# Patient Record
Sex: FEMALE | Race: BLACK OR AFRICAN AMERICAN | NOT HISPANIC OR LATINO | Employment: OTHER | URBAN - METROPOLITAN AREA
[De-identification: names, ages, dates, MRNs, and addresses within clinical notes are randomized per-mention and may not be internally consistent; named-entity substitution may affect disease eponyms.]

---

## 2017-01-11 DIAGNOSIS — I10 ESSENTIAL HYPERTENSION: ICD-10-CM

## 2017-01-11 RX ORDER — AMLODIPINE BESYLATE 5 MG/1
TABLET ORAL
Qty: 90 TABLET | Refills: 2 | Status: SHIPPED | OUTPATIENT
Start: 2017-01-11 | End: 2017-10-15 | Stop reason: SDUPTHER

## 2017-01-27 RX ORDER — CANAGLIFLOZIN 300 MG/1
TABLET, FILM COATED ORAL
Qty: 30 TABLET | Refills: 5 | Status: SHIPPED | OUTPATIENT
Start: 2017-01-27 | End: 2017-05-23 | Stop reason: SDUPTHER

## 2017-02-17 RX ORDER — METFORMIN HYDROCHLORIDE 500 MG/1
TABLET, EXTENDED RELEASE ORAL
Qty: 360 TABLET | Refills: 0 | Status: SHIPPED | OUTPATIENT
Start: 2017-02-17 | End: 2017-05-18 | Stop reason: SDUPTHER

## 2017-03-30 RX ORDER — ATENOLOL 100 MG/1
TABLET ORAL
Qty: 60 CAPSULE | Refills: 4 | Status: SHIPPED | OUTPATIENT
Start: 2017-03-30 | End: 2017-09-03 | Stop reason: SDUPTHER

## 2017-04-01 DIAGNOSIS — E11.8 TYPE 2 DIABETES MELLITUS WITH COMPLICATION, WITHOUT LONG-TERM CURRENT USE OF INSULIN (HCC): Primary | ICD-10-CM

## 2017-04-01 DIAGNOSIS — E53.8 COBALAMIN DEFICIENCY: ICD-10-CM

## 2017-04-08 LAB
ALBUMIN SERPL-MCNC: 3.8 G/DL (ref 3.5–5.2)
ALBUMIN/GLOB SERPL: 1.5 G/DL
ALP SERPL-CCNC: 63 U/L (ref 39–117)
ALT SERPL-CCNC: 25 U/L (ref 1–33)
AST SERPL-CCNC: 25 U/L (ref 1–32)
BASOPHILS # BLD AUTO: 0.03 10*3/MM3 (ref 0–0.2)
BASOPHILS NFR BLD AUTO: 0.3 % (ref 0–1.5)
BILIRUB SERPL-MCNC: 0.4 MG/DL (ref 0.1–1.2)
BUN SERPL-MCNC: 9 MG/DL (ref 8–23)
BUN/CREAT SERPL: 11.5 (ref 7–25)
CALCIUM SERPL-MCNC: 9.9 MG/DL (ref 8.6–10.5)
CHLORIDE SERPL-SCNC: 102 MMOL/L (ref 98–107)
CHOLEST SERPL-MCNC: 160 MG/DL (ref 0–200)
CO2 SERPL-SCNC: 27.3 MMOL/L (ref 22–29)
CREAT SERPL-MCNC: 0.78 MG/DL (ref 0.57–1)
EOSINOPHIL # BLD AUTO: 0.19 10*3/MM3 (ref 0–0.7)
EOSINOPHIL NFR BLD AUTO: 2.1 % (ref 0.3–6.2)
ERYTHROCYTE [DISTWIDTH] IN BLOOD BY AUTOMATED COUNT: 15.4 % (ref 11.7–13)
GLOBULIN SER CALC-MCNC: 2.6 GM/DL
GLUCOSE SERPL-MCNC: 142 MG/DL (ref 65–99)
HBA1C MFR BLD: 7.47 % (ref 4.8–5.6)
HCT VFR BLD AUTO: 37.6 % (ref 35.6–45.5)
HDLC SERPL-MCNC: 68 MG/DL (ref 40–60)
HGB BLD-MCNC: 11.9 G/DL (ref 11.9–15.5)
IMM GRANULOCYTES # BLD: 0.02 10*3/MM3 (ref 0–0.03)
IMM GRANULOCYTES NFR BLD: 0.2 % (ref 0–0.5)
LDLC SERPL CALC-MCNC: 69 MG/DL (ref 0–100)
LYMPHOCYTES # BLD AUTO: 2.66 10*3/MM3 (ref 0.9–4.8)
LYMPHOCYTES NFR BLD AUTO: 29.4 % (ref 19.6–45.3)
MCH RBC QN AUTO: 29.2 PG (ref 26.9–32)
MCHC RBC AUTO-ENTMCNC: 31.6 G/DL (ref 32.4–36.3)
MCV RBC AUTO: 92.2 FL (ref 80.5–98.2)
MONOCYTES # BLD AUTO: 0.67 10*3/MM3 (ref 0.2–1.2)
MONOCYTES NFR BLD AUTO: 7.4 % (ref 5–12)
NEUTROPHILS # BLD AUTO: 5.49 10*3/MM3 (ref 1.9–8.1)
NEUTROPHILS NFR BLD AUTO: 60.6 % (ref 42.7–76)
PLATELET # BLD AUTO: 319 10*3/MM3 (ref 140–500)
POTASSIUM SERPL-SCNC: 4.5 MMOL/L (ref 3.5–5.2)
PROT SERPL-MCNC: 6.4 G/DL (ref 6–8.5)
RBC # BLD AUTO: 4.08 10*6/MM3 (ref 3.9–5.2)
SODIUM SERPL-SCNC: 144 MMOL/L (ref 136–145)
TRIGL SERPL-MCNC: 113 MG/DL (ref 0–150)
TSH SERPL DL<=0.005 MIU/L-ACNC: 2.18 MIU/ML (ref 0.27–4.2)
VIT B12 SERPL-MCNC: 1203 PG/ML (ref 211–946)
VLDLC SERPL CALC-MCNC: 22.6 MG/DL (ref 5–40)
WBC # BLD AUTO: 9.06 10*3/MM3 (ref 4.5–10.7)

## 2017-04-12 ENCOUNTER — OFFICE VISIT (OUTPATIENT)
Dept: INTERNAL MEDICINE | Facility: CLINIC | Age: 72
End: 2017-04-12

## 2017-04-12 VITALS
OXYGEN SATURATION: 97 % | HEIGHT: 62 IN | BODY MASS INDEX: 37.36 KG/M2 | DIASTOLIC BLOOD PRESSURE: 68 MMHG | WEIGHT: 203 LBS | HEART RATE: 60 BPM | SYSTOLIC BLOOD PRESSURE: 130 MMHG

## 2017-04-12 DIAGNOSIS — E11.8 TYPE 2 DIABETES MELLITUS WITH COMPLICATION, WITHOUT LONG-TERM CURRENT USE OF INSULIN (HCC): Primary | ICD-10-CM

## 2017-04-12 DIAGNOSIS — E78.5 HYPERLIPIDEMIA, UNSPECIFIED HYPERLIPIDEMIA TYPE: ICD-10-CM

## 2017-04-12 DIAGNOSIS — I10 ESSENTIAL HYPERTENSION: ICD-10-CM

## 2017-04-12 PROCEDURE — 99214 OFFICE O/P EST MOD 30 MIN: CPT | Performed by: INTERNAL MEDICINE

## 2017-04-12 RX ORDER — ROSUVASTATIN CALCIUM 20 MG/1
TABLET, COATED ORAL
Qty: 90 TABLET | Refills: 1 | Status: SHIPPED | OUTPATIENT
Start: 2017-04-12 | End: 2017-10-15 | Stop reason: SDUPTHER

## 2017-04-12 RX ORDER — HYDRALAZINE HYDROCHLORIDE 100 MG/1
TABLET, FILM COATED ORAL
Qty: 180 TABLET | Refills: 1 | Status: SHIPPED | OUTPATIENT
Start: 2017-04-12 | End: 2017-10-15 | Stop reason: SDUPTHER

## 2017-04-13 NOTE — PROGRESS NOTES
Subjective     Kassi Mathis is a 72 y.o. female who presents with   Chief Complaint   Patient presents with   • Diabetes   • Hypertension   • Hyperlipidemia       History of Present Illness     DM-2. She is maintained on metformin, Januvia and Invokana.  HTN. She is under good control at home.  HLD. She looks good with Crestor.      Review of Systems   Respiratory: Negative.    Cardiovascular: Negative.        The following portions of the patient's history were reviewed and updated as appropriate: allergies, current medications and problem list.    Patient Active Problem List    Diagnosis Date Noted   • Type 2 diabetes mellitus 01/17/2016     Priority: High   • Depression 01/17/2016   • Allergic rhinitis 01/17/2016   • Hypertension 01/17/2016   • Dementia without behavioral disturbance 01/17/2016     Note Last Updated: 7/20/2016     By neuropysch evaluation 7/20/2016     • Paroxysmal atrial fibrillation 01/17/2016   • Cobalamin deficiency 01/17/2016     Note Last Updated: 6/8/2016     Boarderline only with normal MMA/Homocysteine/IF antibody.  Encouraged MVI daily.       • Hyperlipidemia 01/17/2016       Current Outpatient Prescriptions on File Prior to Visit   Medication Sig Dispense Refill   • acetaminophen (TYLENOL) 325 MG tablet Take 650 mg by mouth Every 6 (Six) Hours.     • amLODIPine (NORVASC) 5 MG tablet TAKE ONE TABLET BY MOUTH DAILY 90 tablet 2   • cetirizine (ZyrTEC) 10 MG tablet Take 1 tablet by mouth daily.     • donepezil (ARICEPT) 10 MG tablet      • ferrous sulfate 325 (65 FE) MG tablet Take 1 tablet by mouth 2 (two) times a day. EVERY OTHER DAY     • fluticasone (FLONASE) 50 MCG/ACT nasal spray PLACE 2 SPRAYS IN EACH NOSTRIL DAILY 16 each 10   • glucose blood test strip Use daily for type 2 DM. 100 each 3   • INVOKANA 300 MG tablet TAKE ONE TABLET BY MOUTH DAILY 30 tablet 5   • JANUVIA 100 MG tablet TAKE ONE TABLET BY MOUTH DAILY 30 tablet 10   • Melatonin 10 MG capsule Take  by mouth.     •  "metFORMIN ER (GLUCOPHAGE-XR) 500 MG 24 hr tablet TAKE TWO TABLETS BY MOUTH EVERY 12 HOURS 360 tablet 0   • metoprolol succinate XL (TOPROL-XL) 50 MG 24 hr tablet TAKE TWO TABLETS BY MOUTH EVERY MORNING 180 tablet 1   • Multiple Vitamin (MULTIVITAMINS PO) Take 1 tablet by mouth daily.     • PRADAXA 150 MG capsu TAKE ONE CAPSULE BY MOUTH TWICE A DAY 60 capsule 4   • sertraline (ZOLOFT) 25 MG tablet Take 25 mg by mouth Daily.     • buPROPion XL (WELLBUTRIN XL) 300 MG 24 hr tablet daily.         No current facility-administered medications on file prior to visit.        Objective     /68  Pulse 60  Ht 62\" (157.5 cm)  Wt 203 lb (92.1 kg)  LMP  (LMP Unknown)  SpO2 97%  BMI 37.13 kg/m2    Physical Exam   Constitutional: She is oriented to person, place, and time. She appears well-developed and well-nourished.   HENT:   Head: Normocephalic and atraumatic.   Cardiovascular: Normal rate, regular rhythm and normal heart sounds.    Pulmonary/Chest: Effort normal and breath sounds normal.   Neurological: She is alert and oriented to person, place, and time.   Skin: Skin is warm and dry.   Psychiatric: She has a normal mood and affect. Her behavior is normal.       Assessment/Plan   Kassi was seen today for diabetes, hypertension and hyperlipidemia.    Diagnoses and all orders for this visit:    Type 2 diabetes mellitus with complication, without long-term current use of insulin    Essential hypertension    Hyperlipidemia, unspecified hyperlipidemia type        Discussion  DM-2. Continue current regimen.   HTN. Continue current regimen.   HLD. Continue Crestor.        Future Appointments  Date Time Provider Department Center   6/8/2017 2:00 PM Mode Gongora MD [facility] None   10/10/2017 1:30 PM [facility] None   10/17/2017 1:30 PM Parul Jaime MD [facility] None         "

## 2017-05-01 RX ORDER — SITAGLIPTIN 100 MG/1
TABLET, FILM COATED ORAL
Qty: 30 TABLET | Refills: 9 | Status: SHIPPED | OUTPATIENT
Start: 2017-05-01 | End: 2017-05-23 | Stop reason: SDUPTHER

## 2017-05-18 RX ORDER — METFORMIN HYDROCHLORIDE 500 MG/1
TABLET, EXTENDED RELEASE ORAL
Qty: 360 TABLET | Refills: 0 | Status: SHIPPED | OUTPATIENT
Start: 2017-05-18 | End: 2017-08-20 | Stop reason: SDUPTHER

## 2017-05-30 RX ORDER — METOPROLOL SUCCINATE 50 MG/1
TABLET, EXTENDED RELEASE ORAL
Qty: 180 TABLET | Refills: 0 | Status: SHIPPED | OUTPATIENT
Start: 2017-05-30 | End: 2017-08-26 | Stop reason: SDUPTHER

## 2017-06-01 RX ORDER — DONEPEZIL HYDROCHLORIDE 5 MG/1
TABLET, FILM COATED ORAL
Qty: 30 TABLET | Refills: 3 | Status: SHIPPED | OUTPATIENT
Start: 2017-06-01 | End: 2017-07-12

## 2017-06-07 ENCOUNTER — TELEPHONE (OUTPATIENT)
Dept: CARDIOLOGY | Facility: CLINIC | Age: 72
End: 2017-06-07

## 2017-06-07 NOTE — TELEPHONE ENCOUNTER
Pt's daughter is not able to attend appt with pt.  She has mild demntia has some questions for appt in her absence.      Pt has an appt tomorrow on 06/08.  Pt's bp is usually running fine, 120's/50's, but does c/o headaches.  They are concerned that it may be a side affect from the Hydralazin 100 mg bid.    Do you think pt would be okay to take Calcium and Magnesium to assist with afib.  She does c/o of dizziness at times.  Went over pt's medication over the phone.       Note: will print and add to chart and discuss with Dr. Gongora about any changes after appt.

## 2017-06-08 ENCOUNTER — OFFICE VISIT (OUTPATIENT)
Dept: CARDIOLOGY | Facility: CLINIC | Age: 72
End: 2017-06-08

## 2017-06-08 VITALS
DIASTOLIC BLOOD PRESSURE: 70 MMHG | SYSTOLIC BLOOD PRESSURE: 122 MMHG | BODY MASS INDEX: 37.17 KG/M2 | WEIGHT: 202 LBS | HEART RATE: 66 BPM | HEIGHT: 62 IN

## 2017-06-08 DIAGNOSIS — I48.0 PAROXYSMAL ATRIAL FIBRILLATION (HCC): Primary | ICD-10-CM

## 2017-06-08 DIAGNOSIS — I10 ESSENTIAL HYPERTENSION: ICD-10-CM

## 2017-06-08 PROCEDURE — 99213 OFFICE O/P EST LOW 20 MIN: CPT | Performed by: INTERNAL MEDICINE

## 2017-06-08 PROCEDURE — 93000 ELECTROCARDIOGRAM COMPLETE: CPT | Performed by: INTERNAL MEDICINE

## 2017-06-08 NOTE — PROGRESS NOTES
Date of Office Visit: 2017  Encounter Provider: Mode Gongora MD  Place of Service: [facility] CARDIOLOGY  Patient Name: Kassi Mathis  :1945    Chief Complaint   Patient presents with   • Atrial Fibrillation     6 MONTH    :     HPI: Kassi Mathis is a 72 y.o. female who presents today to followup. She has a long-standing history of high blood pressure. She has paroxysmal atrial fibrillation. She had an echocardiogram in 2015 which revealed normal left ventricular systolic function without pulmonary hypertension or valvular heart disease. She only had grade I diastolic dysfunction. She had a Cardiolite stress test which was normal. She was markedly hypertensive at that time and she was placed on an increased dose of hydralazine/isosorbide as well as furosemide.  The diuretic then had to be stopped because she was getting orthostatic and dehydrated. She was unable to get the combination medication so she was changed to hydralazine alone.     She had occasional palpitations, which are isolated. She has not had any sustained atrial fibrillation. She denies edema, dyspnea, lightheadedness, syncope, or chest pain. She has not had bleeding on dabigatran.  Her memory continues to decline.     Her daughter checks her BP regularly at home, and her systolics are in the 120's.  She is having headaches and some positional lightheadedness.     Past Medical History:   Diagnosis Date   • Allergic rhinitis    • Anemia    • Chronic venous insufficiency    • Dementia    • Depression    • Diabetes mellitus    • Hidradenitis suppurativa    • Hyperlipidemia    • Hypertension    • Osteoarthritis    • Paroxysmal atrial fibrillation    • Peripheral neuropathy    • Stroke    • Vitamin B 12 deficiency     Boarderline only with normal MMA/Homocysteine/IF antibody       Past Surgical History:   Procedure Laterality Date   • BACK SURGERY     • HYSTERECTOMY      PARTIAL   • KNEE SURGERY Left         Social History     Social History   • Marital status:      Spouse name: N/A   • Number of children: N/A   • Years of education: N/A     Occupational History   • Not on file.     Social History Main Topics   • Smoking status: Former Smoker   • Smokeless tobacco: Not on file      Comment: CAFFEINE USE/ SOFT DRINKS.    • Alcohol use No   • Drug use: No   • Sexual activity: Not on file     Other Topics Concern   • Not on file     Social History Narrative       Family History   Problem Relation Age of Onset   • Breast cancer Mother    • Stroke Father    • Stroke Maternal Grandmother    • Heart attack Maternal Grandmother    • Heart attack Maternal Grandfather        Review of Systems   Cardiovascular: Positive for palpitations.   Neurological: Positive for light-headedness.   Psychiatric/Behavioral: Positive for memory loss.   All other systems reviewed and are negative.      Allergies   Allergen Reactions   • Enalapril      Aka vasotec tabs   • Namenda [Memantine Hcl]      imbalance   • Penicillins          Current Outpatient Prescriptions:   •  acetaminophen (TYLENOL) 325 MG tablet, Take 650 mg by mouth Every 6 (Six) Hours., Disp: , Rfl:   •  amLODIPine (NORVASC) 5 MG tablet, TAKE ONE TABLET BY MOUTH DAILY, Disp: 90 tablet, Rfl: 2  •  buPROPion XL (WELLBUTRIN XL) 300 MG 24 hr tablet, daily.  , Disp: , Rfl:   •  Canagliflozin (INVOKANA) 300 MG tablet, Take 300 mg by mouth Daily., Disp: 90 tablet, Rfl: 1  •  cetirizine (ZyrTEC) 10 MG tablet, Take 1 tablet by mouth daily., Disp: , Rfl:   •  donepezil (ARICEPT) 10 MG tablet, , Disp: , Rfl:   •  donepezil (ARICEPT) 5 MG tablet, TAKE ONE TABLET BY MOUTH EVERY NIGHT AT BEDTIME, Disp: 30 tablet, Rfl: 3  •  ferrous sulfate 325 (65 FE) MG tablet, Take 1 tablet by mouth 2 (two) times a day. EVERY OTHER DAY, Disp: , Rfl:   •  fluticasone (FLONASE) 50 MCG/ACT nasal spray, PLACE 2 SPRAYS IN EACH NOSTRIL DAILY, Disp: 16 each, Rfl: 10  •  glucose blood test strip, Use  "daily for type 2 DM., Disp: 100 each, Rfl: 3  •  hydrALAZINE (APRESOLINE) 100 MG tablet, TAKE ONE TABLET BY MOUTH TWICE A DAY, Disp: 180 tablet, Rfl: 1  •  Melatonin 10 MG capsule, Take  by mouth., Disp: , Rfl:   •  metFORMIN ER (GLUCOPHAGE-XR) 500 MG 24 hr tablet, TAKE TWO TABLETS BY MOUTH EVERY 12 HOURS, Disp: 360 tablet, Rfl: 0  •  metoprolol succinate XL (TOPROL-XL) 50 MG 24 hr tablet, TAKE TWO TABLETS BY MOUTH EVERY MORNING, Disp: 180 tablet, Rfl: 0  •  Multiple Vitamin (MULTIVITAMINS PO), Take 1 tablet by mouth daily., Disp: , Rfl:   •  PRADAXA 150 MG capsu, TAKE ONE CAPSULE BY MOUTH TWICE A DAY, Disp: 60 capsule, Rfl: 4  •  rosuvastatin (CRESTOR) 20 MG tablet, TAKE ONE TABLET BY MOUTH EVERY NIGHT AT BEDTIME, Disp: 90 tablet, Rfl: 1  •  sertraline (ZOLOFT) 100 MG tablet, Take 100 mg by mouth Daily., Disp: , Rfl:   •  SITagliptin (JANUVIA) 100 MG tablet, Take 1 tablet by mouth Daily., Disp: 90 tablet, Rfl: 1     Objective:     Vitals:    06/08/17 1357 06/08/17 1656   BP: 158/72 122/70   Pulse: 66    Weight: 202 lb (91.6 kg)    Height: 62\" (157.5 cm)      Body mass index is 36.95 kg/(m^2).    Physical Exam   Constitutional: She is oriented to person, place, and time. She appears well-developed and well-nourished.   HENT:   Head: Normocephalic.   Nose: Nose normal.   Mouth/Throat: Oropharynx is clear and moist.   Eyes: Conjunctivae and EOM are normal. Pupils are equal, round, and reactive to light.   Neck: Normal range of motion. No JVD present.   Cardiovascular: Normal rate, regular rhythm, normal heart sounds and intact distal pulses.    No murmur heard.  Pulmonary/Chest: Effort normal and breath sounds normal.   Abdominal: Soft. She exhibits no mass. There is no tenderness.   Musculoskeletal: Normal range of motion. She exhibits no edema.   Lymphadenopathy:     She has no cervical adenopathy.   Neurological: She is alert and oriented to person, place, and time. No cranial nerve deficit.   Skin: Skin is warm " and dry. No rash noted.   Psychiatric: She has a normal mood and affect. Her behavior is normal. Judgment and thought content normal.   Vitals reviewed.        ECG 12 Lead  Date/Time: 6/8/2017 4:54 PM  Performed by: MODE GONGORA  Authorized by: MODE GONGORA   Comparison: compared with previous ECG   Similar to previous ECG  Rhythm: sinus rhythm  Conduction: conduction normal  ST Depression: II, III, aVF, V5 and V6  QRS axis: normal  Other: no other findings  Clinical impression: abnormal ECG              Assessment:       Diagnosis Plan   1. Paroxysmal atrial fibrillation     2. Essential hypertension            Plan:       1.  Atrial Fibrillation and Atrial Flutter  Assessment  • The patient has paroxysmal atrial fibrillation  • This is non-valvular in etiology  • The patient's CHADS2-VASc score is 6  • A [id number] score of 2 or more is considered a high risk for a thromboembolic event  • Dabigatran prescribed    Plan  • Attempt to maintain sinus rhythm  • Continue dabigatran for antithrombotic therapy, bleeding issues discussed  • Continue beta blocker for rhythm control    2.  Her SBP is probably a little too low for her.  She has positional lightheadedness.  I have decreased her hydralazine to 50mg BID.        Sincerely,       Mode Gongora MD

## 2017-06-08 NOTE — TELEPHONE ENCOUNTER
Dr. Gongora recommends that her goal sys bp at 140, so pt will need to cut her Hydralizine in half.   Calcium and Mag are fine for pt to take.      I tried calling, but had to leave a v/m for pt's daughter with this info.  I instructed her to call back if she has any questions.

## 2017-07-12 ENCOUNTER — OFFICE VISIT (OUTPATIENT)
Dept: INTERNAL MEDICINE | Facility: CLINIC | Age: 72
End: 2017-07-12

## 2017-07-12 VITALS
SYSTOLIC BLOOD PRESSURE: 132 MMHG | BODY MASS INDEX: 36.99 KG/M2 | WEIGHT: 201 LBS | DIASTOLIC BLOOD PRESSURE: 84 MMHG | HEIGHT: 62 IN | OXYGEN SATURATION: 100 % | HEART RATE: 66 BPM

## 2017-07-12 DIAGNOSIS — M54.50 ACUTE BILATERAL LOW BACK PAIN WITHOUT SCIATICA: ICD-10-CM

## 2017-07-12 DIAGNOSIS — M25.511 ACUTE PAIN OF RIGHT SHOULDER: ICD-10-CM

## 2017-07-12 DIAGNOSIS — M25.532 WRIST PAIN, ACUTE, LEFT: Primary | ICD-10-CM

## 2017-07-12 DIAGNOSIS — Z13.9 SCREENING: ICD-10-CM

## 2017-07-12 DIAGNOSIS — E11.8 TYPE 2 DIABETES MELLITUS WITH COMPLICATION, WITHOUT LONG-TERM CURRENT USE OF INSULIN (HCC): ICD-10-CM

## 2017-07-12 PROCEDURE — 73030 X-RAY EXAM OF SHOULDER: CPT | Performed by: INTERNAL MEDICINE

## 2017-07-12 PROCEDURE — 99214 OFFICE O/P EST MOD 30 MIN: CPT | Performed by: INTERNAL MEDICINE

## 2017-07-12 PROCEDURE — 72110 X-RAY EXAM L-2 SPINE 4/>VWS: CPT | Performed by: INTERNAL MEDICINE

## 2017-07-12 NOTE — PROGRESS NOTES
Subjective     Kassi Mathis is a 72 y.o. female who presents with   Chief Complaint   Patient presents with   • Back Pain   • Left hand pain       History of Present Illness     Left wrist pain.  Going on for a week.  No injury is associated.  Swelling associated.  Getting better.     LBP.  Going for a couple months. No injury is associated.  Localized to the back.      Shoulder pain for eight months.  Aches with certain montions. Decreased ROM    Review of Systems   Respiratory: Negative.    Cardiovascular: Negative.        The following portions of the patient's history were reviewed and updated as appropriate: allergies, current medications and problem list.    Patient Active Problem List    Diagnosis Date Noted   • Type 2 diabetes mellitus 01/17/2016     Priority: High   • Depression 01/17/2016   • Allergic rhinitis 01/17/2016   • Hypertension 01/17/2016   • Dementia without behavioral disturbance 01/17/2016     Note Last Updated: 7/20/2016     By neuropysch evaluation 7/20/2016     • Paroxysmal atrial fibrillation 01/17/2016   • Cobalamin deficiency 01/17/2016     Note Last Updated: 6/8/2016     Boarderline only with normal MMA/Homocysteine/IF antibody.  Encouraged MVI daily.       • Hyperlipidemia 01/17/2016       Current Outpatient Prescriptions on File Prior to Visit   Medication Sig Dispense Refill   • acetaminophen (TYLENOL) 325 MG tablet Take 650 mg by mouth Every 6 (Six) Hours.     • amLODIPine (NORVASC) 5 MG tablet TAKE ONE TABLET BY MOUTH DAILY 90 tablet 2   • buPROPion XL (WELLBUTRIN XL) 300 MG 24 hr tablet daily.       • cetirizine (ZyrTEC) 10 MG tablet Take 1 tablet by mouth daily.     • donepezil (ARICEPT) 10 MG tablet      • ferrous sulfate 325 (65 FE) MG tablet Take 1 tablet by mouth 2 (two) times a day. EVERY OTHER DAY     • fluticasone (FLONASE) 50 MCG/ACT nasal spray PLACE 2 SPRAYS IN EACH NOSTRIL DAILY 16 each 10   • glucose blood test strip Use daily for type 2 DM. 100 each 3   •  "hydrALAZINE (APRESOLINE) 100 MG tablet TAKE ONE TABLET BY MOUTH TWICE A DAY (Patient taking differently: TAKE ONE HALF TABLET TWICE DAILY) 180 tablet 1   • Melatonin 10 MG capsule Take  by mouth.     • metFORMIN ER (GLUCOPHAGE-XR) 500 MG 24 hr tablet TAKE TWO TABLETS BY MOUTH EVERY 12 HOURS 360 tablet 0   • metoprolol succinate XL (TOPROL-XL) 50 MG 24 hr tablet TAKE TWO TABLETS BY MOUTH EVERY MORNING 180 tablet 0   • Multiple Vitamin (MULTIVITAMINS PO) Take 1 tablet by mouth daily.     • PRADAXA 150 MG capsu TAKE ONE CAPSULE BY MOUTH TWICE A DAY 60 capsule 4   • rosuvastatin (CRESTOR) 20 MG tablet TAKE ONE TABLET BY MOUTH EVERY NIGHT AT BEDTIME 90 tablet 1   • sertraline (ZOLOFT) 100 MG tablet Take 100 mg by mouth Daily.     • SITagliptin (JANUVIA) 100 MG tablet Take 1 tablet by mouth Daily. 90 tablet 1   • [DISCONTINUED] Canagliflozin (INVOKANA) 300 MG tablet Take 300 mg by mouth Daily. 90 tablet 1   • [DISCONTINUED] donepezil (ARICEPT) 5 MG tablet TAKE ONE TABLET BY MOUTH EVERY NIGHT AT BEDTIME 30 tablet 3     No current facility-administered medications on file prior to visit.        Objective     /84  Pulse 66  Ht 62\" (157.5 cm)  Wt 201 lb (91.2 kg)  LMP  (LMP Unknown)  SpO2 100%  BMI 36.76 kg/m2    Physical Exam   Constitutional: She is oriented to person, place, and time. She appears well-developed and well-nourished.   HENT:   Head: Normocephalic and atraumatic.   Pulmonary/Chest: Effort normal.   Musculoskeletal:        Right shoulder: She exhibits tenderness. She exhibits normal range of motion, no bony tenderness, no swelling and no effusion.        Left wrist: She exhibits decreased range of motion and tenderness. She exhibits no effusion.        Lumbar back: She exhibits normal range of motion, no tenderness, no bony tenderness, no swelling, no deformity and no spasm.   Neurological: She is alert and oriented to person, place, and time.   Psychiatric: She has a normal mood and affect. Her " behavior is normal.     X-rays of the wrist, lumbar, right shoulder  performed today for following indication:   pain.  X-ray reveal Lumbar previous surgery.  Right shoulder DJD.  Wrist NAD.  There is no available x-ray for comparison.  X-ray sent to radiology for official interpretation and findings.        Assessment/Plan   Kassi was seen today for back pain and left hand pain.    Diagnoses and all orders for this visit:    Wrist pain, acute, left  -     XR Wrist 3+ View Left; Future  -     Sedimentation Rate  -     Rheumatoid Factor, Quant  -     Cyclic Citrul Peptide Antibody, IgG / IgA    Acute bilateral low back pain without sciatica  -     XR Spine Lumbar 4+ View  -     Sedimentation Rate  -     Rheumatoid Factor, Quant  -     Cyclic Citrul Peptide Antibody, IgG / IgA  -     Ambulatory Referral to Physical Therapy    Acute pain of right shoulder  -     XR Shoulder 2+ View Right  -     Sedimentation Rate  -     Rheumatoid Factor, Quant  -     Cyclic Citrul Peptide Antibody, IgG / IgA  -     Ambulatory Referral to Physical Therapy    Screening  -     Vascular Screening (Bundle) CAR; Future    Other orders  -     diclofenac (VOLTAREN) 1 % gel gel; Apply 4 g topically 4 (Four) Times a Day As Needed (left wrist pain).  -     Empagliflozin (JARDIANCE) 10 MG tablet; Take 10 mg by mouth Daily.        Discussion  Left wrist pain.  Likley tendonitis.  Trial of Voltaren gel which her daughter has at home.    LBP.  Degenerative in nature.  Trial of tylenol arthritis and PT.   Right shoulder pain.  Trial of PT and tylenol arthritis.    Let me know if not feeling better over the next 7 days or if there is any change in symptoms.    DM-2.  Because of black box warning for Invokana we are switching to Jardiance.    Given multitude of joint symptoms her daughter who has RA requests her mom tested which is reasonable.   15/25 minutes spent in counseling regarding:  Risks/benefits of medication, test results.           Future  Appointments  Date Time Provider Department Center   7/27/2017 2:45 PM Alba Crews, PT  [facility]   10/10/2017 1:30 PM [facility] None   10/17/2017 1:30 PM MD CARMENZA Patiño [facility] None   3/8/2018 2:30 PM MD [facility] None

## 2017-07-14 LAB
CCP IGA+IGG SERPL IA-ACNC: 8 UNITS (ref 0–19)
ERYTHROCYTE [SEDIMENTATION RATE] IN BLOOD BY WESTERGREN METHOD: 54 MM/HR (ref 0–30)
RHEUMATOID FACT SERPL-ACNC: 13 IU/ML (ref 0–13.9)

## 2017-07-27 ENCOUNTER — HOSPITAL ENCOUNTER (OUTPATIENT)
Dept: PHYSICAL THERAPY | Facility: HOSPITAL | Age: 72
Setting detail: THERAPIES SERIES
Discharge: HOME | End: 2017-07-27

## 2017-07-27 DIAGNOSIS — M25.511 RIGHT SHOULDER PAIN, UNSPECIFIED CHRONICITY: Primary | ICD-10-CM

## 2017-07-27 DIAGNOSIS — G89.29 CHRONIC MIDLINE LOW BACK PAIN WITHOUT SCIATICA: ICD-10-CM

## 2017-07-27 DIAGNOSIS — M54.50 CHRONIC MIDLINE LOW BACK PAIN WITHOUT SCIATICA: ICD-10-CM

## 2017-07-27 PROCEDURE — G8979 MOBILITY GOAL STATUS: HCPCS | Performed by: PHYSICAL THERAPIST

## 2017-07-27 PROCEDURE — 97162 PT EVAL MOD COMPLEX 30 MIN: CPT | Performed by: PHYSICAL THERAPIST

## 2017-07-27 PROCEDURE — G0283 ELEC STIM OTHER THAN WOUND: HCPCS | Performed by: PHYSICAL THERAPIST

## 2017-07-27 PROCEDURE — G8978 MOBILITY CURRENT STATUS: HCPCS | Performed by: PHYSICAL THERAPIST

## 2017-07-28 NOTE — THERAPY EVALUATION
Outpatient Physical Therapy Ortho Initial Evaluation  [facility]     Patient Name: Kassi Mathis  : 1945  MRN: [id number]  Today's Date: 2017      Visit Date: 2017    Patient Active Problem List   Diagnosis   • Depression   • Allergic rhinitis   • Hypertension   • Dementia without behavioral disturbance   • Paroxysmal atrial fibrillation   • Cobalamin deficiency   • Type 2 diabetes mellitus   • Hyperlipidemia        Past Medical History:   Diagnosis Date   • Allergic rhinitis    • Anemia    • Chronic venous insufficiency    • Dementia    • Depression    • Diabetes mellitus    • Hidradenitis suppurativa    • Hyperlipidemia    • Hypertension    • Osteoarthritis    • Paroxysmal atrial fibrillation    • Peripheral neuropathy    • Stroke    • Vitamin B 12 deficiency     Boarderline only with normal MMA/Homocysteine/IF antibody        Past Surgical History:   Procedure Laterality Date   • BACK SURGERY     • HYSTERECTOMY      PARTIAL   • KNEE SURGERY Left        Visit Dx:     ICD-10-CM ICD-9-CM   1. Right shoulder pain, unspecified chronicity M25.511 719.41   2. Chronic midline low back pain without sciatica M54.5 724.2    G89.29 338.29             Patient History       17 1500          History    Chief Complaint Pain;Difficulty with daily activities  -KJ      Type of Pain Back pain;Shoulder pain  -KJ      Date Current Problem(s) Began 17  -KJ      Brief Description of Current Complaint Back pain worsening over past two years,  history of back surgery in , daughter thinks a fusion. Increased pain with any walking, prolonged standing >5-6 minutes. Decreased pain with sitting usually, sleeping, elevating in lounger. 4-5/10 on VAS now, worst over past week 6-7/10, best 2/10. Taking Tyelnol OA, Salonpas and that does help. No formal exercise program. Lives alone, takes care of her house. Pain stays mostly in lower back. No increased pain with valsalva. No falls. R shoulder off and on  "for about a year. Increased pain with washing dishes, cooking, bringing elbow out to side. No issues with sleeping. Pain in shoulder and upper arm. R handed. Tyelnol and Salonpas help that. Haven't tried heating or ice pack.   -KJ      Fall Risk Assessment    Any falls in the past year: No  -KJ        User Key  (r) = Recorded By, (t) = Taken By, (c) = Cosigned By    Initials Name Provider Type    JAMIL Crews, PT Physical Therapist                PT Ortho       07/27/17 1400    Posture/Observations    Posture/Observations Comments [place] flexed posture with R scoliosis noted   -KJ    Sensory Screen for Light Touch- Lower Quarter Clearing    L1 (inguinal area) Bilateral:;Intact  -KJ    L2 (anterior mid thigh) Bilateral:;Intact  -KJ    L3 (distal anterior thigh) Right:;Diminished;Left:;Intact  -KJ    L4 (medial lower leg/foot) Bilateral:;Intact  -KJ    L5 (lateral lower leg/great toe) Bilateral:;Intact  -KJ    Myotomal Screen- Lower Quarter Clearing    Hip flexion (L2) Bilateral:;4+ (Good +)  -KJ    Knee extension (L3) Bilateral:;4+ (Good +)  -KJ    Ankle DF (L4) Bilateral:;4+ (Good +)  -KJ    Great toe extension (L5) Bilateral:;4+ (Good +)  -KJ    Ankle PF (S1) Bilateral:;4+ (Good +)   tested in sitting  -KJ    Knee flexion (S2) Bilateral:;4+ (Good +)  -KJ    Lumbar ROM Screen- Lower Quarter Clearing    Lumbar Flexion Impaired   60% of WNL \"uncomfortable\"  -KJ    Lumbar Extension Impaired   50% of WNL with pain  -KJ    Lumbar Lateral Flexion Impaired   50% of WNL with pain B  -KJ    Lumbar Rotation Impaired   20% of WNL with pain B  -KJ    Lumbosacral Palpation    Erector Spinae (Paraspinals) Bilateral:;Tender;Guarded/taut   R>L  -KJ    Lumbosacral Accessory Motions    Lumbosacral Accessory Motions Tested? --   hypomobile but testing limited due to severe pain  -KJ    Lumbar/SI Special Tests    SLR (Neural Tension) Bilateral:;Negative   pain with R LAD  -KJ    Shoulder Girdle Palpation    Supraspinatus " Insertion Right:;Tender;Trigger point  -KJ    Upper Trap Right:;Tender;Guarded/taut;Trigger point   distal  -KJ    Shoulder Girdle Accessory Motions    Shoulder Girdle Accessory Motions Tested? --   normal PA, inferior without pain  -KJ    Shoulder Impingement/Rotator Cuff Special Tests    Phillip-Phu Test (RC Lesion vs. Bursitis) Right:;Negative  -KJ    Neer Impingement Test (RC Lesion vs. Bursitis) Right:;Negative   for shoulder pain, painful in low back  -KJ    Empty Can Test (RC Lesion) Right:;Positive   for pain  -KJ    Speed's Test (LH of Biceps Lesion) Right:;Negative  -KJ    Right Shoulder    Flexion ROM Details AROM 0-121 deg with pain  -KJ    ABduction ROM Details AROM 0-83 deg with pain  -KJ    External Rotation ROM Details ROJELIO 80% of WFL for age, equal to L side   B ROJELIO painful to low back  -KJ    Internal Rotation ROM Details FIR L3 B with pain on R  -KJ    Right Shoulder    Flexion Gross Movement (4-/5) good minus   pain  -KJ    ABduction Gross Movement (4-/5) good minus   with pain  -KJ    Int Rotation Gross Movement (4+/5) good plus  -KJ    Ext Rotation Gross Movement (4/5) good  -KJ    Lower Extremity Flexibility    Hamstrings Bilateral:;Mildly limited  -KJ    Hip External Rotators Right:;Severely limited;Left:;Mildly limited   very painful in LB  -KJ    Hip Internal Rotators Bilateral:;Moderately limited   mild pain in low back on L  -KJ      User Key  (r) = Recorded By, (t) = Taken By, (c) = Cosigned By    Initials Name Provider Type    JAMIL Crews PT Physical Therapist                            Therapy Education       07/28/17 1113          Therapy Education    Education Details Evaluation and imaging findings, use of e-stim for pain control, goals of therapy, realistic expectations.   -KJ      Given HEP;Symptoms/condition management;Pain management;Posture/body mechanics  -KJ      Program New  -KJ      How Provided Verbal;Demonstration  -KJ      Provided to Patient;Caregiver  -KJ       Level of Understanding Teach back education performed;Verbalized;Demonstrated  -KJ        User Key  (r) = Recorded By, (t) = Taken By, (c) = Cosigned By    Initials Name Provider Type    JAMIL Crews, PT Physical Therapist                PT OP Goals       07/27/17 2014       PT Short Term Goals    STG Date to Achieve 08/11/17  -KJ     STG 1 Pt. will be independent and compliant with initial home exercise program.  -KJ     STG 1 Progress New  -KJ     STG 2 Pt. will report low back pain </= 6-7/10 to increase ease of getting in/out of bed.  -KJ     STG 2 Progress New  -KJ     STG 3 Pt. will report R shoulder pain </=4-5/10 to increase ease of grooming.  -KJ     STG 3 Progress New  -KJ     Long Term Goals    LTG Date to Achieve 08/27/17  -KJ     LTG 1 Pt.w ill be independent and compliant with advanced home exercise program.  -KJ     LTG 1 Progress New  -KJ     LTG 2 Pt. will report low back pain </= 4-5/10 to increase ease of preparing a meal.  -KJ     LTG 2 Progress New  -KJ     LTG 3 Pt. will report R shoulder pain </=2-3/10 to increase ease of bathing.  -KJ     LTG 3 Progress New  -KJ     LTG 4 Pt. will score </=27% on DASH, indicating decreased perceived functional disability  -KJ     LTG 4 Progress New  -KJ     LTG 5 Pt. will score </= 40% on Oswestry, indicating decreased perceived functional disability.  -KJ     LTG 5 Progress New  -KJ     Time Calculation    PT Goal Re-Cert Due Date 08/26/17  -KJ       User Key  (r) = Recorded By, (t) = Taken By, (c) = Cosigned By    Initials Name Provider Type    JAMIL Crews, PT Physical Therapist                    Modalities       07/28/17 1100          Moist Heat    Location prone with LE over bolster with e-stim  -KJ      Rx Minutes 15 mins  -KJ      ELECTRICAL STIMULATION    Attended/Unattended Unattended  -KJ      Stimulation Type IFC  -KJ      Location/Electrode Placement/Other Lumbar spine  -KJ      Rx Minutes 15 mins  -KJ        User Key  (r) =  Recorded By, (t) = Taken By, (c) = Cosigned By    Initials Name Provider Type    JAMIL Crews PT Physical Therapist                                Outcome Measures       07/27/17 1500          DASH    Open a tight or new jar. 3  -KJ      Write 1  -KJ      Turn a key 2  -KJ      Prepare a meal 1  -KJ      Push open a heavy door 1  -KJ      Place an object on a shelf above your head 2  -KJ      Do heavy household chores (e.g., wash walls, wash floors) 4  -KJ      Garden or do yard work 5  -KJ      Make a bed 2  -KJ      Carry a shopping bag or briefcase 2  -KJ      Carry a heavy object (over 10 lbs) 2  -KJ      Change a lightbulb overhead 3  -KJ      Wash or blow dry your hair 1  -KJ      Wash your back 1  -KJ      Put on a pullover sweater 2  -KJ      Use a knife to cut food 1  -KJ      Recreational activities in which require little effort (e.g., cardplaying, knitting, etc.) 1  -KJ      Recreational activities in which you take some force or impact through your arm, should or hand (e.g. golf, hammering, tennis, etc.) 5  -KJ      Recreational Activities in which you move your arm freely (e.g., frisbee, badminton, etc.) 5  -KJ      Manage transportation needs (getting from one place to another) 1  -KJ      During the past week, to what extent has your arm, shoulder, or hand problem interfered with your normal social activites with family, friends, neighbors or groups? 4  -KJ      During the past week, were you limited in your work or other regular daily activities as a result of your arm, shoulder or hand problem? 3  -KJ      Arm, Shoulder, or hand pain 3  -KJ      Arm, shoulder or hand pain when you performed any specific activity 4  -KJ      Tingling (pins and needles) in your arm, shoulder, or hand 2  -KJ      Weakness in your arm, shoulder or hand 3  -KJ      Stiffness in your arm, shoulder or hand 3  -KJ      During the past week, how much difficulty have you had sleeping because of the pain in your arm,  shoulder or hand? 2  -KJ      I feel less capable, less confident or less useful because of my arm, shoulder or hand problem 3  -KJ      DASH Sum  72  -KJ      Number of Questions Answered 29  -KJ      DASH Score 37.07  -KJ      Modified Oswestry    Modified Oswestry Score/Comments 66%  -KJ      Functional Assessment    Outcome Measure Options Disabilities of the Arm, Shoulder, and Hand (DASH);Modifed Owestry  -KJ        User Key  (r) = Recorded By, (t) = Taken By, (c) = Cosigned By    Initials Name Provider Type    JAMIL Crews, PT Physical Therapist            Time Calculation:   Start Time: 1445  Stop Time: 1545  Time Calculation (min): 60 min     Therapy Charges for Today     Code Description Service Date Service Provider Modifiers Qty    44944529791 HC PT MOBILITY CURRENT 7/27/2017 Alba Crews, PT GP, CL 1    42031482960 HC PT MOBILITY PROJECTED 7/27/2017 Alba Crews, PT GP, CJ 1    42092559368 HC PT HOT OR COLD PACK TREAT MCARE 7/27/2017 Alba Crews, PT GP 1    77763141059 HC PT ELECTRICAL STIM UNATTENDED 7/27/2017 Alba Crews, PT  1    75878893742 HC PT EVAL MOD COMPLEXITY 3 7/27/2017 Alba Crews, PT GP 1          PT G-Codes  PT Professional Judgement Used?: Yes  Outcome Measure Options: Disabilities of the Arm, Shoulder, and Hand (DASH), Modifed Owestry  Score: 37% on DASH, 60% on Oswestry  Functional Limitation: Mobility: Walking and moving around  Mobility: Walking and Moving Around Current Status (): At least 60 percent but less than 80 percent impaired, limited or restricted  Mobility: Walking and Moving Around Goal Status (): At least 20 percent but less than 40 percent impaired, limited or restricted         Alba Crews, PT  7/28/2017

## 2017-08-03 ENCOUNTER — HOSPITAL ENCOUNTER (OUTPATIENT)
Dept: PHYSICAL THERAPY | Facility: HOSPITAL | Age: 72
Setting detail: THERAPIES SERIES
Discharge: HOME | End: 2017-08-03

## 2017-08-03 DIAGNOSIS — G89.29 CHRONIC MIDLINE LOW BACK PAIN WITHOUT SCIATICA: ICD-10-CM

## 2017-08-03 DIAGNOSIS — M25.511 RIGHT SHOULDER PAIN, UNSPECIFIED CHRONICITY: Primary | ICD-10-CM

## 2017-08-03 DIAGNOSIS — M54.50 CHRONIC MIDLINE LOW BACK PAIN WITHOUT SCIATICA: ICD-10-CM

## 2017-08-03 PROCEDURE — 97110 THERAPEUTIC EXERCISES: CPT | Performed by: PHYSICAL THERAPIST

## 2017-08-03 NOTE — THERAPY TREATMENT NOTE
Outpatient Physical Therapy Ortho Treatment Note  [facility]     Patient Name: Kassi Mathis  : 1945  MRN: [id number]  Today's Date: 8/3/2017      Visit Date: 2017    Visit Dx:    ICD-10-CM ICD-9-CM   1. Right shoulder pain, unspecified chronicity M25.511 719.41   2. Chronic midline low back pain without sciatica M54.5 724.2    G89.29 338.29       Patient Active Problem List   Diagnosis   • Depression   • Allergic rhinitis   • Hypertension   • Dementia without behavioral disturbance   • Paroxysmal atrial fibrillation   • Cobalamin deficiency   • Type 2 diabetes mellitus   • Hyperlipidemia        Past Medical History:   Diagnosis Date   • Allergic rhinitis    • Anemia    • Chronic venous insufficiency    • Dementia    • Depression    • Diabetes mellitus    • Hidradenitis suppurativa    • Hyperlipidemia    • Hypertension    • Osteoarthritis    • Paroxysmal atrial fibrillation    • Peripheral neuropathy    • Stroke    • Vitamin B 12 deficiency     Boarderline only with normal MMA/Homocysteine/IF antibody        Past Surgical History:   Procedure Laterality Date   • BACK SURGERY     • HYSTERECTOMY      PARTIAL   • KNEE SURGERY Left                              PT Assessment/Plan       17 1713       PT Assessment    Assessment Comments Pt. with much less pain today, improved mobilty and was able to tolerate 45 minutes of exercises.   -KJ     PT Plan    PT Plan Comments Assess response to ther ex today, add to HEP   -KJ       User Key  (r) = Recorded By, (t) = Taken By, (c) = Cosigned By    Initials Name Provider Type    JAMIL Crews, PT Physical Therapist                    Exercises       17 1500          Subjective Comments    Subjective Comments Doing good. Back pain is 2/10 since I had to do some walking to another MD today. R shoulder is good, no pain. Haven't noticed it, don't feel anything.   -KJ      Subjective Pain    Pre-Treatment Pain Level 2  -KJ      Exercise 1     Exercise Name 1 Nu-Step U/LE  -KJ      Time (Minutes) 1 10  -KJ      Additional Comments L3  -KJ      Exercise 2    Exercise Name 2 Hip abduction in standing  -KJ      Cueing 2 Verbal  -KJ      Reps 2 10  -KJ      Exercise 3    Exercise Name 3 Shoulder extension  -KJ      Cueing 3 Verbal  -KJ      Reps 3 10  -KJ      Additional Comments YTB  -KJ      Exercise 4    Exercise Name 4 Wall slides flexion/scaption  -KJ      Cueing 4 Verbal  -KJ      Reps 4 10  -KJ      Exercise 5    Exercise Name 5 Scapular retraction  -KJ      Cueing 5 Verbal  -KJ      Reps 5 10  -KJ      Additional Comments YTB  -KJ      Exercise 6    Exercise Name 6 SKTC  -KJ      Cueing 6 Verbal  -KJ      Reps 6 5  -KJ      Time (Seconds) 6 5  -KJ      Exercise 7    Exercise Name 7 LTR  -KJ      Reps 7 5  -KJ      Time (Seconds) 7 5  -KJ      Exercise 8    Exercise Name 8 PPT  -KJ      Reps 8 15  -KJ      Exercise 9    Exercise Name 9 Hip adduction with TA  -KJ      Reps 9 15  -KJ      Exercise 10    Exercise Name 10 Hip Abduction with TA  -KJ        User Key  (r) = Recorded By, (t) = Taken By, (c) = Cosigned By    Initials Name Provider Type    JAMIL Crews, PT Physical Therapist                               PT OP Goals       08/03/17 1700       PT Short Term Goals    STG Date to Achieve 08/11/17  -KJ     STG 1 Pt. will be independent and compliant with initial home exercise program.  -KJ     STG 1 Progress Ongoing  -KJ     STG 1 Progress Comments Will issue next session.   -KJ     STG 2 Pt. will report low back pain </= 6-7/10 to increase ease of getting in/out of bed.  -KJ     STG 2 Progress Progressing  -KJ     STG 2 Progress Comments 2/10 pain today, will continue to monitor.   -KJ     STG 3 Pt. will report R shoulder pain </=4-5/10 to increase ease of grooming.  -KJ     STG 3 Progress Progressing  -KJ     STG 3 Progress Comments 0/10 pain today, will continue to monitor.   -KJ     Long Term Goals    LTG Date to Achieve 08/27/17  -KJ      LTG 1 Pt.w ill be independent and compliant with advanced home exercise program.  -KJ     LTG 1 Progress Ongoing  -KJ     LTG 2 Pt. will report low back pain </= 4-5/10 to increase ease of preparing a meal.  -KJ     LTG 2 Progress Progressing  -KJ     LTG 2 Progress Comments 2/10 pain today, will continue to monitor.   -KJ     LTG 3 Pt. will report R shoulder pain </=2-3/10 to increase ease of bathing.  -KJ     LTG 3 Progress Progressing  -KJ     LTG 3 Progress Comments 0/10 pain today, will continue to monitor.   -KJ     LTG 4 Pt. will score </=27% on DASH, indicating decreased perceived functional disability  -KJ     LTG 4 Progress Ongoing  -KJ     LTG 5 Pt. will score </= 40% on Oswestry, indicating decreased perceived functional disability.  -KJ     LTG 5 Progress Ongoing  -KJ       User Key  (r) = Recorded By, (t) = Taken By, (c) = Cosigned By    Initials Name Provider Type    JAMIL Crews, PT Physical Therapist                Therapy Education       08/03/17 4342          Therapy Education    Education Details IMportance of core strength.   -KJ      Given HEP;Symptoms/condition management;Pain management;Posture/body mechanics  -KJ      Program Reinforced  -KJ      Provided to Patient;Caregiver  -KJ      Level of Understanding Teach back education performed;Verbalized;Demonstrated  -KJ        User Key  (r) = Recorded By, (t) = Taken By, (c) = Cosigned By    Initials Name Provider Type    JAMIL Crews, PT Physical Therapist                Time Calculation:   Start Time: 1536  Stop Time: 1615  Time Calculation (min): 39 min    Therapy Charges for Today     Code Description Service Date Service Provider Modifiers Qty    21168607481  PT THER PROC EA 15 MIN 8/3/2017 Alba Crews, PT GP 3                    Alba Crews, PT  8/3/2017

## 2017-08-08 ENCOUNTER — HOSPITAL ENCOUNTER (OUTPATIENT)
Dept: PHYSICAL THERAPY | Facility: HOSPITAL | Age: 72
Setting detail: THERAPIES SERIES
Discharge: HOME | End: 2017-08-08

## 2017-08-08 DIAGNOSIS — M25.511 RIGHT SHOULDER PAIN, UNSPECIFIED CHRONICITY: Primary | ICD-10-CM

## 2017-08-08 DIAGNOSIS — M54.50 CHRONIC MIDLINE LOW BACK PAIN WITHOUT SCIATICA: ICD-10-CM

## 2017-08-08 DIAGNOSIS — G89.29 CHRONIC MIDLINE LOW BACK PAIN WITHOUT SCIATICA: ICD-10-CM

## 2017-08-08 PROCEDURE — 97110 THERAPEUTIC EXERCISES: CPT

## 2017-08-08 NOTE — THERAPY TREATMENT NOTE
Outpatient Physical Therapy Ortho Treatment Note  [facility]     Patient Name: Kassi Mathis  : 1945  MRN: [id number]  Today's Date: 2017      Visit Date: 2017    Visit Dx:    ICD-10-CM ICD-9-CM   1. Right shoulder pain, unspecified chronicity M25.511 719.41   2. Chronic midline low back pain without sciatica M54.5 724.2    G89.29 338.29       Patient Active Problem List   Diagnosis   • Depression   • Allergic rhinitis   • Hypertension   • Dementia without behavioral disturbance   • Paroxysmal atrial fibrillation   • Cobalamin deficiency   • Type 2 diabetes mellitus   • Hyperlipidemia        Past Medical History:   Diagnosis Date   • Allergic rhinitis    • Anemia    • Chronic venous insufficiency    • Dementia    • Depression    • Diabetes mellitus    • Hidradenitis suppurativa    • Hyperlipidemia    • Hypertension    • Osteoarthritis    • Paroxysmal atrial fibrillation    • Peripheral neuropathy    • Stroke    • Vitamin B 12 deficiency     Boarderline only with normal MMA/Homocysteine/IF antibody        Past Surgical History:   Procedure Laterality Date   • BACK SURGERY     • HYSTERECTOMY      PARTIAL   • KNEE SURGERY Left                              PT Assessment/Plan       17 1455       PT Assessment    Assessment Comments Pt demonstrates facial grimace with transitions such as sit to supine and rolling B. Discussed TA activation and advised pt to perform during transitional movements. Pt has difficulty and increased pain with supine exercises and discussed possibility of transitioning to aquatic therapy for treatment of low back. Pt would like to continue with land PT for shoulder pain and will work on initiating aquatic therapy for low back relief.   -CN     PT Plan    PT Plan Comments Follow up regarding scheduling aquatic therapy 1x/week. Continue to progress low back and shoulder HEP as tolerated.   -CN       User Key  (r) = Recorded By, (t) = Taken By, (c) = Cosigned By     Initials Name Provider Type    ARIEL Flaherty, JOSE Physical Therapist                    Exercises       08/08/17 1400          Subjective Comments    Subjective Comments The back is hurting pretty bad. My shoulder has been doing better. My back is worse than my shoulder.   -CN      Subjective Pain    Able to rate subjective pain? yes  -CN      Pre-Treatment Pain Level 6  -CN      Post-Treatment Pain Level 3  -CN      Exercise 1    Exercise Name 1 Nu-Step U/LE  -CN      Time (Minutes) 1 6  -CN      Additional Comments L5  -CN      Exercise 3    Exercise Name 3 Shoulder extension  -CN      Cueing 3 Verbal  -CN      Sets 3 2  -CN      Reps 3 10  -CN      Additional Comments peach latex free  -CN      Exercise 5    Exercise Name 5 Scapular retraction  -CN      Cueing 5 Verbal  -CN      Sets 5 2  -CN      Reps 5 10  -CN      Additional Comments Peach latex free  -CN      Exercise 6    Exercise Name 6 SKTC  -CN      Cueing 6 Verbal  -CN      Reps 6 3  -CN      Time (Seconds) 6 20  -CN      Exercise 7    Exercise Name 7 LTR  -CN      Reps 7 10  -CN      Time (Seconds) 7 5  -CN      Exercise 8    Exercise Name 8 PPT  -CN      Reps 8 15  -CN      Exercise 9    Exercise Name 9 Hip adduction with TA  -CN      Reps 9 15  -CN      Exercise 10    Exercise Name 10 Hip Abduction with TA (B and uni)  -CN      Cueing 10 Verbal  -CN      Reps 10 15  -CN      Additional Comments Peach latex free band  -CN        User Key  (r) = Recorded By, (t) = Taken By, (c) = Cosigned By    Initials Name Provider Type    ARIEL Flaherty, PT Physical Therapist                               PT OP Goals       08/08/17 1400       PT Short Term Goals    STG Date to Achieve 08/11/17  -CN     STG 1 Pt. will be independent and compliant with initial home exercise program.  -CN     STG 1 Progress Ongoing  -CN     STG 1 Progress Comments Pt advised to perform HEP daily for maximal therapeutic benefit.   -CN     STG 2 Pt. will report low  back pain </= 6-7/10 to increase ease of getting in/out of bed.  -CN     STG 2 Progress Progressing  -CN     STG 2 Progress Comments Pt reports pain rated 6/10 today.   -CN     STG 3 Pt. will report R shoulder pain </=4-5/10 to increase ease of grooming.  -CN     STG 3 Progress Progressing  -CN     Long Term Goals    LTG Date to Achieve 08/27/17  -CN     LTG 1 Pt.w ill be independent and compliant with advanced home exercise program.  -CN     LTG 1 Progress Ongoing  -CN     LTG 2 Pt. will report low back pain </= 4-5/10 to increase ease of preparing a meal.  -CN     LTG 2 Progress Progressing  -CN     LTG 3 Pt. will report R shoulder pain </=2-3/10 to increase ease of bathing.  -CN     LTG 3 Progress Progressing  -CN     LTG 4 Pt. will score </=27% on DASH, indicating decreased perceived functional disability  -CN     LTG 4 Progress Ongoing  -CN     LTG 5 Pt. will score </= 40% on Oswestry, indicating decreased perceived functional disability.  -CN     LTG 5 Progress Ongoing  -CN       User Key  (r) = Recorded By, (t) = Taken By, (c) = Cosigned By    Initials Name Provider Type    ARIEL Flaherty PT Physical Therapist                Therapy Education       08/08/17 1407          Therapy Education    Given HEP;Symptoms/condition management;Pain management;Posture/body mechanics  -CN      Program Progressed  -CN      How Provided Verbal;Demonstration  -CN      Provided to Patient  -CN      Level of Understanding Teach back education performed;Verbalized;Demonstrated  -CN        User Key  (r) = Recorded By, (t) = Taken By, (c) = Cosigned By    Initials Name Provider Type    ARIEL Flaherty PT Physical Therapist                Time Calculation:   Start Time: 1400  Stop Time: 1445  Time Calculation (min): 45 min    Therapy Charges for Today     Code Description Service Date Service Provider Modifiers Qty    75246941598  PT THER PROC EA 15 MIN 8/8/2017 Laurence Flaherty PT GP 3                     Laurence Flaherty, PT  8/8/2017

## 2017-08-10 ENCOUNTER — HOSPITAL ENCOUNTER (OUTPATIENT)
Dept: PHYSICAL THERAPY | Facility: HOSPITAL | Age: 72
Setting detail: THERAPIES SERIES
Discharge: HOME | End: 2017-08-10

## 2017-08-10 DIAGNOSIS — M54.50 CHRONIC MIDLINE LOW BACK PAIN WITHOUT SCIATICA: ICD-10-CM

## 2017-08-10 DIAGNOSIS — G89.29 CHRONIC MIDLINE LOW BACK PAIN WITHOUT SCIATICA: ICD-10-CM

## 2017-08-10 DIAGNOSIS — M25.511 RIGHT SHOULDER PAIN, UNSPECIFIED CHRONICITY: Primary | ICD-10-CM

## 2017-08-10 PROCEDURE — 97110 THERAPEUTIC EXERCISES: CPT

## 2017-08-10 NOTE — THERAPY TREATMENT NOTE
Outpatient Physical Therapy Ortho Treatment Note  [facility]     Patient Name: Kassi Mathis  : 1945  MRN: [id number]  Today's Date: 8/10/2017      Visit Date: 08/10/2017    Visit Dx:    ICD-10-CM ICD-9-CM   1. Right shoulder pain, unspecified chronicity M25.511 719.41   2. Chronic midline low back pain without sciatica M54.5 724.2    G89.29 338.29       Patient Active Problem List   Diagnosis   • Depression   • Allergic rhinitis   • Hypertension   • Dementia without behavioral disturbance   • Paroxysmal atrial fibrillation   • Cobalamin deficiency   • Type 2 diabetes mellitus   • Hyperlipidemia        Past Medical History:   Diagnosis Date   • Allergic rhinitis    • Anemia    • Chronic venous insufficiency    • Dementia    • Depression    • Diabetes mellitus    • Hidradenitis suppurativa    • Hyperlipidemia    • Hypertension    • Osteoarthritis    • Paroxysmal atrial fibrillation    • Peripheral neuropathy    • Stroke    • Vitamin B 12 deficiency     Boarderline only with normal MMA/Homocysteine/IF antibody        Past Surgical History:   Procedure Laterality Date   • BACK SURGERY     • HYSTERECTOMY      PARTIAL   • KNEE SURGERY Left                              PT Assessment/Plan       08/10/17 1431       PT Assessment    Assessment Comments Pt was able to demonstrate correct PPT with copious cuing verbal and tactile and noted it relieved tension in her back. She was educated in HEP and will need reinforcement--she reports her memory isn't too good.  Pt responded well to ther ex and further reported decreased pain after application of MH at end of session.  -SOFIA     PT Plan    PT Plan Comments Check response to last visit and to HEP,  Next visit consider adding LAQ and possibly hamstring stretch to HEP.  -SOFIA       User Key  (r) = Recorded By, (t) = Taken By, (c) = Cosigned By    Initials Name Provider Type    SOFIA Singletary, PT Physical Therapist                Modalities       08/10/17 1300           Subjective Pain    Post-Treatment Pain Level 1  -JA      Moist Heat    MH Applied Yes  -JA      Location Lumbar, supine with legs over traction stool  -JA      Rx Minutes 15 mins  -JA      MH S/P Rx No  -JA        User Key  (r) = Recorded By, (t) = Taken By, (c) = Cosigned By    Initials Name Provider Type    SOFIA Singletary, PT Physical Therapist                Exercises       08/10/17 1300          Subjective Comments    Subjective Comments I have a lot of arthritis.  I am feeling better this week compared with last week.  -JA      Subjective Pain    Able to rate subjective pain? yes  -JA      Pre-Treatment Pain Level 7  -JA      Post-Treatment Pain Level 1  -JA      Subjective Pain Comment 7 LBP, 6 R shld  -JA      Exercise 1    Exercise Name 1 Nu-Step U/LE  -JA      Time (Minutes) 1 6  -JA      Additional Comments L4  -JA      Exercise 2    Exercise Name 2 Heel raises  -JA      Cueing 2 Demo  -JA      Reps 2 10  -JA      Exercise 3    Exercise Name 3 Shoulder extension  -JA      Cueing 3 Demo  -JA      Sets 3 2  -JA      Reps 3 10  -JA      Additional Comments no band today due to arthritis in hand today  -JA      Exercise 4    Exercise Name 4 Partial Squats  -JA      Cueing 4 Demo;Tactile   keep weight shifted over heels  -JA      Reps 4 8  -JA      Exercise 5    Exercise Name 5 Scapular retraction  -JA      Cueing 5 --  -JA      Sets 5 --  -JA      Reps 5 --  -JA      Additional Comments --  -JA      Exercise 6    Exercise Name 6 SKTC  -JA      Cueing 6 Verbal  -JA      Reps 6 2   increased pain, stopped after 2 tries  -JA      Exercise 7    Exercise Name 7 LTR  -JA      Reps 7 5  -JA      Time (Seconds) 7 5  -JA      Exercise 8    Exercise Name 8 PPT  -JA      Reps 8 15  -JA      Exercise 9    Exercise Name 9 Hip adduction with TA  -JA      Reps 9 15  -JA      Exercise 11    Exercise Name 11 LAQ with t.a.  -JA      Cueing 11 Demo  -JA      Sets 11 2  -JA      Reps 11 5  -JA        User Key  (r)  = Recorded By, (t) = Taken By, (c) = Cosigned By    Initials Name Provider Type    SOFIA Singletary PT Physical Therapist                                   Therapy Education       08/10/17 1425          Therapy Education    Education Details Worked on PPT and pt required a fair amount of cuing to achieve but once she did she was able to do consistently.  Initiated gentle ther ex for home: LTR in small range, PPT, standing heel raises, scap squeezes (cued to be careful not to shrug shoulders).  Reviewed the HEP with pt reading printed page.  Recommended pt use heat at home for better pain modulation.  -JA      Given HEP   Printed HEP with heel raises, scap ret, LTR, PPT  -JA      Program New  -SOFIA      How Provided Verbal;Demonstration;Written  -SOFIA      Provided to Patient  -SOFIA      Level of Understanding Teach back education performed  -SOFIA        User Key  (r) = Recorded By, (t) = Taken By, (c) = Cosigned By    Initials Name Provider Type    SOFIA Singletary PT Physical Therapist                Time Calculation:   Start Time: 1345  Stop Time: 1445  Time Calculation (min): 60 min    Therapy Charges for Today     Code Description Service Date Service Provider Modifiers Qty    49438858239  PT THER PROC EA 15 MIN 8/10/2017 Francheska Singletary, PT GP 3    96739655426 HC PT HOT OR COLD PACK TREAT MCARE 8/10/2017 Francheska Singletary, PT GP 1                    Francheska Singletary PT  8/10/2017

## 2017-08-15 ENCOUNTER — APPOINTMENT (OUTPATIENT)
Dept: PHYSICAL THERAPY | Facility: HOSPITAL | Age: 72
End: 2017-08-15

## 2017-08-17 ENCOUNTER — HOSPITAL ENCOUNTER (OUTPATIENT)
Dept: PHYSICAL THERAPY | Facility: HOSPITAL | Age: 72
Setting detail: THERAPIES SERIES
Discharge: HOME | End: 2017-08-17

## 2017-08-17 DIAGNOSIS — M54.50 CHRONIC MIDLINE LOW BACK PAIN WITHOUT SCIATICA: ICD-10-CM

## 2017-08-17 DIAGNOSIS — G89.29 CHRONIC MIDLINE LOW BACK PAIN WITHOUT SCIATICA: ICD-10-CM

## 2017-08-17 DIAGNOSIS — M25.511 RIGHT SHOULDER PAIN, UNSPECIFIED CHRONICITY: Primary | ICD-10-CM

## 2017-08-17 PROCEDURE — 97110 THERAPEUTIC EXERCISES: CPT | Performed by: PHYSICAL THERAPIST

## 2017-08-18 NOTE — THERAPY TREATMENT NOTE
Outpatient Physical Therapy Ortho Treatment Note  [facility]     Patient Name: Kassi Mathis  : 1945  MRN: [id number]  Today's Date: 2017      Visit Date: 2017    Visit Dx:    ICD-10-CM ICD-9-CM   1. Right shoulder pain, unspecified chronicity M25.511 719.41   2. Chronic midline low back pain without sciatica M54.5 724.2    G89.29 338.29       Patient Active Problem List   Diagnosis   • Depression   • Allergic rhinitis   • Hypertension   • Dementia without behavioral disturbance   • Paroxysmal atrial fibrillation   • Cobalamin deficiency   • Type 2 diabetes mellitus   • Hyperlipidemia        Past Medical History:   Diagnosis Date   • Allergic rhinitis    • Anemia    • Chronic venous insufficiency    • Dementia    • Depression    • Diabetes mellitus    • Hidradenitis suppurativa    • Hyperlipidemia    • Hypertension    • Osteoarthritis    • Paroxysmal atrial fibrillation    • Peripheral neuropathy    • Stroke    • Vitamin B 12 deficiency     Boarderline only with normal MMA/Homocysteine/IF antibody        Past Surgical History:   Procedure Laterality Date   • BACK SURGERY     • HYSTERECTOMY      PARTIAL   • KNEE SURGERY Left              17 1400   Subjective Comments   Subjective Comments I'm hurting today. All over, knees, low back, upper back. Tyelnol helps but it makes me more sleepy I have to wait until I get home. i haven't been doing the exercises at home.    Subjective Pain   Pre-Treatment Pain Level 8   Exercise 1   Exercise Name 1 Nu-Step U/LE   Time (Minutes) 1 6   Additional Comments L3   Exercise 2   Exercise Name 2 Hip abduction in standing   Cueing 2 Verbal   Reps 2 10   Exercise 3   Exercise Name 3 Shoulder extension   Cueing 3 Demo   Reps 3 15   Additional Comments Peach latex free   Exercise 4   Exercise Name 4 Wall slides flexion/scaption   Cueing 4 Verbal   Reps 4 10   Exercise 5   Exercise Name 5 Scapular retraction   Reps 5 15   Additional Comments Peach latex free    Exercise 6   Exercise Name 6 SKTC   Cueing 6 Verbal   Reps 6 5  (on L, R too painful)   Exercise 7   Exercise Name 7 LTR   Cueing 7 Verbal  (to stay in small range, pain-free)   Reps 7 5   Time (Seconds) 7 5   Exercise 8   Exercise Name 8 PPT   Reps 8 15   Exercise 9   Exercise Name 9 Hip adduction with TA   Reps 9 15                       PT Assessment/Plan       08/17/17 1512       PT Assessment    Assessment Comments Pt. with increased multi-joint pain today, increased grimacing and yelps of pain with certain movements, limited ther ex due to pain today. Pt. woud likely benefit from more aggressive OA medication therapy as well as aquatic therapy but she is afraid of water and does not want to take more medication. Increased independence with PPT noted today.   -KJ     PT Plan    PT Plan Comments  Contiue to progress R shoulder AROM and strength, core strength and mobility.   -KJ       User Key  (r) = Recorded By, (t) = Taken By, (c) = Cosigned By    Initials Name Provider Type    JAMIL Crews, PT Physical Therapist                                       PT OP Goals       08/17/17 1653       PT Short Term Goals    STG Date to Achieve 08/11/17  -KJ     STG 1 Pt. will be independent and compliant with initial home exercise program.  -KJ     STG 1 Progress Ongoing  -KJ     STG 1 Progress Comments Pt. reporting limited compliance when she is in pain.   -KJ     STG 2 Pt. will report low back pain </= 6-7/10 to increase ease of getting in/out of bed.  -KJ     STG 2 Progress Progressing  -KJ     STG 2 Progress Comments Intermittently met, some days worse than others.   -KJ     STG 3 Pt. will report R shoulder pain </=4-5/10 to increase ease of grooming.  -KJ     STG 3 Progress Progressing  -KJ     STG 3 Progress Comments Intermittently met, some days worse than others.   -KJ     Long Term Goals    LTG Date to Achieve 08/27/17  -KJ     LTG 1 Pt.w ill be independent and compliant with advanced home exercise  program.  -KJ     LTG 1 Progress Ongoing  -KJ     LTG 1 Progress Comments Pt. reporting limited compliance when she is in pain.   -KJ     LTG 2 Pt. will report low back pain </= 4-5/10 to increase ease of preparing a meal.  -KJ     LTG 2 Progress Progressing  -KJ     LTG 3 Pt. will report R shoulder pain </=2-3/10 to increase ease of bathing.  -KJ     LTG 3 Progress Progressing  -KJ     LTG 4 Pt. will score </=27% on DASH, indicating decreased perceived functional disability  -KJ     LTG 4 Progress Ongoing  -KJ     LTG 5 Pt. will score </= 40% on Oswestry, indicating decreased perceived functional disability.  -KJ     LTG 5 Progress Ongoing  -KJ       User Key  (r) = Recorded By, (t) = Taken By, (c) = Cosigned By    Initials Name Provider Type    JAMIL Crews, PT Physical Therapist                Therapy Education       08/17/17 3502          Therapy Education    Education Details At length discussion regarding benefits of HEP, OA, need to keep strong and moving. Encouraged pt. to follow up with [facility] re: meds she could take for OA. How OA can be affected by activity, barometric pressure, etc.   -KJ      Given HEP;Symptoms/condition management;Pain management  -KJ      Program Reinforced  -KJ      How Provided Verbal;Demonstration  -KJ      Provided to Patient  -KJ      Level of Understanding Teach back education performed;Verbalized;Demonstrated  -KJ        User Key  (r) = Recorded By, (t) = Taken By, (c) = Cosigned By    Initials Name Provider Type    JAMIL Crews PT Physical Therapist                Time Calculation:   Start Time: 1445  Stop Time: 1525  Time Calculation (min): 40 min    Therapy Charges for Today     Code Description Service Date Service Provider Modifiers Qty    29282230814  PT THER PROC EA 15 MIN 8/17/2017 Alba Crews, PT GP 3                    Alba Crews PT  8/18/2017

## 2017-08-21 RX ORDER — METFORMIN HYDROCHLORIDE 500 MG/1
TABLET, EXTENDED RELEASE ORAL
Qty: 360 TABLET | Refills: 0 | Status: SHIPPED | OUTPATIENT
Start: 2017-08-21 | End: 2017-11-17 | Stop reason: SDUPTHER

## 2017-08-22 ENCOUNTER — HOSPITAL ENCOUNTER (OUTPATIENT)
Dept: PHYSICAL THERAPY | Facility: HOSPITAL | Age: 72
Setting detail: THERAPIES SERIES
Discharge: HOME | End: 2017-08-22

## 2017-08-22 DIAGNOSIS — G89.29 CHRONIC MIDLINE LOW BACK PAIN WITHOUT SCIATICA: ICD-10-CM

## 2017-08-22 DIAGNOSIS — M54.50 CHRONIC MIDLINE LOW BACK PAIN WITHOUT SCIATICA: ICD-10-CM

## 2017-08-22 DIAGNOSIS — M25.511 RIGHT SHOULDER PAIN, UNSPECIFIED CHRONICITY: Primary | ICD-10-CM

## 2017-08-22 PROCEDURE — 97110 THERAPEUTIC EXERCISES: CPT | Performed by: PHYSICAL THERAPIST

## 2017-08-22 NOTE — THERAPY TREATMENT NOTE
Outpatient Physical Therapy Ortho Treatment Note  [facility]     Patient Name: Kassi Mathis  : 1945  MRN: [id number]  Today's Date: 2017      Visit Date: 2017    Visit Dx:    ICD-10-CM ICD-9-CM   1. Right shoulder pain, unspecified chronicity M25.511 719.41   2. Chronic midline low back pain without sciatica M54.5 724.2    G89.29 338.29       Patient Active Problem List   Diagnosis   • Depression   • Allergic rhinitis   • Hypertension   • Dementia without behavioral disturbance   • Paroxysmal atrial fibrillation   • Cobalamin deficiency   • Type 2 diabetes mellitus   • Hyperlipidemia        Past Medical History:   Diagnosis Date   • Allergic rhinitis    • Anemia    • Chronic venous insufficiency    • Dementia    • Depression    • Diabetes mellitus    • Hidradenitis suppurativa    • Hyperlipidemia    • Hypertension    • Osteoarthritis    • Paroxysmal atrial fibrillation    • Peripheral neuropathy    • Stroke    • Vitamin B 12 deficiency     Boarderline only with normal MMA/Homocysteine/IF antibody        Past Surgical History:   Procedure Laterality Date   • BACK SURGERY     • HYSTERECTOMY      PARTIAL   • KNEE SURGERY Left                              PT Assessment/Plan       17 1422       PT Assessment    Assessment Comments Pt. reporting lack of compliance with HEP, limited buy-in to therapy and concept of AROM and strength to decrease OA-related pain. Will reassess goals and likely d/c next session with recert.   -KJ     PT Plan    PT Plan Comments Reassess next session, likely d/c to HEP due to lack of compliance, lack of buy in.   -JAMIL       User Key  (r) = Recorded By, (t) = Taken By, (c) = Cosigned By    Initials Name Provider Type    JAMIL Crews, PT Physical Therapist                    Exercises       17 1400          Subjective Comments    Subjective Comments Arthritis is bad today, 9/10. I haven't done the exercises to be honest. Just lazy.  -JAMIL       Subjective Pain    Pre-Treatment Pain Level 9  -KJ      Exercise 1    Exercise Name 1 Nu-Step U/LE  -KJ      Time (Minutes) 1 10  -KJ      Additional Comments L3  -KJ      Exercise 2    Exercise Name 2 Hip abduction in standing  -KJ      Cueing 2 Verbal  -KJ      Reps 2 15   ea  -KJ      Exercise 3    Exercise Name 3 Shoulder extension  -KJ      Cueing 3 Verbal  -KJ      Reps 3 15  -KJ      Additional Comments Peach latex free  -KJ      Exercise 4    Exercise Name 4 Wall slides flexion/scaption  -KJ      Cueing 4 Verbal  -KJ      Reps 4 10  -KJ      Exercise 5    Exercise Name 5 Scapular retraction  -KJ      Reps 5 15  -KJ      Additional Comments Peach latex free  -KJ      Exercise 6    Exercise Name 6 SKTC  -KJ      Cueing 6 Verbal  -KJ      Reps 6 5  -KJ      Exercise 7    Exercise Name 7 LTR  -KJ      Cueing 7 Verbal   to stay in small range, pain-free  -KJ      Reps 7 5  -KJ      Time (Seconds) 7 5  -KJ      Exercise 8    Exercise Name 8 PPT  -KJ      Reps 8 15  -KJ      Exercise 9    Exercise Name 9 --  -KJ      Reps 9 --  -KJ        User Key  (r) = Recorded By, (t) = Taken By, (c) = Cosigned By    Initials Name Provider Type    JAMIL Crews, PT Physical Therapist                                   Therapy Education       08/22/17 1421          Therapy Education    Education Details At length disucssion regarding compliance with HEP, benefits of therapy but only if pt. is going to continue at home. That OA is degenerative and progressive but performing exercises can decrease pain. Will likely not eliminate pain.   -KJ      Given HEP;Symptoms/condition management;Pain management  -KJ      Program Reinforced  -KJ      How Provided Verbal;Demonstration  -KJ      Provided to Patient  -KJ      Level of Understanding Teach back education performed;Verbalized;Demonstrated  -KJ        User Key  (r) = Recorded By, (t) = Taken By, (c) = Cosigned By    Initials Name Provider Type    JAMIL Crews, PT  Physical Therapist                Time Calculation:   Start Time: 1400  Stop Time: 1440  Time Calculation (min): 40 min    Therapy Charges for Today     Code Description Service Date Service Provider Modifiers Qty    43859377546  PT THER PROC EA 15 MIN 8/22/2017 Alba Crews, PT GP 3                    Alba Crews, PT  8/22/2017

## 2017-08-24 ENCOUNTER — APPOINTMENT (OUTPATIENT)
Dept: PHYSICAL THERAPY | Facility: HOSPITAL | Age: 72
End: 2017-08-24

## 2017-08-28 RX ORDER — METOPROLOL SUCCINATE 50 MG/1
TABLET, EXTENDED RELEASE ORAL
Qty: 180 TABLET | Refills: 1 | Status: SHIPPED | OUTPATIENT
Start: 2017-08-28 | End: 2018-03-03 | Stop reason: SDUPTHER

## 2017-09-05 ENCOUNTER — APPOINTMENT (OUTPATIENT)
Dept: PHYSICAL THERAPY | Facility: HOSPITAL | Age: 72
End: 2017-09-05

## 2017-09-05 RX ORDER — ATENOLOL 100 MG/1
TABLET ORAL
Qty: 60 CAPSULE | Refills: 6 | Status: SHIPPED | OUTPATIENT
Start: 2017-09-05 | End: 2018-03-08 | Stop reason: SDUPTHER

## 2017-10-10 ENCOUNTER — LAB (OUTPATIENT)
Dept: INTERNAL MEDICINE | Facility: CLINIC | Age: 72
End: 2017-10-10

## 2017-10-10 DIAGNOSIS — I10 ESSENTIAL HYPERTENSION: Primary | ICD-10-CM

## 2017-10-10 DIAGNOSIS — I48.0 PAROXYSMAL ATRIAL FIBRILLATION (HCC): ICD-10-CM

## 2017-10-10 DIAGNOSIS — Z00.00 HEALTHCARE MAINTENANCE: ICD-10-CM

## 2017-10-10 DIAGNOSIS — E78.5 HYPERLIPIDEMIA, UNSPECIFIED HYPERLIPIDEMIA TYPE: ICD-10-CM

## 2017-10-10 DIAGNOSIS — E53.8 COBALAMIN DEFICIENCY: ICD-10-CM

## 2017-10-10 DIAGNOSIS — E11.8 TYPE 2 DIABETES MELLITUS WITH COMPLICATION, WITHOUT LONG-TERM CURRENT USE OF INSULIN (HCC): ICD-10-CM

## 2017-10-12 LAB
ALBUMIN SERPL-MCNC: 4 G/DL (ref 3.5–5.2)
ALBUMIN/CREAT UR: 240.5 MG/G CREAT (ref 0–30)
ALBUMIN/GLOB SERPL: 1.5 G/DL
ALP SERPL-CCNC: 64 U/L (ref 39–117)
ALT SERPL-CCNC: 26 U/L (ref 1–33)
APPEARANCE UR: ABNORMAL
AST SERPL-CCNC: 24 U/L (ref 1–32)
BACTERIA #/AREA URNS HPF: ABNORMAL /HPF
BASOPHILS # BLD AUTO: 0.02 10*3/MM3 (ref 0–0.2)
BASOPHILS NFR BLD AUTO: 0.2 % (ref 0–1.5)
BILIRUB SERPL-MCNC: 0.6 MG/DL (ref 0.1–1.2)
BILIRUB UR QL STRIP: NEGATIVE
BUN SERPL-MCNC: 7 MG/DL (ref 8–23)
BUN/CREAT SERPL: 8.5 (ref 7–25)
CALCIUM SERPL-MCNC: 9.6 MG/DL (ref 8.6–10.5)
CASTS URNS MICRO: ABNORMAL
CASTS URNS QL MICRO: PRESENT /LPF
CHLORIDE SERPL-SCNC: 103 MMOL/L (ref 98–107)
CHOLEST SERPL-MCNC: 150 MG/DL (ref 0–200)
CO2 SERPL-SCNC: 29.5 MMOL/L (ref 22–29)
COLOR UR: YELLOW
CREAT SERPL-MCNC: 0.82 MG/DL (ref 0.57–1)
CREAT UR-MCNC: 201.7 MG/DL
CRYSTALS URNS MICRO: ABNORMAL
EOSINOPHIL # BLD AUTO: 0.09 10*3/MM3 (ref 0–0.7)
EOSINOPHIL NFR BLD AUTO: 1 % (ref 0.3–6.2)
EPI CELLS #/AREA URNS HPF: ABNORMAL /HPF
ERYTHROCYTE [DISTWIDTH] IN BLOOD BY AUTOMATED COUNT: 15.7 % (ref 11.7–13)
GLOBULIN SER CALC-MCNC: 2.6 GM/DL
GLUCOSE SERPL-MCNC: 161 MG/DL (ref 65–99)
GLUCOSE UR QL: NEGATIVE
HBA1C MFR BLD: 7.45 % (ref 4.8–5.6)
HCT VFR BLD AUTO: 38.8 % (ref 35.6–45.5)
HDLC SERPL-MCNC: 73 MG/DL (ref 40–60)
HGB BLD-MCNC: 12.3 G/DL (ref 11.9–15.5)
HGB UR QL STRIP: NEGATIVE
IMM GRANULOCYTES # BLD: 0.02 10*3/MM3 (ref 0–0.03)
IMM GRANULOCYTES NFR BLD: 0.2 % (ref 0–0.5)
KETONES UR QL STRIP: NEGATIVE
LDLC SERPL CALC-MCNC: 60 MG/DL (ref 0–100)
LEUKOCYTE ESTERASE UR QL STRIP: NEGATIVE
LYMPHOCYTES # BLD AUTO: 3.13 10*3/MM3 (ref 0.9–4.8)
LYMPHOCYTES NFR BLD AUTO: 33.3 % (ref 19.6–45.3)
MCH RBC QN AUTO: 29.1 PG (ref 26.9–32)
MCHC RBC AUTO-ENTMCNC: 31.7 G/DL (ref 32.4–36.3)
MCV RBC AUTO: 91.9 FL (ref 80.5–98.2)
MICRO URNS: ABNORMAL
MICROALBUMIN UR-MCNC: 485 UG/ML
MONOCYTES # BLD AUTO: 0.9 10*3/MM3 (ref 0.2–1.2)
MONOCYTES NFR BLD AUTO: 9.6 % (ref 5–12)
MUCOUS THREADS URNS QL MICRO: PRESENT /HPF
NEUTROPHILS # BLD AUTO: 5.25 10*3/MM3 (ref 1.9–8.1)
NEUTROPHILS NFR BLD AUTO: 55.7 % (ref 42.7–76)
NITRITE UR QL STRIP: NEGATIVE
PH UR STRIP: 6 [PH] (ref 5–7.5)
PLATELET # BLD AUTO: 281 10*3/MM3 (ref 140–500)
POTASSIUM SERPL-SCNC: 4.8 MMOL/L (ref 3.5–5.2)
PROT SERPL-MCNC: 6.6 G/DL (ref 6–8.5)
PROT UR QL STRIP: ABNORMAL
RBC # BLD AUTO: 4.22 10*6/MM3 (ref 3.9–5.2)
RBC #/AREA URNS HPF: ABNORMAL /HPF
SODIUM SERPL-SCNC: 146 MMOL/L (ref 136–145)
SP GR UR: 1.02 (ref 1–1.03)
TRIGL SERPL-MCNC: 86 MG/DL (ref 0–150)
TSH SERPL DL<=0.005 MIU/L-ACNC: 1.12 MIU/ML (ref 0.27–4.2)
UNIDENT CRYS URNS QL MICRO: PRESENT /LPF
URINALYSIS REFLEX: ABNORMAL
UROBILINOGEN UR STRIP-MCNC: 0.2 MG/DL (ref 0.2–1)
VIT B12 SERPL-MCNC: 1808 PG/ML (ref 211–946)
VLDLC SERPL CALC-MCNC: 17.2 MG/DL (ref 5–40)
WBC # BLD AUTO: 9.41 10*3/MM3 (ref 4.5–10.7)
WBC #/AREA URNS HPF: ABNORMAL /HPF

## 2017-10-15 DIAGNOSIS — I10 ESSENTIAL HYPERTENSION: ICD-10-CM

## 2017-10-16 RX ORDER — AMLODIPINE BESYLATE 5 MG/1
TABLET ORAL
Qty: 90 TABLET | Refills: 1 | Status: SHIPPED | OUTPATIENT
Start: 2017-10-16 | End: 2018-04-17 | Stop reason: SDUPTHER

## 2017-10-16 RX ORDER — ROSUVASTATIN CALCIUM 20 MG/1
TABLET, COATED ORAL
Qty: 90 TABLET | Refills: 0 | Status: SHIPPED | OUTPATIENT
Start: 2017-10-16 | End: 2018-01-17 | Stop reason: SDUPTHER

## 2017-10-17 ENCOUNTER — OFFICE VISIT (OUTPATIENT)
Dept: INTERNAL MEDICINE | Facility: CLINIC | Age: 72
End: 2017-10-17

## 2017-10-17 VITALS
RESPIRATION RATE: 18 BRPM | SYSTOLIC BLOOD PRESSURE: 144 MMHG | OXYGEN SATURATION: 98 % | HEIGHT: 62 IN | HEART RATE: 59 BPM | BODY MASS INDEX: 36.25 KG/M2 | WEIGHT: 197 LBS | DIASTOLIC BLOOD PRESSURE: 68 MMHG

## 2017-10-17 DIAGNOSIS — Z12.31 ENCOUNTER FOR SCREENING MAMMOGRAM FOR BREAST CANCER: ICD-10-CM

## 2017-10-17 DIAGNOSIS — F02.80 ALZHEIMER'S DEMENTIA WITHOUT BEHAVIORAL DISTURBANCE, UNSPECIFIED TIMING OF DEMENTIA ONSET: ICD-10-CM

## 2017-10-17 DIAGNOSIS — Z00.00 MEDICARE ANNUAL WELLNESS VISIT, SUBSEQUENT: Primary | ICD-10-CM

## 2017-10-17 DIAGNOSIS — I10 ESSENTIAL HYPERTENSION: ICD-10-CM

## 2017-10-17 DIAGNOSIS — G30.9 ALZHEIMER'S DEMENTIA WITHOUT BEHAVIORAL DISTURBANCE, UNSPECIFIED TIMING OF DEMENTIA ONSET: ICD-10-CM

## 2017-10-17 DIAGNOSIS — E78.5 HYPERLIPIDEMIA, UNSPECIFIED HYPERLIPIDEMIA TYPE: ICD-10-CM

## 2017-10-17 DIAGNOSIS — E11.8 TYPE 2 DIABETES MELLITUS WITH COMPLICATION, WITHOUT LONG-TERM CURRENT USE OF INSULIN (HCC): ICD-10-CM

## 2017-10-17 PROBLEM — M20.42 HAMMER TOES OF BOTH FEET: Status: ACTIVE | Noted: 2017-10-17

## 2017-10-17 PROBLEM — M20.41 HAMMER TOES OF BOTH FEET: Status: ACTIVE | Noted: 2017-10-17

## 2017-10-17 PROCEDURE — G0009 ADMIN PNEUMOCOCCAL VACCINE: HCPCS | Performed by: INTERNAL MEDICINE

## 2017-10-17 PROCEDURE — 90662 IIV NO PRSV INCREASED AG IM: CPT | Performed by: INTERNAL MEDICINE

## 2017-10-17 PROCEDURE — 99214 OFFICE O/P EST MOD 30 MIN: CPT | Performed by: INTERNAL MEDICINE

## 2017-10-17 PROCEDURE — 90732 PPSV23 VACC 2 YRS+ SUBQ/IM: CPT | Performed by: INTERNAL MEDICINE

## 2017-10-17 PROCEDURE — G0439 PPPS, SUBSEQ VISIT: HCPCS | Performed by: INTERNAL MEDICINE

## 2017-10-17 PROCEDURE — G0008 ADMIN INFLUENZA VIRUS VAC: HCPCS | Performed by: INTERNAL MEDICINE

## 2017-10-17 RX ORDER — MEMANTINE HYDROCHLORIDE 10 MG/1
10 TABLET ORAL 2 TIMES DAILY
COMMUNITY
End: 2019-03-06

## 2017-10-17 RX ORDER — HYDRALAZINE HYDROCHLORIDE 100 MG/1
TABLET, FILM COATED ORAL
Qty: 180 TABLET | Refills: 1 | Status: SHIPPED | OUTPATIENT
Start: 2017-10-17 | End: 2018-11-01 | Stop reason: SDUPTHER

## 2017-10-17 NOTE — PATIENT INSTRUCTIONS
Medicare Wellness  Personal Prevention Plan of Service     Date of Office Visit:  10/17/2017  Encounter Provider:  Parul Jaime MD  Place of Service:  [facility] INTERNAL MEDICINE  Patient Name: Kassi Mathis  :  1945    As part of the Medicare Wellness portion of your visit today, we are providing you with this personalized preventive plan of services (PPPS). This plan is based upon recommendations of the [place] Preventive Services Task Force (USPSTF) and the Advisory Committee on Immunization Practices (ACIP).    This lists the preventive care services that should be considered, and provides dates of when you are due. Items listed as completed are up-to-date and do not require any further intervention.    Health Maintenance   Topic Date Due   • TDAP/TD VACCINES (1 - Tdap) 1963   • MEDICARE ANNUAL WELLNESS  2016   • PNEUMOCOCCAL VACCINES (65+ LOW/MEDIUM RISK) (2 of 2 - PPSV23) 2016   • DIABETIC FOOT EXAM  10/12/2017   • HEMOGLOBIN A1C  2018   • DIABETIC EYE EXAM  2018   • MAMMOGRAM  2018   • LIPID PANEL  10/11/2018   • URINE MICROALBUMIN  10/11/2018   • COLONOSCOPY  2025   • INFLUENZA VACCINE  Completed   • ZOSTER VACCINE  Completed       Orders Placed This Encounter   Procedures   • Mammo Screening Bilateral With CAD     Standing Status:   Future     Standing Expiration Date:   10/18/2018     Scheduling Instructions:      [facility]     Order Specific Question:   Reason for Exam:     Answer:   screening   • MRI Brain With Contrast     Standing Status:   Future     Standing Expiration Date:   10/17/2018     Order Specific Question:   Reason for Exam:     Answer:   dementia   • Flu Vaccine High Dose PF 65YR+   • Pneumococcal Polysaccharide Vaccine 23-Valent Greater Than or Equal To 3yo Subcutaneous / IM   • Ambulatory Referral to Neurology     Referral Priority:   Routine     Referral Type:   Consultation     Referral Reason:   Specialty  Services Required     Referred to Provider:   Ray Romero MD     Requested Specialty:   Neurology     Number of Visits Requested:   1       Return in about 3 months (around 1/17/2018) for Recheck.

## 2017-10-17 NOTE — PROGRESS NOTES
Subjective     Kassi Mathis is a 72 y.o. female who presents for an annual wellness visit as well as check up of dm-2, htn, hld, .      History of Present Illness     DM-2. She is maintained on metformin, Januvia and Jardiance with fair control.   HTN. She is under good control at home.  HLD. She looks good with Crestor.    Dementia.  Her daughter is interested in  instead of continuing to take her to [facility]. I reviewed importance of not driving which she finds upsetting.  Family in agreement.  They request F/u MRI of the brain which Richton requested.     Review of Systems   Respiratory: Negative.    Cardiovascular: Negative.        The following portions of the patient's history were reviewed and updated as appropriate: allergies, current medications, past family history, past medical history, past social history, past surgical history and problem list.  Health maintenance tab was reviewed and updated with the patient.       Patient Active Problem List    Diagnosis Date Noted   • Type 2 diabetes mellitus 01/17/2016     Priority: High   • Hammer toes of both feet 10/17/2017   • Depression 01/17/2016   • Allergic rhinitis 01/17/2016   • Hypertension 01/17/2016   • Dementia without behavioral disturbance 01/17/2016     Note Last Updated: 7/20/2016     By neuropysch evaluation 7/20/2016     • Paroxysmal atrial fibrillation 01/17/2016   • Cobalamin deficiency 01/17/2016     Note Last Updated: 6/8/2016     Boarderline only with normal MMA/Homocysteine/IF antibody.  Encouraged MVI daily.       • Hyperlipidemia 01/17/2016       Past Medical History:   Diagnosis Date   • Allergic rhinitis    • Anemia    • Chronic venous insufficiency    • Dementia    • Depression    • Diabetes mellitus    • Hidradenitis suppurativa    • Hyperlipidemia    • Hypertension    • Osteoarthritis    • Paroxysmal atrial fibrillation    • Peripheral neuropathy    • Stroke    • Vitamin B 12 deficiency     Boarderline only with normal  MMA/Homocysteine/IF antibody       Past Surgical History:   Procedure Laterality Date   • BACK SURGERY     • HYSTERECTOMY      PARTIAL   • KNEE SURGERY Left        Family History   Problem Relation Age of Onset   • Breast cancer Mother    • Stroke Father    • Stroke Maternal Grandmother    • Heart attack Maternal Grandmother    • Heart attack Maternal Grandfather        Social History     Social History   • Marital status:      Spouse name: N/A   • Number of children: N/A   • Years of education: N/A     Occupational History   • Not on file.     Social History Main Topics   • Smoking status: Former Smoker   • Smokeless tobacco: Not on file      Comment: CAFFEINE USE/ SOFT DRINKS.    • Alcohol use No   • Drug use: No   • Sexual activity: Not on file     Other Topics Concern   • Not on file     Social History Narrative       Current Outpatient Prescriptions on File Prior to Visit   Medication Sig Dispense Refill   • acetaminophen (TYLENOL) 325 MG tablet Take 650 mg by mouth Every 6 (Six) Hours.     • amLODIPine (NORVASC) 5 MG tablet TAKE ONE TABLET BY MOUTH DAILY 90 tablet 1   • buPROPion XL (WELLBUTRIN XL) 300 MG 24 hr tablet daily.       • cetirizine (ZyrTEC) 10 MG tablet Take 1 tablet by mouth daily.     • diclofenac (VOLTAREN) 1 % gel gel Apply 4 g topically 4 (Four) Times a Day As Needed (left wrist pain). 100 g 0   • donepezil (ARICEPT) 10 MG tablet      • Empagliflozin (JARDIANCE) 10 MG tablet Take 10 mg by mouth Daily. 30 tablet 11   • fluticasone (FLONASE) 50 MCG/ACT nasal spray PLACE 2 SPRAYS IN EACH NOSTRIL DAILY 16 each 10   • glucose blood test strip Use daily for type 2 DM. 100 each 3   • hydrALAZINE (APRESOLINE) 100 MG tablet TAKE ONE TABLET BY MOUTH TWICE A  tablet 1   • Melatonin 10 MG capsule Take  by mouth.     • metFORMIN ER (GLUCOPHAGE-XR) 500 MG 24 hr tablet TAKE TWO TABLETS BY MOUTH EVERY 12 HOURS 360 tablet 0   • metoprolol succinate XL (TOPROL-XL) 50 MG 24 hr tablet TAKE TWO  "TABLETS BY MOUTH EVERY MORNING 180 tablet 1   • Multiple Vitamin (MULTIVITAMINS PO) Take 1 tablet by mouth daily.     • PRADAXA 150 MG capsu TAKE ONE CAPSULE BY MOUTH TWICE A DAY 60 capsule 6   • rosuvastatin (CRESTOR) 20 MG tablet TAKE ONE TABLET BY MOUTH EVERY NIGHT AT BEDTIME 90 tablet 0   • sertraline (ZOLOFT) 100 MG tablet Take 100 mg by mouth Daily.     • SITagliptin (JANUVIA) 100 MG tablet Take 1 tablet by mouth Daily. 90 tablet 1   • ferrous sulfate 325 (65 FE) MG tablet Take 1 tablet by mouth 2 (two) times a day. EVERY OTHER DAY       No current facility-administered medications on file prior to visit.        Allergies   Allergen Reactions   • Enalapril      Aka vasotec tabs   • Namenda [Memantine Hcl]      imbalance   • Penicillins        Immunization History   Administered Date(s) Administered   • Flu Vaccine High Dose PF 65YR+ 10/12/2016, 10/17/2017   • Influenza TIV (IM) 08/31/2015   • Pneumococcal Conjugate 08/31/2015   • Pneumococcal Polysaccharide 01/01/2008, 10/17/2017   • Zoster 07/14/2015       Objective     /68  Pulse 59  Resp 18  Ht 62\" (157.5 cm)  Wt 197 lb (89.4 kg)  LMP  (LMP Unknown)  SpO2 98%  BMI 36.03 kg/m2    Physical Exam   Constitutional: She is oriented to person, place, and time. She appears well-developed and well-nourished.   HENT:   Head: Normocephalic and atraumatic.   Right Ear: Hearing, tympanic membrane and external ear normal.   Left Ear: Hearing, tympanic membrane and external ear normal.   Nose: Nose normal.   Mouth/Throat: Oropharynx is clear and moist.   Neck: Neck supple. No thyromegaly present.   Cardiovascular: Normal rate, regular rhythm and normal heart sounds.    No murmur heard.  Pulmonary/Chest: Effort normal and breath sounds normal. Right breast exhibits no mass. Left breast exhibits no mass.   Abdominal: Soft. She exhibits no distension. There is no hepatosplenomegaly. There is no tenderness.   Genitourinary: No breast tenderness.    Kassi had a " diabetic foot exam performed today.   During the foot exam she had a monofilament test performed (normal).   Skin Integrity  -  Her right foot skin is intact.     Kassi 's left foot skin is intact. .   Foot Structure and Biomechanics -  Her right foot has no hallux valgus and no hammer toes present. Her left foot has no hallux valgus and no hammer toes.      Lymphadenopathy:     She has no cervical adenopathy.   Neurological: She is alert and oriented to person, place, and time.   Skin: Skin is warm and dry.   Psychiatric: She has a normal mood and affect. Her speech is normal and behavior is normal. Judgment and thought content normal. Cognition and memory are impaired. She exhibits abnormal recent memory. She exhibits normal remote memory.       Assessment/Plan   Kassi was seen today for annual exam.    Diagnoses and all orders for this visit:    Medicare annual wellness visit, subsequent    Type 2 diabetes mellitus with complication, without long-term current use of insulin    Essential hypertension    Hyperlipidemia, unspecified hyperlipidemia type    Alzheimer's dementia without behavioral disturbance, unspecified timing of dementia onset  -     Ambulatory Referral to Neurology  -     MRI Brain With Contrast; Future    Encounter for screening mammogram for breast cancer  -     Mammo Screening Bilateral With CAD; Future    Other orders  -     Flu Vaccine High Dose PF 65YR+  -     Pneumococcal Polysaccharide Vaccine 23-Valent Greater Than or Equal To 1yo Subcutaneous / IM        Discussion    AWV.  See scanned forms for karla history, PHQ-9, functional ability questionnaire, cognitive impairment screening.  Direct observation of cognitive abilities:  She is impaired cognitively.  These were all reviewed with the patient and the patient was provided with a personal prevention plan of service in patient instructions.  Patient was given advice or information on the following topics:  nutrition, fall prevention,  exercise   DM-2. Continue current regimen.   HTN. Continue current regimen.   HLD. Continue Crestor.   Dementia.  Refer to  clinic.      Health Maintenance   Topic Date Due   • TDAP/TD VACCINES (1 - Tdap) 03/29/1964   • MEDICARE ANNUAL WELLNESS  01/19/2016   • DIABETIC FOOT EXAM  10/12/2017   • HEMOGLOBIN A1C  04/11/2018   • DIABETIC EYE EXAM  05/26/2018   • MAMMOGRAM  06/22/2018   • LIPID PANEL  10/11/2018   • URINE MICROALBUMIN  10/11/2018   • COLONOSCOPY  11/11/2025   • INFLUENZA VACCINE  Completed   • PNEUMOCOCCAL VACCINES (65+ LOW/MEDIUM RISK)  Completed   • ZOSTER VACCINE  Completed            Future Appointments  Date Time Provider Department Center   3/8/2018 2:30 PM Mode Gongora MD [facility] None   3/20/2018 1:00 PM Parul Jaime MD [facility] None

## 2017-10-24 RX ORDER — CETIRIZINE HYDROCHLORIDE 10 MG/1
10 TABLET ORAL DAILY
Qty: 90 TABLET | Refills: 3 | Status: SHIPPED | OUTPATIENT
Start: 2017-10-24 | End: 2019-03-20

## 2017-10-27 ENCOUNTER — TELEPHONE (OUTPATIENT)
Dept: INTERNAL MEDICINE | Facility: CLINIC | Age: 72
End: 2017-10-27

## 2017-10-27 ENCOUNTER — OFFICE VISIT (OUTPATIENT)
Dept: INTERNAL MEDICINE | Facility: CLINIC | Age: 72
End: 2017-10-27

## 2017-10-27 VITALS
HEIGHT: 62 IN | OXYGEN SATURATION: 98 % | HEART RATE: 64 BPM | WEIGHT: 197 LBS | SYSTOLIC BLOOD PRESSURE: 144 MMHG | BODY MASS INDEX: 36.25 KG/M2 | DIASTOLIC BLOOD PRESSURE: 82 MMHG

## 2017-10-27 DIAGNOSIS — L03.011 CELLULITIS OF FINGER OF RIGHT HAND: Primary | ICD-10-CM

## 2017-10-27 PROCEDURE — 99213 OFFICE O/P EST LOW 20 MIN: CPT | Performed by: INTERNAL MEDICINE

## 2017-10-27 RX ORDER — DOXYCYCLINE 100 MG/1
100 CAPSULE ORAL 2 TIMES DAILY
Qty: 20 CAPSULE | Refills: 0 | Status: SHIPPED | OUTPATIENT
Start: 2017-10-27 | End: 2017-12-20

## 2017-10-27 NOTE — PROGRESS NOTES
Subjective     Kassi Mathis is a 72 y.o. female who presents with   Chief Complaint   Patient presents with   • Hand Pain     small finger rt hand swollen       History of Present Illness     5th digit swelling and pain developed in last couple days.  Tender to palpation.  She bites her nails.  No drainage.      Review of Systems   Constitutional: Negative for fever.       The following portions of the patient's history were reviewed and updated as appropriate: allergies, current medications and problem list.    Patient Active Problem List    Diagnosis Date Noted   • Type 2 diabetes mellitus 01/17/2016     Priority: High   • Hammer toes of both feet 10/17/2017   • Depression 01/17/2016   • Allergic rhinitis 01/17/2016   • Hypertension 01/17/2016   • Dementia without behavioral disturbance 01/17/2016     Note Last Updated: 7/20/2016     By neuropysch evaluation 7/20/2016     • Paroxysmal atrial fibrillation 01/17/2016   • Cobalamin deficiency 01/17/2016     Note Last Updated: 6/8/2016     Boarderline only with normal MMA/Homocysteine/IF antibody.  Encouraged MVI daily.       • Hyperlipidemia 01/17/2016       Current Outpatient Prescriptions on File Prior to Visit   Medication Sig Dispense Refill   • acetaminophen (TYLENOL) 325 MG tablet Take 650 mg by mouth Every 6 (Six) Hours.     • buPROPion XL (WELLBUTRIN XL) 300 MG 24 hr tablet daily.       • cetirizine (zyrTEC) 10 MG tablet Take 1 tablet by mouth Daily. 90 tablet 3   • diclofenac (VOLTAREN) 1 % gel gel Apply 4 g topically 4 (Four) Times a Day As Needed (left wrist pain). 100 g 0   • donepezil (ARICEPT) 10 MG tablet      • Empagliflozin (JARDIANCE) 10 MG tablet Take 10 mg by mouth Daily. 30 tablet 11   • ferrous sulfate 325 (65 FE) MG tablet Take 1 tablet by mouth 2 (two) times a day. EVERY OTHER DAY     • fluticasone (FLONASE) 50 MCG/ACT nasal spray PLACE 2 SPRAYS IN EACH NOSTRIL DAILY 16 each 10   • glucose blood test strip Use daily for type 2 DM. 100 each 3  "  • hydrALAZINE (APRESOLINE) 100 MG tablet TAKE ONE TABLET BY MOUTH TWICE A  tablet 1   • Melatonin 10 MG capsule Take  by mouth.     • memantine (NAMENDA) 10 MG tablet Take 10 mg by mouth 2 (Two) Times a Day.     • metFORMIN ER (GLUCOPHAGE-XR) 500 MG 24 hr tablet TAKE TWO TABLETS BY MOUTH EVERY 12 HOURS 360 tablet 0   • metoprolol succinate XL (TOPROL-XL) 50 MG 24 hr tablet TAKE TWO TABLETS BY MOUTH EVERY MORNING 180 tablet 1   • rosuvastatin (CRESTOR) 20 MG tablet TAKE ONE TABLET BY MOUTH EVERY NIGHT AT BEDTIME 90 tablet 0   • sertraline (ZOLOFT) 100 MG tablet Take 100 mg by mouth Daily.     • SITagliptin (JANUVIA) 100 MG tablet Take 1 tablet by mouth Daily. 90 tablet 1   • amLODIPine (NORVASC) 5 MG tablet TAKE ONE TABLET BY MOUTH DAILY 90 tablet 1   • Multiple Vitamin (MULTIVITAMINS PO) Take 1 tablet by mouth daily.     • PRADAXA 150 MG capsu TAKE ONE CAPSULE BY MOUTH TWICE A DAY 60 capsule 6     No current facility-administered medications on file prior to visit.        Objective     /82  Pulse 64  Ht 62\" (157.5 cm)  Wt 197 lb (89.4 kg)  LMP  (LMP Unknown)  SpO2 98%  BMI 36.03 kg/m2    Physical Exam   Constitutional: She is oriented to person, place, and time. She appears well-developed and well-nourished.   HENT:   Head: Normocephalic and atraumatic.   Pulmonary/Chest: Effort normal.   Neurological: She is alert and oriented to person, place, and time.   Skin:   Distal aspect of 5th right digit is swollen, red and tender.  Nail bed is chewed.  No active drainage.   Psychiatric: She has a normal mood and affect. Her behavior is normal.       Assessment/Plan   Kassi was seen today for hand pain.    Diagnoses and all orders for this visit:    Paronychia of right little finger    Other orders  -     doxycycline (MONODOX) 100 MG capsule; Take 1 capsule by mouth 2 (Two) Times a Day.      Discussion  Patient presents with cellulitis secondary to biting nails.  Cover for MRSA with doxycycline.  Warm " soak are advised.  Let me know if not feeling better over the next 3 days or if there is any change in symptoms.             Future Appointments  Date Time Provider Department Center   3/8/2018 2:30 PM MD CARMENZA Schneider [facility] None   3/20/2018 1:00 PM MD CARMENZA Patiño [facility] None

## 2017-10-27 NOTE — TELEPHONE ENCOUNTER
Patients daughter called states her mothers pinky finger is swollen and red. She states it has been like that for three days. He mother told her it is very painful to the touch. She is calling to ask what to do.     Per Dr. Jaime she can be seen at 3:15 today.

## 2017-10-28 ENCOUNTER — HOSPITAL ENCOUNTER (EMERGENCY)
Facility: HOSPITAL | Age: 72
Discharge: HOME | End: 2017-10-28
Attending: EMERGENCY MEDICINE | Admitting: EMERGENCY MEDICINE

## 2017-10-28 VITALS
HEIGHT: 62 IN | OXYGEN SATURATION: 98 % | BODY MASS INDEX: 37.91 KG/M2 | HEART RATE: 62 BPM | DIASTOLIC BLOOD PRESSURE: 74 MMHG | SYSTOLIC BLOOD PRESSURE: 166 MMHG | RESPIRATION RATE: 15 BRPM | WEIGHT: 206 LBS | TEMPERATURE: 97.8 F

## 2017-10-28 DIAGNOSIS — L03.011 FELON OF FINGER OF RIGHT HAND: Primary | ICD-10-CM

## 2017-10-28 PROCEDURE — 96372 THER/PROPH/DIAG INJ SC/IM: CPT

## 2017-10-28 PROCEDURE — 99284 EMERGENCY DEPT VISIT MOD MDM: CPT

## 2017-10-28 PROCEDURE — 25010000002 HYDROMORPHONE PER 4 MG: Performed by: EMERGENCY MEDICINE

## 2017-10-28 PROCEDURE — 25010000002 PROMETHAZINE PER 50 MG: Performed by: EMERGENCY MEDICINE

## 2017-10-28 RX ORDER — OXYCODONE HYDROCHLORIDE AND ACETAMINOPHEN 5; 325 MG/1; MG/1
1-2 TABLET ORAL EVERY 6 HOURS PRN
Qty: 15 TABLET | Refills: 0 | Status: SHIPPED | OUTPATIENT
Start: 2017-10-28 | End: 2018-03-08 | Stop reason: ALTCHOICE

## 2017-10-28 RX ORDER — LIDOCAINE HYDROCHLORIDE AND EPINEPHRINE 10; 10 MG/ML; UG/ML
20 INJECTION, SOLUTION INFILTRATION; PERINEURAL ONCE
Status: COMPLETED | OUTPATIENT
Start: 2017-10-28 | End: 2017-10-28

## 2017-10-28 RX ORDER — PROMETHAZINE HYDROCHLORIDE 25 MG/ML
12.5 INJECTION, SOLUTION INTRAMUSCULAR; INTRAVENOUS ONCE
Status: COMPLETED | OUTPATIENT
Start: 2017-10-28 | End: 2017-10-28

## 2017-10-28 RX ADMIN — PROMETHAZINE HYDROCHLORIDE 12.5 MG: 25 INJECTION INTRAMUSCULAR; INTRAVENOUS at 22:01

## 2017-10-28 RX ADMIN — LIDOCAINE HYDROCHLORIDE,EPINEPHRINE BITARTRATE 20 ML: 10; .01 INJECTION, SOLUTION INFILTRATION; PERINEURAL at 22:02

## 2017-10-28 RX ADMIN — HYDROMORPHONE HYDROCHLORIDE 1 MG: 1 INJECTION, SOLUTION INTRAMUSCULAR; INTRAVENOUS; SUBCUTANEOUS at 21:57

## 2017-10-29 NOTE — ED NOTES
Pt right 5th finger wound care complete. Pt tolerated well. Wrap with kurlex and then I applied a finger split.           Rosmery Oliveros  10/28/17 2799

## 2017-10-29 NOTE — ED PROVIDER NOTES
EMERGENCY DEPARTMENT ENCOUNTER    CHIEF COMPLAINT  Chief Complaint: pain and swelling of the R 5th finger  History given by: Pt  History limited by: N/A  Room Number: 15/15  PMD: Parul Jaime MD      HPI:  Pt is a 72 y.o. female who presents with pain and swelling of the R 5th finger for the last week. Pt has not drained any fluid, and went to see her PCP, who believed it was the infection of the finger - started Doxycycline. She denies any fever, chest pain, or difficulty breathing. There are no other complaints.     Duration/Onset/Timin week ago/gradual/constant  Location: R 5th finger  Radiation: none  Intensity/Severity: moderate  Associated Symptoms: none  Aggravating or Alleviating Factors: none  Previous Episodes: No history of a similar episode.      PAST MEDICAL HISTORY  Active Ambulatory Problems     Diagnosis Date Noted   • Depression 2016   • Allergic rhinitis 2016   • Hypertension 2016   • Dementia without behavioral disturbance 2016   • Paroxysmal atrial fibrillation 2016   • Cobalamin deficiency 2016   • Type 2 diabetes mellitus 2016   • Hyperlipidemia 2016   • Hammer toes of both feet 10/17/2017     Resolved Ambulatory Problems     Diagnosis Date Noted   • Fatigue 2016   • Edema 2016   • Anemia 2016   • Abdominal pain 2016   • Hidradenitis suppurativa 2016   • Diarrhea 2016   • Imbalance 2016     Past Medical History:   Diagnosis Date   • Allergic rhinitis    • Anemia    • Chronic venous insufficiency    • Dementia    • Depression    • Diabetes mellitus    • Hidradenitis suppurativa    • Hyperlipidemia    • Hypertension    • Osteoarthritis    • Paroxysmal atrial fibrillation    • Peripheral neuropathy    • Stroke    • Vitamin B 12 deficiency        PAST SURGICAL HISTORY  Past Surgical History:   Procedure Laterality Date   • BACK SURGERY     • HYSTERECTOMY      PARTIAL   • KNEE SURGERY Left         FAMILY HISTORY  Family History   Problem Relation Age of Onset   • Breast cancer Mother    • Stroke Father    • Stroke Maternal Grandmother    • Heart attack Maternal Grandmother    • Heart attack Maternal Grandfather        SOCIAL HISTORY  Social History     Social History   • Marital status:      Spouse name: N/A   • Number of children: N/A   • Years of education: N/A     Occupational History   • Not on file.     Social History Main Topics   • Smoking status: Former Smoker   • Smokeless tobacco: Not on file      Comment: CAFFEINE USE/ SOFT DRINKS.    • Alcohol use No   • Drug use: No   • Sexual activity: Not on file     Other Topics Concern   • Not on file     Social History Narrative       ALLERGIES  Enalapril; Namenda [memantine hcl]; and Penicillins    REVIEW OF SYSTEMS  Review of Systems   Constitutional: Negative for fever.   Respiratory: Negative for shortness of breath.    Cardiovascular: Negative for chest pain.   Musculoskeletal: Positive for myalgias (pain and swelling to R 5th finger).       PHYSICAL EXAM  ED Triage Vitals   Temp Heart Rate Resp BP SpO2   10/28/17 1826 10/28/17 1826 10/28/17 1826 10/28/17 1842 10/28/17 1826   96.9 °F (36.1 °C) 74 16 141/69 94 %      Temp src Heart Rate Source Patient Position BP Location FiO2 (%)   10/28/17 1826 10/28/17 1826 10/28/17 2040 10/28/17 2040 --   Tympanic Monitor Lying Right arm        Physical Exam   Constitutional: No distress.   HENT:   Head: Normocephalic and atraumatic.   Cardiovascular: Regular rhythm.    Pulmonary/Chest: No respiratory distress.   Abdominal: There is no tenderness.   Musculoskeletal: She exhibits no tenderness.   Skin: No rash noted.   Moderate swelling of the palmar surface of the L 5th finger felon.    Nursing note and vitals reviewed.      PROCEDURES  Incision & Drainage  Date/Time: 10/28/2017 10:05 PM  Performed by: CATHLEEN SHAHID  Authorized by: CATHLEEN SHAHID     Consent:     Consent obtained:  Verbal    Consent  given by:  Patient    Risks discussed:  Bleeding    Alternatives discussed:  Alternative treatment  Location:     Type:  Abscess    Location:  Upper extremity    Upper extremity location:  R small finger  Pre-procedure details:     Skin preparation:  Betadine  Anesthesia (see MAR for exact dosages):     Anesthesia method:  Local infiltration    Local anesthetic:  Lidocaine 1% WITH epi  Procedure details:     Complexity:  Simple    Needle aspiration: yes (minimal amount of drainage)      Needle size:  18 G    Drainage characteristics: clear fluid.    Drainage amount: minimal amount of drainage.    Wound treatment:  Wound left open    Packing materials:  None  Post-procedure details:     Patient tolerance of procedure:  Tolerated well, no immediate complications          PROGRESS AND CONSULTS  ED Course     2137  Ordered Phenergan and Dilaudid injection for pain, and Xylocaine to drain the site.   2213  I informed pt of plan to discharge home with a referral to a Hand surgeon in case her infection persist, and Percocet for the pain. Return to ER warnings given for any new or worsening symptoms. Pt understands and agrees with plan. All questions addressed.       MEDICAL DECISION MAKING  Results were reviewed/discussed with the patient and they were also made aware of online access. Pt also made aware that some labs, such as cultures, will not be resulted during ER visit and follow up with PMD is necessary.     MDM  Number of Diagnoses or Management Options  Patient Progress  Patient progress: stable         DIAGNOSIS  Final diagnoses:   Felon of finger of right hand       DISPOSITION  DISCHARGE    Patient discharged in stable condition.    Reviewed implications of results, diagnosis, meds, responsibility to follow up, warning signs and symptoms of possible worsening, potential complications and reasons to return to ER.    Patient/Family voiced understanding of above instructions.    Discussed plan for discharge, as  there is no emergent indication for admission.  Pt/family is agreeable and understands need for follow up and repeat testing.  Pt is aware that discharge does not mean that nothing is wrong but it indicates no emergency is present that requires admission and they must continue care with follow-up as given below or physician of their choice.     FOLLOW-UP  Alcon Jenkins MD  [address] [facility] [id number] [phone]    In 3 days  If Not Better         Medication List      New Prescriptions          oxyCODONE-acetaminophen 5-325 MG per tablet   Commonly known as:  ROXICET   Take 1-2 tablets by mouth Every 6 (Six) Hours As Needed for Moderate Pain   .               Latest Documented Vital Signs:  As of 10:17 PM  BP- 153/80 HR- 67 Temp- 96.9 °F (36.1 °C) (Tympanic) O2 sat- 100%    --  Documentation assistance provided by gibran Davis for Dr.David Rodolfo MD.  Information recorded by the scribe was done at my direction and has been verified and validated by me.         Deborah Fisher  10/28/17 2218       Gabriel Reynolds MD  10/28/17 2222

## 2017-10-30 ENCOUNTER — TELEPHONE (OUTPATIENT)
Dept: SOCIAL WORK | Facility: HOSPITAL | Age: 72
End: 2017-10-30

## 2017-10-30 NOTE — TELEPHONE ENCOUNTER
F/u phone call; spoke w/daughter Flaca who reports patient is feeling better; Pt is taking RX as prescribed. Hasnt called for f/u appt- will schedule if condition worsens. Kathya Osorio RN

## 2017-11-03 ENCOUNTER — APPOINTMENT (OUTPATIENT)
Dept: WOMENS IMAGING | Facility: HOSPITAL | Age: 72
End: 2017-11-03

## 2017-11-03 PROCEDURE — G0202 SCR MAMMO BI INCL CAD: HCPCS | Performed by: RADIOLOGY

## 2017-11-03 PROCEDURE — 77063 BREAST TOMOSYNTHESIS BI: CPT | Performed by: RADIOLOGY

## 2017-11-17 RX ORDER — METFORMIN HYDROCHLORIDE 500 MG/1
TABLET, EXTENDED RELEASE ORAL
Qty: 360 TABLET | Refills: 0 | Status: SHIPPED | OUTPATIENT
Start: 2017-11-17 | End: 2018-02-15 | Stop reason: SDUPTHER

## 2017-11-20 RX ORDER — SITAGLIPTIN 100 MG/1
TABLET, FILM COATED ORAL
Qty: 90 TABLET | Refills: 3 | Status: SHIPPED | OUTPATIENT
Start: 2017-11-20 | End: 2018-11-14 | Stop reason: SDUPTHER

## 2017-11-27 ENCOUNTER — TELEPHONE (OUTPATIENT)
Dept: INTERNAL MEDICINE | Facility: CLINIC | Age: 72
End: 2017-11-27

## 2017-11-27 NOTE — TELEPHONE ENCOUNTER
She has been anxious with the holiday and anniversary of son's death. Can you prescibe her xanax?  CC    This is something by [place] law needs an OV.  SLW    Left detailed message for daughter to schedule office visit. CLC

## 2017-12-20 ENCOUNTER — OFFICE VISIT (OUTPATIENT)
Dept: INTERNAL MEDICINE | Facility: CLINIC | Age: 72
End: 2017-12-20

## 2017-12-20 VITALS
TEMPERATURE: 97.4 F | SYSTOLIC BLOOD PRESSURE: 162 MMHG | BODY MASS INDEX: 37.91 KG/M2 | OXYGEN SATURATION: 98 % | HEART RATE: 58 BPM | DIASTOLIC BLOOD PRESSURE: 88 MMHG | WEIGHT: 206 LBS | HEIGHT: 62 IN

## 2017-12-20 DIAGNOSIS — N39.41 URGE INCONTINENCE OF URINE: Primary | ICD-10-CM

## 2017-12-20 LAB
BILIRUB BLD-MCNC: NEGATIVE MG/DL
CLARITY, POC: CLEAR
COLOR UR: YELLOW
GLUCOSE UR STRIP-MCNC: NEGATIVE MG/DL
KETONES UR QL: NEGATIVE
LEUKOCYTE EST, POC: NEGATIVE
NITRITE UR-MCNC: NEGATIVE MG/ML
PH UR: 5 [PH] (ref 5–8)
PROT UR STRIP-MCNC: ABNORMAL MG/DL
RBC # UR STRIP: NEGATIVE /UL
SP GR UR: 1.03 (ref 1–1.03)
UROBILINOGEN UR QL: NORMAL

## 2017-12-20 PROCEDURE — 81003 URINALYSIS AUTO W/O SCOPE: CPT | Performed by: INTERNAL MEDICINE

## 2017-12-20 PROCEDURE — 99213 OFFICE O/P EST LOW 20 MIN: CPT | Performed by: INTERNAL MEDICINE

## 2017-12-20 NOTE — PROGRESS NOTES
Subjective     Kassi Mathis is a 72 y.o. female who presents with   Chief Complaint   Patient presents with   • Urinary Incontinence       History of Present Illness     Unable to hold urine for the past three weeks.  She feels urgency.  No stress incontinence.  She wears a pad chronically.      Review of Systems   Gastrointestinal: Negative for abdominal pain.       The following portions of the patient's history were reviewed and updated as appropriate: allergies, current medications and problem list.    Patient Active Problem List    Diagnosis Date Noted   • Type 2 diabetes mellitus 01/17/2016     Priority: High   • Hammer toes of both feet 10/17/2017   • Depression 01/17/2016   • Allergic rhinitis 01/17/2016   • Hypertension 01/17/2016   • Dementia without behavioral disturbance 01/17/2016     Note Last Updated: 7/20/2016     By neuropysch evaluation 7/20/2016     • Paroxysmal atrial fibrillation 01/17/2016   • Cobalamin deficiency 01/17/2016     Note Last Updated: 6/8/2016     Boarderline only with normal MMA/Homocysteine/IF antibody.  Encouraged MVI daily.       • Hyperlipidemia 01/17/2016       Current Outpatient Prescriptions on File Prior to Visit   Medication Sig Dispense Refill   • acetaminophen (TYLENOL) 325 MG tablet Take 650 mg by mouth Every 6 (Six) Hours.     • amLODIPine (NORVASC) 5 MG tablet TAKE ONE TABLET BY MOUTH DAILY 90 tablet 1   • buPROPion XL (WELLBUTRIN XL) 300 MG 24 hr tablet daily.       • cetirizine (zyrTEC) 10 MG tablet Take 1 tablet by mouth Daily. 90 tablet 3   • diclofenac (VOLTAREN) 1 % gel gel Apply 4 g topically 4 (Four) Times a Day As Needed (left wrist pain). 100 g 0   • donepezil (ARICEPT) 10 MG tablet      • Empagliflozin (JARDIANCE) 10 MG tablet Take 10 mg by mouth Daily. 30 tablet 11   • ferrous sulfate 325 (65 FE) MG tablet Take 1 tablet by mouth 2 (two) times a day. EVERY OTHER DAY     • fluticasone (FLONASE) 50 MCG/ACT nasal spray PLACE 2 SPRAYS IN EACH NOSTRIL DAILY 16  "each 10   • glucose blood test strip Use daily for type 2 DM. 100 each 3   • hydrALAZINE (APRESOLINE) 100 MG tablet TAKE ONE TABLET BY MOUTH TWICE A  tablet 1   • JANUVIA 100 MG tablet TAKE ONE TABLET BY MOUTH DAILY 90 tablet 3   • Melatonin 10 MG capsule Take  by mouth.     • memantine (NAMENDA) 10 MG tablet Take 10 mg by mouth 2 (Two) Times a Day.     • metFORMIN ER (GLUCOPHAGE-XR) 500 MG 24 hr tablet TAKE TWO TABLETS BY MOUTH EVERY 12 HOURS 360 tablet 0   • metoprolol succinate XL (TOPROL-XL) 50 MG 24 hr tablet TAKE TWO TABLETS BY MOUTH EVERY MORNING 180 tablet 1   • Multiple Vitamin (MULTIVITAMINS PO) Take 1 tablet by mouth daily.     • oxyCODONE-acetaminophen (ROXICET) 5-325 MG per tablet Take 1-2 tablets by mouth Every 6 (Six) Hours As Needed for Moderate Pain . 15 tablet 0   • PRADAXA 150 MG capsu TAKE ONE CAPSULE BY MOUTH TWICE A DAY 60 capsule 6   • rosuvastatin (CRESTOR) 20 MG tablet TAKE ONE TABLET BY MOUTH EVERY NIGHT AT BEDTIME 90 tablet 0   • sertraline (ZOLOFT) 100 MG tablet Take 100 mg by mouth Daily.     • [DISCONTINUED] doxycycline (MONODOX) 100 MG capsule Take 1 capsule by mouth 2 (Two) Times a Day. 20 capsule 0     No current facility-administered medications on file prior to visit.        Objective     /88  Pulse 58  Temp 97.4 °F (36.3 °C)  Ht 157.5 cm (62.01\")  Wt 93.4 kg (206 lb)  LMP  (LMP Unknown)  SpO2 98%  BMI 37.67 kg/m2    Physical Exam   Constitutional: She is oriented to person, place, and time. She appears well-developed and well-nourished.   HENT:   Head: Normocephalic and atraumatic.   Pulmonary/Chest: Effort normal.   Abdominal: Soft. She exhibits no distension and no mass. There is no tenderness. There is no rebound and no guarding.   Genitourinary: Vagina normal. Right adnexum displays no mass. Left adnexum displays no mass.   Neurological: She is alert and oriented to person, place, and time.   Psychiatric: She has a normal mood and affect. Her behavior is " normal.       Assessment/Plan   Kassi was seen today for urinary incontinence.    Diagnoses and all orders for this visit:    Urge incontinence of urine  -     Urine Culture - Urine, Urine, Clean Catch  -     POC Urinalysis Dipstick, Automated    Other orders  -     Mirabegron ER (MYRBETRIQ) 50 MG tablet sustained-release 24 hour 24 hr tablet; Take 50 mg by mouth Daily.        Discussion  Patient presents with worsening of urge incontinence.  Urine dip is normal.  Send for culture.  Handout of bladder exercises.  Trial of myrbetriq.  25mg samples given.  Rx called in.  Discussed with the patient onset on action.  The patient will let me know of any side effects from the medication.             Future Appointments  Date Time Provider Department Center   3/8/2018 2:30 PM MD CARMENZA [facility] None   3/20/2018 1:00 PM MD CARMENZA [facility] None

## 2017-12-24 LAB
BACTERIA UR CULT: ABNORMAL
BACTERIA UR CULT: ABNORMAL
OTHER ANTIBIOTIC SUSC ISLT: ABNORMAL

## 2018-01-17 RX ORDER — ROSUVASTATIN CALCIUM 20 MG/1
TABLET, COATED ORAL
Qty: 90 TABLET | Refills: 0 | Status: SHIPPED | OUTPATIENT
Start: 2018-01-17 | End: 2018-04-21 | Stop reason: SDUPTHER

## 2018-02-15 RX ORDER — METFORMIN HYDROCHLORIDE 500 MG/1
TABLET, EXTENDED RELEASE ORAL
Qty: 360 TABLET | Refills: 0 | Status: SHIPPED | OUTPATIENT
Start: 2018-02-15 | End: 2018-05-15 | Stop reason: SDUPTHER

## 2018-03-05 RX ORDER — METOPROLOL SUCCINATE 50 MG/1
TABLET, EXTENDED RELEASE ORAL
Qty: 180 TABLET | Refills: 2 | Status: SHIPPED | OUTPATIENT
Start: 2018-03-05 | End: 2018-03-08 | Stop reason: SDUPTHER

## 2018-03-08 ENCOUNTER — OFFICE VISIT (OUTPATIENT)
Dept: CARDIOLOGY | Facility: CLINIC | Age: 73
End: 2018-03-08

## 2018-03-08 VITALS
SYSTOLIC BLOOD PRESSURE: 144 MMHG | BODY MASS INDEX: 37.73 KG/M2 | HEIGHT: 62 IN | DIASTOLIC BLOOD PRESSURE: 72 MMHG | HEART RATE: 63 BPM | WEIGHT: 205 LBS

## 2018-03-08 DIAGNOSIS — I48.0 PAROXYSMAL ATRIAL FIBRILLATION (HCC): Primary | ICD-10-CM

## 2018-03-08 DIAGNOSIS — I10 ESSENTIAL HYPERTENSION: ICD-10-CM

## 2018-03-08 PROCEDURE — 99213 OFFICE O/P EST LOW 20 MIN: CPT | Performed by: INTERNAL MEDICINE

## 2018-03-08 PROCEDURE — 93000 ELECTROCARDIOGRAM COMPLETE: CPT | Performed by: INTERNAL MEDICINE

## 2018-03-08 RX ORDER — DABIGATRAN ETEXILATE 150 MG/1
150 CAPSULE ORAL EVERY 12 HOURS SCHEDULED
Qty: 180 CAPSULE | Refills: 3 | Status: SHIPPED | OUTPATIENT
Start: 2018-03-08 | End: 2019-02-19 | Stop reason: SDUPTHER

## 2018-03-08 RX ORDER — METOPROLOL SUCCINATE 50 MG/1
TABLET, EXTENDED RELEASE ORAL
Qty: 180 TABLET | Refills: 3 | Status: SHIPPED | OUTPATIENT
Start: 2018-03-08 | End: 2019-03-05 | Stop reason: SDUPTHER

## 2018-03-08 NOTE — PROGRESS NOTES
Date of Office Visit: 2018  Encounter Provider: Mode Gongora MD  Place of Service: [facility] CARDIOLOGY  Patient Name: Kassi Mathis  :1945    Chief Complaint   Patient presents with   • Atrial Fibrillation     9 month follow up    :     HPI: Kassi Mathis is a 72 y.o. female who presents today to followup. She has a long-standing history of high blood pressure. She has paroxysmal atrial fibrillation. She had an echocardiogram in 2015 which revealed normal left ventricular systolic function without pulmonary hypertension or valvular heart disease. She only had grade I diastolic dysfunction. She had a Cardiolite stress test which was normal. She was markedly hypertensive at that time and she was placed on an increased dose of hydralazine/isosorbide as well as furosemide.  The diuretic then had to be stopped because she was getting orthostatic and dehydrated. She was unable to get the combination medication so she was changed to hydralazine alone.     She had occasional palpitations, which are isolated. She has not had any sustained atrial fibrillation. She denies edema, dyspnea, lightheadedness, syncope, or chest pain. She has not had bleeding on dabigatran.  Her memory continues to decline.     Her daughter checks her BP regularly at home, and her systolics are in the 120-130s.      She has had some falls.    Past Medical History:   Diagnosis Date   • Allergic rhinitis    • Anemia    • Chronic venous insufficiency    • Dementia    • Depression    • Diabetes mellitus    • Hidradenitis suppurativa    • Hyperlipidemia    • Hypertension    • Osteoarthritis    • Paroxysmal atrial fibrillation    • Peripheral neuropathy    • Stroke    • Vitamin B 12 deficiency     Boarderline only with normal MMA/Homocysteine/IF antibody       Past Surgical History:   Procedure Laterality Date   • BACK SURGERY     • HYSTERECTOMY      PARTIAL   • KNEE SURGERY Left        Social  History     Social History   • Marital status:      Spouse name: N/A   • Number of children: N/A   • Years of education: N/A     Occupational History   • Not on file.     Social History Main Topics   • Smoking status: Former Smoker   • Smokeless tobacco: Never Used      Comment: CAFFEINE USE/ SOFT DRINKS.    • Alcohol use No   • Drug use: No   • Sexual activity: Not on file     Other Topics Concern   • Not on file     Social History Narrative       Family History   Problem Relation Age of Onset   • Breast cancer Mother    • Stroke Father    • Stroke Maternal Grandmother    • Heart attack Maternal Grandmother    • Heart attack Maternal Grandfather        Review of Systems   Cardiovascular: Negative for chest pain, leg swelling and palpitations.   Musculoskeletal: Positive for falls.   Neurological: Positive for light-headedness.   Psychiatric/Behavioral: Positive for memory loss.   All other systems reviewed and are negative.      Allergies   Allergen Reactions   • Enalapril      Aka vasotec tabs   • Penicillins          Current Outpatient Prescriptions:   •  acetaminophen (TYLENOL) 325 MG tablet, Take 650 mg by mouth Every 6 (Six) Hours., Disp: , Rfl:   •  amLODIPine (NORVASC) 5 MG tablet, TAKE ONE TABLET BY MOUTH DAILY, Disp: 90 tablet, Rfl: 1  •  buPROPion XL (WELLBUTRIN XL) 300 MG 24 hr tablet, daily.  , Disp: , Rfl:   •  cetirizine (zyrTEC) 10 MG tablet, Take 1 tablet by mouth Daily., Disp: 90 tablet, Rfl: 3  •  diclofenac (VOLTAREN) 1 % gel gel, Apply 4 g topically 4 (Four) Times a Day As Needed (left wrist pain)., Disp: 100 g, Rfl: 0  •  donepezil (ARICEPT) 10 MG tablet, Daily., Disp: , Rfl:   •  Empagliflozin (JARDIANCE) 10 MG tablet, Take 10 mg by mouth Daily., Disp: 30 tablet, Rfl: 11  •  ferrous sulfate 325 (65 FE) MG tablet, Take 1 tablet by mouth 2 (two) times a day. EVERY OTHER DAY, Disp: , Rfl:   •  fluticasone (FLONASE) 50 MCG/ACT nasal spray, PLACE 2 SPRAYS IN EACH NOSTRIL DAILY, Disp: 16  "each, Rfl: 10  •  glucose blood test strip, Use daily for type 2 DM., Disp: 100 each, Rfl: 3  •  hydrALAZINE (APRESOLINE) 100 MG tablet, TAKE ONE TABLET BY MOUTH TWICE A DAY, Disp: 180 tablet, Rfl: 1  •  JANUVIA 100 MG tablet, TAKE ONE TABLET BY MOUTH DAILY, Disp: 90 tablet, Rfl: 3  •  Melatonin 10 MG capsule, Take  by mouth., Disp: , Rfl:   •  memantine (NAMENDA) 10 MG tablet, Take 10 mg by mouth 2 (Two) Times a Day., Disp: , Rfl:   •  metFORMIN ER (GLUCOPHAGE-XR) 500 MG 24 hr tablet, TAKE TWO TABLETS BY MOUTH EVERY 12 HOURS, Disp: 360 tablet, Rfl: 0  •  metoprolol succinate XL (TOPROL-XL) 50 MG 24 hr tablet, TAKE TWO TABLETS BY MOUTH EVERY MORNING, Disp: 180 tablet, Rfl: 2  •  Mirabegron ER (MYRBETRIQ) 50 MG tablet sustained-release 24 hour 24 hr tablet, Take 50 mg by mouth Daily., Disp: 30 tablet, Rfl: 5  •  Multiple Vitamin (MULTIVITAMINS PO), Take 1 tablet by mouth daily., Disp: , Rfl:   •  Omega-3 Fatty Acids (OMEGA 3 PO), Take  by mouth Daily., Disp: , Rfl:   •  PRADAXA 150 MG capsu, TAKE ONE CAPSULE BY MOUTH TWICE A DAY, Disp: 60 capsule, Rfl: 6  •  rosuvastatin (CRESTOR) 20 MG tablet, TAKE ONE TABLET BY MOUTH EVERY NIGHT AT BEDTIME, Disp: 90 tablet, Rfl: 0  •  sertraline (ZOLOFT) 100 MG tablet, Take 100 mg by mouth Daily., Disp: , Rfl:      Objective:     Vitals:    03/08/18 1456 03/08/18 1507   BP: 166/72 144/72   BP Location: Right arm    Patient Position: Sitting    Pulse: 63    Weight: 93 kg (205 lb)    Height: 157.5 cm (62.01\")      Body mass index is 37.48 kg/(m^2).    Physical Exam   Constitutional: She appears well-developed and well-nourished.   HENT:   Head: Normocephalic.   Nose: Nose normal.   Mouth/Throat: Oropharynx is clear and moist.   Eyes: Conjunctivae and EOM are normal. Pupils are equal, round, and reactive to light.   Neck: Normal range of motion. No JVD present.   Cardiovascular: Normal rate, regular rhythm, normal heart sounds and intact distal pulses.    No murmur " heard.  Pulmonary/Chest: Effort normal and breath sounds normal.   Abdominal: Soft. She exhibits no mass. There is no tenderness.   Musculoskeletal: Normal range of motion. She exhibits no edema.   Lymphadenopathy:     She has no cervical adenopathy.   Neurological: She is alert. No cranial nerve deficit.   Skin: Skin is warm and dry. No rash noted.   Psychiatric: She has a normal mood and affect. Her behavior is normal. Cognition and memory are impaired.   Vitals reviewed.        ECG 12 Lead  Date/Time: 3/8/2018 3:11 PM  Performed by: MODE GONGORA  Authorized by: MODE GONGORA   Comparison: compared with previous ECG   Similar to previous ECG  Rhythm: sinus rhythm  Conduction: conduction normal  QRS axis: normal  Other findings: LVH with strain  Clinical impression: abnormal ECG              Assessment:       Diagnosis Plan   1. Paroxysmal atrial fibrillation     2. Essential hypertension            Plan:       1.  Atrial Fibrillation and Atrial Flutter  Assessment  • The patient has paroxysmal atrial fibrillation  • This is non-valvular in etiology  • The patient's CHADS2-VASc score is 6  • A [id number] score of 2 or more is considered a high risk for a thromboembolic event  • Dabigatran prescribed    Plan  • Attempt to maintain sinus rhythm  • Continue dabigatran for antithrombotic therapy, bleeding issues discussed  • Continue beta blocker for rhythm control  • If her falls become more frequent, we will have to reconsider the NOAC.    2.  Her SBP is within goal for her age and memory impairment.    Sincerely,       Mode Gongora MD

## 2018-03-20 ENCOUNTER — OFFICE VISIT (OUTPATIENT)
Dept: INTERNAL MEDICINE | Facility: CLINIC | Age: 73
End: 2018-03-20

## 2018-03-20 VITALS
BODY MASS INDEX: 37.12 KG/M2 | WEIGHT: 203 LBS | DIASTOLIC BLOOD PRESSURE: 68 MMHG | OXYGEN SATURATION: 98 % | SYSTOLIC BLOOD PRESSURE: 164 MMHG | HEART RATE: 61 BPM

## 2018-03-20 DIAGNOSIS — E11.8 TYPE 2 DIABETES MELLITUS WITH COMPLICATION, WITHOUT LONG-TERM CURRENT USE OF INSULIN (HCC): Primary | ICD-10-CM

## 2018-03-20 DIAGNOSIS — E78.5 HYPERLIPIDEMIA, UNSPECIFIED HYPERLIPIDEMIA TYPE: ICD-10-CM

## 2018-03-20 DIAGNOSIS — R05.3 PERSISTENT COUGH: ICD-10-CM

## 2018-03-20 DIAGNOSIS — I10 ESSENTIAL HYPERTENSION: ICD-10-CM

## 2018-03-20 PROCEDURE — 71046 X-RAY EXAM CHEST 2 VIEWS: CPT | Performed by: INTERNAL MEDICINE

## 2018-03-20 PROCEDURE — 99214 OFFICE O/P EST MOD 30 MIN: CPT | Performed by: INTERNAL MEDICINE

## 2018-03-20 RX ORDER — MONTELUKAST SODIUM 10 MG/1
10 TABLET ORAL NIGHTLY
Qty: 30 TABLET | Refills: 11 | Status: SHIPPED | OUTPATIENT
Start: 2018-03-20 | End: 2019-03-06

## 2018-03-20 RX ORDER — ALPRAZOLAM 0.25 MG/1
0.25 TABLET ORAL 2 TIMES DAILY PRN
Qty: 20 TABLET | Refills: 0 | Status: SHIPPED | OUTPATIENT
Start: 2018-03-20 | End: 2019-03-06

## 2018-03-20 NOTE — PROGRESS NOTES
Subjective     Kassi Mathis is a 72 y.o. female who presents with   Chief Complaint   Patient presents with   • Diabetes   • Hypertension   • Hyperlipidemia       History of Present Illness     DM-2. She is maintained on metformin, Januvia and Jardiance.  She is due for labs.   HTN. She is under good control at home.  HLD. She is due for labs on Crestor.    Daughter requests xanax for occasional anxiety attacks.    Persistent cough for months.  Associated with runny nose and allergies.  Production at times.  No SOA/wheezing.    Review of Systems   Respiratory: Positive for cough.    Cardiovascular: Negative.        The following portions of the patient's history were reviewed and updated as appropriate: allergies, current medications and problem list.    Patient Active Problem List    Diagnosis Date Noted   • Type 2 diabetes mellitus 01/17/2016     Priority: High   • Hammer toes of both feet 10/17/2017   • Depression 01/17/2016   • Allergic rhinitis 01/17/2016   • Hypertension 01/17/2016   • Dementia without behavioral disturbance 01/17/2016     Note Last Updated: 7/20/2016     By neuropysch evaluation 7/20/2016     • Paroxysmal atrial fibrillation 01/17/2016   • Cobalamin deficiency 01/17/2016     Note Last Updated: 6/8/2016     Boarderline only with normal MMA/Homocysteine/IF antibody.  Encouraged MVI daily.       • Hyperlipidemia 01/17/2016       Current Outpatient Prescriptions on File Prior to Visit   Medication Sig Dispense Refill   • acetaminophen (TYLENOL) 325 MG tablet Take 650 mg by mouth Every 6 (Six) Hours.     • amLODIPine (NORVASC) 5 MG tablet TAKE ONE TABLET BY MOUTH DAILY 90 tablet 1   • buPROPion XL (WELLBUTRIN XL) 300 MG 24 hr tablet daily.       • cetirizine (zyrTEC) 10 MG tablet Take 1 tablet by mouth Daily. 90 tablet 3   • dabigatran etexilate (PRADAXA) 150 MG capsu Take 1 capsule by mouth Every 12 (Twelve) Hours. 180 capsule 3   • donepezil (ARICEPT) 10 MG tablet Daily.     • Empagliflozin  (JARDIANCE) 10 MG tablet Take 10 mg by mouth Daily. 30 tablet 11   • ferrous sulfate 325 (65 FE) MG tablet Take 1 tablet by mouth 2 (two) times a day. EVERY OTHER DAY     • glucose blood test strip Use daily for type 2 DM. 100 each 3   • hydrALAZINE (APRESOLINE) 100 MG tablet TAKE ONE TABLET BY MOUTH TWICE A  tablet 1   • JANUVIA 100 MG tablet TAKE ONE TABLET BY MOUTH DAILY 90 tablet 3   • Melatonin 10 MG capsule Take  by mouth.     • memantine (NAMENDA) 10 MG tablet Take 10 mg by mouth 2 (Two) Times a Day.     • metFORMIN ER (GLUCOPHAGE-XR) 500 MG 24 hr tablet TAKE TWO TABLETS BY MOUTH EVERY 12 HOURS 360 tablet 0   • metoprolol succinate XL (TOPROL-XL) 50 MG 24 hr tablet TAKE TWO TABLETS BY MOUTH EVERY MORNING 180 tablet 3   • Mirabegron ER (MYRBETRIQ) 50 MG tablet sustained-release 24 hour 24 hr tablet Take 50 mg by mouth Daily. 30 tablet 5   • Multiple Vitamin (MULTIVITAMINS PO) Take 1 tablet by mouth daily.     • Omega-3 Fatty Acids (OMEGA 3 PO) Take  by mouth Daily.     • rosuvastatin (CRESTOR) 20 MG tablet TAKE ONE TABLET BY MOUTH EVERY NIGHT AT BEDTIME 90 tablet 0   • sertraline (ZOLOFT) 100 MG tablet Take 100 mg by mouth Daily.     • [DISCONTINUED] diclofenac (VOLTAREN) 1 % gel gel Apply 4 g topically 4 (Four) Times a Day As Needed (left wrist pain). 100 g 0   • [DISCONTINUED] fluticasone (FLONASE) 50 MCG/ACT nasal spray PLACE 2 SPRAYS IN EACH NOSTRIL DAILY 16 each 10     No current facility-administered medications on file prior to visit.        Objective     /68   Pulse 61   Wt 92.1 kg (203 lb)   LMP  (LMP Unknown)   SpO2 98%   BMI 37.12 kg/m²     Physical Exam   Constitutional: She is oriented to person, place, and time. She appears well-developed and well-nourished.   HENT:   Head: Normocephalic and atraumatic.   Right Ear: Hearing and tympanic membrane normal.   Left Ear: Hearing and tympanic membrane normal.   Mouth/Throat: No oropharyngeal exudate or posterior oropharyngeal erythema.    Cardiovascular: Normal rate, regular rhythm and normal heart sounds.    Pulmonary/Chest: Effort normal and breath sounds normal.   Neurological: She is alert and oriented to person, place, and time.   Skin: Skin is warm and dry.   Psychiatric: She has a normal mood and affect. Her behavior is normal.     X-rays of the chest  performed today for following indication:   cough.  X-ray reveal nad.  No change 2/18/2016    Assessment/Plan   Kassi was seen today for diabetes, hypertension and hyperlipidemia.    Diagnoses and all orders for this visit:    Type 2 diabetes mellitus with complication, without long-term current use of insulin  -     CBC & Differential  -     Comprehensive Metabolic Panel  -     Lipid Panel  -     Hemoglobin A1c    Essential hypertension  -     CBC & Differential  -     Comprehensive Metabolic Panel  -     Lipid Panel  -     Hemoglobin A1c    Hyperlipidemia, unspecified hyperlipidemia type  -     CBC & Differential  -     Comprehensive Metabolic Panel  -     Lipid Panel  -     Hemoglobin A1c    Persistent cough  -     XR Chest PA & Lateral    Other orders  -     ALPRAZolam (XANAX) 0.25 MG tablet; Take 1 tablet by mouth 2 (Two) Times a Day As Needed for Anxiety.  -     montelukast (SINGULAIR) 10 MG tablet; Take 1 tablet by mouth Every Night.        Discussion  DM-2. Continue current regimen.   HTN. Continue current regimen.   HLD. Continue Crestor.   Persistent cough.  Likely allergies.  Trial of adding singulair.    Anxiety.  Add prn Xanax.  Contract today.      As part of this patient's treatment plan I am prescribing controlled substances. The patient has been made aware of appropriate use of such medications, including potential risk of somnolence, limited ability to drive and/or work safely, and potential for dependence or overdose. It has also been made clear that these medications are for use by this patient only, without concomitant use of alcohol or other substances unless prescribed.  The has completed prescribing agreement detailing terms of continued prescribing of controlled substances, including monitoring ANJANA reports, urine drug screening, and pill counts if necessary. The patient is aware that inappropriate use will result in cessation of prescribing such medications.          Future Appointments  Date Time Provider Department Center   3/6/2019 11:45 AM Mode Gongora MD [facility] None

## 2018-03-21 LAB
ALBUMIN SERPL-MCNC: 4.1 G/DL (ref 3.5–5.2)
ALBUMIN/GLOB SERPL: 1.6 G/DL
ALP SERPL-CCNC: 59 U/L (ref 39–117)
ALT SERPL-CCNC: 27 U/L (ref 1–33)
AST SERPL-CCNC: 27 U/L (ref 1–32)
BASOPHILS # BLD AUTO: 0.03 10*3/MM3 (ref 0–0.2)
BASOPHILS NFR BLD AUTO: 0.3 % (ref 0–1.5)
BILIRUB SERPL-MCNC: 0.6 MG/DL (ref 0.1–1.2)
BUN SERPL-MCNC: 14 MG/DL (ref 8–23)
BUN/CREAT SERPL: 13.1 (ref 7–25)
CALCIUM SERPL-MCNC: 9.8 MG/DL (ref 8.6–10.5)
CHLORIDE SERPL-SCNC: 101 MMOL/L (ref 98–107)
CHOLEST SERPL-MCNC: 175 MG/DL (ref 0–200)
CO2 SERPL-SCNC: 27.8 MMOL/L (ref 22–29)
CREAT SERPL-MCNC: 1.07 MG/DL (ref 0.57–1)
EOSINOPHIL # BLD AUTO: 0.25 10*3/MM3 (ref 0–0.7)
EOSINOPHIL NFR BLD AUTO: 2.6 % (ref 0.3–6.2)
ERYTHROCYTE [DISTWIDTH] IN BLOOD BY AUTOMATED COUNT: 14.9 % (ref 11.7–13)
GFR SERPLBLD CREATININE-BSD FMLA CKD-EPI: 50 ML/MIN/1.73
GFR SERPLBLD CREATININE-BSD FMLA CKD-EPI: 61 ML/MIN/1.73
GLOBULIN SER CALC-MCNC: 2.5 GM/DL
GLUCOSE SERPL-MCNC: 242 MG/DL (ref 65–99)
HBA1C MFR BLD: 7.72 % (ref 4.8–5.6)
HCT VFR BLD AUTO: 40 % (ref 35.6–45.5)
HDLC SERPL-MCNC: 76 MG/DL (ref 40–60)
HGB BLD-MCNC: 12.5 G/DL (ref 11.9–15.5)
IMM GRANULOCYTES # BLD: 0.02 10*3/MM3 (ref 0–0.03)
IMM GRANULOCYTES NFR BLD: 0.2 % (ref 0–0.5)
LDLC SERPL CALC-MCNC: 75 MG/DL (ref 0–100)
LYMPHOCYTES # BLD AUTO: 2.96 10*3/MM3 (ref 0.9–4.8)
LYMPHOCYTES NFR BLD AUTO: 30.9 % (ref 19.6–45.3)
MCH RBC QN AUTO: 28.9 PG (ref 26.9–32)
MCHC RBC AUTO-ENTMCNC: 31.3 G/DL (ref 32.4–36.3)
MCV RBC AUTO: 92.4 FL (ref 80.5–98.2)
MONOCYTES # BLD AUTO: 0.53 10*3/MM3 (ref 0.2–1.2)
MONOCYTES NFR BLD AUTO: 5.5 % (ref 5–12)
NEUTROPHILS # BLD AUTO: 5.78 10*3/MM3 (ref 1.9–8.1)
NEUTROPHILS NFR BLD AUTO: 60.5 % (ref 42.7–76)
PLATELET # BLD AUTO: 326 10*3/MM3 (ref 140–500)
POTASSIUM SERPL-SCNC: 4.8 MMOL/L (ref 3.5–5.2)
PROT SERPL-MCNC: 6.6 G/DL (ref 6–8.5)
RBC # BLD AUTO: 4.33 10*6/MM3 (ref 3.9–5.2)
SODIUM SERPL-SCNC: 141 MMOL/L (ref 136–145)
TRIGL SERPL-MCNC: 120 MG/DL (ref 0–150)
VLDLC SERPL CALC-MCNC: 24 MG/DL (ref 5–40)
WBC # BLD AUTO: 9.57 10*3/MM3 (ref 4.5–10.7)

## 2018-04-17 DIAGNOSIS — I10 ESSENTIAL HYPERTENSION: ICD-10-CM

## 2018-04-17 RX ORDER — AMLODIPINE BESYLATE 5 MG/1
TABLET ORAL
Qty: 90 TABLET | Refills: 0 | Status: SHIPPED | OUTPATIENT
Start: 2018-04-17 | End: 2018-07-17 | Stop reason: SDUPTHER

## 2018-04-23 RX ORDER — ROSUVASTATIN CALCIUM 20 MG/1
TABLET, COATED ORAL
Qty: 90 TABLET | Refills: 2 | Status: SHIPPED | OUTPATIENT
Start: 2018-04-23 | End: 2019-01-20 | Stop reason: SDUPTHER

## 2018-05-15 RX ORDER — METFORMIN HYDROCHLORIDE 500 MG/1
1000 TABLET, EXTENDED RELEASE ORAL 2 TIMES DAILY
Qty: 360 TABLET | Refills: 2 | Status: SHIPPED | OUTPATIENT
Start: 2018-05-15 | End: 2019-01-20 | Stop reason: SDUPTHER

## 2018-06-04 RX ORDER — EMPAGLIFLOZIN 10 MG/1
TABLET, FILM COATED ORAL
Qty: 90 TABLET | Refills: 10 | Status: SHIPPED | OUTPATIENT
Start: 2018-06-04 | End: 2019-05-30 | Stop reason: SDUPTHER

## 2018-06-06 ENCOUNTER — TELEPHONE (OUTPATIENT)
Dept: CARDIOLOGY | Facility: CLINIC | Age: 73
End: 2018-06-06

## 2018-06-06 NOTE — TELEPHONE ENCOUNTER
Pt's daughter called to report that the pt's bp has been elevated for the past 3-4 days.  Today her bp was 160/60 at approx 2pm.  She does c/o a headache sometimes but not consistent and does take either Tylenol (OTC) or Sinus (OTC), she couldn't  recall if it was the pseudoephedrine class.      She also reports that the pt had 2 episodes within the past 3 days where the pt c/o nausea and had diarrhea.      Verified pt's meds:    Amlodipine 5 mg qd  Hydralazine 100 mg, 1/2 tablet qd   Toprol 50 mg 2 tabs qd

## 2018-06-08 ENCOUNTER — TELEPHONE (OUTPATIENT)
Dept: INTERNAL MEDICINE | Facility: CLINIC | Age: 73
End: 2018-06-08

## 2018-06-08 NOTE — TELEPHONE ENCOUNTER
Patient's daughter wanted you to know that Dr. Flores was the surgeon that performed Kassi Mathis' back surgery in 2007.

## 2018-06-27 DIAGNOSIS — M54.5 CHRONIC LOW BACK PAIN, UNSPECIFIED BACK PAIN LATERALITY, WITH SCIATICA PRESENCE UNSPECIFIED: Primary | ICD-10-CM

## 2018-06-27 DIAGNOSIS — G89.29 CHRONIC LOW BACK PAIN, UNSPECIFIED BACK PAIN LATERALITY, WITH SCIATICA PRESENCE UNSPECIFIED: Primary | ICD-10-CM

## 2018-07-03 ENCOUNTER — OFFICE VISIT (OUTPATIENT)
Dept: INTERNAL MEDICINE | Facility: CLINIC | Age: 73
End: 2018-07-03

## 2018-07-03 VITALS
OXYGEN SATURATION: 98 % | SYSTOLIC BLOOD PRESSURE: 130 MMHG | HEART RATE: 60 BPM | DIASTOLIC BLOOD PRESSURE: 72 MMHG | HEIGHT: 62 IN | BODY MASS INDEX: 36.07 KG/M2 | WEIGHT: 196 LBS

## 2018-07-03 DIAGNOSIS — M54.6 ACUTE LEFT-SIDED THORACIC BACK PAIN: Primary | ICD-10-CM

## 2018-07-03 DIAGNOSIS — M54.5 ACUTE BILATERAL LOW BACK PAIN, WITH SCIATICA PRESENCE UNSPECIFIED: ICD-10-CM

## 2018-07-03 PROCEDURE — 72072 X-RAY EXAM THORAC SPINE 3VWS: CPT | Performed by: INTERNAL MEDICINE

## 2018-07-03 PROCEDURE — 99213 OFFICE O/P EST LOW 20 MIN: CPT | Performed by: INTERNAL MEDICINE

## 2018-07-03 NOTE — PROGRESS NOTES
Subjective     Kassi Mathis is a 73 y.o. female who presents with   Chief Complaint   Patient presents with   • Back Pain       History of Present Illness     They saw Dr. Flores yesterday for LBP.  Left sided thoracic pain started on Saturday.  Much worse yesterday.  Movement brought on the pain.  No SOA.  No CP associated.  No injury.  OTC rubbing cream was helpful.      Review of Systems   Respiratory: Negative.        The following portions of the patient's history were reviewed and updated as appropriate: allergies, current medications and problem list.    Patient Active Problem List    Diagnosis Date Noted   • Type 2 diabetes mellitus (CMS/[facility]) 01/17/2016     Priority: High   • Hammer toes of both feet 10/17/2017   • Depression 01/17/2016   • Allergic rhinitis 01/17/2016   • Hypertension 01/17/2016   • Dementia without behavioral disturbance 01/17/2016     Note Last Updated: 7/20/2016     By neuropysch evaluation 7/20/2016     • Paroxysmal atrial fibrillation (CMS/[facility]) 01/17/2016   • Cobalamin deficiency 01/17/2016     Note Last Updated: 6/8/2016     Boarderline only with normal MMA/Homocysteine/IF antibody.  Encouraged MVI daily.       • Hyperlipidemia 01/17/2016       Current Outpatient Prescriptions on File Prior to Visit   Medication Sig Dispense Refill   • acetaminophen (TYLENOL) 325 MG tablet Take 650 mg by mouth Every 6 (Six) Hours.     • ALPRAZolam (XANAX) 0.25 MG tablet Take 1 tablet by mouth 2 (Two) Times a Day As Needed for Anxiety. 20 tablet 0   • amLODIPine (NORVASC) 5 MG tablet TAKE ONE TABLET BY MOUTH DAILY 90 tablet 0   • buPROPion XL (WELLBUTRIN XL) 300 MG 24 hr tablet daily.       • cetirizine (zyrTEC) 10 MG tablet Take 1 tablet by mouth Daily. 90 tablet 3   • dabigatran etexilate (PRADAXA) 150 MG capsu Take 1 capsule by mouth Every 12 (Twelve) Hours. 180 capsule 3   • donepezil (ARICEPT) 10 MG tablet Daily.     • ferrous sulfate 325 (65 FE) MG tablet Take 1 tablet by mouth 2 (two) times a  "day. EVERY OTHER DAY     • glucose blood test strip Use daily for type 2 DM. 100 each 3   • hydrALAZINE (APRESOLINE) 100 MG tablet TAKE ONE TABLET BY MOUTH TWICE A  tablet 1   • JANUVIA 100 MG tablet TAKE ONE TABLET BY MOUTH DAILY 90 tablet 3   • JARDIANCE 10 MG tablet TAKE ONE TABLET BY MOUTH DAILY 90 tablet 10   • Melatonin 10 MG capsule Take  by mouth.     • memantine (NAMENDA) 10 MG tablet Take 10 mg by mouth 2 (Two) Times a Day.     • metFORMIN ER (GLUCOPHAGE-XR) 500 MG 24 hr tablet Take 2 tablets by mouth 2 (Two) Times a Day. 360 tablet 2   • metoprolol succinate XL (TOPROL-XL) 50 MG 24 hr tablet TAKE TWO TABLETS BY MOUTH EVERY MORNING 180 tablet 3   • Mirabegron ER (MYRBETRIQ) 50 MG tablet sustained-release 24 hour 24 hr tablet Take 50 mg by mouth Daily. 30 tablet 5   • montelukast (SINGULAIR) 10 MG tablet Take 1 tablet by mouth Every Night. 30 tablet 11   • Multiple Vitamin (MULTIVITAMINS PO) Take 1 tablet by mouth daily.     • Omega-3 Fatty Acids (OMEGA 3 PO) Take  by mouth Daily.     • rosuvastatin (CRESTOR) 20 MG tablet TAKE ONE TABLET BY MOUTH EVERY NIGHT AT BEDTIME 90 tablet 2   • sertraline (ZOLOFT) 100 MG tablet Take 100 mg by mouth Daily.       No current facility-administered medications on file prior to visit.        Objective     /72   Pulse 60   Ht 157.5 cm (62.01\")   Wt 88.9 kg (196 lb)   LMP  (LMP Unknown)   SpO2 98%   BMI 35.84 kg/m²     Physical Exam   Constitutional: She is oriented to person, place, and time. She appears well-developed and well-nourished.   HENT:   Head: Normocephalic and atraumatic.   Cardiovascular: Normal rate, regular rhythm and normal heart sounds.    Pulmonary/Chest: Effort normal and breath sounds normal.   Musculoskeletal:        Thoracic back: She exhibits tenderness. She exhibits no bony tenderness.   Tenderness to the left of thoracic spine with palpation.     Neurological: She is alert and oriented to person, place, and time.   Skin: Skin is " warm and dry.   Psychiatric: She has a normal mood and affect. Her behavior is normal.     X-rays of the thoracic  performed today for following indication:   pain.  X-ray reveal nad.  There is no available x-ray for comparison.  X-ray sent to radiology for official interpretation and findings.        Assessment/Plan   Kassi was seen today for back pain.    Diagnoses and all orders for this visit:    Acute left-sided thoracic back pain  -     Cancel: XR Spine Thoracic 2 View (In Office)  -     XR Spine Thoracic 3 View  -     Ambulatory Referral to Physical Therapy    Acute bilateral low back pain, with sciatica presence unspecified  -     Ambulatory Referral to Physical Therapy        Discussion  Patient presents with new thoracic pain pain and chronic LBP followed by Dr. Flores.   Trial of conservative management with rest, heating pad, tylenol  and physical therapy.  Let me know if not feeling better over the next several weeks or if there is any change in symptoms.         Future Appointments  Date Time Provider Department Center   3/6/2019 11:45 AM Mode Gongora MD [facility] None

## 2018-07-17 DIAGNOSIS — I10 ESSENTIAL HYPERTENSION: ICD-10-CM

## 2018-07-17 RX ORDER — AMLODIPINE BESYLATE 5 MG/1
TABLET ORAL
Qty: 90 TABLET | Refills: 0 | Status: SHIPPED | OUTPATIENT
Start: 2018-07-17 | End: 2018-10-15 | Stop reason: SDUPTHER

## 2018-07-20 ENCOUNTER — TELEPHONE (OUTPATIENT)
Dept: CARDIOLOGY | Facility: CLINIC | Age: 73
End: 2018-07-20

## 2018-07-20 ENCOUNTER — APPOINTMENT (OUTPATIENT)
Dept: PHYSICAL THERAPY | Facility: HOSPITAL | Age: 73
End: 2018-07-20

## 2018-07-20 NOTE — TELEPHONE ENCOUNTER
Dr. Dante Clements with [facility] sent a request for approval for pt to hold her Pradaxa 150 mg 4 days prior to inj.      Fax [phone]  Phone: [phone]

## 2018-07-20 NOTE — TELEPHONE ENCOUNTER
In general we recommend 2 days for most procedures and 3 days for procedures like this (neuraxial).  Is 3 days not sufficient for them?    JDK

## 2018-07-23 NOTE — TELEPHONE ENCOUNTER
I have called to inquire if 3 days would be sufficient, but the number is busy.  I have attempted x 3.

## 2018-07-24 NOTE — TELEPHONE ENCOUNTER
I discussed with the procedure  Heidy who d/w Dr. Clements.  They stated that 3 days would be sufficient.  Is pt approved to hold the 3 day prior to inj?

## 2018-07-31 ENCOUNTER — APPOINTMENT (OUTPATIENT)
Dept: PHYSICAL THERAPY | Facility: HOSPITAL | Age: 73
End: 2018-07-31

## 2018-08-06 RX ORDER — MIRABEGRON 50 MG/1
TABLET, FILM COATED, EXTENDED RELEASE ORAL
Qty: 30 TABLET | Refills: 4 | Status: SHIPPED | OUTPATIENT
Start: 2018-08-06 | End: 2019-01-20 | Stop reason: SDUPTHER

## 2018-08-07 ENCOUNTER — HOSPITAL ENCOUNTER (OUTPATIENT)
Dept: PHYSICAL THERAPY | Facility: HOSPITAL | Age: 73
Setting detail: THERAPIES SERIES
Discharge: HOME | End: 2018-08-07

## 2018-08-07 ENCOUNTER — DOCUMENTATION (OUTPATIENT)
Dept: PHYSICAL THERAPY | Facility: HOSPITAL | Age: 73
End: 2018-08-07

## 2018-08-07 DIAGNOSIS — M54.50 CHRONIC MIDLINE LOW BACK PAIN WITHOUT SCIATICA: Primary | ICD-10-CM

## 2018-08-07 DIAGNOSIS — Z74.09 LIMITED MOBILITY: ICD-10-CM

## 2018-08-07 DIAGNOSIS — G89.29 CHRONIC MIDLINE LOW BACK PAIN WITHOUT SCIATICA: ICD-10-CM

## 2018-08-07 DIAGNOSIS — M25.561 CHRONIC PAIN OF RIGHT KNEE: ICD-10-CM

## 2018-08-07 DIAGNOSIS — G89.29 CHRONIC PAIN OF RIGHT KNEE: ICD-10-CM

## 2018-08-07 DIAGNOSIS — M25.511 RIGHT SHOULDER PAIN, UNSPECIFIED CHRONICITY: Primary | ICD-10-CM

## 2018-08-07 DIAGNOSIS — M54.50 CHRONIC MIDLINE LOW BACK PAIN WITHOUT SCIATICA: ICD-10-CM

## 2018-08-07 DIAGNOSIS — G89.29 CHRONIC MIDLINE LOW BACK PAIN WITHOUT SCIATICA: Primary | ICD-10-CM

## 2018-08-07 PROCEDURE — 97162 PT EVAL MOD COMPLEX 30 MIN: CPT | Performed by: PHYSICAL THERAPIST

## 2018-08-07 PROCEDURE — 97110 THERAPEUTIC EXERCISES: CPT | Performed by: PHYSICAL THERAPIST

## 2018-08-07 PROCEDURE — G8978 MOBILITY CURRENT STATUS: HCPCS | Performed by: PHYSICAL THERAPIST

## 2018-08-07 PROCEDURE — G8979 MOBILITY GOAL STATUS: HCPCS | Performed by: PHYSICAL THERAPIST

## 2018-08-07 NOTE — THERAPY DISCHARGE NOTE
Outpatient Physical Therapy Discharge Summary         Patient Name: Kassi Mathis  : 1945  MRN: [id number]    Today's Date: 2018    Visit Dx:    ICD-10-CM ICD-9-CM   1. Right shoulder pain, unspecified chronicity M25.511 719.41   2. Chronic midline low back pain without sciatica M54.5 724.2    G89.29 338.29             PT OP Goals     Row Name 18 1300          PT Short Term Goals    STG Date to Achieve 17  -KJ     STG 1 Pt. will be independent and compliant with initial home exercise program.  -KJ     STG 1 Progress Not Met  -KJ     STG 1 Progress Comments Pt. reporting limited compliance when she is in pain.   -KJ     STG 2 Pt. will report low back pain </= 6-7/10 to increase ease of getting in/out of bed.  -KJ     STG 2 Progress Partially Met  -KJ     STG 2 Progress Comments Intermittently met, some days worse than others.   -KJ     STG 3 Pt. will report R shoulder pain </=4-5/10 to increase ease of grooming.  -KJ     STG 3 Progress Partially Met  -KJ     STG 3 Progress Comments Intermittently met, some days worse than others.   -KJ        Long Term Goals    LTG Date to Achieve 17  -KJ     LTG 1 Pt.w ill be independent and compliant with advanced home exercise program.  -KJ     LTG 1 Progress Ongoing;Not Met  -KJ     LTG 1 Progress Comments Pt. reporting limited compliance when she is in pain.   -KJ     LTG 2 Pt. will report low back pain </= 4-5/10 to increase ease of preparing a meal.  -KJ     LTG 2 Progress Not Met  -KJ     LTG 3 Pt. will report R shoulder pain </=2-3/10 to increase ease of bathing.  -KJ     LTG 3 Progress Not Met  -KJ     LTG 4 Pt. will score </=27% on DASH, indicating decreased perceived functional disability  -KJ     LTG 4 Progress Not Met  -KJ     LTG 5 Pt. will score </= 40% on Oswestry, indicating decreased perceived functional disability.  -KJ     LTG 5 Progress Not Met  -KJ       User Key  (r) = Recorded By, (t) = Taken By, (c) = Cosigned By     Initials Name Provider Type    Alba Caal, PT Physical Therapist          OP PT Discharge Summary  Date of Discharge: 08/07/18  Reason for Discharge: Lack of progress, Unable to participate  Outcomes Achieved: Unable to make functional progress toward goals at this time  Discharge Destination: Home with home program  Discharge Instructions/Additional Comments: Pt. attended 6 physical therapy sessions and had limited progress towards her goals. She admitted to lack of compliance with her HEP. Last scheduled sessions were canceled due to heart issues.       Time Calculation:        Therapy Suggested Charges     Code   Minutes Charges    None                       Alba Crews, PT  8/7/2018

## 2018-08-08 PROCEDURE — G8978 MOBILITY CURRENT STATUS: HCPCS | Performed by: PHYSICAL THERAPIST

## 2018-08-08 PROCEDURE — G8979 MOBILITY GOAL STATUS: HCPCS | Performed by: PHYSICAL THERAPIST

## 2018-08-08 NOTE — THERAPY EVALUATION
Outpatient Physical Therapy Ortho Initial Evaluation  Psychiatric     Patient Name: Kassi Mathis  : 1945  MRN: [id number]  Today's Date: 2018      Visit Date: 2018    Patient Active Problem List   Diagnosis   • Depression   • Allergic rhinitis   • Hypertension   • Dementia without behavioral disturbance   • Paroxysmal atrial fibrillation (CMS/HCC)   • Cobalamin deficiency   • Type 2 diabetes mellitus (CMS/HCC)   • Hyperlipidemia   • Hammer toes of both feet        Past Medical History:   Diagnosis Date   • Allergic rhinitis    • Anemia    • Chronic venous insufficiency    • Dementia    • Depression    • Diabetes mellitus (CMS/HCC)    • Hidradenitis suppurativa    • Hyperlipidemia    • Hypertension    • Osteoarthritis    • Paroxysmal atrial fibrillation (CMS/HCC)    • Peripheral neuropathy    • Stroke (CMS/HCC)    • Vitamin B 12 deficiency     Boarderline only with normal MMA/Homocysteine/IF antibody        Past Surgical History:   Procedure Laterality Date   • BACK SURGERY     • HYSTERECTOMY      PARTIAL   • KNEE SURGERY Left        Visit Dx:     ICD-10-CM ICD-9-CM   1. Chronic midline low back pain without sciatica M54.5 724.2    G89.29 338.29   2. Limited mobility Z74.09 V49.89   3. Chronic pain of right knee M25.561 719.46    G89.29 338.29             Patient History     Row Name 18 1400             History    Chief Complaint Pain;Difficulty with daily activities;Difficulty Walking;Muscle weakness  -CK      Type of Pain Back pain  -CK      Brief Description of Current Complaint Back pain with extended standing or walking. I live alone. I do what I have to do but I have to take a lot of seated rest breaks. Back surgery in , daughter thinks it was a fusion.   -CK      Patient/Caregiver Goals Relieve pain;Know what to do to help the symptoms  -CK      Current Tobacco Use none  -CK      Patient's Rating of General Health Good  -CK      Hand Dominance right-handed  -CK      Are you  or can you be pregnant No  -CK         Pain     Pain Location Back;Hip  -CK      Pain at Present 3  -CK      Pain at Best 2  -CK      Pain at Worst 10  -CK      Pain Frequency Constant/continuous  -CK      Pain Description Tiring;Heaviness  -CK      Tolerance Time- Standing 10-15 min  -CK      Tolerance Time- Walking 10-15 min  -CK      Is your sleep disturbed? No  -CK      Difficulties with ADL's? yes, mobility  -CK         Fall Risk Assessment    Any falls in the past year: No  -CK         Services    Prior Rehab/Home Health Experiences No  -CK         Daily Activities    Primary Language English  -CK      Are you able to read Yes  -CK      Are you able to write Yes  -CK      How does patient learn best? Demonstration;Pictures/Video;Listening  -CK      Teaching needs identified Home Exercise Program;Management of Condition  -CK      Patient is concerned about/has problems with Performing home management (household chores, shopping, care of dependents);Standing;Walking  -CK      Does patient have problems with the following? Depression;Anxiety;Panic Attack  -CK      Barriers to learning None  -CK      Pt Participated in POC and Goals Yes  -CK         Safety    Are you being hurt, hit, or frightened by anyone at home or in your life? No  -CK      Are you being neglected by a caregiver No  -CK        User Key  (r) = Recorded By, (t) = Taken By, (c) = Cosigned By    Initials Name Provider Type    CK Km Trevino, PT Physical Therapist                PT Ortho     Row Name 08/07/18 1500       Posture/Observations    Alignment Options Lumbar lordosis  -CK    Lumbar lordosis Decreased  -CK    Posture/Observations Comments Forward flexed posture with R scoliosis noted. Large pannus observed   -CK       Quarter Clearing    Quarter Clearing Lower Quarter Clearing  -CK       Neural Tension Signs- Lower Quarter Clearing    SLR Bilateral:;Negative  -CK       Sensory Screen for Light Touch- Lower Quarter Clearing    L1  (inguinal area) Bilateral:;Intact  -CK    L2 (anterior mid thigh) Bilateral:;Intact  -CK    L3 (distal anterior thigh) Bilateral:;Intact  -CK    L4 (medial lower leg/foot) Bilateral:;Intact  -CK    L5 (lateral lower leg/great toe) Bilateral:;Intact  -CK       Myotomal Screen- Lower Quarter Clearing    Hip flexion (L2) Right:;4- (Good -);Left:;4+ (Good +)  -CK    Knee extension (L3) Right:;4- (Good -);Left:;4 (Good)  -CK    Ankle DF (L4) Bilateral:;4+ (Good +)  -CK    Ankle PF (S1) Bilateral:;3+ (Fair +)  -CK    Knee flexion (S2) Right:;4- (Good -);Left:;4 (Good)  -CK       Lumbar ROM Screen- Lower Quarter Clearing    Lumbar Flexion Impaired   Limited 50%  -CK    Lumbar Extension Impaired   Limited 50%  -CK    Lumbar Lateral Flexion Impaired   Limited 50%  -CK    Lumbar Rotation Impaired   Limited 25%  -CK       General ROM    GENERAL ROM COMMENTS BLE AROM full  -CK       MMT (Manual Muscle Testing)    Additional Documentation --   B hip abduction: 4-/5, B hip ext: 3/5  -CK       Lower Extremity Flexibility    Hamstrings Bilateral:;Mildly limited  -CK    Hip External Rotators Right:;Severely limited;Left:;Moderately limited  -CK    Hip Internal Rotators Bilateral:;Moderately limited  -CK    Quadratus Lumborum Bilateral:;Moderately limited  -CK       Gait/Stairs Assessment/Training    Assistive Device (Gait) cane, quad  -CK    Pattern (Gait) step-to  -CK    Bilateral Gait Deviations forward flexed posture;heel strike decreased;lateral trunk flexion  -CK    Number of Steps (Stairs) 4  -CK    Ascending Technique (Stairs) step-to-step  -CK    Descending Technique (Stairs) step-to-step  -CK      User Key  (r) = Recorded By, (t) = Taken By, (c) = Cosigned By    Initials Name Provider Type    CK Km Trevino, PT Physical Therapist                      Therapy Education  Education Details: Discussed findings from evaluation. Back book given for home study. Initiated HEP, handout given. Discussed importance of compliance  with therapy sessions as she has a high cancel rate from past bouts of therapy. Offered aqua therapy but patient verbalized fear of water.   Given: HEP, Symptoms/condition management, Posture/body mechanics           PT OP Goals     Row Name 08/08/18 1000 08/07/18 1300       PT Short Term Goals    STG Date to Achieve  -- 08/11/17  -KJ    STG 1 Pt. will be independent and compliant with initial home exercise program.  -CK Pt. will be independent and compliant with initial home exercise program.  -KJ    STG 1 Progress New  -CK Not Met  -KJ    STG 1 Progress Comments  -- Pt. reporting limited compliance when she is in pain.   -KJ    STG 2 Patient will verbalize understanding and demonstrate proper body mechanics with daily household activities to decrease pain (ie. logroll, washing dishes, making a meal, retrieving an item from the floor).   -CK Pt. will report low back pain </= 6-7/10 to increase ease of getting in/out of bed.  -KJ    STG 2 Progress New  -CK Partially Met  -KJ    STG 2 Progress Comments  -- Intermittently met, some days worse than others.   -KJ    STG 3  -- Pt. will report R shoulder pain </=4-5/10 to increase ease of grooming.  -KJ    STG 3 Progress  -- Partially Met  -KJ    STG 3 Progress Comments  -- Intermittently met, some days worse than others.   -KJ       Long Term Goals    LTG Date to Achieve  -- 08/27/17  -KJ    LTG 1 Pt.w ill be independent and compliant with advanced home exercise program.  -CK Pt.w ill be independent and compliant with advanced home exercise program.  -KJ    LTG 1 Progress New  -CK Ongoing;Not Met  -KJ    LTG 1 Progress Comments  -- Pt. reporting limited compliance when she is in pain.   -KJ    LTG 2 Pt. will report low back pain </= 4-5/10 to increase ease of standing to prepare a meal.  -CK Pt. will report low back pain </= 4-5/10 to increase ease of preparing a meal.  -KJ    LTG 2 Progress New  -CK Not Met  -KJ    LTG 3 Patient will report ability to stand/walk > 15  min without increasing low back pain for ease with grocery shopping.  -CK Pt. will report R shoulder pain </=2-3/10 to increase ease of bathing.  -KJ    LTG 3 Progress New  -CK Not Met  -KJ    LTG 4 Patient will report 75% reduction in R buttock pain with all daily activities.  -CK Pt. will score </=27% on DASH, indicating decreased perceived functional disability  -KJ    LTG 4 Progress New  -CK Not Met  -KJ    LTG 5 Pt. will score </= 40% on Oswestry, indicating decreased perceived functional disability.  -CK Pt. will score </= 40% on Oswestry, indicating decreased perceived functional disability.  -KJ    LTG 5 Progress New  -CK Not Met  -KJ      User Key  (r) = Recorded By, (t) = Taken By, (c) = Cosigned By    Initials Name Provider Type    Alba Caal, PT Physical Therapist    CK Km Trevino, PT Physical Therapist                PT Assessment/Plan     Row Name 08/08/18 1009          PT Assessment    Functional Limitations Impaired gait;Limitation in home management;Limitations in community activities;Limitations in functional capacity and performance;Performance in leisure activities;Performance in self-care ADL  -CK     Impairments Range of motion;Muscle strength;Pain;Poor body mechanics;Gait;Impaired flexibility;Impaired muscle length;Posture  -CK     Assessment Comments Ms. Mathis is a 73 year old female who presents to clinic with c/o evolving lumbar pain with R radicular symptoms at this time. She reports chronic history of back pain with increasing radicular symptoms to posterior aspect of R knee over the last few months. She has comorbid diabetes mellitus that requires assistance from family to manage. She is also limited by her personal factors of dementia and depression that limit her carryover of therapy exercises and adherence to therapy program. She reports pain with extended standing or walking activities, even short distance around her home. Maximum tolerarance to those activities is  10-15 min. No AD used at this time, though she does own a quad cane. Patient reports a back surgery in 2007 but unsure of what procedure was performed (daughter thinks fusion). Patient demonstrates increased abdominal pannus, decreased AROM in all planes, decreased core and B hip strength, and gait deviations. She self scored a 58% disability on the Oswestry Index (where 0 = no deficits). Signs and symptoms are consistent with lumbar degenerative nature, poor core stability, and RLE radiculopathy. She would benefit from skilled physical therapy at this time to improve her participation in daily ambulation activities around her home and improve tolerance to community outings such as grocery shopping.   -CK     Please refer to paper survey for additional self-reported information Yes  -CK     Rehab Potential Fair  -CK     Patient/caregiver participated in establishment of treatment plan and goals Yes  -CK     Patient would benefit from skilled therapy intervention Yes  -CK        PT Plan    PT Frequency 2x/week  -CK     Predicted Duration of Therapy Intervention (Therapy Eval) 8 weeks  -CK     Planned CPT's? PT EVAL LOW COMPLEXITY: 71380;PT THER PROC EA 15 MIN: 07561;PT MANUAL THERAPY EA 15 MIN: 05152;PT HOT OR COLD PACK TREAT MCARE;PT ELECTRICAL STIM UNATTEND: ;PT TRACTION LUMBAR: 50176  -CK     PT Plan Comments Nustep, supine mat stretches, core/BLE strengthening.  -CK       User Key  (r) = Recorded By, (t) = Taken By, (c) = Cosigned By    Initials Name Provider Type    CK Km Trevino S, PT Physical Therapist                  Exercises     Row Name 08/08/18 1000             Exercise 1    Exercise Name 1 SKTC stretch  -CK         Exercise 2    Exercise Name 2 supine 90/90 HS stretch  -CK         Exercise 3    Exercise Name 3 Piriformis stretch  -CK         Exercise 4    Exercise Name 4 PPT  -CK         Exercise 5    Exercise Name 5 LTR  -CK        User Key  (r) = Recorded By, (t) = Taken By, (c) = Cosigned By     Initials Name Provider Type    CK Km Trevino, PT Physical Therapist                        Outcome Measure Options: Modifed Owestry  Modified Oswestry  Modified Oswestry Score/Comments: 58%      Time Calculation:     Therapy Suggested Charges     Code   Minutes Charges    None             Start Time: 1445  Stop Time: 1530  Time Calculation (min): 45 min  Total Timed Code Minutes- PT: 15 minute(s)     Therapy Charges for Today     Code Description Service Date Service Provider Modifiers Qty    70164560172 HC PT EVAL MOD COMPLEXITY 2 8/7/2018 Km Trevino, PT GP 1    15797686115 HC PT THER PROC EA 15 MIN 8/7/2018 Km Trevino, PT GP 1    83978994447 HC PT MOBILITY CURRENT 8/8/2018 Km Trevino, PT GP, CK 1    22234986716 HC PT MOBILITY PROJECTED 8/8/2018 Km Trevino, PT GP, CJ 1          PT G-Codes  Outcome Measure Options: Modifed Owestry  Score: 58%  Functional Limitation: Mobility: Walking and moving around  Mobility: Walking and Moving Around Current Status (): At least 40 percent but less than 60 percent impaired, limited or restricted  Mobility: Walking and Moving Around Goal Status (): At least 20 percent but less than 40 percent impaired, limited or restricted         Km Trevino PT  8/8/2018

## 2018-08-16 ENCOUNTER — HOSPITAL ENCOUNTER (OUTPATIENT)
Dept: PHYSICAL THERAPY | Facility: HOSPITAL | Age: 73
Setting detail: THERAPIES SERIES
Discharge: HOME | End: 2018-08-16

## 2018-08-16 DIAGNOSIS — M25.511 RIGHT SHOULDER PAIN, UNSPECIFIED CHRONICITY: Primary | ICD-10-CM

## 2018-08-16 DIAGNOSIS — G89.29 CHRONIC PAIN OF RIGHT KNEE: ICD-10-CM

## 2018-08-16 DIAGNOSIS — Z74.09 LIMITED MOBILITY: ICD-10-CM

## 2018-08-16 DIAGNOSIS — M54.50 CHRONIC MIDLINE LOW BACK PAIN WITHOUT SCIATICA: ICD-10-CM

## 2018-08-16 DIAGNOSIS — M25.561 CHRONIC PAIN OF RIGHT KNEE: ICD-10-CM

## 2018-08-16 DIAGNOSIS — G89.29 CHRONIC MIDLINE LOW BACK PAIN WITHOUT SCIATICA: ICD-10-CM

## 2018-08-16 PROCEDURE — 97110 THERAPEUTIC EXERCISES: CPT | Performed by: PHYSICAL THERAPIST

## 2018-08-16 NOTE — THERAPY TREATMENT NOTE
Outpatient Physical Therapy Ortho Treatment Note  [facility]     Patient Name: Kassi Mathis  : 1945  MRN: [id number]  Today's Date: 2018      Visit Date: 2018    Visit Dx:    ICD-10-CM ICD-9-CM   1. Right shoulder pain, unspecified chronicity M25.511 719.41   2. Chronic midline low back pain without sciatica M54.5 724.2    G89.29 338.29   3. Limited mobility Z74.09 V49.89   4. Chronic pain of right knee M25.561 719.46    G89.29 338.29       Patient Active Problem List   Diagnosis   • Depression   • Allergic rhinitis   • Hypertension   • Dementia without behavioral disturbance   • Paroxysmal atrial fibrillation (CMS/HCC)   • Cobalamin deficiency   • Type 2 diabetes mellitus (CMS/HCC)   • Hyperlipidemia   • Hammer toes of both feet        Past Medical History:   Diagnosis Date   • Allergic rhinitis    • Anemia    • Chronic venous insufficiency    • Dementia    • Depression    • Diabetes mellitus (CMS/HCC)    • Hidradenitis suppurativa    • Hyperlipidemia    • Hypertension    • Osteoarthritis    • Paroxysmal atrial fibrillation (CMS/HCC)    • Peripheral neuropathy    • Stroke (CMS/HCC)    • Vitamin B 12 deficiency     Boarderline only with normal MMA/Homocysteine/IF antibody        Past Surgical History:   Procedure Laterality Date   • BACK SURGERY     • HYSTERECTOMY      PARTIAL   • KNEE SURGERY Left                              PT Assessment/Plan     Row Name 18 1424          PT Assessment    Assessment Comments First session since initial evaluation. Reports minimal to no compliance with initial HEP given. Reviewed exercises today for proper technique. Verbal and tactile cueing given for all exercises  -CK        PT Plan    PT Plan Comments Nustep, supine mat exercises, core/BLE strengthening, add hip adduction/bridge.   -CK       User Key  (r) = Recorded By, (t) = Taken By, (c) = Cosigned By    Initials Name Provider Type    Km Boone, PT Physical Therapist                     Exercises     Row Name 08/16/18 1400             Subjective Comments    Subjective Comments My back is not hurting too much today. I dont remember the exercises you gave me.   -CK         Subjective Pain    Able to rate subjective pain? yes  -CK      Pre-Treatment Pain Level 0  -CK      Post-Treatment Pain Level 0  -CK         Exercise 1    Exercise Name 1 SKTC stretch  -CK      Reps 1 2  -CK      Time 1 30 sec  -CK         Exercise 2    Exercise Name 2 HS stretch on stair  -CK      Reps 2 2  -CK      Time 2 30 sec  -CK         Exercise 3    Exercise Name 3 Piriformis stretch  -CK      Reps 3 2  -CK      Time 3 30 sec  -CK      Additional Comments R knee pain  -CK         Exercise 4    Exercise Name 4 PPT  -CK      Reps 4 12  -CK      Time 4 5 sec  -CK         Exercise 5    Exercise Name 5 LTR  -CK      Reps 5 12  -CK      Time 5 5 sec  -CK         Exercise 6    Exercise Name 6 Nustep- Level 3  -CK      Time 6 5 min  -CK         Exercise 7    Exercise Name 7 calf stretch  -CK      Reps 7 3  -CK      Time 7 20 sec  -CK         Exercise 8    Exercise Name 8 H/L hip abd with TA  -CK      Sets 8 3  -CK      Reps 8 10  -CK      Additional Comments RTB: B and uni  -CK         Exercise 9    Exercise Name 9 Shoulder Ext  -CK      Reps 9 20  -CK      Additional Comments RTB  -CK         Exercise 10    Exercise Name 10 Rows  -CK      Reps 10 20  -CK      Additional Comments RTB  -CK        User Key  (r) = Recorded By, (t) = Taken By, (c) = Cosigned By    Initials Name Provider Type    CK Km Trevino S, PT Physical Therapist                               PT OP Goals     Row Name 08/16/18 1400          PT Short Term Goals    STG 1 Pt. will be independent and compliant with initial home exercise program.  -CK     STG 1 Progress Ongoing  -CK     STG 1 Progress Comments no compliance to date  -CK     STG 2 Patient will verbalize understanding and demonstrate proper body mechanics with daily household activities to decrease pain  (ie. logroll, washing dishes, making a meal, retrieving an item from the floor).   -CK     STG 2 Progress Ongoing  -CK        Long Term Goals    LTG 1 Pt.w ill be independent and compliant with advanced home exercise program.  -CK     LTG 1 Progress Ongoing  -CK     LTG 2 Pt. will report low back pain </= 4-5/10 to increase ease of standing to prepare a meal.  -CK     LTG 2 Progress Ongoing  -CK     LTG 3 Patient will report ability to stand/walk > 15 min without increasing low back pain for ease with grocery shopping.  -CK     LTG 3 Progress Ongoing  -CK     LTG 4 Patient will report 75% reduction in R buttock pain with all daily activities.  -CK     LTG 4 Progress Ongoing  -CK     LTG 5 Pt. will score </= 40% on Oswestry, indicating decreased perceived functional disability.  -CK     LTG 5 Progress Ongoing  -CK       User Key  (r) = Recorded By, (t) = Taken By, (c) = Cosigned By    Initials Name Provider Type    CK Km Trevino, PT Physical Therapist                         Time Calculation:   Start Time: 1400  Stop Time: 1440  Time Calculation (min): 40 min  Total Timed Code Minutes- PT: 40 minute(s)  Therapy Suggested Charges     Code   Minutes Charges    None           Therapy Charges for Today     Code Description Service Date Service Provider Modifiers Qty    38456152988 HC PT THER PROC EA 15 MIN 8/16/2018 Km Trevino PT GP 3                    Km Trevino PT  8/16/2018

## 2018-08-21 ENCOUNTER — HOSPITAL ENCOUNTER (OUTPATIENT)
Dept: PHYSICAL THERAPY | Facility: HOSPITAL | Age: 73
Setting detail: THERAPIES SERIES
Discharge: HOME | End: 2018-08-21

## 2018-08-21 DIAGNOSIS — Z74.09 LIMITED MOBILITY: ICD-10-CM

## 2018-08-21 DIAGNOSIS — G89.29 CHRONIC PAIN OF RIGHT KNEE: ICD-10-CM

## 2018-08-21 DIAGNOSIS — M25.561 CHRONIC PAIN OF RIGHT KNEE: ICD-10-CM

## 2018-08-21 DIAGNOSIS — M54.50 CHRONIC MIDLINE LOW BACK PAIN WITHOUT SCIATICA: Primary | ICD-10-CM

## 2018-08-21 DIAGNOSIS — G89.29 CHRONIC MIDLINE LOW BACK PAIN WITHOUT SCIATICA: Primary | ICD-10-CM

## 2018-08-21 PROCEDURE — 97110 THERAPEUTIC EXERCISES: CPT | Performed by: PHYSICAL THERAPIST

## 2018-08-21 NOTE — THERAPY TREATMENT NOTE
Outpatient Physical Therapy Ortho Treatment Note  [facility]     Patient Name: Kassi Mathis  : 1945  MRN: [id number]  Today's Date: 2018      Visit Date: 2018    Visit Dx:    ICD-10-CM ICD-9-CM   1. Chronic midline low back pain without sciatica M54.5 724.2    G89.29 338.29   2. Limited mobility Z74.09 V49.89   3. Chronic pain of right knee M25.561 719.46    G89.29 338.29       Patient Active Problem List   Diagnosis   • Depression   • Allergic rhinitis   • Hypertension   • Dementia without behavioral disturbance   • Paroxysmal atrial fibrillation (CMS/HCC)   • Cobalamin deficiency   • Type 2 diabetes mellitus (CMS/HCC)   • Hyperlipidemia   • Hammer toes of both feet        Past Medical History:   Diagnosis Date   • Allergic rhinitis    • Anemia    • Chronic venous insufficiency    • Dementia    • Depression    • Diabetes mellitus (CMS/HCC)    • Hidradenitis suppurativa    • Hyperlipidemia    • Hypertension    • Osteoarthritis    • Paroxysmal atrial fibrillation (CMS/HCC)    • Peripheral neuropathy    • Stroke (CMS/HCC)    • Vitamin B 12 deficiency     Boarderline only with normal MMA/Homocysteine/IF antibody        Past Surgical History:   Procedure Laterality Date   • BACK SURGERY     • HYSTERECTOMY      PARTIAL   • KNEE SURGERY Left                              PT Assessment/Plan     Row Name 18 1350          PT Assessment    Assessment Comments Extensive verbal and tactile cueing for proper technique with all exercises. Modified exercises due to R knee pain. Education on the importance of compliance with HEP as she reports minimal use at home. Re-printed HEP handout and gave to daughter to assist with compliance.   -CK       User Key  (r) = Recorded By, (t) = Taken By, (c) = Cosigned By    Initials Name Provider Type    Km Boone, PT Physical Therapist                    Exercises     Row Name 18 1300             Subjective Comments    Subjective Comments I was a  little sore after last time but I havent used those muscles in a long time. More pain today most likely from all the rain.  -CK         Subjective Pain    Able to rate subjective pain? yes  -CK      Pre-Treatment Pain Level 4  -CK         Total Minutes    30348 - PT Therapeutic Exercise Minutes 45  -CK         Exercise 1    Exercise Name 1 SKTC stretch  -CK      Reps 1 2  -CK      Time 1 30 sec  -CK         Exercise 2    Exercise Name 2 B HS stretch on stair  -CK      Reps 2 2  -CK      Time 2 30 sec  -CK         Exercise 3    Exercise Name 3 Piriformis stretch  -CK      Reps 3 2  -CK      Time 3 30 sec  -CK         Exercise 4    Exercise Name 4 PPT  -CK      Cueing 4 Tactile  -CK      Reps 4 12  -CK      Time 4 5 sec  -CK         Exercise 5    Exercise Name 5 LTR  -CK      Reps 5 10  -CK      Time 5 5 sec  -CK      Additional Comments pain minimal range for R knee  -CK         Exercise 6    Exercise Name 6 Nustep- Level 3  -CK      Time 6 6 min  -CK         Exercise 7    Exercise Name 7 calf stretch  -CK      Reps 7 2  -CK      Time 7 20 sec  -CK         Exercise 9    Exercise Name 9 Shoulder Ext  -CK      Reps 9 20  -CK      Additional Comments RTB  -CK         Exercise 10    Exercise Name 10 Rows  -CK      Reps 10 15  -CK      Additional Comments RTB  -CK         Exercise 11    Exercise Name 11 Hip abduction-standing  -CK      Reps 11 10  -CK      Additional Comments #2, B  -CK         Exercise 12    Exercise Name 12 B, HS curls  -CK      Reps 12 15  -CK      Additional Comments #2  -CK         Exercise 13    Exercise Name 13 LAQ, B  -CK      Reps 13 12  -CK      Time 13 3 sec  -CK      Additional Comments #2  -CK         Exercise 14    Exercise Name 14 Hip adduction  -CK      Reps 14 10  -CK      Time 14 5 sec  -CK        User Key  (r) = Recorded By, (t) = Taken By, (c) = Cosigned By    Initials Name Provider Type    CK Km Trevino S, PT Physical Therapist                                            Time  Calculation:   Start Time: 1315  Stop Time: 1400  Time Calculation (min): 45 min  Total Timed Code Minutes- PT: 45 minute(s)  Therapy Suggested Charges     Code   Minutes Charges    48707 (CPT®) Hc Pt Neuromusc Re Education Ea 15 Min      28530 (CPT®) Hc Pt Ther Proc Ea 15 Min 45 3    75113 (CPT®) Hc Gait Training Ea 15 Min      41700 (CPT®) Hc Pt Therapeutic Act Ea 15 Min      72064 (CPT®) Hc Pt Manual Therapy Ea 15 Min      75696 (CPT®) Hc Pt Ther Massage- Per 15 Min      72595 (CPT®) Hc Pt Iontophoresis Ea 15 Min      20861 (CPT®) Hc Pt Elec Stim Ea-Per 15 Min      24526 (CPT®) Hc Pt Ultrasound Ea 15 Min      77613 (CPT®) Hc Pt Self Care/Mgmt/Train Ea 15 Min      26238 (CPT®) Hc Pt Prosthetic (S) Train Initial Encounter, Each 15 Min      64280 (CPT®) Hc Orthotic(S) Mgmt/Train Initial Encounter, Each 15min      43774 (CPT®) Hc Pt Aquatic Therapy Ea 15 Min      63859 (CPT®) Hc Pt Orthotic(S)/Prosthetic(S) Encounter, Each 15 Min      Total  45 3        Therapy Charges for Today     Code Description Service Date Service Provider Modifiers Qty    60047890726 HC PT THER PROC EA 15 MIN 8/21/2018 Km Trevino, PT GP 3                    Km Trevion, PT  8/21/2018

## 2018-08-23 ENCOUNTER — APPOINTMENT (OUTPATIENT)
Dept: PHYSICAL THERAPY | Facility: HOSPITAL | Age: 73
End: 2018-08-23

## 2018-08-28 ENCOUNTER — HOSPITAL ENCOUNTER (OUTPATIENT)
Dept: PHYSICAL THERAPY | Facility: HOSPITAL | Age: 73
Setting detail: THERAPIES SERIES
Discharge: HOME | End: 2018-08-28

## 2018-08-28 DIAGNOSIS — M54.50 CHRONIC MIDLINE LOW BACK PAIN WITHOUT SCIATICA: Primary | ICD-10-CM

## 2018-08-28 DIAGNOSIS — Z74.09 LIMITED MOBILITY: ICD-10-CM

## 2018-08-28 DIAGNOSIS — G89.29 CHRONIC MIDLINE LOW BACK PAIN WITHOUT SCIATICA: Primary | ICD-10-CM

## 2018-08-28 PROCEDURE — 97110 THERAPEUTIC EXERCISES: CPT | Performed by: PHYSICAL THERAPIST

## 2018-08-28 NOTE — THERAPY TREATMENT NOTE
Outpatient Physical Therapy Ortho Treatment Note  [facility]     Patient Name: Kassi Mathis  : 1945  MRN: [id number]  Today's Date: 2018      Visit Date: 2018    Visit Dx:    ICD-10-CM ICD-9-CM   1. Chronic midline low back pain without sciatica M54.5 724.2    G89.29 338.29   2. Limited mobility Z74.09 V49.89       Patient Active Problem List   Diagnosis   • Depression   • Allergic rhinitis   • Hypertension   • Dementia without behavioral disturbance   • Paroxysmal atrial fibrillation (CMS/HCC)   • Cobalamin deficiency   • Type 2 diabetes mellitus (CMS/HCC)   • Hyperlipidemia   • Hammer toes of both feet        Past Medical History:   Diagnosis Date   • Allergic rhinitis    • Anemia    • Chronic venous insufficiency    • Dementia    • Depression    • Diabetes mellitus (CMS/HCC)    • Hidradenitis suppurativa    • Hyperlipidemia    • Hypertension    • Osteoarthritis    • Paroxysmal atrial fibrillation (CMS/HCC)    • Peripheral neuropathy    • Stroke (CMS/HCC)    • Vitamin B 12 deficiency     Boarderline only with normal MMA/Homocysteine/IF antibody        Past Surgical History:   Procedure Laterality Date   • BACK SURGERY     • HYSTERECTOMY      PARTIAL   • KNEE SURGERY Left                              PT Assessment/Plan     Row Name 18 1406          PT Assessment    Assessment Comments Patient reporting some relief in low back pain since initiating sessions. Still increased with extended standing/walking activities. Good activation of core during all exercises today. Still watching/modifying exercises due to R knee pain. Added partial bridge today with no increase in lumbar symptoms. Will monitor response and progress accordingly.   -CK       User Key  (r) = Recorded By, (t) = Taken By, (c) = Cosigned By    Initials Name Provider Type    Km Boone, PT Physical Therapist                    Exercises     Row Name 18 1300             Subjective Comments    Subjective  Comments My back is feeling a little better.  -CK         Subjective Pain    Able to rate subjective pain? yes  -CK      Pre-Treatment Pain Level 2  -CK         Total Minutes    77166 - PT Therapeutic Exercise Minutes 45  -CK         Exercise 2    Exercise Name 2 B HS stretch on stair  -CK      Reps 2 2  -CK      Time 2 30 sec  -CK         Exercise 3    Exercise Name 3 Piriformis stretch  -CK      Reps 3 2  -CK      Time 3 30 sec  -CK         Exercise 4    Exercise Name 4 PPT  -CK      Cueing 4 Tactile  -CK      Reps 4 12  -CK      Time 4 3 sec  -CK         Exercise 5    Exercise Name 5 LTR  -CK      Reps 5 10  -CK      Time 5 5 sec  -CK      Additional Comments pain minimal range  -CK         Exercise 6    Exercise Name 6 Nustep- Level 3  -CK      Time 6 6 min  -CK         Exercise 7    Exercise Name 7 calf stretch  -CK      Reps 7 2  -CK      Time 7 20 sec  -CK         Exercise 8    Exercise Name 8 H/L hip abd with TA  -CK      Sets 8 3  -CK      Reps 8 10  -CK      Additional Comments RTB: B and uni  -CK         Exercise 9    Exercise Name 9 Shoulder Ext  -CK      Reps 9 20  -CK      Additional Comments RTB  -CK         Exercise 10    Exercise Name 10 Rows  -CK      Reps 10 15  -CK      Additional Comments RTB  -CK         Exercise 11    Exercise Name 11 Hip abduction-standing  -CK      Reps 11 15  -CK      Additional Comments #2, B  -CK         Exercise 12    Exercise Name 12 B, HS curls  -CK      Reps 12 15  -CK      Additional Comments #2  -CK         Exercise 13    Exercise Name 13 LAQ, B  -CK      Reps 13 15  -CK      Time 13 3 sec  -CK      Additional Comments #2  -CK         Exercise 14    Exercise Name 14 Hip adduction with partial bridge  -CK      Reps 14 12  -CK      Time 14 2 sec  -CK        User Key  (r) = Recorded By, (t) = Taken By, (c) = Cosigned By    Initials Name Provider Type    CK Km Trevino S, PT Physical Therapist                                            Time Calculation:   Start Time:  1315  Stop Time: 1400  Time Calculation (min): 45 min  Total Timed Code Minutes- PT: 45 minute(s)  Therapy Suggested Charges     Code   Minutes Charges    97814 (CPT®) Hc Pt Neuromusc Re Education Ea 15 Min      96586 (CPT®) Hc Pt Ther Proc Ea 15 Min 45 3    03146 (CPT®) Hc Gait Training Ea 15 Min      48230 (CPT®) Hc Pt Therapeutic Act Ea 15 Min      54807 (CPT®) Hc Pt Manual Therapy Ea 15 Min      56656 (CPT®) Hc Pt Ther Massage- Per 15 Min      85559 (CPT®) Hc Pt Iontophoresis Ea 15 Min      57376 (CPT®) Hc Pt Elec Stim Ea-Per 15 Min      17323 (CPT®) Hc Pt Ultrasound Ea 15 Min      96890 (CPT®) Hc Pt Self Care/Mgmt/Train Ea 15 Min      90735 (CPT®) Hc Pt Prosthetic (S) Train Initial Encounter, Each 15 Min      28578 (CPT®) Hc Orthotic(S) Mgmt/Train Initial Encounter, Each 15min      25004 (CPT®) Hc Pt Aquatic Therapy Ea 15 Min      02671 (CPT®) Hc Pt Orthotic(S)/Prosthetic(S) Encounter, Each 15 Min      Total  45 3        Therapy Charges for Today     Code Description Service Date Service Provider Modifiers Qty    39272895338 HC PT THER PROC EA 15 MIN 8/28/2018 Km Trevino, PT GP 3                    Km Trevino, PT  8/28/2018

## 2018-08-30 ENCOUNTER — HOSPITAL ENCOUNTER (OUTPATIENT)
Dept: PHYSICAL THERAPY | Facility: HOSPITAL | Age: 73
Setting detail: THERAPIES SERIES
Discharge: HOME | End: 2018-08-30

## 2018-08-30 DIAGNOSIS — Z74.09 LIMITED MOBILITY: ICD-10-CM

## 2018-08-30 DIAGNOSIS — M54.50 CHRONIC MIDLINE LOW BACK PAIN WITHOUT SCIATICA: Primary | ICD-10-CM

## 2018-08-30 DIAGNOSIS — G89.29 CHRONIC MIDLINE LOW BACK PAIN WITHOUT SCIATICA: Primary | ICD-10-CM

## 2018-08-30 PROCEDURE — 97110 THERAPEUTIC EXERCISES: CPT | Performed by: PHYSICAL THERAPIST

## 2018-09-04 ENCOUNTER — APPOINTMENT (OUTPATIENT)
Dept: PHYSICAL THERAPY | Facility: HOSPITAL | Age: 73
End: 2018-09-04

## 2018-09-05 RX ORDER — SCOLOPAMINE TRANSDERMAL SYSTEM 1 MG/1
1 PATCH, EXTENDED RELEASE TRANSDERMAL
Qty: 5 PATCH | Refills: 0 | Status: SHIPPED | OUTPATIENT
Start: 2018-09-05 | End: 2018-10-08 | Stop reason: HOSPADM

## 2018-09-06 ENCOUNTER — APPOINTMENT (OUTPATIENT)
Dept: PHYSICAL THERAPY | Facility: HOSPITAL | Age: 73
End: 2018-09-06

## 2018-09-11 ENCOUNTER — HOSPITAL ENCOUNTER (OUTPATIENT)
Dept: PHYSICAL THERAPY | Facility: HOSPITAL | Age: 73
Setting detail: THERAPIES SERIES
Discharge: HOME | End: 2018-09-11

## 2018-09-11 DIAGNOSIS — M25.511 RIGHT SHOULDER PAIN, UNSPECIFIED CHRONICITY: ICD-10-CM

## 2018-09-11 DIAGNOSIS — Z74.09 LIMITED MOBILITY: ICD-10-CM

## 2018-09-11 DIAGNOSIS — M25.561 CHRONIC PAIN OF RIGHT KNEE: ICD-10-CM

## 2018-09-11 DIAGNOSIS — G89.29 CHRONIC PAIN OF RIGHT KNEE: ICD-10-CM

## 2018-09-11 DIAGNOSIS — M54.50 CHRONIC MIDLINE LOW BACK PAIN WITHOUT SCIATICA: Primary | ICD-10-CM

## 2018-09-11 DIAGNOSIS — G89.29 CHRONIC MIDLINE LOW BACK PAIN WITHOUT SCIATICA: Primary | ICD-10-CM

## 2018-09-11 PROCEDURE — 97110 THERAPEUTIC EXERCISES: CPT

## 2018-09-11 NOTE — THERAPY TREATMENT NOTE
Outpatient Physical Therapy Ortho Treatment Note  [facility]     Patient Name: Kassi Mathis  : 1945  MRN: [id number]  Today's Date: 2018      Visit Date: 2018    Visit Dx:    ICD-10-CM ICD-9-CM   1. Chronic midline low back pain without sciatica M54.5 724.2    G89.29 338.29   2. Limited mobility Z74.09 V49.89   3. Chronic pain of right knee M25.561 719.46    G89.29 338.29   4. Right shoulder pain, unspecified chronicity M25.511 719.41       Patient Active Problem List   Diagnosis   • Depression   • Allergic rhinitis   • Hypertension   • Dementia without behavioral disturbance   • Paroxysmal atrial fibrillation (CMS/HCC)   • Cobalamin deficiency   • Type 2 diabetes mellitus (CMS/HCC)   • Hyperlipidemia   • Hammer toes of both feet        Past Medical History:   Diagnosis Date   • Allergic rhinitis    • Anemia    • Chronic venous insufficiency    • Dementia    • Depression    • Diabetes mellitus (CMS/HCC)    • Hidradenitis suppurativa    • Hyperlipidemia    • Hypertension    • Osteoarthritis    • Paroxysmal atrial fibrillation (CMS/HCC)    • Peripheral neuropathy    • Stroke (CMS/HCC)    • Vitamin B 12 deficiency     Boarderline only with normal MMA/Homocysteine/IF antibody        Past Surgical History:   Procedure Laterality Date   • BACK SURGERY     • HYSTERECTOMY      PARTIAL   • KNEE SURGERY Left                              PT Assessment/Plan     Row Name 18 1427          PT Assessment    Assessment Comments Ms. Mathis is able to complete exercises with mild complaints of pain in R knee and intermittent complaints of lower back pain.  She needs constant cuing to recall the exercises, and when reminded that she was given an addtional copy for home,states she does not remember.  She also needs cuing for proper technique, some modifications help with her pain during exer's.  -LP     Please refer to paper survey for additional self-reported information Yes  -LP     Rehab Potential Good   -LP     Patient/caregiver participated in establishment of treatment plan and goals Yes  -LP     Patient would benefit from skilled therapy intervention Yes  -LP        PT Plan    PT Frequency 2x/week  -LP     Predicted Duration of Therapy Intervention (Therapy Eval) 2-3 weeks  -LP     PT Plan Comments Continue to help pt with retention of exercises, progressing as able per pain tolerance.  -LP       User Key  (r) = Recorded By, (t) = Taken By, (c) = Cosigned By    Initials Name Provider Type    LP Mary Fajardo, PT Physical Therapist                    Exercises     Row Name 09/11/18 1300             Subjective Comments    Subjective Comments I can't really remember those exercises.  Pt not sure she knows where the pictures that were given to her are.  -LP         Subjective Pain    Able to rate subjective pain? yes  -LP      Pre-Treatment Pain Level 4  -LP         Total Minutes    50221 - PT Therapeutic Exercise Minutes 40  -LP         Exercise 4    Exercise Name 4 PPT  -LP         Exercise 5    Exercise Name 5 LTR  -LP      Reps 5 10  -LP      Time 5 5-10 sec  -LP      Additional Comments pain with rotation to Left.  -LP         Exercise 6    Exercise Name 6 Nustep- Level 3  -LP      Time 6 5 min  -LP         Exercise 7    Exercise Name 7 calf stretch  -LP      Reps 7 2  -LP      Time 7 20 sec  -LP         Exercise 8    Exercise Name 8 H/L hip abd with TA  -LP      Sets 8 3  -LP      Reps 8 10  -LP         Exercise 9    Exercise Name 9 Shoulder Ext  -LP      Sets 9 2  -LP      Reps 9 10  -LP      Additional Comments GTB  -LP         Exercise 10    Exercise Name 10 Rows  -LP      Sets 10 2  -LP      Reps 10 10  -LP      Additional Comments GTB  -LP         Exercise 11    Exercise Name 11 Hip abduction-standing  -LP      Reps 11 10  -LP      Additional Comments 2# B  -LP         Exercise 12    Exercise Name 12 B, HS curls  -LP      Reps 12 15  -LP      Additional Comments 2#  -LP         Exercise 13     Exercise Name 13 LAQ, B  -LP      Reps 13 15  -LP      Additional Comments 2#  -LP         Exercise 14    Exercise Name 14 Hip adduction with partial bridge  -LP      Reps 14 15  -LP      Time 14 3 sec  -LP        User Key  (r) = Recorded By, (t) = Taken By, (c) = Cosigned By    Initials Name Provider Type    LP Mary Fajardo, PT Physical Therapist                               PT OP Goals     Row Name 09/11/18 1400          PT Short Term Goals    STG 1 Pt. will be independent and compliant with initial home exercise program.  -LP     STG 1 Progress Ongoing  -LP     STG 1 Progress Comments pt states she doesn't know where her copy of exercises is  -LP     STG 2 Patient will verbalize understanding and demonstrate proper body mechanics with daily household activities to decrease pain (ie. logroll, washing dishes, making a meal, retrieving an item from the floor).   -LP     STG 2 Progress Ongoing  -LP        Long Term Goals    LTG 1 Pt.w ill be independent and compliant with advanced home exercise program.  -LP     LTG 1 Progress Ongoing  -LP     LTG 2 Pt. will report low back pain </= 4-5/10 to increase ease of standing to prepare a meal.  -LP     LTG 2 Progress Ongoing  -LP     LTG 3 Patient will report ability to stand/walk > 15 min without increasing low back pain for ease with grocery shopping.  -LP     LTG 3 Progress Ongoing  -LP     LTG 4 Patient will report 75% reduction in R buttock pain with all daily activities.  -LP     LTG 4 Progress Ongoing  -LP     LTG 5 Pt. will score </= 40% on Oswestry, indicating decreased perceived functional disability.  -LP     LTG 5 Progress Ongoing  -LP       User Key  (r) = Recorded By, (t) = Taken By, (c) = Cosigned By    Initials Name Provider Type    LP Mary Fajardo, PT Physical Therapist          Therapy Education  Given: HEP              Time Calculation:   Start Time: 1300  Stop Time: 1345  Time Calculation (min): 45 min  Therapy Suggested Charges     Code    Minutes Charges    31582 (CPT®) Hc Pt Neuromusc Re Education Ea 15 Min      41446 (CPT®) Hc Pt Ther Proc Ea 15 Min 40 3    98864 (CPT®) Hc Gait Training Ea 15 Min      85130 (CPT®) Hc Pt Therapeutic Act Ea 15 Min      19826 (CPT®) Hc Pt Manual Therapy Ea 15 Min      59694 (CPT®) Hc Pt Ther Massage- Per 15 Min      90722 (CPT®) Hc Pt Iontophoresis Ea 15 Min      17980 (CPT®) Hc Pt Elec Stim Ea-Per 15 Min      26499 (CPT®) Hc Pt Ultrasound Ea 15 Min      65887 (CPT®) Hc Pt Self Care/Mgmt/Train Ea 15 Min      54500 (CPT®) Hc Pt Prosthetic (S) Train Initial Encounter, Each 15 Min      12461 (CPT®) Hc Orthotic(S) Mgmt/Train Initial Encounter, Each 15min      91503 (CPT®) Hc Pt Aquatic Therapy Ea 15 Min      44490 (CPT®) Hc Pt Orthotic(S)/Prosthetic(S) Encounter, Each 15 Min      Total  40 3        Therapy Charges for Today     Code Description Service Date Service Provider Modifiers Qty    94287912146 HC PT THER PROC EA 15 MIN 9/11/2018 Mary Fajardo, PT GP 3                    Mary Fajardo, PT  9/11/2018

## 2018-10-07 ENCOUNTER — HOSPITAL ENCOUNTER (OUTPATIENT)
Facility: HOSPITAL | Age: 73
Setting detail: OBSERVATION
Discharge: HOME | End: 2018-10-08
Attending: INTERNAL MEDICINE | Admitting: HOSPITALIST

## 2018-10-07 PROBLEM — R51.9 HEADACHE: Status: ACTIVE | Noted: 2018-10-07

## 2018-10-07 PROBLEM — M54.2 ACUTE NECK PAIN: Status: ACTIVE | Noted: 2018-10-07

## 2018-10-07 LAB
ALBUMIN SERPL-MCNC: 3.2 G/DL (ref 3.5–5.2)
ALBUMIN/GLOB SERPL: 1.1 G/DL
ALP SERPL-CCNC: 62 U/L (ref 39–117)
ALT SERPL W P-5'-P-CCNC: 22 U/L (ref 1–33)
ANION GAP SERPL CALCULATED.3IONS-SCNC: 11.7 MMOL/L
AST SERPL-CCNC: 13 U/L (ref 1–32)
BASOPHILS # BLD AUTO: 0.02 10*3/MM3 (ref 0–0.2)
BASOPHILS NFR BLD AUTO: 0.2 % (ref 0–1.5)
BILIRUB SERPL-MCNC: 0.2 MG/DL (ref 0.1–1.2)
BUN BLD-MCNC: 7 MG/DL (ref 8–23)
BUN/CREAT SERPL: 8.1 (ref 7–25)
CALCIUM SPEC-SCNC: 8.7 MG/DL (ref 8.6–10.5)
CHLORIDE SERPL-SCNC: 105 MMOL/L (ref 98–107)
CO2 SERPL-SCNC: 24.3 MMOL/L (ref 22–29)
CREAT BLD-MCNC: 0.86 MG/DL (ref 0.57–1)
CRP SERPL-MCNC: 1.09 MG/DL (ref 0–0.5)
D-LACTATE SERPL-SCNC: 1.3 MMOL/L (ref 0.5–2)
DEPRECATED RDW RBC AUTO: 53.4 FL (ref 37–54)
EOSINOPHIL # BLD AUTO: 0.12 10*3/MM3 (ref 0–0.7)
EOSINOPHIL NFR BLD AUTO: 0.9 % (ref 0.3–6.2)
ERYTHROCYTE [DISTWIDTH] IN BLOOD BY AUTOMATED COUNT: 16.2 % (ref 11.7–13)
ERYTHROCYTE [SEDIMENTATION RATE] IN BLOOD: 31 MM/HR (ref 0–30)
GFR SERPL CREATININE-BSD FRML MDRD: 78 ML/MIN/1.73
GLOBULIN UR ELPH-MCNC: 3 GM/DL
GLUCOSE BLD-MCNC: 161 MG/DL (ref 65–99)
GLUCOSE BLDC GLUCOMTR-MCNC: 133 MG/DL (ref 70–130)
GLUCOSE BLDC GLUCOMTR-MCNC: 163 MG/DL (ref 70–130)
GLUCOSE BLDC GLUCOMTR-MCNC: 325 MG/DL (ref 70–130)
HBA1C MFR BLD: 8 % (ref 4.8–5.6)
HCT VFR BLD AUTO: 35.5 % (ref 35.6–45.5)
HGB BLD-MCNC: 11.2 G/DL (ref 11.9–15.5)
IMM GRANULOCYTES # BLD: 0.03 10*3/MM3 (ref 0–0.03)
IMM GRANULOCYTES NFR BLD: 0.2 % (ref 0–0.5)
LYMPHOCYTES # BLD AUTO: 3.58 10*3/MM3 (ref 0.9–4.8)
LYMPHOCYTES NFR BLD AUTO: 28.2 % (ref 19.6–45.3)
MCH RBC QN AUTO: 28.2 PG (ref 26.9–32)
MCHC RBC AUTO-ENTMCNC: 31.5 G/DL (ref 32.4–36.3)
MCV RBC AUTO: 89.4 FL (ref 80.5–98.2)
MONOCYTES # BLD AUTO: 1.09 10*3/MM3 (ref 0.2–1.2)
MONOCYTES NFR BLD AUTO: 8.6 % (ref 5–12)
NEUTROPHILS # BLD AUTO: 7.89 10*3/MM3 (ref 1.9–8.1)
NEUTROPHILS NFR BLD AUTO: 62.1 % (ref 42.7–76)
PLATELET # BLD AUTO: 323 10*3/MM3 (ref 140–500)
PMV BLD AUTO: 9.8 FL (ref 6–12)
POTASSIUM BLD-SCNC: 3.9 MMOL/L (ref 3.5–5.2)
PROCALCITONIN SERPL-MCNC: 0.03 NG/ML (ref 0.1–0.25)
PROT SERPL-MCNC: 6.2 G/DL (ref 6–8.5)
RBC # BLD AUTO: 3.97 10*6/MM3 (ref 3.9–5.2)
SODIUM BLD-SCNC: 141 MMOL/L (ref 136–145)
WBC NRBC COR # BLD: 12.7 10*3/MM3 (ref 4.5–10.7)

## 2018-10-07 PROCEDURE — 83036 HEMOGLOBIN GLYCOSYLATED A1C: CPT | Performed by: INTERNAL MEDICINE

## 2018-10-07 PROCEDURE — 80053 COMPREHEN METABOLIC PANEL: CPT | Performed by: HOSPITALIST

## 2018-10-07 PROCEDURE — G0378 HOSPITAL OBSERVATION PER HR: HCPCS

## 2018-10-07 PROCEDURE — G0379 DIRECT REFER HOSPITAL OBSERV: HCPCS

## 2018-10-07 PROCEDURE — 85652 RBC SED RATE AUTOMATED: CPT | Performed by: HOSPITALIST

## 2018-10-07 PROCEDURE — 25010000002 METHYLPREDNISOLONE PER 125 MG: Performed by: PSYCHIATRY & NEUROLOGY

## 2018-10-07 PROCEDURE — 96374 THER/PROPH/DIAG INJ IV PUSH: CPT

## 2018-10-07 PROCEDURE — 85025 COMPLETE CBC W/AUTO DIFF WBC: CPT | Performed by: HOSPITALIST

## 2018-10-07 PROCEDURE — 86140 C-REACTIVE PROTEIN: CPT | Performed by: HOSPITALIST

## 2018-10-07 PROCEDURE — 84145 PROCALCITONIN (PCT): CPT | Performed by: HOSPITALIST

## 2018-10-07 PROCEDURE — 99203 OFFICE O/P NEW LOW 30 MIN: CPT | Performed by: PSYCHIATRY & NEUROLOGY

## 2018-10-07 PROCEDURE — 82962 GLUCOSE BLOOD TEST: CPT

## 2018-10-07 PROCEDURE — 83605 ASSAY OF LACTIC ACID: CPT | Performed by: HOSPITALIST

## 2018-10-07 RX ORDER — MEMANTINE HYDROCHLORIDE 10 MG/1
10 TABLET ORAL DAILY
Status: DISCONTINUED | OUTPATIENT
Start: 2018-10-07 | End: 2018-10-07

## 2018-10-07 RX ORDER — SERTRALINE HYDROCHLORIDE 100 MG/1
100 TABLET, FILM COATED ORAL DAILY
Status: DISCONTINUED | OUTPATIENT
Start: 2018-10-07 | End: 2018-10-08 | Stop reason: HOSPADM

## 2018-10-07 RX ORDER — BACLOFEN 10 MG/1
10 TABLET ORAL
Status: DISCONTINUED | OUTPATIENT
Start: 2018-10-07 | End: 2018-10-08 | Stop reason: HOSPADM

## 2018-10-07 RX ORDER — ONDANSETRON 4 MG/1
4 TABLET, ORALLY DISINTEGRATING ORAL EVERY 6 HOURS PRN
Status: DISCONTINUED | OUTPATIENT
Start: 2018-10-07 | End: 2018-10-08 | Stop reason: HOSPADM

## 2018-10-07 RX ORDER — ROSUVASTATIN CALCIUM 20 MG/1
20 TABLET, COATED ORAL NIGHTLY
Status: DISCONTINUED | OUTPATIENT
Start: 2018-10-07 | End: 2018-10-08 | Stop reason: HOSPADM

## 2018-10-07 RX ORDER — SODIUM CHLORIDE 0.9 % (FLUSH) 0.9 %
3-10 SYRINGE (ML) INJECTION AS NEEDED
Status: DISCONTINUED | OUTPATIENT
Start: 2018-10-07 | End: 2018-10-08 | Stop reason: HOSPADM

## 2018-10-07 RX ORDER — METOPROLOL SUCCINATE 50 MG/1
50 TABLET, EXTENDED RELEASE ORAL
Status: DISCONTINUED | OUTPATIENT
Start: 2018-10-07 | End: 2018-10-08 | Stop reason: HOSPADM

## 2018-10-07 RX ORDER — HYDRALAZINE HYDROCHLORIDE 50 MG/1
50 TABLET, FILM COATED ORAL 2 TIMES DAILY
COMMUNITY
End: 2018-10-08 | Stop reason: HOSPADM

## 2018-10-07 RX ORDER — DONEPEZIL HYDROCHLORIDE 10 MG/1
10 TABLET, FILM COATED ORAL NIGHTLY
Status: DISCONTINUED | OUTPATIENT
Start: 2018-10-07 | End: 2018-10-08 | Stop reason: HOSPADM

## 2018-10-07 RX ORDER — ALPRAZOLAM 0.25 MG/1
0.25 TABLET ORAL 2 TIMES DAILY PRN
Status: DISCONTINUED | OUTPATIENT
Start: 2018-10-07 | End: 2018-10-08 | Stop reason: HOSPADM

## 2018-10-07 RX ORDER — AMLODIPINE BESYLATE 5 MG/1
5 TABLET ORAL DAILY
Status: DISCONTINUED | OUTPATIENT
Start: 2018-10-07 | End: 2018-10-08 | Stop reason: HOSPADM

## 2018-10-07 RX ORDER — SODIUM CHLORIDE 0.9 % (FLUSH) 0.9 %
3 SYRINGE (ML) INJECTION EVERY 12 HOURS SCHEDULED
Status: DISCONTINUED | OUTPATIENT
Start: 2018-10-07 | End: 2018-10-08 | Stop reason: HOSPADM

## 2018-10-07 RX ORDER — ONDANSETRON 4 MG/1
4 TABLET, FILM COATED ORAL EVERY 6 HOURS PRN
Status: DISCONTINUED | OUTPATIENT
Start: 2018-10-07 | End: 2018-10-08 | Stop reason: HOSPADM

## 2018-10-07 RX ORDER — ACETAMINOPHEN 325 MG/1
650 TABLET ORAL EVERY 4 HOURS PRN
Status: DISCONTINUED | OUTPATIENT
Start: 2018-10-07 | End: 2018-10-08 | Stop reason: HOSPADM

## 2018-10-07 RX ORDER — QUETIAPINE FUMARATE 25 MG/1
25 TABLET, FILM COATED ORAL NIGHTLY
COMMUNITY
End: 2020-03-04

## 2018-10-07 RX ORDER — QUETIAPINE FUMARATE 25 MG/1
25 TABLET, FILM COATED ORAL NIGHTLY
Status: DISCONTINUED | OUTPATIENT
Start: 2018-10-07 | End: 2018-10-08 | Stop reason: HOSPADM

## 2018-10-07 RX ORDER — DEXTROSE MONOHYDRATE 25 G/50ML
25 INJECTION, SOLUTION INTRAVENOUS
Status: DISCONTINUED | OUTPATIENT
Start: 2018-10-07 | End: 2018-10-08 | Stop reason: HOSPADM

## 2018-10-07 RX ORDER — METHYLPREDNISOLONE SODIUM SUCCINATE 125 MG/2ML
60 INJECTION, POWDER, LYOPHILIZED, FOR SOLUTION INTRAMUSCULAR; INTRAVENOUS ONCE
Status: COMPLETED | OUTPATIENT
Start: 2018-10-07 | End: 2018-10-07

## 2018-10-07 RX ORDER — SODIUM CHLORIDE 9 MG/ML
50 INJECTION, SOLUTION INTRAVENOUS CONTINUOUS
Status: DISCONTINUED | OUTPATIENT
Start: 2018-10-07 | End: 2018-10-08

## 2018-10-07 RX ORDER — BUPROPION HYDROCHLORIDE 300 MG/1
300 TABLET ORAL DAILY
Status: DISCONTINUED | OUTPATIENT
Start: 2018-10-07 | End: 2018-10-08 | Stop reason: HOSPADM

## 2018-10-07 RX ORDER — NICOTINE POLACRILEX 4 MG
15 LOZENGE BUCCAL
Status: DISCONTINUED | OUTPATIENT
Start: 2018-10-07 | End: 2018-10-08 | Stop reason: HOSPADM

## 2018-10-07 RX ORDER — HYDRALAZINE HYDROCHLORIDE 50 MG/1
100 TABLET, FILM COATED ORAL 2 TIMES DAILY
Status: DISCONTINUED | OUTPATIENT
Start: 2018-10-07 | End: 2018-10-08 | Stop reason: HOSPADM

## 2018-10-07 RX ORDER — DOCUSATE SODIUM 100 MG/1
100 CAPSULE, LIQUID FILLED ORAL 2 TIMES DAILY
Status: DISCONTINUED | OUTPATIENT
Start: 2018-10-07 | End: 2018-10-08 | Stop reason: HOSPADM

## 2018-10-07 RX ORDER — ONDANSETRON 2 MG/ML
4 INJECTION INTRAMUSCULAR; INTRAVENOUS EVERY 6 HOURS PRN
Status: DISCONTINUED | OUTPATIENT
Start: 2018-10-07 | End: 2018-10-08 | Stop reason: HOSPADM

## 2018-10-07 RX ADMIN — BACLOFEN 10 MG: 10 TABLET ORAL at 14:44

## 2018-10-07 RX ADMIN — LINAGLIPTIN 5 MG: 5 TABLET, FILM COATED ORAL at 14:44

## 2018-10-07 RX ADMIN — SODIUM CHLORIDE 50 ML/HR: 9 INJECTION, SOLUTION INTRAVENOUS at 14:45

## 2018-10-07 RX ADMIN — SERTRALINE 100 MG: 100 TABLET, FILM COATED ORAL at 14:44

## 2018-10-07 RX ADMIN — METOPROLOL SUCCINATE 50 MG: 50 TABLET, FILM COATED, EXTENDED RELEASE ORAL at 14:44

## 2018-10-07 RX ADMIN — BUPROPION HYDROCHLORIDE 300 MG: 300 TABLET, EXTENDED RELEASE ORAL at 14:44

## 2018-10-07 RX ADMIN — QUETIAPINE FUMARATE 25 MG: 25 TABLET ORAL at 20:39

## 2018-10-07 RX ADMIN — DOCUSATE SODIUM 100 MG: 100 CAPSULE, LIQUID FILLED ORAL at 21:00

## 2018-10-07 RX ADMIN — Medication 3 ML: at 13:30

## 2018-10-07 RX ADMIN — HYDRALAZINE HYDROCHLORIDE 100 MG: 50 TABLET, FILM COATED ORAL at 14:45

## 2018-10-07 RX ADMIN — DONEPEZIL HYDROCHLORIDE 10 MG: 10 TABLET, FILM COATED ORAL at 20:39

## 2018-10-07 RX ADMIN — ROSUVASTATIN CALCIUM 20 MG: 20 TABLET, FILM COATED ORAL at 20:39

## 2018-10-07 RX ADMIN — METHYLPREDNISOLONE SODIUM SUCCINATE 60 MG: 125 INJECTION, POWDER, FOR SOLUTION INTRAMUSCULAR; INTRAVENOUS at 14:45

## 2018-10-07 RX ADMIN — Medication 3 ML: at 20:40

## 2018-10-07 RX ADMIN — AMLODIPINE BESYLATE 5 MG: 5 TABLET ORAL at 14:44

## 2018-10-07 NOTE — H&P
Name: Kassi Mathis ADMIT: 10/7/2018   : 1945  PCP: Parul Jaime MD    MRN: [id number] LOS: 0 days   AGE/SEX: 73 y.o. female  ROOM: [facility]  Headache and neck pain, 3 days    History of present illness  Ms. Mathis is a 73 y.o. female has a history of atrial fibrillation was on Pradaxa.  She also has history of dementia and diabetes mellitus.  She presented to [facility] last night with stable complaints.  They did a CT of the head which was negative for bleed and suspected that she may have meningitis.  She has received vancomycin, 2 g of Rocephin and acyclovir 10 mg/kg and sent here for further evaluation and a possible LP.    Patient gives a history of having been on the [place] 2 weeks ago but arrived back in the [place] about 10 days ago.  She does not have any fever nausea or vomiting but only complains of headache and neck pain.  She does not have any weakness in the extremities.  No photophobia or visual problems.        Past Medical History:   Diagnosis Date   • Allergic rhinitis    • Anemia    • Chronic venous insufficiency    • Dementia    • Depression    • Diabetes mellitus (CMS/HCC)    • Hidradenitis suppurativa    • Hyperlipidemia    • Hypertension    • Osteoarthritis    • Paroxysmal atrial fibrillation (CMS/HCC)    • Peripheral neuropathy    • Stroke (CMS/HCC)    • Vitamin B 12 deficiency     Boarderline only with normal MMA/Homocysteine/IF antibody     Past Surgical History:   Procedure Laterality Date   • BACK SURGERY     • HYSTERECTOMY      PARTIAL   • KNEE SURGERY Left      Family History   Problem Relation Age of Onset   • Breast cancer Mother    • Stroke Father    • Stroke Maternal Grandmother    • Heart attack Maternal Grandmother    • Heart attack Maternal Grandfather      Social History   Substance Use Topics   • Smoking status: Former Smoker   • Smokeless tobacco: Never Used      Comment: CAFFEINE USE/ SOFT DRINKS.    • Alcohol use No      Prescriptions Prior to Admission   Medication Sig Dispense Refill Last Dose   • ALPRAZolam (XANAX) 0.25 MG tablet Take 1 tablet by mouth 2 (Two) Times a Day As Needed for Anxiety. 20 tablet 0 Taking   • amLODIPine (NORVASC) 5 MG tablet TAKE ONE TABLET BY MOUTH DAILY 90 tablet 0 10/6/2018 at Unknown time   • buPROPion XL (WELLBUTRIN XL) 300 MG 24 hr tablet daily.     10/6/2018 at Unknown time   • dabigatran etexilate (PRADAXA) 150 MG capsu Take 1 capsule by mouth Every 12 (Twelve) Hours. 180 capsule 3 10/6/2018 at Unknown time   • donepezil (ARICEPT) 10 MG tablet 10 mg Daily.   10/6/2018 at Unknown time   • hydrALAZINE (APRESOLINE) 50 MG tablet Take 50 mg by mouth 2 (Two) Times a Day.   10/6/2018 at Unknown time   • JANUVIA 100 MG tablet TAKE ONE TABLET BY MOUTH DAILY 90 tablet 3 10/6/2018 at Unknown time   • JARDIANCE 10 MG tablet TAKE ONE TABLET BY MOUTH DAILY 90 tablet 10 10/6/2018 at Unknown time   • memantine (NAMENDA) 10 MG tablet Take 10 mg by mouth 2 (Two) Times a Day.   Taking   • metFORMIN ER (GLUCOPHAGE-XR) 500 MG 24 hr tablet Take 2 tablets by mouth 2 (Two) Times a Day. 360 tablet 2 10/6/2018 at Unknown time   • metoprolol succinate XL (TOPROL-XL) 50 MG 24 hr tablet TAKE TWO TABLETS BY MOUTH EVERY MORNING (Patient taking differently: 50 mg 2 (Two) Times a Day. TAKE TWO TABLETS BY MOUTH EVERY MORNING) 180 tablet 3 10/6/2018 at Unknown time   • montelukast (SINGULAIR) 10 MG tablet Take 1 tablet by mouth Every Night. 30 tablet 11 10/6/2018 at Unknown time   • MYRBETRIQ 50 MG tablet sustained-release 24 hour 24 hr tablet TAKE ONE TABLET BY MOUTH DAILY 30 tablet 4 10/6/2018 at Unknown time   • QUEtiapine (SEROquel) 25 MG tablet Take 25 mg by mouth Every Night.      • rosuvastatin (CRESTOR) 20 MG tablet TAKE ONE TABLET BY MOUTH EVERY NIGHT AT BEDTIME 90 tablet 2 10/6/2018 at Unknown time   • sertraline (ZOLOFT) 100 MG tablet Take 100 mg by mouth Daily.   10/6/2018 at Unknown time   • acetaminophen (TYLENOL)  325 MG tablet Take 650 mg by mouth Every 6 (Six) Hours.   Taking   • cetirizine (zyrTEC) 10 MG tablet Take 1 tablet by mouth Daily. 90 tablet 3 Taking   • ferrous sulfate 325 (65 FE) MG tablet Take 1 tablet by mouth 2 (two) times a day. EVERY OTHER DAY   Taking   • glucose blood test strip Use daily for type 2 DM. 100 each 3 Taking   • hydrALAZINE (APRESOLINE) 100 MG tablet TAKE ONE TABLET BY MOUTH TWICE A DAY (Patient not taking: Reported on 10/7/2018) 180 tablet 1 Not Taking at Unknown time   • Melatonin 10 MG capsule Take  by mouth.   Taking   • Multiple Vitamin (MULTIVITAMINS PO) Take 1 tablet by mouth daily.   Taking   • Omega-3 Fatty Acids (OMEGA 3 PO) Take  by mouth Daily.   Taking   • Scopolamine (TRANSDERM-SCOP, 1.5 MG,) 1.5 MG/3DAYS patch Place 1 patch on the skin as directed by provider Every 72 (Seventy-Two) Hours. 5 patch 0      Allergies:    Allergies   Allergen Reactions   • Enalapril Swelling     Other reaction(s): Other: See Comments  Aka vasotec tabs  Aka vasotec tabs  Aka vasotec tabs   • Penicillins Swelling       Review of Systems   Constitutional: Negative for activity change, appetite change, chills, fatigue and fever.   HENT: Negative for congestion, ear discharge, mouth sores, nosebleeds, sneezing, sore throat and trouble swallowing.    Eyes: Negative for photophobia, discharge, itching and visual disturbance.   Respiratory: Negative for apnea, cough, chest tightness, shortness of breath, wheezing and stridor.    Cardiovascular: Negative for chest pain, palpitations and leg swelling.   Gastrointestinal: Negative for abdominal distention, abdominal pain, blood in stool, constipation, diarrhea, nausea and vomiting.   Endocrine: Negative for cold intolerance, heat intolerance and polydipsia.   Genitourinary: Negative for difficulty urinating and frequency.   Musculoskeletal: Positive for neck pain. Negative for arthralgias, back pain, gait problem, joint swelling and neck stiffness.   Skin:  Negative for color change, pallor and rash.   Neurological: Positive for headaches. Negative for dizziness, seizures, syncope, facial asymmetry, weakness and numbness.   Hematological: Negative for adenopathy. Does not bruise/bleed easily.   Psychiatric/Behavioral: Negative for agitation, behavioral problems and hallucinations.          Objective    Vital Signs  Temp:  [97.5 °F (36.4 °C)-98.4 °F (36.9 °C)] 97.5 °F (36.4 °C)  Heart Rate:  [64-69] 64  Resp:  [18] 18  BP: (166-185)/(75-82) 166/75  SpO2:  [100 %] 100 %  on   ;   Device (Oxygen Therapy): room air  Body mass index is 37.04 kg/m².    Physical Exam   Constitutional: She is oriented to person, place, and time. She appears well-developed and well-nourished. No distress.   HENT:   Head: Normocephalic and atraumatic.   Nose: No epistaxis.   Mouth/Throat: Oropharynx is clear and moist.   Eyes: Pupils are equal, round, and reactive to light. Conjunctivae and EOM are normal.   Neck: Neck supple. No JVD present. Muscular tenderness present. Decreased range of motion present. No tracheal deviation and no edema present. No thyroid mass and no thyromegaly present.   Cardiovascular: Normal rate, regular rhythm and normal heart sounds.    No murmur heard.  Pulmonary/Chest: Breath sounds normal. No stridor. She has no decreased breath sounds. She has no wheezes.   Abdominal: Soft. Normal appearance and bowel sounds are normal. She exhibits no ascites. There is no hepatosplenomegaly.   Musculoskeletal: She exhibits no edema.   Neurological: She is alert and oriented to person, place, and time.   Skin: Skin is warm, dry and intact. No rash noted. No cyanosis or erythema. No pallor. Nails show no clubbing.   Psychiatric: She has a normal mood and affect. Her behavior is normal.   Vitals reviewed.      Results Review:   I reviewed the patient's new clinical results.    Lab Results (last 24 hours)     Procedure Component Value Units Date/Time    C-reactive Protein [[id number]]   (Abnormal) Collected:  10/07/18 0930    Specimen:  Blood Updated:  10/07/18 1015     C-Reactive Protein 1.09 (H) mg/dL     Lactic Acid, Plasma [[id number]]  (Normal) Collected:  10/07/18 0930    Specimen:  Blood Updated:  10/07/18 1016     Lactate 1.3 mmol/L     CBC & Differential [[id number]] Collected:  10/07/18 0930    Specimen:  Blood Updated:  10/07/18 0951    Narrative:       The following orders were created for panel order CBC & Differential.  Procedure                               Abnormality         Status                     ---------                               -----------         ------                     CBC Auto Differential[[id number]]        Abnormal            Final result                 Please view results for these tests on the individual orders.    Comprehensive Metabolic Panel [[id number]]  (Abnormal) Collected:  10/07/18 0930    Specimen:  Blood Updated:  10/07/18 1015     Glucose 161 (H) mg/dL      BUN 7 (L) mg/dL      Creatinine 0.86 mg/dL      Sodium 141 mmol/L      Potassium 3.9 mmol/L      Chloride 105 mmol/L      CO2 24.3 mmol/L      Calcium 8.7 mg/dL      Total Protein 6.2 g/dL      Albumin 3.20 (L) g/dL      ALT (SGPT) 22 U/L      AST (SGOT) 13 U/L      Alkaline Phosphatase 62 U/L      Total Bilirubin 0.2 mg/dL      eGFR  African Amer 78 mL/min/1.73      Globulin 3.0 gm/dL      A/G Ratio 1.1 g/dL      BUN/Creatinine Ratio 8.1     Anion Gap 11.7 mmol/L     Narrative:       The MDRD GFR formula is only valid for adults with stable renal function between ages 18 and 70.    Procalcitonin [[id number]]  (Abnormal) Collected:  10/07/18 0930    Specimen:  Blood Updated:  10/07/18 1022     Procalcitonin 0.03 (L) ng/mL     Narrative:       As a Marker for Sepsis (Non-Neonates):   1. <0.5 ng/mL represents a low risk of severe sepsis and/or septic shock.  1. >2 ng/mL represents a high risk of severe sepsis and/or septic shock.    As a Marker for Lower Respiratory Tract Infections that require  "antibiotic therapy:  PCT on Admission     Antibiotic Therapy             6-12 Hrs later  > 0.5                Strongly Recommended            >0.25 - <0.5         Recommended  0.1 - 0.25           Discouraged                   Remeasure/reassess PCT  <0.1                 Strongly Discouraged          Remeasure/reassess PCT      As 28 day mortality risk marker: \"Change in Procalcitonin Result\" (> 80 % or <=80 %) if Day 0 (or Day 1) and Day 4 values are available. Refer to http://[email or web address]-pct-[email or web address]/   Change in PCT <=80 %   A decrease of PCT levels below or equal to 80 % defines a positive change in PCT test result representing a higher risk for 28-day all-cause mortality of patients diagnosed with severe sepsis or septic shock.  Change in PCT > 80 %   A decrease of PCT levels of more than 80 % defines a negative change in PCT result representing a lower risk for 28-day all-cause mortality of patients diagnosed with severe sepsis or septic shock.                Sedimentation Rate [[id number]]  (Abnormal) Collected:  10/07/18 0930    Specimen:  Blood Updated:  10/07/18 1028     Sed Rate 31 (H) mm/hr     CBC Auto Differential [[id number]]  (Abnormal) Collected:  10/07/18 0930    Specimen:  Blood Updated:  10/07/18 0951     WBC 12.70 (H) 10*3/mm3      RBC 3.97 10*6/mm3      Hemoglobin 11.2 (L) g/dL      Hematocrit 35.5 (L) %      MCV 89.4 fL      MCH 28.2 pg      MCHC 31.5 (L) g/dL      RDW 16.2 (H) %      RDW-SD 53.4 fl      MPV 9.8 fL      Platelets 323 10*3/mm3      Neutrophil % 62.1 %      Lymphocyte % 28.2 %      Monocyte % 8.6 %      Eosinophil % 0.9 %      Basophil % 0.2 %      Immature Grans % 0.2 %      Neutrophils, Absolute 7.89 10*3/mm3      Lymphocytes, Absolute 3.58 10*3/mm3      Monocytes, Absolute 1.09 10*3/mm3      Eosinophils, Absolute 0.12 10*3/mm3      Basophils, Absolute 0.02 10*3/mm3      Immature Grans, Absolute 0.03 10*3/mm3     POC Glucose Once [[id number]]  (Abnormal) Collected:  10/07/18 " 1101    Specimen:  Blood Updated:  10/07/18 1102     Glucose 133 (H) mg/dL     Narrative:       Meter: [id number] : [id number] Jan CALDERA          No orders to display     Assessment/Plan      Active Hospital Problems    Diagnosis Date Noted   • **Acute neck pain [M54.2] 10/07/2018   • Headache [R51] 10/07/2018   • Paroxysmal atrial fibrillation (CMS/[facility]) [I48.0] 01/17/2016   • Type 2 diabetes mellitus (CMS/[facility]) [E11.9] 01/17/2016      Resolved Hospital Problems    Diagnosis Date Noted Date Resolved   No resolved problems to display.         Ms. Mathis is a 73 y.o. female who was acute onset of neck pain and headache for the past 3 days.  Her range of motion saw him..  She has received antibiotics before she was transferred here.  Will not continue antibiotics until we get an MRI of the neck.  I feel that this is more of a muscular problem than meningitis.  She does not have any meningeal signs and symptoms.  We'll also get a neurology to see her on hold Pradaxa until she was evaluated but meantime continue other home medications.  She does not have any problems swallowing or neurological deficits.        I discussed the patients findings and my recommendations with patient, family and nursing staff.      Osman Morrison MD  [facility]  10/07/18

## 2018-10-07 NOTE — CONSULTS
Patient Identification:  NAME:  Kassi Mahtis  Age:  73 y.o.   Sex:  female   :  1945   MRN:  [id number]       Chief complaint: Neck pain, reason for consult neck pain and stiffness    History of present illness:  This patient is a 73-year-old right-handed black female with history of hypertension hyperlipidemia dementia mild who is been on a cruise and did very well after she had been back few days she's developed some neck pain turning her head to the left and the right in quality with neck stiffness as the associated symptoms no fever or chills apparently she has very supple neck for the most part coming forwards and backwards but definitely has trouble turning left and right this is the quality duration is noted modifying factors she's been given significant antibiotics and transferred here for the possibility of meningitis or encephalitis.      Past medical history:  Past Medical History:   Diagnosis Date   • Allergic rhinitis    • Anemia    • Chronic venous insufficiency    • Dementia    • Depression    • Diabetes mellitus (CMS/HCC)    • Hidradenitis suppurativa    • Hyperlipidemia    • Hypertension    • Osteoarthritis    • Paroxysmal atrial fibrillation (CMS/HCC)    • Peripheral neuropathy    • Stroke (CMS/HCC)    • Vitamin B 12 deficiency     Boarderline only with normal MMA/Homocysteine/IF antibody       Past surgical history:  Past Surgical History:   Procedure Laterality Date   • BACK SURGERY     • HYSTERECTOMY      PARTIAL   • KNEE SURGERY Left        Allergies:  Enalapril and Penicillins    Home medications:  Prescriptions Prior to Admission   Medication Sig Dispense Refill Last Dose   • ALPRAZolam (XANAX) 0.25 MG tablet Take 1 tablet by mouth 2 (Two) Times a Day As Needed for Anxiety. 20 tablet 0 Taking   • amLODIPine (NORVASC) 5 MG tablet TAKE ONE TABLET BY MOUTH DAILY 90 tablet 0 10/6/2018 at Unknown time   • buPROPion XL (WELLBUTRIN XL) 300 MG 24 hr tablet daily.     10/6/2018 at Unknown  time   • dabigatran etexilate (PRADAXA) 150 MG capsu Take 1 capsule by mouth Every 12 (Twelve) Hours. 180 capsule 3 10/6/2018 at Unknown time   • donepezil (ARICEPT) 10 MG tablet 10 mg Daily.   10/6/2018 at Unknown time   • hydrALAZINE (APRESOLINE) 50 MG tablet Take 50 mg by mouth 2 (Two) Times a Day.   10/6/2018 at Unknown time   • JANUVIA 100 MG tablet TAKE ONE TABLET BY MOUTH DAILY 90 tablet 3 10/6/2018 at Unknown time   • JARDIANCE 10 MG tablet TAKE ONE TABLET BY MOUTH DAILY 90 tablet 10 10/6/2018 at Unknown time   • memantine (NAMENDA) 10 MG tablet Take 10 mg by mouth 2 (Two) Times a Day.   Taking   • metFORMIN ER (GLUCOPHAGE-XR) 500 MG 24 hr tablet Take 2 tablets by mouth 2 (Two) Times a Day. 360 tablet 2 10/6/2018 at Unknown time   • metoprolol succinate XL (TOPROL-XL) 50 MG 24 hr tablet TAKE TWO TABLETS BY MOUTH EVERY MORNING (Patient taking differently: 50 mg 2 (Two) Times a Day. TAKE TWO TABLETS BY MOUTH EVERY MORNING) 180 tablet 3 10/6/2018 at Unknown time   • montelukast (SINGULAIR) 10 MG tablet Take 1 tablet by mouth Every Night. 30 tablet 11 10/6/2018 at Unknown time   • MYRBETRIQ 50 MG tablet sustained-release 24 hour 24 hr tablet TAKE ONE TABLET BY MOUTH DAILY 30 tablet 4 10/6/2018 at Unknown time   • QUEtiapine (SEROquel) 25 MG tablet Take 25 mg by mouth Every Night.      • rosuvastatin (CRESTOR) 20 MG tablet TAKE ONE TABLET BY MOUTH EVERY NIGHT AT BEDTIME 90 tablet 2 10/6/2018 at Unknown time   • sertraline (ZOLOFT) 100 MG tablet Take 100 mg by mouth Daily.   10/6/2018 at Unknown time   • acetaminophen (TYLENOL) 325 MG tablet Take 650 mg by mouth Every 6 (Six) Hours.   Taking   • cetirizine (zyrTEC) 10 MG tablet Take 1 tablet by mouth Daily. 90 tablet 3 Taking   • ferrous sulfate 325 (65 FE) MG tablet Take 1 tablet by mouth 2 (two) times a day. EVERY OTHER DAY   Taking   • glucose blood test strip Use daily for type 2 DM. 100 each 3 Taking   • hydrALAZINE (APRESOLINE) 100 MG tablet TAKE ONE TABLET  BY MOUTH TWICE A DAY (Patient not taking: Reported on 10/7/2018) 180 tablet 1 Not Taking at Unknown time   • Melatonin 10 MG capsule Take  by mouth.   Taking   • Multiple Vitamin (MULTIVITAMINS PO) Take 1 tablet by mouth daily.   Taking   • Omega-3 Fatty Acids (OMEGA 3 PO) Take  by mouth Daily.   Taking   • Scopolamine (TRANSDERM-SCOP, 1.5 MG,) 1.5 MG/3DAYS patch Place 1 patch on the skin as directed by provider Every 72 (Seventy-Two) Hours. 5 patch 0         Hospital medications:    amLODIPine 5 mg Oral Daily   baclofen 10 mg Oral BID AC   buPROPion  mg Oral Daily   docusate sodium 100 mg Oral BID   donepezil 10 mg Oral Nightly   hydrALAZINE 100 mg Oral BID   insulin aspart 0-7 Units Subcutaneous 4x Daily With Meals & Nightly   linagliptin 5 mg Oral Daily   memantine 10 mg Oral Daily   methylPREDNISolone sodium succinate 60 mg Intravenous Once   metoprolol succinate XL 50 mg Oral Q24H   QUEtiapine 25 mg Oral Nightly   rosuvastatin 20 mg Oral Nightly   sertraline 100 mg Oral Daily   sodium chloride 3 mL Intravenous Q12H       sodium chloride 50 mL/hr     •  acetaminophen  •  ALPRAZolam  •  dextrose  •  dextrose  •  glucagon (human recombinant)  •  ondansetron **OR** ondansetron ODT **OR** ondansetron  •  sodium chloride    Family history:  Family History   Problem Relation Age of Onset   • Breast cancer Mother    • Stroke Father    • Stroke Maternal Grandmother    • Heart attack Maternal Grandmother    • Heart attack Maternal Grandfather        Social history:  Social History   Substance Use Topics   • Smoking status: Former Smoker   • Smokeless tobacco: Never Used      Comment: CAFFEINE USE/ SOFT DRINKS.    • Alcohol use No       Review of systems:    Her neck is been sore the last couple of days no hair eyes ears nose throat skin bone joint  lymphatic hematologic or oncologic complaints no chest pain abdominal pain bowel bladder incontinence fever chills or rash    Objective:  Vitals Ranges:   Temp:  [97  °F (36.1 °C)-98.4 °F (36.9 °C)] 97 °F (36.1 °C)  Heart Rate:  [64-69] 64  Resp:  [18] 18  BP: (150-185)/(73-82) 150/73      Physical Exam:  Awake alert oriented ×3 in no distress fund of knowledge normal attention span and concentration normal recent and remote memory normal for her effort in no distress oriented ×3 pupils 1-1/2 constricting to 1 normal disc retinas visual fields extraocular movements full without nystagmus nasolabial folds palate tongue symmetrical normal hearing facial sensation  shoulder shrug definite pain turning her head to the left or the right with decreased range of motion but pretty good flexion forward and extension backwards.  Motor 5 out of 5 times for 70s no atrophy fasciculations rigidity bradykinesia resting tremor reflexes trace throughout symmetrical toes downgoing bilaterally sensation normal light touch face both arms and both legs coordination normal in the upper and lower extremities station and gait was not tested for fear of would let her fall heart regular without murmur neck supple without bruits extremities no clubbing cyanosis some mild peripheral edema in the feet visual acuity normal at 3 feet    Results review:   I reviewed the patient's new clinical results.    Data review:  Lab Results (last 24 hours)     Procedure Component Value Units Date/Time    POC Glucose Once [[id number]]  (Abnormal) Collected:  10/07/18 1101    Specimen:  Blood Updated:  10/07/18 1102     Glucose 133 (H) mg/dL     Narrative:       Meter: [id number] : [id number] Jan CALDERA    Sedimentation Rate [[id number]]  (Abnormal) Collected:  10/07/18 0930    Specimen:  Blood Updated:  10/07/18 1028     Sed Rate 31 (H) mm/hr     Procalcitonin [[id number]]  (Abnormal) Collected:  10/07/18 0930    Specimen:  Blood Updated:  10/07/18 1022     Procalcitonin 0.03 (L) ng/mL     Narrative:       As a Marker for Sepsis (Non-Neonates):   1. <0.5 ng/mL represents a low risk of severe sepsis and/or septic  "shock.  1. >2 ng/mL represents a high risk of severe sepsis and/or septic shock.    As a Marker for Lower Respiratory Tract Infections that require antibiotic therapy:  PCT on Admission     Antibiotic Therapy             6-12 Hrs later  > 0.5                Strongly Recommended            >0.25 - <0.5         Recommended  0.1 - 0.25           Discouraged                   Remeasure/reassess PCT  <0.1                 Strongly Discouraged          Remeasure/reassess PCT      As 28 day mortality risk marker: \"Change in Procalcitonin Result\" (> 80 % or <=80 %) if Day 0 (or Day 1) and Day 4 values are available. Refer to http://www.[employer] Blackboxpct-calculator.com/   Change in PCT <=80 %   A decrease of PCT levels below or equal to 80 % defines a positive change in PCT test result representing a higher risk for 28-day all-cause mortality of patients diagnosed with severe sepsis or septic shock.  Change in PCT > 80 %   A decrease of PCT levels of more than 80 % defines a negative change in PCT result representing a lower risk for 28-day all-cause mortality of patients diagnosed with severe sepsis or septic shock.                Lactic Acid, Plasma [[id number]]  (Normal) Collected:  10/07/18 0930    Specimen:  Blood Updated:  10/07/18 1016     Lactate 1.3 mmol/L     C-reactive Protein [[id number]]  (Abnormal) Collected:  10/07/18 0930    Specimen:  Blood Updated:  10/07/18 1015     C-Reactive Protein 1.09 (H) mg/dL     Comprehensive Metabolic Panel [[id number]]  (Abnormal) Collected:  10/07/18 0930    Specimen:  Blood Updated:  10/07/18 1015     Glucose 161 (H) mg/dL      BUN 7 (L) mg/dL      Creatinine 0.86 mg/dL      Sodium 141 mmol/L      Potassium 3.9 mmol/L      Chloride 105 mmol/L      CO2 24.3 mmol/L      Calcium 8.7 mg/dL      Total Protein 6.2 g/dL      Albumin 3.20 (L) g/dL      ALT (SGPT) 22 U/L      AST (SGOT) 13 U/L      Alkaline Phosphatase 62 U/L      Total Bilirubin 0.2 mg/dL      eGFR  African Amer 78 mL/min/1.73  "     Globulin 3.0 gm/dL      A/G Ratio 1.1 g/dL      BUN/Creatinine Ratio 8.1     Anion Gap 11.7 mmol/L     Narrative:       The MDRD GFR formula is only valid for adults with stable renal function between ages 18 and 70.    CBC & Differential [[id number]] Collected:  10/07/18 0930    Specimen:  Blood Updated:  10/07/18 0951    Narrative:       The following orders were created for panel order CBC & Differential.  Procedure                               Abnormality         Status                     ---------                               -----------         ------                     CBC Auto Differential[[id number]]        Abnormal            Final result                 Please view results for these tests on the individual orders.    CBC Auto Differential [[id number]]  (Abnormal) Collected:  10/07/18 0930    Specimen:  Blood Updated:  10/07/18 0951     WBC 12.70 (H) 10*3/mm3      RBC 3.97 10*6/mm3      Hemoglobin 11.2 (L) g/dL      Hematocrit 35.5 (L) %      MCV 89.4 fL      MCH 28.2 pg      MCHC 31.5 (L) g/dL      RDW 16.2 (H) %      RDW-SD 53.4 fl      MPV 9.8 fL      Platelets 323 10*3/mm3      Neutrophil % 62.1 %      Lymphocyte % 28.2 %      Monocyte % 8.6 %      Eosinophil % 0.9 %      Basophil % 0.2 %      Immature Grans % 0.2 %      Neutrophils, Absolute 7.89 10*3/mm3      Lymphocytes, Absolute 3.58 10*3/mm3      Monocytes, Absolute 1.09 10*3/mm3      Eosinophils, Absolute 0.12 10*3/mm3      Basophils, Absolute 0.02 10*3/mm3      Immature Grans, Absolute 0.03 10*3/mm3            Imaging:  Imaging Results (last 24 hours)     ** No results found for the last 24 hours. **             Assessment and Plan:     Principal Problem:    Acute neck pain  Active Problems:    Paroxysmal atrial fibrillation (CMS/HCC)    Type 2 diabetes mellitus (CMS/HCC)    Headache    Cervical radiculopathy with difficulty turning her head to the left or the right this is not a stroke TIA meningitis nor encephalitis syndrome I will  initiate baclofen 10 mg by mouth twice a day and she will get a small dose of Solu-Medrol and MRI scan of the neck is not really going to change my treatment as I believe any type of surgical intervention is very very unlikely specifically she does not have any shooting pain from her neck no focal numbness and no bowel bladder changes      Moiz Rahman MD  10/07/18  2:14 PM

## 2018-10-07 NOTE — PLAN OF CARE
Problem: Patient Care Overview  Goal: Plan of Care Review  Outcome: Ongoing (interventions implemented as appropriate)   10/07/18 0631   Coping/Psychosocial   Plan of Care Reviewed With patient   Plan of Care Review   Progress no change   OTHER   Outcome Summary Patient direct admit from [facility] c/o stiff neck for a few days. Patient with increased WBC at [facility] and transferred here for r/o Meningitis. Patient and family stated they just went on a cruise outside the country to [facility], [facility], [place], etc. Patient not complaining of pain at the moment. Awaiting orders. Will continue to monitor.        Problem: Pain, Acute (Adult)  Goal: Identify Related Risk Factors and Signs and Symptoms  Outcome: Outcome(s) achieved Date Met: 10/07/18    Goal: Acceptable Pain Control/Comfort Level  Outcome: Ongoing (interventions implemented as appropriate)      Problem: Fall Risk (Adult)  Goal: Identify Related Risk Factors and Signs and Symptoms  Outcome: Outcome(s) achieved Date Met: 10/07/18    Goal: Absence of Fall  Outcome: Ongoing (interventions implemented as appropriate)

## 2018-10-08 VITALS
HEIGHT: 62 IN | WEIGHT: 200.2 LBS | BODY MASS INDEX: 36.84 KG/M2 | HEART RATE: 69 BPM | RESPIRATION RATE: 18 BRPM | DIASTOLIC BLOOD PRESSURE: 83 MMHG | TEMPERATURE: 98.2 F | OXYGEN SATURATION: 99 % | SYSTOLIC BLOOD PRESSURE: 167 MMHG

## 2018-10-08 PROBLEM — M54.12 CERVICAL RADICULOPATHY: Status: ACTIVE | Noted: 2018-10-08

## 2018-10-08 PROBLEM — R51.9 HEADACHE: Status: RESOLVED | Noted: 2018-10-07 | Resolved: 2018-10-08

## 2018-10-08 PROBLEM — M54.2 ACUTE NECK PAIN: Status: RESOLVED | Noted: 2018-10-07 | Resolved: 2018-10-08

## 2018-10-08 LAB
GLUCOSE BLDC GLUCOMTR-MCNC: 109 MG/DL (ref 70–130)
GLUCOSE BLDC GLUCOMTR-MCNC: 119 MG/DL (ref 70–130)

## 2018-10-08 PROCEDURE — G0378 HOSPITAL OBSERVATION PER HR: HCPCS

## 2018-10-08 PROCEDURE — 99212 OFFICE O/P EST SF 10 MIN: CPT | Performed by: PSYCHIATRY & NEUROLOGY

## 2018-10-08 PROCEDURE — 82962 GLUCOSE BLOOD TEST: CPT

## 2018-10-08 RX ORDER — BACLOFEN 10 MG/1
10 TABLET ORAL
Qty: 30 TABLET | Refills: 0 | Status: SHIPPED | OUTPATIENT
Start: 2018-10-08 | End: 2019-03-06

## 2018-10-08 RX ORDER — METHYLPREDNISOLONE 4 MG/1
TABLET ORAL
Qty: 1 EACH | Refills: 0 | Status: SHIPPED | OUTPATIENT
Start: 2018-10-08 | End: 2019-03-06

## 2018-10-08 RX ADMIN — SERTRALINE 100 MG: 100 TABLET, FILM COATED ORAL at 11:28

## 2018-10-08 RX ADMIN — LINAGLIPTIN 5 MG: 5 TABLET, FILM COATED ORAL at 11:28

## 2018-10-08 RX ADMIN — METOPROLOL SUCCINATE 50 MG: 50 TABLET, FILM COATED, EXTENDED RELEASE ORAL at 11:29

## 2018-10-08 RX ADMIN — AMLODIPINE BESYLATE 5 MG: 5 TABLET ORAL at 11:28

## 2018-10-08 RX ADMIN — BACLOFEN 10 MG: 10 TABLET ORAL at 11:29

## 2018-10-08 RX ADMIN — BUPROPION HYDROCHLORIDE 300 MG: 300 TABLET, EXTENDED RELEASE ORAL at 11:28

## 2018-10-08 RX ADMIN — HYDRALAZINE HYDROCHLORIDE 100 MG: 50 TABLET, FILM COATED ORAL at 11:29

## 2018-10-08 NOTE — PROGRESS NOTES
Case Management Discharge Note    Final Note: DC home via private auto. Flaca Phillip RN    Destination     No service has been selected for the patient.      Durable Medical Equipment     No service has been selected for the patient.      Dialysis/Infusion     No service has been selected for the patient.      Home Medical Care     No service has been selected for the patient.      Social Care     No service has been selected for the patient.        Other: Other (private auto)    Final Discharge Disposition Code: 01 - home or self-care

## 2018-10-08 NOTE — PLAN OF CARE
Problem: Patient Care Overview  Goal: Plan of Care Review  Outcome: Ongoing (interventions implemented as appropriate)   10/08/18 0433   Coping/Psychosocial   Plan of Care Reviewed With patient;family   Plan of Care Review   Progress improving   OTHER   Outcome Summary VSS. No c/o pain. No fevers overnight. Possible d/c today. Will continue to monitor.        Problem: Pain, Acute (Adult)  Goal: Acceptable Pain Control/Comfort Level  Outcome: Ongoing (interventions implemented as appropriate)      Problem: Fall Risk (Adult)  Goal: Absence of Fall  Outcome: Ongoing (interventions implemented as appropriate)

## 2018-10-08 NOTE — PROGRESS NOTES
Discharge Planning Assessment  [facility]     Patient Name: Kassi Mathis  MRN: [id number]  Today's Date: 10/8/2018    Admit Date: 10/7/2018          Discharge Needs Assessment     Row Name 10/08/18 1125       Living Environment    Lives With alone    Current Living Arrangements home/apartment/condo    Primary Care Provided by self;child(tayler)   Flaca Mathis [phone], and Meg Mathis [phone]    Family Caregiver if Needed child(tayler), adult    Family Caregiver Names --   Flaca Mathis [phone], and Meg Mathis [phone]    Quality of Family Relationships helpful;involved;supportive    Able to Return to Prior Arrangements yes       Resource/Environmental Concerns    Resource/Environmental Concerns none    Transportation Concerns car, none       Transition Planning    Patient/Family Anticipates Transition to home with family    Patient/Family Anticipated Services at Transition none    Transportation Anticipated family or friend will provide       Discharge Needs Assessment    Concerns to be Addressed discharge planning    Equipment Currently Used at Home cane, quad    Anticipated Changes Related to Illness none    Equipment Needed After Discharge none    Offered/Gave Vendor List yes    Discharge Coordination/Progress Home with support of adult children Flaca Mathis [phone], and Meg Mathis [phone], no needs. -SHON Alexander            Discharge Plan     Row Name 10/08/18 1122       Plan    Plan Home with support of adult children Flaca Mathis [phone], and Meg Mathis [phone], no needs. -SHON Alexander    Patient/Family in Agreement with Plan yes    Plan Comments CCP met with patient, daughter Flaca Mathis [phone], and Meg Mathis [phone]. Patient gave consent for CCP to speak to family. Daughter answered all questions on patient's behalf. Explained role of CCP, verified face sheet, and discussed d/c planning needs. Verified [place] Notice document in  EPIC. Left SNF/HH list.  Patient lives home alone in a multiple story home with 1 step to enter home and 10 steps to enter bedroom. Patient owns a cane and uses it intermittently, patient normally ambulates independently. Patient has been up independently at bedside.  Previous SNF admission post back surgery to [facility] (now [facility]), previous HH services through [facility]. Patient relies on adult children for transportation but manages her own medications without difficulty. Patient's plan upon discharge is to return home with support of adult children who will also provide transportation upon d/c. -SHON Alexander        Destination     No service coordination in this encounter.      Durable Medical Equipment     No service coordination in this encounter.      Dialysis/Infusion     No service coordination in this encounter.      Home Medical Care     No service coordination in this encounter.      Social Care     No service coordination in this encounter.                Demographic Summary     Row Name 10/08/18 1124       General Information    Admission Type observation    Arrived From home    Required Notices Provided Observation Status Notice    Referral Source physician;nursing    Reason for Consult discharge planning    Preferred Language English     Used During This Interaction no            Functional Status     Row Name 10/08/18 1125       Functional Status    Usual Activity Tolerance good    Current Activity Tolerance good       Functional Status, IADL    Medications independent    Meal Preparation independent    Housekeeping independent    Laundry independent    Shopping independent       Mental Status    General Appearance WDL WDL       Mental Status Summary    Recent Changes in Mental Status/Cognitive Functioning no changes            Psychosocial    No documentation.           Abuse/Neglect    No documentation.           Legal    No documentation.           Substance  Abuse    No documentation.           Patient Forms    No documentation.         SHON Alexander

## 2018-10-08 NOTE — PROGRESS NOTES
Patient Identification:  NAME:  Kassi Mathis  Age:  73 y.o.   Sex:  female   :  1945   MRN:  [id number]       Chief complaint: Cervical radiculopathy    History of present illness:  She's doing much better the pain is better better range of motion of the neck she is wanting to go home      Past medical history:  Past Medical History:   Diagnosis Date   • Allergic rhinitis    • Anemia    • Chronic venous insufficiency    • Dementia    • Depression    • Diabetes mellitus (CMS/HCC)    • Hidradenitis suppurativa    • Hyperlipidemia    • Hypertension    • Osteoarthritis    • Paroxysmal atrial fibrillation (CMS/HCC)    • Peripheral neuropathy    • Stroke (CMS/HCC)    • Vitamin B 12 deficiency     Boarderline only with normal MMA/Homocysteine/IF antibody       Allergies:  Enalapril and Penicillins    Home medications:  Prescriptions Prior to Admission   Medication Sig Dispense Refill Last Dose   • amLODIPine (NORVASC) 5 MG tablet TAKE ONE TABLET BY MOUTH DAILY 90 tablet 0 10/6/2018 at Unknown time   • buPROPion XL (WELLBUTRIN XL) 300 MG 24 hr tablet daily.     10/6/2018 at Unknown time   • cetirizine (zyrTEC) 10 MG tablet Take 1 tablet by mouth Daily. 90 tablet 3 Taking   • dabigatran etexilate (PRADAXA) 150 MG capsu Take 1 capsule by mouth Every 12 (Twelve) Hours. 180 capsule 3 10/6/2018 at Unknown time   • donepezil (ARICEPT) 10 MG tablet 10 mg Daily.   10/6/2018 at Unknown time   • hydrALAZINE (APRESOLINE) 50 MG tablet Take 50 mg by mouth 2 (Two) Times a Day.   10/6/2018 at Unknown time   • JANUVIA 100 MG tablet TAKE ONE TABLET BY MOUTH DAILY 90 tablet 3 10/6/2018 at Unknown time   • JARDIANCE 10 MG tablet TAKE ONE TABLET BY MOUTH DAILY 90 tablet 10 10/6/2018 at Unknown time   • metFORMIN ER (GLUCOPHAGE-XR) 500 MG 24 hr tablet Take 2 tablets by mouth 2 (Two) Times a Day. 360 tablet 2 10/6/2018 at Unknown time   • metoprolol succinate XL (TOPROL-XL) 50 MG 24 hr tablet TAKE TWO TABLETS BY MOUTH EVERY MORNING  (Patient taking differently: 100 mg Every Night. TAKE TWO TABLETS BY MOUTH EVERY MORNING) 180 tablet 3 10/6/2018 at Unknown time   • montelukast (SINGULAIR) 10 MG tablet Take 1 tablet by mouth Every Night. 30 tablet 11 10/6/2018 at Unknown time   • Multiple Vitamin (MULTIVITAMINS PO) Take 1 tablet by mouth daily.   Taking   • MYRBETRIQ 50 MG tablet sustained-release 24 hour 24 hr tablet TAKE ONE TABLET BY MOUTH DAILY 30 tablet 4 10/6/2018 at Unknown time   • Omega-3 Fatty Acids (OMEGA 3 PO) Take  by mouth Daily.   Taking   • QUEtiapine (SEROquel) 25 MG tablet Take 25 mg by mouth Every Night.      • rosuvastatin (CRESTOR) 20 MG tablet TAKE ONE TABLET BY MOUTH EVERY NIGHT AT BEDTIME 90 tablet 2 10/6/2018 at Unknown time   • sertraline (ZOLOFT) 100 MG tablet Take 100 mg by mouth Daily.   10/6/2018 at Unknown time   • acetaminophen (TYLENOL) 325 MG tablet Take 650 mg by mouth Every 6 (Six) Hours.   Taking   • ALPRAZolam (XANAX) 0.25 MG tablet Take 1 tablet by mouth 2 (Two) Times a Day As Needed for Anxiety. 20 tablet 0 Taking   • ferrous sulfate 325 (65 FE) MG tablet Take 1 tablet by mouth 2 (two) times a day. EVERY OTHER DAY   Taking   • glucose blood test strip Use daily for type 2 DM. 100 each 3 Taking   • hydrALAZINE (APRESOLINE) 100 MG tablet TAKE ONE TABLET BY MOUTH TWICE A DAY (Patient not taking: Reported on 10/7/2018) 180 tablet 1 Not Taking at Unknown time   • Melatonin 10 MG capsule Take  by mouth.   Taking   • memantine (NAMENDA) 10 MG tablet Take 10 mg by mouth 2 (Two) Times a Day.   Taking   • Scopolamine (TRANSDERM-SCOP, 1.5 MG,) 1.5 MG/3DAYS patch Place 1 patch on the skin as directed by provider Every 72 (Seventy-Two) Hours. 5 patch 0         Hospital medications:    amLODIPine 5 mg Oral Daily   baclofen 10 mg Oral BID AC   buPROPion  mg Oral Daily   docusate sodium 100 mg Oral BID   donepezil 10 mg Oral Nightly   hydrALAZINE 100 mg Oral BID   insulin aspart 0-7 Units Subcutaneous 4x Daily With  Meals & Nightly   linagliptin 5 mg Oral Daily   metoprolol succinate XL 50 mg Oral Q24H   QUEtiapine 25 mg Oral Nightly   rosuvastatin 20 mg Oral Nightly   sertraline 100 mg Oral Daily   sodium chloride 3 mL Intravenous Q12H        •  acetaminophen  •  ALPRAZolam  •  dextrose  •  dextrose  •  glucagon (human recombinant)  •  ondansetron **OR** ondansetron ODT **OR** ondansetron  •  sodium chloride      Objective:  Vitals Ranges:   Temp:  [97 °F (36.1 °C)-98.2 °F (36.8 °C)] 98.2 °F (36.8 °C)  Heart Rate:  [64-72] 69  Resp:  [18] 18  BP: (147-167)/(68-83) 167/83      Physical Exam:  She is awake alert very cheerful normal cranial nerves II through VII range of motion of the neck is definitely better to the left and the right she is going home today and is happy reflexes trace throughout symmetrical toes downgoing bilaterally    Results review:   I reviewed the patient's new clinical results.    Data review:  Lab Results (last 24 hours)     Procedure Component Value Units Date/Time    POC Glucose Once [[id number]]  (Normal) Collected:  10/08/18 1102    Specimen:  Blood Updated:  10/08/18 1108     Glucose 119 mg/dL     Narrative:       Meter: [id number] : [id number] Allen CALDERA    POC Glucose Once [[id number]]  (Normal) Collected:  10/08/18 0618    Specimen:  Blood Updated:  10/08/18 0620     Glucose 109 mg/dL     Narrative:       Meter: [id number] : [id number] Milad Hiral NA    POC Glucose Once [[id number]]  (Abnormal) Collected:  10/07/18 2045    Specimen:  Blood Updated:  10/07/18 2055     Glucose 325 (H) mg/dL     Narrative:       Meter: [id number] : [id number] Milad Hiral NA    POC Glucose Once [[id number]]  (Abnormal) Collected:  10/07/18 1623    Specimen:  Blood Updated:  10/07/18 1624     Glucose 163 (H) mg/dL     Narrative:       Meter: [id number] : [id number] Jan CALDERA    Hemoglobin A1c [[id number]]  (Abnormal) Collected:  10/07/18 1445    Specimen:  Blood Updated:  10/07/18  1520     Hemoglobin A1C 8.00 (H) %     Narrative:       Hemoglobin A1C Ranges:    Increased Risk for Diabetes  5.7% to 6.4%  Diabetes                     >= 6.5%  Diabetic Goal                < 7.0%           Imaging:  Imaging Results (last 24 hours)     ** No results found for the last 24 hours. **             Assessment and Plan:     Principal Problem:    Cervical radiculopathy  Active Problems:    Paroxysmal atrial fibrillation (CMS/HCC)    Type 2 diabetes mellitus (CMS/HCC)    He is much better today very tearful and seems to have increased range of motion of her neck I am okay with her going home      Moiz Rahman MD  10/08/18  1:13 PM

## 2018-10-08 NOTE — DISCHARGE SUMMARY
Name: Kassi Mathis  Age: 73 y.o.  Sex: female  :  1945  MRN: [id number]         Primary Care Physician: Parul Jaime MD      Date of Admission:  10/7/2018  Date of Discharge:  10/8/2018      CHIEF COMPLAINT     Neck pain     DISCHARGE DIAGNOSIS  Active Hospital Problems    Diagnosis Date Noted   • **Cervical radiculopathy [M54.12] 10/08/2018   • Paroxysmal atrial fibrillation (CMS/HCC) [I48.0] 2016   • Type 2 diabetes mellitus (CMS/HCC) [E11.9] 2016      Resolved Hospital Problems    Diagnosis Date Noted Date Resolved   • Headache [R51] 10/07/2018 10/08/2018   • Acute neck pain [M54.2] 10/07/2018 10/08/2018       SECONDARY DIAGNOSES  Past Medical History:   Diagnosis Date   • Allergic rhinitis    • Anemia    • Chronic venous insufficiency    • Dementia    • Depression    • Diabetes mellitus (CMS/[facility])    • Hidradenitis suppurativa    • Hyperlipidemia    • Hypertension    • Osteoarthritis    • Paroxysmal atrial fibrillation (CMS/[facility])    • Peripheral neuropathy    • Stroke (CMS/HCC)    • Vitamin B 12 deficiency     Boarderline only with normal MMA/Homocysteine/IF antibody       CONSULTS   Consulting Physician(s)     Provider Relationship Specialty    Moiz Rahman MD Consulting Physician Neurology          HOSPITAL COURSE  3-year-old with a history of hypertension, dementia has been on cruise for a few days and has returned.  Then she developed neck pain and headache presented to [facility].  They thought that the patient has possible meningitis given Rocephin, vancomycin and acyclovir center to [facility] because her primary care physician is from [facility].  She had no signs of meningitis.  On my examination I thought she has cervical radiculopathy.  Consulted neurology Dr. Rahman saw the patient and agreed with my assessment gave her baclofen and Solu-Medrol.  Today patient feels 90% better.  She will be sent home on a Medrol pack dose and baclofen for about 2 weeks.  All her previous  medications and restarted.  I have asked her to follow-up with her primary care physician in about 2 weeks      PHYSICAL EXAM  Temp:  [97 °F (36.1 °C)-98.2 °F (36.8 °C)] 98.2 °F (36.8 °C)  Heart Rate:  [64-72] 69  Resp:  [18] 18  BP: (147-167)/(68-83) 167/83  Body mass index is 36.62 kg/m².  Physical Exam  HEENT: Unremarkable, pupils are round equal and reacting to light   NECK: No lymphadenopathy, throat is clear,   RESPRATORY SYSTEM: Breath sounds are equal on both sides and are normal, no wheezes or crackles  CARDIOVASULAR SYSTEM: Heart rate is regular without murmur  ABDOMEN: Soft, no ascites, no hepatosplenomegaly.  EXTREMITIES: No cyanosis, clubbing or edema    CONDITION ON DISCHARGE  Stable.      DISCHARGE DISPOSITION   Home      ALLERGIES  Allergies   Allergen Reactions   • Enalapril Swelling     Other reaction(s): Other: See Comments  Aka vasotec tabs  Aka vasotec tabs  Aka vasotec tabs   • Penicillins Swelling       RECENT LABS    Results from last 7 days  Lab Units 10/07/18  0930   WBC 10*3/mm3 12.70*   HEMOGLOBIN g/dL 11.2*   HEMATOCRIT % 35.5*   PLATELETS 10*3/mm3 323       Results from last 7 days  Lab Units 10/07/18  0930   SODIUM mmol/L 141   POTASSIUM mmol/L 3.9   CHLORIDE mmol/L 105   CO2 mmol/L 24.3   BUN mg/dL 7*   CREATININE mg/dL 0.86   GLUCOSE mg/dL 161*   CALCIUM mg/dL 8.7           DIET;  Diet Order   Procedures   • Diet Regular; Cardiac, Consistent Carbohydrate       DISCHARGE MEDICATIONS     Your medication list      START taking these medications      Instructions Last Dose Given Next Dose Due   baclofen 10 MG tablet  Commonly known as:  LIORESAL      Take 1 tablet by mouth 2 (Two) Times a Day Before Meals.       MethylPREDNISolone 4 MG tablet  Commonly known as:  MEDROL (MARU)      Take as directed on package instructions.          CHANGE how you take these medications      Instructions Last Dose Given Next Dose Due   hydrALAZINE 100 MG tablet  Commonly known as:  APRESOLINE  What  changed:  Another medication with the same name was removed. Continue taking this medication, and follow the directions you see here.      TAKE ONE TABLET BY MOUTH TWICE A DAY       metoprolol succinate XL 50 MG 24 hr tablet  Commonly known as:  TOPROL-XL  What changed:  · how much to take  · when to take this  · additional instructions      TAKE TWO TABLETS BY MOUTH EVERY MORNING          CONTINUE taking these medications      Instructions Last Dose Given Next Dose Due   acetaminophen 325 MG tablet  Commonly known as:  TYLENOL      Take 650 mg by mouth Every 6 (Six) Hours.       ALPRAZolam 0.25 MG tablet  Commonly known as:  XANAX      Take 1 tablet by mouth 2 (Two) Times a Day As Needed for Anxiety.       amLODIPine 5 MG tablet  Commonly known as:  NORVASC      TAKE ONE TABLET BY MOUTH DAILY       buPROPion  MG 24 hr tablet  Commonly known as:  WELLBUTRIN XL      daily.       cetirizine 10 MG tablet  Commonly known as:  zyrTEC      Take 1 tablet by mouth Daily.       dabigatran etexilate 150 MG capsu  Commonly known as:  PRADAXA      Take 1 capsule by mouth Every 12 (Twelve) Hours.       donepezil 10 MG tablet  Commonly known as:  ARICEPT      10 mg Daily.       ferrous sulfate 325 (65 FE) MG tablet      Take 1 tablet by mouth 2 (two) times a day. EVERY OTHER DAY       glucose blood test strip      Use daily for type 2 DM.       JANUVIA 100 MG tablet  Generic drug:  SITagliptin      TAKE ONE TABLET BY MOUTH DAILY       JARDIANCE 10 MG tablet  Generic drug:  Empagliflozin      TAKE ONE TABLET BY MOUTH DAILY       Melatonin 10 MG capsule      Take  by mouth.       memantine 10 MG tablet  Commonly known as:  NAMENDA      Take 10 mg by mouth 2 (Two) Times a Day.       metFORMIN  MG 24 hr tablet  Commonly known as:  GLUCOPHAGE-XR      Take 2 tablets by mouth 2 (Two) Times a Day.       montelukast 10 MG tablet  Commonly known as:  SINGULAIR      Take 1 tablet by mouth Every Night.       MULTIVITAMINS PO       Take 1 tablet by mouth daily.       MYRBETRIQ 50 MG tablet sustained-release 24 hour 24 hr tablet  Generic drug:  Mirabegron ER      TAKE ONE TABLET BY MOUTH DAILY       OMEGA 3 PO      Take  by mouth Daily.       QUEtiapine 25 MG tablet  Commonly known as:  SEROquel      Take 25 mg by mouth Every Night.       rosuvastatin 20 MG tablet  Commonly known as:  CRESTOR      TAKE ONE TABLET BY MOUTH EVERY NIGHT AT BEDTIME       sertraline 100 MG tablet  Commonly known as:  ZOLOFT      Take 100 mg by mouth Daily.          STOP taking these medications    Scopolamine 1.5 MG/3DAYS patch  Commonly known as:  TRANSDERM-SCOP (1.5 MG)              Where to Get Your Medications      These medications were sent to [address] [place] [address] AT [facility] & ([facility] - [phone]  - [phone] FX  [address] [place] [address]    Phone:  [phone]   · baclofen 10 MG tablet  · MethylPREDNISolone 4 MG tablet        Future Appointments  Date Time Provider Department Center   3/6/2019 11:45 AM Mode Gongora MD [facility] None     Follow-up Information     Parul Jaime MD Follow up in 2 week(s).    Specialty:  Internal Medicine  Contact information:  [address] [facility]  [facility] [id number] [phone]                      CODE STATUS  Code Status and Medical Interventions:   Ordered at: 10/07/18 1231     Level Of Support Discussed With:    Patient     Code Status:    CPR     Medical Interventions (Level of Support Prior to Arrest):    Full           Osman Morrison MD  [facility]  10/08/18      Time: greater than 35 minutes.

## 2018-10-09 ENCOUNTER — TELEPHONE (OUTPATIENT)
Dept: INTERNAL MEDICINE | Facility: CLINIC | Age: 73
End: 2018-10-09

## 2018-10-09 NOTE — TELEPHONE ENCOUNTER
Daughter,Roseanna Mathis, would like to know if mom can be set up for allergy testing? Says she was discharged from hospital & she is wondering if visit may have been related to allergies.    Please advise, thanks. TW    I would like to see in F/u.  SLW

## 2018-10-12 ENCOUNTER — OFFICE VISIT (OUTPATIENT)
Dept: INTERNAL MEDICINE | Facility: CLINIC | Age: 73
End: 2018-10-12

## 2018-10-12 VITALS
HEIGHT: 62 IN | BODY MASS INDEX: 35.33 KG/M2 | OXYGEN SATURATION: 98 % | HEART RATE: 74 BPM | DIASTOLIC BLOOD PRESSURE: 68 MMHG | SYSTOLIC BLOOD PRESSURE: 128 MMHG | WEIGHT: 192 LBS | RESPIRATION RATE: 18 BRPM

## 2018-10-12 DIAGNOSIS — Z23 NEED FOR INFLUENZA VACCINATION: ICD-10-CM

## 2018-10-12 DIAGNOSIS — M54.2 NECK PAIN: ICD-10-CM

## 2018-10-12 DIAGNOSIS — Z91.09 ENVIRONMENTAL ALLERGIES: Primary | ICD-10-CM

## 2018-10-12 DIAGNOSIS — D72.829 LEUKOCYTOSIS, UNSPECIFIED TYPE: ICD-10-CM

## 2018-10-12 LAB
BASOPHILS # BLD AUTO: 0.03 10*3/MM3 (ref 0–0.2)
BASOPHILS NFR BLD AUTO: 0.2 % (ref 0–1.5)
EOSINOPHIL # BLD AUTO: 0.12 10*3/MM3 (ref 0–0.7)
EOSINOPHIL NFR BLD AUTO: 0.8 % (ref 0.3–6.2)
ERYTHROCYTE [DISTWIDTH] IN BLOOD BY AUTOMATED COUNT: 15.6 % (ref 11.7–13)
HCT VFR BLD AUTO: 40.7 % (ref 35.6–45.5)
HGB BLD-MCNC: 12.3 G/DL (ref 11.9–15.5)
IMM GRANULOCYTES # BLD: 0.05 10*3/MM3 (ref 0–0.03)
IMM GRANULOCYTES NFR BLD: 0.4 % (ref 0–0.5)
LYMPHOCYTES # BLD AUTO: 3.54 10*3/MM3 (ref 0.9–4.8)
LYMPHOCYTES NFR BLD AUTO: 24.9 % (ref 19.6–45.3)
MCH RBC QN AUTO: 27.5 PG (ref 26.9–32)
MCHC RBC AUTO-ENTMCNC: 30.2 G/DL (ref 32.4–36.3)
MCV RBC AUTO: 90.8 FL (ref 80.5–98.2)
MONOCYTES # BLD AUTO: 0.99 10*3/MM3 (ref 0.2–1.2)
MONOCYTES NFR BLD AUTO: 7 % (ref 5–12)
NEUTROPHILS # BLD AUTO: 9.5 10*3/MM3 (ref 1.9–8.1)
NEUTROPHILS NFR BLD AUTO: 66.7 % (ref 42.7–76)
PLATELET # BLD AUTO: 382 10*3/MM3 (ref 140–500)
RBC # BLD AUTO: 4.48 10*6/MM3 (ref 3.9–5.2)
WBC # BLD AUTO: 14.23 10*3/MM3 (ref 4.5–10.7)

## 2018-10-12 PROCEDURE — 99213 OFFICE O/P EST LOW 20 MIN: CPT | Performed by: INTERNAL MEDICINE

## 2018-10-12 PROCEDURE — G0008 ADMIN INFLUENZA VIRUS VAC: HCPCS | Performed by: INTERNAL MEDICINE

## 2018-10-12 PROCEDURE — 90662 IIV NO PRSV INCREASED AG IM: CPT | Performed by: INTERNAL MEDICINE

## 2018-10-12 RX ORDER — MIRTAZAPINE 15 MG/1
TABLET, ORALLY DISINTEGRATING ORAL
COMMUNITY
Start: 2018-07-07 | End: 2019-03-06

## 2018-10-12 NOTE — PROGRESS NOTES
Subjective     Kassi Mathis is a 73 y.o. female who presents with   Chief Complaint   Patient presents with   • Neck Pain     Pinched nerve   • Follow-up     Hosp f/u 10/7       History of Present Illness     F/u neck pain.  Patient presented to the ER at [facility] for neck pain.  There was a concern for meningitis.  And she has transferred and admitted to [facility].  Work up was negative for meningitis.  Her symptoms were more attributable to cervical radiculopathy.  She was treated with steroids and muscle relaxer.  She is much improved.     She has constant rhinorrhea and headaches at home.  When outside the home she is better.  She has five cats at home.  She is interested in alllergy testing.    Review of Systems   HENT: Positive for rhinorrhea.    Neurological: Positive for headaches.       The following portions of the patient's history were reviewed and updated as appropriate: allergies, current medications and problem list.    Patient Active Problem List    Diagnosis Date Noted   • Type 2 diabetes mellitus (CMS/[facility]) 01/17/2016     Priority: High   • Cervical radiculopathy 10/08/2018   • Hammer toes of both feet 10/17/2017   • Depression 01/17/2016   • Allergic rhinitis 01/17/2016   • Hypertension 01/17/2016   • Dementia without behavioral disturbance 01/17/2016     Note Last Updated: 7/20/2016     By neuropysch evaluation 7/20/2016     • Paroxysmal atrial fibrillation (CMS/[facility]) 01/17/2016   • Cobalamin deficiency 01/17/2016     Note Last Updated: 6/8/2016     Boarderline only with normal MMA/Homocysteine/IF antibody.  Encouraged MVI daily.       • Hyperlipidemia 01/17/2016       Current Outpatient Prescriptions on File Prior to Visit   Medication Sig Dispense Refill   • acetaminophen (TYLENOL) 325 MG tablet Take 650 mg by mouth Every 6 (Six) Hours.     • ALPRAZolam (XANAX) 0.25 MG tablet Take 1 tablet by mouth 2 (Two) Times a Day As Needed for Anxiety. 20 tablet 0   • amLODIPine (NORVASC) 5 MG tablet TAKE ONE  TABLET BY MOUTH DAILY 90 tablet 0   • baclofen (LIORESAL) 10 MG tablet Take 1 tablet by mouth 2 (Two) Times a Day Before Meals. 30 tablet 0   • buPROPion XL (WELLBUTRIN XL) 300 MG 24 hr tablet daily.       • cetirizine (zyrTEC) 10 MG tablet Take 1 tablet by mouth Daily. 90 tablet 3   • dabigatran etexilate (PRADAXA) 150 MG capsu Take 1 capsule by mouth Every 12 (Twelve) Hours. 180 capsule 3   • donepezil (ARICEPT) 10 MG tablet 10 mg Daily.     • ferrous sulfate 325 (65 FE) MG tablet Take 1 tablet by mouth 2 (two) times a day. EVERY OTHER DAY     • glucose blood test strip Use daily for type 2 DM. 100 each 3   • hydrALAZINE (APRESOLINE) 100 MG tablet TAKE ONE TABLET BY MOUTH TWICE A  tablet 1   • JANUVIA 100 MG tablet TAKE ONE TABLET BY MOUTH DAILY 90 tablet 3   • JARDIANCE 10 MG tablet TAKE ONE TABLET BY MOUTH DAILY 90 tablet 10   • Melatonin 10 MG capsule Take  by mouth.     • memantine (NAMENDA) 10 MG tablet Take 10 mg by mouth 2 (Two) Times a Day.     • metFORMIN ER (GLUCOPHAGE-XR) 500 MG 24 hr tablet Take 2 tablets by mouth 2 (Two) Times a Day. 360 tablet 2   • MethylPREDNISolone (MEDROL, MARU,) 4 MG tablet Take as directed on package instructions. 1 each 0   • metoprolol succinate XL (TOPROL-XL) 50 MG 24 hr tablet TAKE TWO TABLETS BY MOUTH EVERY MORNING (Patient taking differently: 100 mg Every Night. TAKE TWO TABLETS BY MOUTH EVERY MORNING) 180 tablet 3   • montelukast (SINGULAIR) 10 MG tablet Take 1 tablet by mouth Every Night. 30 tablet 11   • Multiple Vitamin (MULTIVITAMINS PO) Take 1 tablet by mouth daily.     • MYRBETRIQ 50 MG tablet sustained-release 24 hour 24 hr tablet TAKE ONE TABLET BY MOUTH DAILY 30 tablet 4   • Omega-3 Fatty Acids (OMEGA 3 PO) Take  by mouth Daily.     • QUEtiapine (SEROquel) 25 MG tablet Take 25 mg by mouth Every Night.     • rosuvastatin (CRESTOR) 20 MG tablet TAKE ONE TABLET BY MOUTH EVERY NIGHT AT BEDTIME 90 tablet 2   • sertraline (ZOLOFT) 100 MG tablet Take 100 mg by  "mouth Daily.       No current facility-administered medications on file prior to visit.        Objective     /68   Pulse 74   Resp 18   Ht 157.5 cm (62.01\")   Wt 87.1 kg (192 lb)   LMP  (LMP Unknown)   SpO2 98%   BMI 35.11 kg/m²     Physical Exam   Constitutional: She is oriented to person, place, and time. She appears well-developed and well-nourished.   HENT:   Head: Normocephalic and atraumatic.   Pulmonary/Chest: Effort normal.   Neurological: She is alert and oriented to person, place, and time.   Psychiatric: She has a normal mood and affect. Her behavior is normal.       Assessment/Plan   Kassi was seen today for neck pain and follow-up.    Diagnoses and all orders for this visit:    Environmental allergies  -     Ambulatory Referral to Allergy    Neck pain  -     Ambulatory Referral to Physical Therapy Evaluate and treat    Leukocytosis, unspecified type  -     CBC & Differential    Need for influenza vaccination  -     Fluzone High Dose =>65Years        Discussion    Neck pain.  Likely cervical radiculopathy.  Trial of PT.  If not improving consider MRI.   Allergies.  Refer to allergy for testing.         Future Appointments  Date Time Provider Department Center   3/6/2019 11:45 AM Mode Gongora MD [facility] None         "

## 2018-10-15 DIAGNOSIS — I10 ESSENTIAL HYPERTENSION: ICD-10-CM

## 2018-10-15 RX ORDER — AMLODIPINE BESYLATE 5 MG/1
TABLET ORAL
Qty: 90 TABLET | Refills: 0 | Status: SHIPPED | OUTPATIENT
Start: 2018-10-15 | End: 2018-12-21 | Stop reason: SDUPTHER

## 2018-11-01 RX ORDER — HYDRALAZINE HYDROCHLORIDE 100 MG/1
TABLET, FILM COATED ORAL
Qty: 180 TABLET | Refills: 1 | Status: SHIPPED | OUTPATIENT
Start: 2018-11-01 | End: 2019-08-31 | Stop reason: SDUPTHER

## 2018-11-14 RX ORDER — SITAGLIPTIN 100 MG/1
TABLET, FILM COATED ORAL
Qty: 90 TABLET | Refills: 2 | Status: SHIPPED | OUTPATIENT
Start: 2018-11-14 | End: 2019-06-04 | Stop reason: SDUPTHER

## 2018-11-15 ENCOUNTER — DOCUMENTATION (OUTPATIENT)
Dept: PHYSICAL THERAPY | Facility: HOSPITAL | Age: 73
End: 2018-11-15

## 2018-11-15 NOTE — THERAPY DISCHARGE NOTE
Outpatient Physical Therapy Discharge Summary         Patient Name: Kassi Mathis  : 1945  MRN: [id number]    Today's Date: 11/15/2018    Visit Dx:  No diagnosis found.    PT OP Goals     Row Name 11/15/18 1600          PT Short Term Goals    STG Date to Achieve  17  -CK     STG 1  Pt. will be independent and compliant with initial home exercise program.  -CK     STG 1 Progress  Partially Met  -CK     STG 1 Progress Comments  given but not compliant per her report  -CK     STG 2  Patient will verbalize understanding and demonstrate proper body mechanics with daily household activities to decrease pain (ie. logroll, washing dishes, making a meal, retrieving an item from the floor).   -CK     STG 2 Progress  Partially Met  -CK     STG 2 Progress Comments  education given but minimal compliace with suggestions per her report  -CK     STG 3  Pt. will report R shoulder pain </=4-5/10 to increase ease of grooming.  -CK     STG 3 Progress  Partially Met  -CK        Long Term Goals    LTG Date to Achieve  17  -CK     LTG 1  Pt.w ill be independent and compliant with advanced home exercise program.  -CK     LTG 1 Progress  Not Met  -CK     LTG 1 Progress Comments  minimal progress during therapy  -CK     LTG 2  Pt. will report low back pain </= 4-5/10 to increase ease of standing to prepare a meal.  -CK     LTG 2 Progress  Not Met  -CK     LTG 2 Progress Comments  fluctuating pain  -CK     LTG 3  Patient will report ability to stand/walk > 15 min without increasing low back pain for ease with grocery shopping.  -CK     LTG 3 Progress  Not Met  -CK     LTG 4  Patient will report 75% reduction in R buttock pain with all daily activities.  -CK     LTG 4 Progress  Not Met  -CK     LTG 5  Pt. will score </= 40% on Oswestry, indicating decreased perceived functional disability.  -CK     LTG 5 Progress  Not Met  -CK       User Key  (r) = Recorded By, (t) = Taken By, (c) = Cosigned By    Initials Name  Provider Type    Km Boone, PT Physical Therapist          OP PT Discharge Summary  Date of Discharge: 11/15/18  Reason for Discharge: other (comment), Lack of progress(Patient self discharged)  Outcomes Achieved: Patient able to partially acheive established goals  Discharge Destination: Home with home program  Discharge Instructions/Additional Comments: Ms. Mathis was seen for 6 skilled therapy sessions for chronic back pain and knee pain. She reported minimal compliance with posture/body mechanics recommendations and home exercise program. She self discharged thus unable to assess long term goals at this time.       Time Calculation:        Therapy Suggested Charges     Code   Minutes Charges    None                       Km Trevino, PT  11/15/2018

## 2018-12-10 ENCOUNTER — HOSPITAL ENCOUNTER (EMERGENCY)
Facility: HOSPITAL | Age: 73
Discharge: HOME | End: 2018-12-10
Attending: EMERGENCY MEDICINE | Admitting: EMERGENCY MEDICINE

## 2018-12-10 ENCOUNTER — APPOINTMENT (OUTPATIENT)
Dept: GENERAL RADIOLOGY | Facility: HOSPITAL | Age: 73
End: 2018-12-10

## 2018-12-10 VITALS
BODY MASS INDEX: 35.7 KG/M2 | OXYGEN SATURATION: 99 % | WEIGHT: 194 LBS | TEMPERATURE: 99 F | DIASTOLIC BLOOD PRESSURE: 79 MMHG | HEIGHT: 62 IN | RESPIRATION RATE: 18 BRPM | SYSTOLIC BLOOD PRESSURE: 162 MMHG | HEART RATE: 81 BPM

## 2018-12-10 DIAGNOSIS — S82.131A CLOSED FRACTURE OF MEDIAL PORTION OF RIGHT TIBIAL PLATEAU, INITIAL ENCOUNTER: Primary | ICD-10-CM

## 2018-12-10 PROCEDURE — 73560 X-RAY EXAM OF KNEE 1 OR 2: CPT

## 2018-12-10 PROCEDURE — 99283 EMERGENCY DEPT VISIT LOW MDM: CPT

## 2018-12-10 NOTE — ED PROVIDER NOTES
EMERGENCY DEPARTMENT ENCOUNTER    CHIEF COMPLAINT  Chief Complaint: Knee Pain  History given by: Pt  History limited by: none  Room Number: 03/03  PMD: Parul Jaime MD      HPI:  Pt is a 73 y.o. female who presents complaining of R knee pain, worse with movement, that occurred PTA. Pt unsure of mechanism of event and is unsure if she fell or twisted her R knee. Per family, pt likely tripped over something at home. Pt was seen at  earlier today with reported negative XRs and discharged home.     Duration:  PTA  Onset: sudden  Timing: constant  Location: R knee  Radiation: none  Quality: pain  Intensity/Severity: moderate  Progression: unchanged  Associated Symptoms: none  Aggravating Factors: movement  Alleviating Factors: rest  Previous Episodes: none  Treatment before arrival: Pt was seen at  earlier today with reported negative XRs and discharged home.    PAST MEDICAL HISTORY  Active Ambulatory Problems     Diagnosis Date Noted   • Depression 01/17/2016   • Allergic rhinitis 01/17/2016   • Hypertension 01/17/2016   • Dementia without behavioral disturbance 01/17/2016   • Paroxysmal atrial fibrillation (CMS/HCC) 01/17/2016   • Cobalamin deficiency 01/17/2016   • Type 2 diabetes mellitus (CMS/HCC) 01/17/2016   • Hyperlipidemia 01/17/2016   • Hammer toes of both feet 10/17/2017   • Cervical radiculopathy 10/08/2018     Resolved Ambulatory Problems     Diagnosis Date Noted   • Fatigue 01/17/2016   • Edema 01/17/2016   • Anemia 01/17/2016   • Abdominal pain 01/17/2016   • Hidradenitis suppurativa 01/17/2016   • Diarrhea 01/17/2016   • Imbalance 07/20/2016   • Headache 10/07/2018   • Acute neck pain 10/07/2018     Past Medical History:   Diagnosis Date   • Allergic rhinitis    • Anemia    • Chronic venous insufficiency    • Dementia    • Depression    • Diabetes mellitus (CMS/HCC)    • Hidradenitis suppurativa    • Hyperlipidemia    • Hypertension    • Osteoarthritis    • Paroxysmal atrial fibrillation  (CMS/[facility])    • Peripheral neuropathy    • Stroke (CMS/Former[facility])    • Vitamin B 12 deficiency        PAST SURGICAL HISTORY  Past Surgical History:   Procedure Laterality Date   • BACK SURGERY     • HYSTERECTOMY      PARTIAL   • KNEE SURGERY Left        FAMILY HISTORY  Family History   Problem Relation Age of Onset   • Breast cancer Mother    • Stroke Father    • Stroke Maternal Grandmother    • Heart attack Maternal Grandmother    • Heart attack Maternal Grandfather        SOCIAL HISTORY  Social History     Socioeconomic History   • Marital status:      Spouse name: Not on file   • Number of children: Not on file   • Years of education: Not on file   • Highest education level: Not on file   Social Needs   • Financial resource strain: Not on file   • Food insecurity - worry: Not on file   • Food insecurity - inability: Not on file   • Transportation needs - medical: Not on file   • Transportation needs - non-medical: Not on file   Occupational History   • Not on file   Tobacco Use   • Smoking status: Former Smoker   • Smokeless tobacco: Never Used   • Tobacco comment: CAFFEINE USE/ SOFT DRINKS.    Substance and Sexual Activity   • Alcohol use: No   • Drug use: No   • Sexual activity: Defer   Other Topics Concern   • Not on file   Social History Narrative   • Not on file       ALLERGIES  Enalapril and Penicillins    REVIEW OF SYSTEMS  Review of Systems   Constitutional: Negative for fever.   HENT: Negative for sore throat.    Eyes: Negative.    Respiratory: Negative for cough and shortness of breath.    Cardiovascular: Negative for chest pain.   Gastrointestinal: Negative for abdominal pain, diarrhea and vomiting.   Genitourinary: Negative for dysuria.   Musculoskeletal: Positive for arthralgias (R knee). Negative for neck pain.   Skin: Negative for rash.   Allergic/Immunologic: Negative.    Neurological: Negative for weakness, numbness and headaches.   Hematological: Negative.    Psychiatric/Behavioral: Negative.     All other systems reviewed and are negative.      PHYSICAL EXAM  ED Triage Vitals   Temp Heart Rate Resp BP SpO2   12/10/18 1459 12/10/18 1459 12/10/18 1459 12/10/18 1557 12/10/18 1459   99 °F (37.2 °C) 97 18 162/79 99 %      Temp src Heart Rate Source Patient Position BP Location FiO2 (%)   12/10/18 1459 12/10/18 1459 -- -- --   Tympanic Monitor          Physical Exam   Constitutional: She is oriented to person, place, and time. No distress.   Eyes: EOM are normal.   Neck: Normal range of motion.   Cardiovascular: Normal rate and regular rhythm.   Pulmonary/Chest: Effort normal and breath sounds normal. No respiratory distress.   Musculoskeletal:   R knee there is infrapatellar swelling. Tenderness with varus stress and tenderness along lateral joint line.   Neurological: She is alert and oriented to person, place, and time. She has normal sensation and normal strength.   Skin: Skin is warm and dry.   Psychiatric: Affect normal.   Nursing note and vitals reviewed.      RADIOLOGY  XR Knee 1 or 2 View Right    (Results Pending)      XR R knee results that shows tibial plateau fracture    I ordered the above noted radiological studies. Interpreted by radiologist. Reviewed by me in PACS.       PROCEDURES  Procedures    Splint Application:  Splint Type: Knee Immobilizer  Indication: Pain  Splint placed by ERT  Post splint application:   1) neurovascularly intact   2) good position  Discussed splint care with patient  Discussed PMD/orthopedic follow up      PROGRESS AND CONSULTS       1607 I am unable to obtain XRs at  earlier today so I will perform a repeat XR R knee.     1723 walker  Rechecked pt. Pt is resting comfortably. Notified pt of XR R knee results that shows tibial plateau fracture. Discussed the plan to discharge the pt home in a R knee immobilizer with crutches. I instructed the pt to take extra strength Tylenol around the clock for pain and f/u with Ortho. Pt states she has seen Dr. Dobbins in past and  prefers to f/u with him. Pt understands and agrees with the plan, all questions answered.      MEDICAL DECISION MAKING  Results were reviewed/discussed with the patient and they were also made aware of online access. Pt also made aware that some labs, such as cultures, will not be resulted during ER visit and follow up with PMD is necessary.     MDM  Number of Diagnoses or Management Options  Closed fracture of medial portion of right tibial plateau, initial encounter:      Amount and/or Complexity of Data Reviewed  Tests in the radiology section of CPT®: ordered and reviewed (XR R knee results that shows tibial plateau fracture)  Decide to obtain previous medical records or to obtain history from someone other than the patient: yes  Review and summarize past medical records: yes (Pt was evaluated at [facility] for a fall. XRs were reported as normal.)           DIAGNOSIS  Final diagnoses:   Closed fracture of medial portion of right tibial plateau, initial encounter       DISPOSITION  DISCHARGE    Patient discharged in stable condition.    Reviewed implications of results, diagnosis, meds, responsibility to follow up, warning signs and symptoms of possible worsening, potential complications and reasons to return to ER.    Patient/Family voiced understanding of above instructions.    Discussed plan for discharge, as there is no emergent indication for admission. Patient referred to primary care provider for BP management due to today's BP. Pt/family is agreeable and understands need for follow up and repeat testing.  Pt is aware that discharge does not mean that nothing is wrong but it indicates no emergency is present that requires admission and they must continue care with follow-up as given below or physician of their choice.     FOLLOW-UP  Derek Dobbins MD  [address] [address]  [phone]    Schedule an appointment as soon as possible for a visit            Medication List      Changed     metoprolol succinate XL 50 MG 24 hr tablet  Commonly known as:  TOPROL-XL  TAKE TWO TABLETS BY MOUTH EVERY MORNING  What changed:    how much to take  when to take this  additional instructions              Latest Documented Vital Signs:  As of 5:34 PM  BP- 162/79 HR- 81 Temp- 99 °F (37.2 °C) (Tympanic) O2 sat- 99%    --  Documentation assistance provided by gibran Franco for Dr. Lux.  Information recorded by the scribbradford was done at my direction and has been verified and validated by me.        Maycol Franco  12/10/18 [id number]       Juan Lux MD  12/10/18 [id number]

## 2018-12-21 DIAGNOSIS — I10 ESSENTIAL HYPERTENSION: ICD-10-CM

## 2018-12-21 RX ORDER — AMLODIPINE BESYLATE 5 MG/1
TABLET ORAL
Qty: 90 TABLET | Refills: 0 | Status: SHIPPED | OUTPATIENT
Start: 2018-12-21 | End: 2019-02-19 | Stop reason: SDUPTHER

## 2019-01-03 ENCOUNTER — OFFICE VISIT (OUTPATIENT)
Dept: INTERNAL MEDICINE | Facility: CLINIC | Age: 74
End: 2019-01-03

## 2019-01-03 VITALS
HEART RATE: 56 BPM | SYSTOLIC BLOOD PRESSURE: 140 MMHG | WEIGHT: 187 LBS | DIASTOLIC BLOOD PRESSURE: 60 MMHG | TEMPERATURE: 97.7 F | OXYGEN SATURATION: 99 % | BODY MASS INDEX: 34.2 KG/M2

## 2019-01-03 DIAGNOSIS — J20.8 ACUTE BRONCHITIS DUE TO OTHER SPECIFIED ORGANISMS: Primary | ICD-10-CM

## 2019-01-03 DIAGNOSIS — F02.80 ALZHEIMER'S DEMENTIA WITHOUT BEHAVIORAL DISTURBANCE, UNSPECIFIED TIMING OF DEMENTIA ONSET: ICD-10-CM

## 2019-01-03 DIAGNOSIS — E11.8 TYPE 2 DIABETES MELLITUS WITH COMPLICATION, WITHOUT LONG-TERM CURRENT USE OF INSULIN (HCC): ICD-10-CM

## 2019-01-03 DIAGNOSIS — G30.9 ALZHEIMER'S DEMENTIA WITHOUT BEHAVIORAL DISTURBANCE, UNSPECIFIED TIMING OF DEMENTIA ONSET: ICD-10-CM

## 2019-01-03 PROCEDURE — 99214 OFFICE O/P EST MOD 30 MIN: CPT | Performed by: INTERNAL MEDICINE

## 2019-01-03 RX ORDER — AZITHROMYCIN 250 MG/1
TABLET, FILM COATED ORAL
Qty: 6 TABLET | Refills: 0 | Status: SHIPPED | OUTPATIENT
Start: 2019-01-03 | End: 2019-03-06

## 2019-01-03 NOTE — PROGRESS NOTES
Subjective     Kassi Mathis is a 73 y.o. female who presents with   Chief Complaint   Patient presents with   • URI   • Cough       History of Present Illness     C/o cough for the past three weeks.  Cough with production.  No SOA.  No wheezing.  Ear are popping.  No sore throat.  No fever is associated.  She is achy.  Robitussin little help.  Poor historian with her dementia.  Of note she is a diabetic.     Review of Systems   Constitutional: Positive for fatigue. Negative for fever.   HENT: Positive for congestion. Negative for sore throat.    Respiratory: Positive for cough.    Cardiovascular: Negative for chest pain.   Musculoskeletal: Positive for back pain.       The following portions of the patient's history were reviewed and updated as appropriate: allergies, current medications and problem list.    Patient Active Problem List    Diagnosis Date Noted   • Type 2 diabetes mellitus (CMS/formerly [facility]) 01/17/2016     Priority: High   • Cervical radiculopathy 10/08/2018   • Hammer toes of both feet 10/17/2017   • Depression 01/17/2016   • Allergic rhinitis 01/17/2016   • Hypertension 01/17/2016   • Dementia without behavioral disturbance 01/17/2016     Note Last Updated: 7/20/2016     By neuropysch evaluation 7/20/2016     • Paroxysmal atrial fibrillation (CMS/formerly [facility]) 01/17/2016   • Cobalamin deficiency 01/17/2016     Note Last Updated: 6/8/2016     Boarderline only with normal MMA/Homocysteine/IF antibody.  Encouraged MVI daily.       • Hyperlipidemia 01/17/2016       Current Outpatient Medications on File Prior to Visit   Medication Sig Dispense Refill   • acetaminophen (TYLENOL) 325 MG tablet Take 650 mg by mouth Every 6 (Six) Hours.     • ALPRAZolam (XANAX) 0.25 MG tablet Take 1 tablet by mouth 2 (Two) Times a Day As Needed for Anxiety. 20 tablet 0   • amLODIPine (NORVASC) 5 MG tablet TAKE ONE TABLET BY MOUTH DAILY 90 tablet 0   • baclofen (LIORESAL) 10 MG tablet Take 1 tablet by mouth 2 (Two) Times a Day Before Meals.  30 tablet 0   • buPROPion XL (WELLBUTRIN XL) 300 MG 24 hr tablet daily.       • cetirizine (zyrTEC) 10 MG tablet Take 1 tablet by mouth Daily. 90 tablet 3   • dabigatran etexilate (PRADAXA) 150 MG capsu Take 1 capsule by mouth Every 12 (Twelve) Hours. 180 capsule 3   • donepezil (ARICEPT) 10 MG tablet 20 mg Daily.     • ferrous sulfate 325 (65 FE) MG tablet Take 1 tablet by mouth 2 (two) times a day. EVERY OTHER DAY     • glucose blood test strip Use daily for type 2 DM. 100 each 3   • hydrALAZINE (APRESOLINE) 100 MG tablet TAKE ONE TABLET BY MOUTH TWICE A  tablet 1   • JANUVIA 100 MG tablet TAKE ONE TABLET BY MOUTH DAILY 90 tablet 2   • JARDIANCE 10 MG tablet TAKE ONE TABLET BY MOUTH DAILY 90 tablet 10   • Melatonin 10 MG capsule Take  by mouth.     • memantine (NAMENDA) 10 MG tablet Take 10 mg by mouth 2 (Two) Times a Day.     • metFORMIN ER (GLUCOPHAGE-XR) 500 MG 24 hr tablet Take 2 tablets by mouth 2 (Two) Times a Day. 360 tablet 2   • MethylPREDNISolone (MEDROL, MARU,) 4 MG tablet Take as directed on package instructions. 1 each 0   • metoprolol succinate XL (TOPROL-XL) 50 MG 24 hr tablet TAKE TWO TABLETS BY MOUTH EVERY MORNING (Patient taking differently: 100 mg Every Night. TAKE TWO TABLETS BY MOUTH EVERY MORNING) 180 tablet 3   • mirtazapine (REMERON SOL-TAB) 15 MG disintegrating tablet      • montelukast (SINGULAIR) 10 MG tablet Take 1 tablet by mouth Every Night. 30 tablet 11   • Multiple Vitamin (MULTIVITAMINS PO) Take 1 tablet by mouth daily.     • MYRBETRIQ 50 MG tablet sustained-release 24 hour 24 hr tablet TAKE ONE TABLET BY MOUTH DAILY 30 tablet 4   • Omega-3 Fatty Acids (OMEGA 3 PO) Take  by mouth Daily.     • QUEtiapine (SEROquel) 25 MG tablet Take 25 mg by mouth Every Night.     • rosuvastatin (CRESTOR) 20 MG tablet TAKE ONE TABLET BY MOUTH EVERY NIGHT AT BEDTIME 90 tablet 2   • sertraline (ZOLOFT) 100 MG tablet Take 200 mg by mouth Daily.     • vitamin B-12 (CYANOCOBALAMIN) 1000 MCG tablet  Take 1,000 mcg by mouth Daily.       No current facility-administered medications on file prior to visit.        Objective     /60   Pulse 56   Temp 97.7 °F (36.5 °C)   Wt 84.8 kg (187 lb)   LMP  (LMP Unknown)   SpO2 99%   BMI 34.20 kg/m²     Physical Exam   Constitutional: She is oriented to person, place, and time. She appears well-developed and well-nourished.   HENT:   Head: Normocephalic and atraumatic.   Right Ear: Hearing and tympanic membrane normal.   Left Ear: Hearing and tympanic membrane normal.   Mouth/Throat: No oropharyngeal exudate or posterior oropharyngeal erythema.   Cardiovascular: Normal rate, regular rhythm and normal heart sounds.   Pulmonary/Chest: Effort normal and breath sounds normal.   Neurological: She is alert and oriented to person, place, and time.   Skin: Skin is warm and dry.   Psychiatric: She has a normal mood and affect. Her behavior is normal.       Assessment/Plan   Kassi was seen today for uri and cough.    Diagnoses and all orders for this visit:    Acute bronchitis due to other specified organisms    Type 2 diabetes mellitus with complication, without long-term current use of insulin (CMS/[facility])    Alzheimer's dementia without behavioral disturbance, unspecified timing of dementia onset    Other orders  -     azithromycin (ZITHROMAX Z-MARU) 250 MG tablet; Take 2 tablets the first day, then 1 tablet daily for 4 days.        Discussion  Patient with underlying dementia and DM-2 presents with episodes of acute bronchitis.  A prescription for antibiotics is provided today.  The patient is instructed to take along with Mucinex DM.  Let me know they are not feeling better over the next 3 days or if there is any change in symptoms.             Future Appointments   Date Time Provider Department Center   3/6/2019  1:45 PM Mode Gongora MD [facility] None   3/15/2019  1:00 PM [facility] None   3/20/2019  1:30 PM Parul Jaime MD [facility]  None

## 2019-01-21 RX ORDER — ROSUVASTATIN CALCIUM 20 MG/1
TABLET, COATED ORAL
Qty: 90 TABLET | Refills: 1 | Status: SHIPPED | OUTPATIENT
Start: 2019-01-21 | End: 2019-06-04 | Stop reason: SDUPTHER

## 2019-01-21 RX ORDER — METFORMIN HYDROCHLORIDE 500 MG/1
TABLET, EXTENDED RELEASE ORAL
Qty: 360 TABLET | Refills: 1 | Status: SHIPPED | OUTPATIENT
Start: 2019-01-21 | End: 2019-03-20 | Stop reason: SDUPTHER

## 2019-01-21 RX ORDER — MIRABEGRON 50 MG/1
TABLET, FILM COATED, EXTENDED RELEASE ORAL
Qty: 30 TABLET | Refills: 3 | Status: SHIPPED | OUTPATIENT
Start: 2019-01-21 | End: 2019-04-20 | Stop reason: SDUPTHER

## 2019-02-01 ENCOUNTER — TELEPHONE (OUTPATIENT)
Dept: INTERNAL MEDICINE | Facility: CLINIC | Age: 74
End: 2019-02-01

## 2019-02-01 NOTE — TELEPHONE ENCOUNTER
Pt daughter called and wanted you to know she stopped Kassi's myrbetric. She states she was having decreased urine output and having back pain that was radiating to the back. LG    OK noted.  Offer OV.  RISHI

## 2019-02-19 DIAGNOSIS — I10 ESSENTIAL HYPERTENSION: ICD-10-CM

## 2019-02-19 RX ORDER — AMLODIPINE BESYLATE 5 MG/1
TABLET ORAL
Qty: 90 TABLET | Refills: 0 | Status: SHIPPED | OUTPATIENT
Start: 2019-02-19 | End: 2019-06-04 | Stop reason: SDUPTHER

## 2019-02-19 RX ORDER — ATENOLOL 100 MG/1
TABLET ORAL
Qty: 60 CAPSULE | Refills: 2 | Status: SHIPPED | OUTPATIENT
Start: 2019-02-19 | End: 2019-03-05 | Stop reason: SDUPTHER

## 2019-03-06 ENCOUNTER — OFFICE VISIT (OUTPATIENT)
Dept: CARDIOLOGY | Facility: CLINIC | Age: 74
End: 2019-03-06

## 2019-03-06 VITALS
SYSTOLIC BLOOD PRESSURE: 124 MMHG | HEIGHT: 62 IN | DIASTOLIC BLOOD PRESSURE: 64 MMHG | HEART RATE: 60 BPM | WEIGHT: 178 LBS | BODY MASS INDEX: 32.76 KG/M2

## 2019-03-06 DIAGNOSIS — IMO0001 IDDM (INSULIN DEPENDENT DIABETES MELLITUS): ICD-10-CM

## 2019-03-06 DIAGNOSIS — Z00.00 HEALTHCARE MAINTENANCE: Primary | ICD-10-CM

## 2019-03-06 DIAGNOSIS — I10 ESSENTIAL HYPERTENSION: ICD-10-CM

## 2019-03-06 DIAGNOSIS — I48.0 PAROXYSMAL ATRIAL FIBRILLATION (HCC): Primary | ICD-10-CM

## 2019-03-06 PROCEDURE — 93000 ELECTROCARDIOGRAM COMPLETE: CPT | Performed by: INTERNAL MEDICINE

## 2019-03-06 PROCEDURE — 99213 OFFICE O/P EST LOW 20 MIN: CPT | Performed by: INTERNAL MEDICINE

## 2019-03-06 RX ORDER — METOPROLOL SUCCINATE 50 MG/1
TABLET, EXTENDED RELEASE ORAL
Qty: 180 TABLET | Refills: 3 | Status: SHIPPED | OUTPATIENT
Start: 2019-03-06 | End: 2019-09-16 | Stop reason: SDUPTHER

## 2019-03-06 RX ORDER — DABIGATRAN ETEXILATE 150 MG/1
150 CAPSULE ORAL EVERY 12 HOURS
Qty: 180 CAPSULE | Refills: 3 | Status: SHIPPED | OUTPATIENT
Start: 2019-03-06 | End: 2020-03-23

## 2019-03-06 NOTE — PROGRESS NOTES
Date of Office Visit: 2019  Encounter Provider: Mode Gongora MD  Place of Service: [facility] CARDIOLOGY  Patient Name: Kassi Mathis  :1945    Chief Complaint   Patient presents with   • Atrial Fibrillation   :     HPI: Kassi Mathis is a 73 y.o. female who presents today to followup. She has a long-standing history of high blood pressure. She has paroxysmal atrial fibrillation. She had an echocardiogram in 2015 which revealed normal left ventricular systolic function without pulmonary hypertension or valvular heart disease. She only had grade I diastolic dysfunction. She had a Cardiolite stress test which was normal. She was markedly hypertensive at that time and she was placed on an increased dose of hydralazine/isosorbide as well as furosemide.  The diuretic then had to be stopped because she was getting orthostatic and dehydrated. She was unable to get the combination medication so she was changed to hydralazine alone.     She had occasional palpitations, which are isolated. She has not had any sustained atrial fibrillation. She denies edema, dyspnea, lightheadedness, syncope, or chest pain. She has not had bleeding on dabigatran.  Her memory continues to decline.     Past Medical History:   Diagnosis Date   • Allergic rhinitis    • Anemia    • Chronic venous insufficiency    • Dementia    • Depression    • Diabetes mellitus (CMS/HCC)    • Hidradenitis suppurativa    • Hyperlipidemia    • Hypertension    • Osteoarthritis    • Paroxysmal atrial fibrillation (CMS/HCC)    • Peripheral neuropathy    • Stroke (CMS/HCC)    • Vitamin B 12 deficiency     Boarderline only with normal MMA/Homocysteine/IF antibody       Past Surgical History:   Procedure Laterality Date   • BACK SURGERY     • HYSTERECTOMY      PARTIAL   • KNEE SURGERY Left        Social History     Socioeconomic History   • Marital status:      Spouse name: Not on file   • Number of children:  Not on file   • Years of education: Not on file   • Highest education level: Not on file   Social Needs   • Financial resource strain: Not on file   • Food insecurity - worry: Not on file   • Food insecurity - inability: Not on file   • Transportation needs - medical: Not on file   • Transportation needs - non-medical: Not on file   Occupational History   • Not on file   Tobacco Use   • Smoking status: Former Smoker   • Smokeless tobacco: Never Used   • Tobacco comment: CAFFEINE USE/ SOFT DRINKS.    Substance and Sexual Activity   • Alcohol use: No   • Drug use: No   • Sexual activity: Defer   Other Topics Concern   • Not on file   Social History Narrative   • Not on file       Family History   Problem Relation Age of Onset   • Breast cancer Mother    • Stroke Father    • Stroke Maternal Grandmother    • Heart attack Maternal Grandmother    • Heart attack Maternal Grandfather        Review of Systems   Constitution: Positive for malaise/fatigue.   Cardiovascular: Negative for chest pain, leg swelling and palpitations.   Musculoskeletal: Positive for falls.   Psychiatric/Behavioral: Positive for memory loss.   All other systems reviewed and are negative.      Allergies   Allergen Reactions   • Enalapril Swelling and Other (See Comments)     Other reaction(s): Other: See Comments  Aka vasotec tabs  Aka vasotec tabs  Aka vasotec tabs  Aka vasotec tabs   • Enalapril Maleate Swelling   • Memantine Hcl Other (See Comments)     imbalance  imbalance   • Penicillins Swelling         Current Outpatient Medications:   •  acetaminophen (TYLENOL) 325 MG tablet, Take 650 mg by mouth Every 6 (Six) Hours., Disp: , Rfl:   •  amLODIPine (NORVASC) 5 MG tablet, TAKE ONE TABLET BY MOUTH DAILY, Disp: 90 tablet, Rfl: 0  •  ASPIRIN 81 PO, Take 81 mg by mouth Daily., Disp: , Rfl:   •  buPROPion XL (WELLBUTRIN XL) 300 MG 24 hr tablet, daily.  , Disp: , Rfl:   •  cetirizine (zyrTEC) 10 MG tablet, Take 1 tablet by mouth Daily., Disp: 90  "tablet, Rfl: 3  •  dabigatran etexilate (PRADAXA) 150 MG capsu, Take 1 capsule by mouth Every 12 (Twelve) Hours., Disp: 180 capsule, Rfl: 3  •  donepezil (ARICEPT) 10 MG tablet, 20 mg Daily., Disp: , Rfl:   •  glucose blood test strip, Use daily for type 2 DM., Disp: 100 each, Rfl: 3  •  hydrALAZINE (APRESOLINE) 100 MG tablet, TAKE ONE TABLET BY MOUTH TWICE A DAY, Disp: 180 tablet, Rfl: 1  •  JANUVIA 100 MG tablet, TAKE ONE TABLET BY MOUTH DAILY, Disp: 90 tablet, Rfl: 2  •  JARDIANCE 10 MG tablet, TAKE ONE TABLET BY MOUTH DAILY, Disp: 90 tablet, Rfl: 10  •  Melatonin 10 MG capsule, Take  by mouth., Disp: , Rfl:   •  metFORMIN ER (GLUCOPHAGE-XR) 500 MG 24 hr tablet, TAKE TWO TABLETS BY MOUTH TWICE A DAY, Disp: 360 tablet, Rfl: 1  •  metoprolol succinate XL (TOPROL-XL) 50 MG 24 hr tablet, TAKE TWO TABLETS BY MOUTH EVERY MORNING, Disp: 180 tablet, Rfl: 3  •  Multiple Vitamin (MULTIVITAMINS PO), Take 1 tablet by mouth daily., Disp: , Rfl:   •  MYRBETRIQ 50 MG tablet sustained-release 24 hour 24 hr tablet, TAKE ONE TABLET BY MOUTH DAILY, Disp: 30 tablet, Rfl: 3  •  QUEtiapine (SEROquel) 25 MG tablet, Take 25 mg by mouth Every Night., Disp: , Rfl:   •  rosuvastatin (CRESTOR) 20 MG tablet, TAKE ONE TABLET BY MOUTH EVERY NIGHT AT BEDTIME, Disp: 90 tablet, Rfl: 1  •  sertraline (ZOLOFT) 100 MG tablet, Take 200 mg by mouth Daily., Disp: , Rfl:   •  vitamin B-12 (CYANOCOBALAMIN) 1000 MCG tablet, Take 1,000 mcg by mouth Daily., Disp: , Rfl:      Objective:     Vitals:    03/06/19 1228   BP: 124/64   BP Location: Right arm   Patient Position: Sitting   Pulse: 60   Weight: 80.7 kg (178 lb)   Height: 157.5 cm (62\")     Body mass index is 32.56 kg/m².    Physical Exam   Constitutional: She appears well-developed and well-nourished.   HENT:   Head: Normocephalic.   Nose: Nose normal.   Mouth/Throat: Oropharynx is clear and moist.   Eyes: Conjunctivae and EOM are normal. Pupils are equal, round, and reactive to light.   Neck: Normal " range of motion. No JVD present.   Cardiovascular: Normal rate, regular rhythm, normal heart sounds and intact distal pulses.   No murmur heard.  Pulmonary/Chest: Effort normal and breath sounds normal.   Abdominal: Soft. There is no tenderness.   Musculoskeletal: Normal range of motion. She exhibits no edema.   Lymphadenopathy:     She has no cervical adenopathy.   Neurological: She is alert. No cranial nerve deficit.   Skin: Skin is warm and dry. No rash noted.   Psychiatric: She has a normal mood and affect. Her behavior is normal. Cognition and memory are impaired.   Vitals reviewed.        ECG 12 Lead  Date/Time: 3/6/2019 1:00 PM  Performed by: Mode Gongora MD  Authorized by: Mode Gongora MD   Comparison: compared with previous ECG   Similar to previous ECG  Rhythm: sinus rhythm  Conduction: conduction normal  ST Segments: ST segments normal  T elevation: V5, V4, V3, III and aVF  QRS axis: normal  Other findings: poor R wave progression              Assessment:       Diagnosis Plan   1. Paroxysmal atrial fibrillation (CMS/HCC)     2. Essential hypertension            Plan:       1.  Atrial Fibrillation and Atrial Flutter  Assessment  • The patient has paroxysmal atrial fibrillation  • This is non-valvular in etiology  • The patient's CHADS2-VASc score is 6  • A [id number] score of 2 or more is considered a high risk for a thromboembolic event  • Dabigatran prescribed    Plan  • Attempt to maintain sinus rhythm  • Continue dabigatran for antithrombotic therapy, bleeding issues discussed  • Continue beta blocker for rhythm control  • If her dementia progresses and she starts falling, we will have to reconsider the NOAC.    2.  Her SBP is within goal for her age and memory impairment.    Sincerely,       Mode Gongora MD

## 2019-03-14 DIAGNOSIS — I10 HYPERTENSION, UNSPECIFIED TYPE: ICD-10-CM

## 2019-03-14 DIAGNOSIS — E78.5 HYPERLIPIDEMIA, UNSPECIFIED HYPERLIPIDEMIA TYPE: Primary | ICD-10-CM

## 2019-03-14 DIAGNOSIS — E11.8 CONTROLLED DIABETES MELLITUS TYPE 2 WITH COMPLICATIONS, UNSPECIFIED WHETHER LONG TERM INSULIN USE (HCC): ICD-10-CM

## 2019-03-18 LAB
ALBUMIN SERPL-MCNC: 4 G/DL (ref 3.5–5.2)
ALBUMIN/GLOB SERPL: 1.7 G/DL
ALP SERPL-CCNC: 59 U/L (ref 39–117)
ALT SERPL-CCNC: 20 U/L (ref 1–33)
AST SERPL-CCNC: 21 U/L (ref 1–32)
BASOPHILS # BLD AUTO: 0.04 10*3/MM3 (ref 0–0.2)
BASOPHILS NFR BLD AUTO: 0.4 % (ref 0–1.5)
BILIRUB SERPL-MCNC: 0.5 MG/DL (ref 0.1–1.2)
BUN SERPL-MCNC: 17 MG/DL (ref 8–23)
BUN/CREAT SERPL: 17.7 (ref 7–25)
CALCIUM SERPL-MCNC: 9.6 MG/DL (ref 8.6–10.5)
CHLORIDE SERPL-SCNC: 104 MMOL/L (ref 98–107)
CHOLEST SERPL-MCNC: 140 MG/DL (ref 0–200)
CO2 SERPL-SCNC: 28.1 MMOL/L (ref 22–29)
CREAT SERPL-MCNC: 0.96 MG/DL (ref 0.57–1)
EOSINOPHIL # BLD AUTO: 0.18 10*3/MM3 (ref 0–0.4)
EOSINOPHIL NFR BLD AUTO: 2 % (ref 0.3–6.2)
ERYTHROCYTE [DISTWIDTH] IN BLOOD BY AUTOMATED COUNT: 16.1 % (ref 12.3–15.4)
GLOBULIN SER CALC-MCNC: 2.4 GM/DL
GLUCOSE SERPL-MCNC: 157 MG/DL (ref 65–99)
HBA1C MFR BLD: 7.2 % (ref 4.8–5.6)
HCT VFR BLD AUTO: 39.1 % (ref 34–46.6)
HDLC SERPL-MCNC: 68 MG/DL (ref 40–60)
HGB BLD-MCNC: 11.7 G/DL (ref 12–15.9)
IMM GRANULOCYTES # BLD AUTO: 0.03 10*3/MM3 (ref 0–0.05)
IMM GRANULOCYTES NFR BLD AUTO: 0.3 % (ref 0–0.5)
LDLC SERPL CALC-MCNC: 54 MG/DL (ref 0–100)
LYMPHOCYTES # BLD AUTO: 3.31 10*3/MM3 (ref 0.7–3.1)
LYMPHOCYTES NFR BLD AUTO: 36.5 % (ref 19.6–45.3)
MCH RBC QN AUTO: 27.5 PG (ref 26.6–33)
MCHC RBC AUTO-ENTMCNC: 29.9 G/DL (ref 31.5–35.7)
MCV RBC AUTO: 92 FL (ref 79–97)
MONOCYTES # BLD AUTO: 0.88 10*3/MM3 (ref 0.1–0.9)
MONOCYTES NFR BLD AUTO: 9.7 % (ref 5–12)
NEUTROPHILS # BLD AUTO: 4.62 10*3/MM3 (ref 1.4–7)
NEUTROPHILS NFR BLD AUTO: 51.1 % (ref 42.7–76)
NRBC BLD AUTO-RTO: 0 /100 WBC (ref 0–0)
PLATELET # BLD AUTO: 309 10*3/MM3 (ref 140–450)
POTASSIUM SERPL-SCNC: 4.5 MMOL/L (ref 3.5–5.2)
PROT SERPL-MCNC: 6.4 G/DL (ref 6–8.5)
RBC # BLD AUTO: 4.25 10*6/MM3 (ref 3.77–5.28)
SODIUM SERPL-SCNC: 144 MMOL/L (ref 136–145)
T4 FREE SERPL-MCNC: NORMAL NG/DL
TRIGL SERPL-MCNC: 89 MG/DL (ref 0–150)
TSH SERPL DL<=0.005 MIU/L-ACNC: 1.97 MIU/ML (ref 0.27–4.2)
UNABLE TO VOID: NORMAL
VLDLC SERPL CALC-MCNC: 17.8 MG/DL (ref 5–40)
WBC # BLD AUTO: 9.06 10*3/MM3 (ref 3.4–10.8)

## 2019-03-18 RX ORDER — MONTELUKAST SODIUM 10 MG/1
TABLET ORAL
Qty: 30 TABLET | Refills: 10 | Status: SHIPPED | OUTPATIENT
Start: 2019-03-18 | End: 2020-01-31

## 2019-03-20 ENCOUNTER — OFFICE VISIT (OUTPATIENT)
Dept: INTERNAL MEDICINE | Facility: CLINIC | Age: 74
End: 2019-03-20

## 2019-03-20 VITALS
OXYGEN SATURATION: 97 % | SYSTOLIC BLOOD PRESSURE: 106 MMHG | WEIGHT: 185 LBS | DIASTOLIC BLOOD PRESSURE: 58 MMHG | HEART RATE: 64 BPM | HEIGHT: 62 IN | BODY MASS INDEX: 34.04 KG/M2

## 2019-03-20 DIAGNOSIS — Z12.31 ENCOUNTER FOR SCREENING MAMMOGRAM FOR BREAST CANCER: ICD-10-CM

## 2019-03-20 DIAGNOSIS — E78.5 HYPERLIPIDEMIA, UNSPECIFIED HYPERLIPIDEMIA TYPE: ICD-10-CM

## 2019-03-20 DIAGNOSIS — I10 ESSENTIAL HYPERTENSION: ICD-10-CM

## 2019-03-20 DIAGNOSIS — G30.9 ALZHEIMER'S DEMENTIA WITHOUT BEHAVIORAL DISTURBANCE, UNSPECIFIED TIMING OF DEMENTIA ONSET: ICD-10-CM

## 2019-03-20 DIAGNOSIS — E11.8 TYPE 2 DIABETES MELLITUS WITH COMPLICATION, WITHOUT LONG-TERM CURRENT USE OF INSULIN (HCC): ICD-10-CM

## 2019-03-20 DIAGNOSIS — Z00.00 MEDICARE ANNUAL WELLNESS VISIT, SUBSEQUENT: Primary | ICD-10-CM

## 2019-03-20 DIAGNOSIS — F02.80 ALZHEIMER'S DEMENTIA WITHOUT BEHAVIORAL DISTURBANCE, UNSPECIFIED TIMING OF DEMENTIA ONSET: ICD-10-CM

## 2019-03-20 PROCEDURE — 99214 OFFICE O/P EST MOD 30 MIN: CPT | Performed by: INTERNAL MEDICINE

## 2019-03-20 PROCEDURE — G0439 PPPS, SUBSEQ VISIT: HCPCS | Performed by: INTERNAL MEDICINE

## 2019-03-20 RX ORDER — METFORMIN HYDROCHLORIDE 500 MG/1
500 TABLET, EXTENDED RELEASE ORAL 2 TIMES DAILY
Qty: 180 TABLET | Refills: 3 | Status: SHIPPED | OUTPATIENT
Start: 2019-03-20 | End: 2019-06-27 | Stop reason: SDUPTHER

## 2019-03-20 RX ORDER — FEXOFENADINE HCL 180 MG/1
180 TABLET ORAL DAILY
Start: 2019-03-20 | End: 2021-03-25

## 2019-03-20 NOTE — PATIENT INSTRUCTIONS
Medicare Wellness  Personal Prevention Plan of Service     Date of Office Visit:  2019  Encounter Provider:  Parul Jaime MD  Place of Service:  [facility] INTERNAL MEDICINE  Patient Name: Kassi Mathis  :  1945    As part of the Medicare Wellness portion of your visit today, we are providing you with this personalized preventive plan of services (PPPS). This plan is based upon recommendations of the [place] Preventive Services Task Force (USPSTF) and the Advisory Committee on Immunization Practices (ACIP).    This lists the preventive care services that should be considered, and provides dates of when you are due. Items listed as completed are up-to-date and do not require any further intervention.    Health Maintenance   Topic Date Due   • TDAP/TD VACCINES (1 - Tdap) 1963   • ZOSTER VACCINE (2 of 3) 2015   • URINE MICROALBUMIN  10/11/2018   • MEDICARE ANNUAL WELLNESS  10/17/2018   • DIABETIC FOOT EXAM  10/19/2018   • DIABETIC EYE EXAM  2019   • HEMOGLOBIN A1C  09/15/2019   • MAMMOGRAM  2019   • LIPID PANEL  03/15/2020   • COLONOSCOPY  2025   • INFLUENZA VACCINE  Completed   • PNEUMOCOCCAL VACCINES (65+ LOW/MEDIUM RISK)  Completed       Orders Placed This Encounter   Procedures   • Mammo Screening Digital Tomosynthesis Bilateral With CAD     Standing Status:   Future     Standing Expiration Date:   3/20/2020     Order Specific Question:   Reason for Exam:     Answer:   screen       Return in about 6 months (around 2019).

## 2019-03-20 NOTE — PROGRESS NOTES
Subjective     Kassi Mathis is a 73 y.o. female who presents for an annual wellness visit as well as check up of dm, htn, hld.      History of Present Illness     DM-2. She is maintained on metformin, Januvia and Jardiance.  Control is excellent.  HTN. She is under good control at home.  HLD. Control is excellent.    Review of Systems   Respiratory: Negative.    Cardiovascular: Negative.        The following portions of the patient's history were reviewed and updated as appropriate: allergies, current medications, past family history, past medical history, past social history, past surgical history and problem list.  Health maintenance tab was reviewed and updated with the patient.       Patient Active Problem List    Diagnosis Date Noted   • Type 2 diabetes mellitus (CMS/HCC) 01/17/2016     Priority: High   • Cervical radiculopathy 10/08/2018   • Hammer toes of both feet 10/17/2017   • Depression 01/17/2016   • Allergic rhinitis 01/17/2016   • Hypertension 01/17/2016   • Dementia without behavioral disturbance 01/17/2016     Note Last Updated: 7/20/2016     By neuropysch evaluation 7/20/2016     • Paroxysmal atrial fibrillation (CMS/HCC) 01/17/2016   • Cobalamin deficiency 01/17/2016     Note Last Updated: 6/8/2016     Boarderline only with normal MMA/Homocysteine/IF antibody.  Encouraged MVI daily.       • Hyperlipidemia 01/17/2016       Past Medical History:   Diagnosis Date   • Allergic rhinitis    • Anemia    • Chronic venous insufficiency    • Dementia    • Depression    • Diabetes mellitus (CMS/[facility])    • Hidradenitis suppurativa    • Hyperlipidemia    • Hypertension    • Osteoarthritis    • Paroxysmal atrial fibrillation (CMS/HCC)    • Peripheral neuropathy    • Stroke (CMS/[facility])    • Vitamin B 12 deficiency     Boarderline only with normal MMA/Homocysteine/IF antibody       Past Surgical History:   Procedure Laterality Date   • BACK SURGERY     • HYSTERECTOMY      PARTIAL   • KNEE SURGERY Left        Family  History   Problem Relation Age of Onset   • Breast cancer Mother    • Stroke Father    • Stroke Maternal Grandmother    • Heart attack Maternal Grandmother    • Heart attack Maternal Grandfather        Social History     Socioeconomic History   • Marital status:      Spouse name: Not on file   • Number of children: Not on file   • Years of education: Not on file   • Highest education level: Not on file   Tobacco Use   • Smoking status: Former Smoker   • Smokeless tobacco: Never Used   • Tobacco comment: CAFFEINE USE/ SOFT DRINKS.    Substance and Sexual Activity   • Alcohol use: No   • Drug use: No   • Sexual activity: Defer       Current Outpatient Medications on File Prior to Visit   Medication Sig Dispense Refill   • acetaminophen (TYLENOL) 325 MG tablet Take 650 mg by mouth Every 6 (Six) Hours.     • amLODIPine (NORVASC) 5 MG tablet TAKE ONE TABLET BY MOUTH DAILY 90 tablet 0   • buPROPion XL (WELLBUTRIN XL) 300 MG 24 hr tablet daily.       • dabigatran etexilate (PRADAXA) 150 MG capsu Take 1 capsule by mouth Every 12 (Twelve) Hours. 180 capsule 3   • donepezil (ARICEPT) 10 MG tablet 20 mg Daily.     • glucose blood test strip Use daily for type 2 DM. 100 each 3   • hydrALAZINE (APRESOLINE) 100 MG tablet TAKE ONE TABLET BY MOUTH TWICE A  tablet 1   • JANUVIA 100 MG tablet TAKE ONE TABLET BY MOUTH DAILY 90 tablet 2   • JARDIANCE 10 MG tablet TAKE ONE TABLET BY MOUTH DAILY 90 tablet 10   • Melatonin 10 MG capsule Take  by mouth.     • metoprolol succinate XL (TOPROL-XL) 50 MG 24 hr tablet TAKE TWO TABLETS BY MOUTH EVERY MORNING 180 tablet 3   • montelukast (SINGULAIR) 10 MG tablet TAKE ONE TABLET BY MOUTH EVERY NIGHT AT BEDTIME 30 tablet 10   • Multiple Vitamin (MULTIVITAMINS PO) Take 1 tablet by mouth daily.     • MYRBETRIQ 50 MG tablet sustained-release 24 hour 24 hr tablet TAKE ONE TABLET BY MOUTH DAILY 30 tablet 3   • QUEtiapine (SEROquel) 25 MG tablet Take 25 mg by mouth Every Night.     •  "rosuvastatin (CRESTOR) 20 MG tablet TAKE ONE TABLET BY MOUTH EVERY NIGHT AT BEDTIME 90 tablet 1   • sertraline (ZOLOFT) 100 MG tablet Take 200 mg by mouth Daily.     • vitamin B-12 (CYANOCOBALAMIN) 1000 MCG tablet Take 1,000 mcg by mouth Daily.     • [DISCONTINUED] ASPIRIN 81 PO Take 81 mg by mouth Daily.     • [DISCONTINUED] cetirizine (zyrTEC) 10 MG tablet Take 1 tablet by mouth Daily. 90 tablet 3   • [DISCONTINUED] metFORMIN ER (GLUCOPHAGE-XR) 500 MG 24 hr tablet TAKE TWO TABLETS BY MOUTH TWICE A  tablet 1     No current facility-administered medications on file prior to visit.        Allergies   Allergen Reactions   • Enalapril Swelling and Other (See Comments)     Other reaction(s): Other: See Comments  Aka vasotec tabs  Aka vasotec tabs  Aka vasotec tabs  Aka vasotec tabs   • Enalapril Maleate Swelling   • Memantine Hcl Other (See Comments)     imbalance  imbalance   • Penicillins Swelling       Immunization History   Administered Date(s) Administered   • Flu Vaccine High Dose PF 65YR+ 10/12/2016, 10/17/2017, 10/12/2018   • Influenza TIV (IM) 08/31/2015   • Pneumococcal Polysaccharide (PPSV23) 01/01/2008, 10/17/2017   • Pneumococcal, Unspecified 01/01/2008, 08/31/2015   • Zostavax 07/14/2015       Objective     /58   Pulse 64   Ht 157.5 cm (62.01\")   Wt 83.9 kg (185 lb)   LMP  (LMP Unknown)   SpO2 97%   BMI 33.83 kg/m²     Physical Exam   Constitutional: She is oriented to person, place, and time. She appears well-developed and well-nourished.   HENT:   Head: Normocephalic and atraumatic.   Right Ear: Hearing, tympanic membrane and external ear normal.   Left Ear: Hearing, tympanic membrane and external ear normal.   Nose: Nose normal.   Mouth/Throat: Oropharynx is clear and moist.   Neck: Neck supple. No thyromegaly present.   Cardiovascular: Normal rate, regular rhythm and normal heart sounds.   No murmur heard.  Pulmonary/Chest: Effort normal and breath sounds normal. Right breast " exhibits no mass. Left breast exhibits no mass.   Abdominal: Soft. She exhibits no distension. There is no hepatosplenomegaly. There is no tenderness.   Genitourinary: No breast tenderness.    Kassi had a diabetic foot exam performed today.  Skin Integrity  -  Her right foot skin is intact.Her left foot skin is intact..   Foot Structure and Biomechanics -  Her right foot has no hallux valgus and no hammer toes present. Her left foot has no hallux valgus and no hammer toes.  Lymphadenopathy:     She has no cervical adenopathy.   Neurological: She is alert and oriented to person, place, and time.   Skin: Skin is warm and dry.   Psychiatric: She has a normal mood and affect. Her speech is normal and behavior is normal. Judgment and thought content normal. Cognition and memory are normal.       Assessment/Plan   Kassi was seen today for medicare wellness-subsequent.    Diagnoses and all orders for this visit:    Medicare annual wellness visit, subsequent    Encounter for screening mammogram for breast cancer  -     Mammo Screening Digital Tomosynthesis Bilateral With CAD; Future    Type 2 diabetes mellitus with complication, without long-term current use of insulin (CMS/[facility])    Essential hypertension    Hyperlipidemia, unspecified hyperlipidemia type    Alzheimer's dementia without behavioral disturbance, unspecified timing of dementia onset    Other orders  -     fexofenadine (ALLEGRA ALLERGY) 180 MG tablet; Take 1 tablet by mouth Daily.  -     metFORMIN ER (GLUCOPHAGE-XR) 500 MG 24 hr tablet; Take 1 tablet by mouth 2 (Two) Times a Day.        Discussion    AWV.  See scanned forms and/or computer for karla history, PHQ-9, functional ability questionnaire, cognitive impairment screening.  Direct observation of cognitive abilities:  The patient does not exhibit any impairment in cognitive abilities upon direct observation at today's visit.     These were all reviewed with the patient and the patient was provided with a  personal prevention plan of service in patient instructions.  Patient was given advice or information on the following topics:  nutrition, fall prevention, exercise  DM-2. Continue current regimen.   HTN. Continue current regimen.   HLD. Continue Crestor.   I have recommended that the patient get the following immunizations:  Shingrix, td and hepatitis A.        Health Maintenance   Topic Date Due   • TDAP/TD VACCINES (1 - Tdap) 03/29/1964   • ZOSTER VACCINE (2 of 3) 09/08/2015   • URINE MICROALBUMIN  10/11/2018   • DIABETIC EYE EXAM  06/13/2019   • HEMOGLOBIN A1C  09/15/2019   • MAMMOGRAM  11/03/2019   • LIPID PANEL  03/15/2020   • MEDICARE ANNUAL WELLNESS  03/20/2020   • DIABETIC FOOT EXAM  03/20/2020   • COLONOSCOPY  11/11/2025   • INFLUENZA VACCINE  Completed   • PNEUMOCOCCAL VACCINES (65+ LOW/MEDIUM RISK)  Completed            Future Appointments   Date Time Provider Department Center   9/16/2019  1:00 PM Parul Jaime MD [facility] None   3/16/2020  1:30 PM Mode Gongora MD [facility] None

## 2019-04-22 RX ORDER — MIRABEGRON 50 MG/1
TABLET, FILM COATED, EXTENDED RELEASE ORAL
Qty: 90 TABLET | Refills: 2 | Status: SHIPPED | OUTPATIENT
Start: 2019-04-22 | End: 2020-06-11

## 2019-05-30 RX ORDER — EMPAGLIFLOZIN 10 MG/1
TABLET, FILM COATED ORAL
Qty: 30 TABLET | Refills: 9 | Status: SHIPPED | OUTPATIENT
Start: 2019-05-30 | End: 2020-03-23

## 2019-06-04 DIAGNOSIS — I10 ESSENTIAL HYPERTENSION: ICD-10-CM

## 2019-06-04 RX ORDER — SITAGLIPTIN 100 MG/1
TABLET, FILM COATED ORAL
Qty: 32 TABLET | Refills: 1 | Status: SHIPPED | OUTPATIENT
Start: 2019-06-04 | End: 2019-09-16 | Stop reason: SDUPTHER

## 2019-06-04 RX ORDER — AMLODIPINE BESYLATE 5 MG/1
TABLET ORAL
Qty: 90 TABLET | Refills: 0 | Status: SHIPPED | OUTPATIENT
Start: 2019-06-04 | End: 2019-08-25 | Stop reason: SDUPTHER

## 2019-06-04 RX ORDER — ROSUVASTATIN CALCIUM 20 MG/1
TABLET, COATED ORAL
Qty: 40 TABLET | Refills: 0 | Status: SHIPPED | OUTPATIENT
Start: 2019-06-04 | End: 2019-08-03 | Stop reason: SDUPTHER

## 2019-06-05 RX ORDER — METOPROLOL SUCCINATE 50 MG/1
TABLET, EXTENDED RELEASE ORAL
Qty: 180 TABLET | Refills: 2 | Status: SHIPPED | OUTPATIENT
Start: 2019-06-05 | End: 2019-09-16 | Stop reason: SDUPTHER

## 2019-06-28 RX ORDER — METOPROLOL SUCCINATE 50 MG/1
TABLET, EXTENDED RELEASE ORAL
Qty: 180 TABLET | Refills: 2 | Status: SHIPPED | OUTPATIENT
Start: 2019-06-28 | End: 2019-09-16 | Stop reason: SDUPTHER

## 2019-06-28 RX ORDER — METFORMIN HYDROCHLORIDE 500 MG/1
TABLET, EXTENDED RELEASE ORAL
Qty: 360 TABLET | Refills: 0 | Status: SHIPPED | OUTPATIENT
Start: 2019-06-28 | End: 2019-09-22 | Stop reason: SDUPTHER

## 2019-07-31 RX ORDER — METOPROLOL SUCCINATE 50 MG/1
75 TABLET, EXTENDED RELEASE ORAL DAILY
Qty: 135 TABLET | Refills: 1 | Status: SHIPPED | OUTPATIENT
Start: 2019-07-31 | End: 2019-09-16 | Stop reason: SDUPTHER

## 2019-07-31 NOTE — PROGRESS NOTES
Problem: Pt's pharmacy informed pt's daughter that it is to soon for refill.  Pt's daughter reports that the pharmacy did not give the total amount of pills, but they have recorded that they did.  Pt would either have to pay out of pocket for the requested refill or pt's script would have to change for the insurance to cover a new script.  Pt is out of medication.     Pt's daughter called to request that pt's Metoprolol Succ dose be decreased.  She is a retired nurse and state that she will monitor pt's bp to ensure that its maintained WNL.  I discussed pt with Dr. Gongora, she agreed to the change in script.  Submitted Rx to pharmacy.

## 2019-08-05 RX ORDER — ROSUVASTATIN CALCIUM 20 MG/1
TABLET, COATED ORAL
Qty: 40 TABLET | Refills: 0 | Status: SHIPPED | OUTPATIENT
Start: 2019-08-05 | End: 2019-09-20 | Stop reason: SDUPTHER

## 2019-08-25 DIAGNOSIS — I10 ESSENTIAL HYPERTENSION: ICD-10-CM

## 2019-08-26 RX ORDER — AMLODIPINE BESYLATE 5 MG/1
TABLET ORAL
Qty: 90 TABLET | Refills: 0 | Status: SHIPPED | OUTPATIENT
Start: 2019-08-26 | End: 2019-08-31 | Stop reason: SDUPTHER

## 2019-08-31 DIAGNOSIS — I10 ESSENTIAL HYPERTENSION: ICD-10-CM

## 2019-09-02 RX ORDER — HYDRALAZINE HYDROCHLORIDE 100 MG/1
TABLET, FILM COATED ORAL
Qty: 180 TABLET | Refills: 2 | Status: SHIPPED | OUTPATIENT
Start: 2019-09-02 | End: 2020-03-18

## 2019-09-03 RX ORDER — AMLODIPINE BESYLATE 5 MG/1
TABLET ORAL
Qty: 90 TABLET | Refills: 0 | Status: SHIPPED | OUTPATIENT
Start: 2019-09-03 | End: 2020-03-02

## 2019-09-16 ENCOUNTER — OFFICE VISIT (OUTPATIENT)
Dept: INTERNAL MEDICINE | Facility: CLINIC | Age: 74
End: 2019-09-16

## 2019-09-16 VITALS
SYSTOLIC BLOOD PRESSURE: 138 MMHG | DIASTOLIC BLOOD PRESSURE: 80 MMHG | HEART RATE: 57 BPM | BODY MASS INDEX: 33.68 KG/M2 | OXYGEN SATURATION: 98 % | HEIGHT: 62 IN | WEIGHT: 183 LBS

## 2019-09-16 DIAGNOSIS — E11.8 TYPE 2 DIABETES MELLITUS WITH COMPLICATION, WITHOUT LONG-TERM CURRENT USE OF INSULIN (HCC): Primary | ICD-10-CM

## 2019-09-16 DIAGNOSIS — R51.9 SINUS HEADACHE: ICD-10-CM

## 2019-09-16 DIAGNOSIS — I10 ESSENTIAL HYPERTENSION: ICD-10-CM

## 2019-09-16 DIAGNOSIS — Z78.0 POSTMENOPAUSAL: ICD-10-CM

## 2019-09-16 DIAGNOSIS — E78.5 HYPERLIPIDEMIA, UNSPECIFIED HYPERLIPIDEMIA TYPE: ICD-10-CM

## 2019-09-16 PROCEDURE — 99214 OFFICE O/P EST MOD 30 MIN: CPT | Performed by: INTERNAL MEDICINE

## 2019-09-16 PROCEDURE — 77080 DXA BONE DENSITY AXIAL: CPT | Performed by: INTERNAL MEDICINE

## 2019-09-16 PROCEDURE — G0008 ADMIN INFLUENZA VIRUS VAC: HCPCS | Performed by: INTERNAL MEDICINE

## 2019-09-16 PROCEDURE — 90662 IIV NO PRSV INCREASED AG IM: CPT | Performed by: INTERNAL MEDICINE

## 2019-09-16 RX ORDER — SCOLOPAMINE TRANSDERMAL SYSTEM 1 MG/1
1 PATCH, EXTENDED RELEASE TRANSDERMAL
Qty: 1 PATCH | Refills: 0
Start: 2019-09-16 | End: 2020-03-04

## 2019-09-16 RX ORDER — METOPROLOL SUCCINATE 50 MG/1
TABLET, EXTENDED RELEASE ORAL
Qty: 180 TABLET | Refills: 1 | Status: SHIPPED | OUTPATIENT
Start: 2019-09-16 | End: 2020-03-17

## 2019-09-16 NOTE — PROGRESS NOTES
Subjective     Kassi Mathis is a 74 y.o. female who presents with   Chief Complaint   Patient presents with   • Diabetes   • Hypertension   • Hyperlipidemia       History of Present Illness     Dm-2.  She is due for labs.    HTN.  Control is fair with current.  HLD.  She is due for labs on Crestor.    C/o chronic HAs.  Face and sinus.  Sinus congestion is chronic.      Review of Systems   Respiratory: Negative.    Cardiovascular: Negative.        The following portions of the patient's history were reviewed and updated as appropriate: allergies, current medications and problem list.    Patient Active Problem List    Diagnosis Date Noted   • Type 2 diabetes mellitus (CMS/HCC) 01/17/2016     Priority: High   • Cervical radiculopathy 10/08/2018   • Hammer toes of both feet 10/17/2017   • Depression 01/17/2016   • Allergic rhinitis 01/17/2016   • Hypertension 01/17/2016   • Dementia without behavioral disturbance 01/17/2016     Note Last Updated: 7/20/2016     By neuropysch evaluation 7/20/2016     • Paroxysmal atrial fibrillation (CMS/[facility]) 01/17/2016   • Cobalamin deficiency 01/17/2016     Note Last Updated: 6/8/2016     Boarderline only with normal MMA/Homocysteine/IF antibody.  Encouraged MVI daily.       • Hyperlipidemia 01/17/2016       Current Outpatient Medications on File Prior to Visit   Medication Sig Dispense Refill   • acetaminophen (TYLENOL) 325 MG tablet Take 650 mg by mouth Every 6 (Six) Hours.     • amLODIPine (NORVASC) 5 MG tablet TAKE ONE TABLET BY MOUTH DAILY 90 tablet 0   • buPROPion XL (WELLBUTRIN XL) 300 MG 24 hr tablet daily.       • dabigatran etexilate (PRADAXA) 150 MG capsu Take 1 capsule by mouth Every 12 (Twelve) Hours. 180 capsule 3   • donepezil (ARICEPT) 10 MG tablet 20 mg Daily.     • fexofenadine (ALLEGRA ALLERGY) 180 MG tablet Take 1 tablet by mouth Daily.     • glucose blood test strip Use daily for type 2 DM. 100 each 3   • hydrALAZINE (APRESOLINE) 100 MG tablet TAKE ONE TABLET BY  "MOUTH TWICE A  tablet 2   • JARDIANCE 10 MG tablet TAKE ONE TABLET BY MOUTH DAILY 30 tablet 9   • Melatonin 10 MG capsule Take  by mouth.     • metFORMIN ER (GLUCOPHAGE-XR) 500 MG 24 hr tablet TAKE TWO TABLETS BY MOUTH EVERY 12 HOURS 360 tablet 0   • metoprolol succinate XL (TOPROL-XL) 50 MG 24 hr tablet Take 1.5 tablets by mouth Daily. 135 tablet 1   • montelukast (SINGULAIR) 10 MG tablet TAKE ONE TABLET BY MOUTH EVERY NIGHT AT BEDTIME 30 tablet 10   • Multiple Vitamin (MULTIVITAMINS PO) Take 1 tablet by mouth daily.     • MYRBETRIQ 50 MG tablet sustained-release 24 hour 24 hr tablet TAKE ONE TABLET BY MOUTH DAILY 90 tablet 2   • QUEtiapine (SEROquel) 25 MG tablet Take 25 mg by mouth Every Night.     • rosuvastatin (CRESTOR) 20 MG tablet TAKE ONE TABLET BY MOUTH EVERY NIGHT AT BEDTIME 40 tablet 0   • sertraline (ZOLOFT) 100 MG tablet Take 200 mg by mouth Daily.     • vitamin B-12 (CYANOCOBALAMIN) 1000 MCG tablet Take 1,000 mcg by mouth Daily.     • [DISCONTINUED] JANUVIA 100 MG tablet TAKE ONE TABLET BY MOUTH DAILY 32 tablet 1   • [DISCONTINUED] metoprolol succinate XL (TOPROL-XL) 50 MG 24 hr tablet TAKE TWO TABLETS BY MOUTH EVERY MORNING 180 tablet 3   • [DISCONTINUED] metoprolol succinate XL (TOPROL-XL) 50 MG 24 hr tablet TAKE TWO TABLETS BY MOUTH EVERY MORNING 180 tablet 2   • [DISCONTINUED] metoprolol succinate XL (TOPROL-XL) 50 MG 24 hr tablet TAKE TWO TABLETS BY MOUTH EVERY MORNING 180 tablet 2     No current facility-administered medications on file prior to visit.        Objective     /80   Pulse 57   Ht 157.5 cm (62.01\")   Wt 83 kg (183 lb)   LMP  (LMP Unknown)   SpO2 98%   BMI 33.46 kg/m²     Physical Exam   Constitutional: She is oriented to person, place, and time. She appears well-developed and well-nourished.   HENT:   Head: Normocephalic and atraumatic.   Right Ear: Hearing and tympanic membrane normal.   Left Ear: Hearing and tympanic membrane normal.   Mouth/Throat: No " oropharyngeal exudate or posterior oropharyngeal erythema.   Cardiovascular: Normal rate, regular rhythm and normal heart sounds.   Pulmonary/Chest: Effort normal and breath sounds normal.   Neurological: She is alert and oriented to person, place, and time.   Skin: Skin is warm and dry.   Psychiatric: She has a normal mood and affect. Her behavior is normal.       Assessment/Plan   Kassi was seen today for diabetes, hypertension and hyperlipidemia.    Diagnoses and all orders for this visit:    Type 2 diabetes mellitus with complication, without long-term current use of insulin (CMS/[facility])  -     CBC & Differential  -     Lipid Panel  -     Comprehensive Metabolic Panel  -     Hemoglobin A1c    Essential hypertension  -     CBC & Differential  -     Lipid Panel  -     Comprehensive Metabolic Panel  -     Hemoglobin A1c    Hyperlipidemia, unspecified hyperlipidemia type  -     CBC & Differential  -     Lipid Panel  -     Comprehensive Metabolic Panel  -     Hemoglobin A1c    Postmenopausal  -     DEXA Bone Density Axial    Sinus headache    Other orders  -     Fluzone High Dose =>65Years  -     Scopolamine (TRANSDERM-SCOP, 1.5 MG,) 1.5 MG/3DAYS patch; Place 1 patch on the skin as directed by provider Every 72 (Seventy-Two) Hours.        Discussion    Dm-2.  The patient will continue current regimen.      HTN.  The patient will continue current regimen.      HLD.  The patient will continue current regimen.      HAs. Likely sinus related.  Previous MRI for HAs was normal. 2016.  Add flonase OTC to what she is taking.  Let me know if not feeling better over the next several weeks or if there is any change in symptoms.    DEXA is reviewed.  BMD is excellent.  She separate report.        Future Appointments   Date Time Provider Department Center   3/16/2020  1:30 PM Mode Gongora MD [facility] None

## 2019-09-17 LAB
ALBUMIN SERPL-MCNC: 4 G/DL (ref 3.5–5.2)
ALBUMIN/GLOB SERPL: 1.5 G/DL
ALP SERPL-CCNC: 71 U/L (ref 39–117)
ALT SERPL-CCNC: 37 U/L (ref 1–33)
AST SERPL-CCNC: 24 U/L (ref 1–32)
BASOPHILS # BLD AUTO: 0.05 10*3/MM3 (ref 0–0.2)
BASOPHILS NFR BLD AUTO: 0.5 % (ref 0–1.5)
BILIRUB SERPL-MCNC: 0.6 MG/DL (ref 0.2–1.2)
BUN SERPL-MCNC: 13 MG/DL (ref 8–23)
BUN/CREAT SERPL: 15.9 (ref 7–25)
CALCIUM SERPL-MCNC: 9.4 MG/DL (ref 8.6–10.5)
CHLORIDE SERPL-SCNC: 107 MMOL/L (ref 98–107)
CHOLEST SERPL-MCNC: 164 MG/DL (ref 0–200)
CO2 SERPL-SCNC: 26.6 MMOL/L (ref 22–29)
CREAT SERPL-MCNC: 0.82 MG/DL (ref 0.57–1)
EOSINOPHIL # BLD AUTO: 0.11 10*3/MM3 (ref 0–0.4)
EOSINOPHIL NFR BLD AUTO: 1.1 % (ref 0.3–6.2)
ERYTHROCYTE [DISTWIDTH] IN BLOOD BY AUTOMATED COUNT: 17.2 % (ref 12.3–15.4)
GLOBULIN SER CALC-MCNC: 2.6 GM/DL
GLUCOSE SERPL-MCNC: 137 MG/DL (ref 65–99)
HBA1C MFR BLD: 7.3 % (ref 4.8–5.6)
HCT VFR BLD AUTO: 40 % (ref 34–46.6)
HDLC SERPL-MCNC: 78 MG/DL (ref 40–60)
HGB BLD-MCNC: 11.5 G/DL (ref 12–15.9)
IMM GRANULOCYTES # BLD AUTO: 0.04 10*3/MM3 (ref 0–0.05)
IMM GRANULOCYTES NFR BLD AUTO: 0.4 % (ref 0–0.5)
LDLC SERPL CALC-MCNC: 68 MG/DL (ref 0–100)
LYMPHOCYTES # BLD AUTO: 2.37 10*3/MM3 (ref 0.7–3.1)
LYMPHOCYTES NFR BLD AUTO: 24.1 % (ref 19.6–45.3)
MCH RBC QN AUTO: 26.3 PG (ref 26.6–33)
MCHC RBC AUTO-ENTMCNC: 28.8 G/DL (ref 31.5–35.7)
MCV RBC AUTO: 91.3 FL (ref 79–97)
MONOCYTES # BLD AUTO: 0.75 10*3/MM3 (ref 0.1–0.9)
MONOCYTES NFR BLD AUTO: 7.6 % (ref 5–12)
NEUTROPHILS # BLD AUTO: 6.5 10*3/MM3 (ref 1.7–7)
NEUTROPHILS NFR BLD AUTO: 66.3 % (ref 42.7–76)
NRBC BLD AUTO-RTO: 0 /100 WBC (ref 0–0.2)
PLATELET # BLD AUTO: 336 10*3/MM3 (ref 140–450)
POTASSIUM SERPL-SCNC: 4.4 MMOL/L (ref 3.5–5.2)
PROT SERPL-MCNC: 6.6 G/DL (ref 6–8.5)
RBC # BLD AUTO: 4.38 10*6/MM3 (ref 3.77–5.28)
SODIUM SERPL-SCNC: 145 MMOL/L (ref 136–145)
TRIGL SERPL-MCNC: 92 MG/DL (ref 0–150)
VLDLC SERPL CALC-MCNC: 18.4 MG/DL
WBC # BLD AUTO: 9.82 10*3/MM3 (ref 3.4–10.8)

## 2019-09-20 RX ORDER — ROSUVASTATIN CALCIUM 20 MG/1
TABLET, COATED ORAL
Qty: 40 TABLET | Refills: 0 | Status: SHIPPED | OUTPATIENT
Start: 2019-09-20 | End: 2019-11-02 | Stop reason: SDUPTHER

## 2019-09-23 RX ORDER — METFORMIN HYDROCHLORIDE 500 MG/1
TABLET, EXTENDED RELEASE ORAL
Qty: 360 TABLET | Refills: 0 | Status: SHIPPED | OUTPATIENT
Start: 2019-09-23 | End: 2020-06-05 | Stop reason: SDUPTHER

## 2019-09-24 ENCOUNTER — TELEPHONE (OUTPATIENT)
Dept: INTERNAL MEDICINE | Facility: CLINIC | Age: 74
End: 2019-09-24

## 2019-09-24 RX ORDER — AZITHROMYCIN 250 MG/1
TABLET, FILM COATED ORAL
Qty: 6 TABLET | Refills: 0 | Status: SHIPPED | OUTPATIENT
Start: 2019-09-24 | End: 2020-03-04

## 2019-09-24 NOTE — TELEPHONE ENCOUNTER
She has been using flonase and her cough isn't getting any better. Can you call her in a antibiotic? She is leaving on cruise Saturday. CC    I LM I called zpak to the [facility] on [address].  RISHI

## 2019-10-30 RX ORDER — SITAGLIPTIN 100 MG/1
TABLET, FILM COATED ORAL
Qty: 30 TABLET | Refills: 0 | Status: SHIPPED | OUTPATIENT
Start: 2019-10-30 | End: 2019-11-26 | Stop reason: SDUPTHER

## 2019-11-04 RX ORDER — ROSUVASTATIN CALCIUM 20 MG/1
TABLET, COATED ORAL
Qty: 40 TABLET | Refills: 0 | Status: SHIPPED | OUTPATIENT
Start: 2019-11-04 | End: 2019-12-12 | Stop reason: SDUPTHER

## 2019-11-26 RX ORDER — SITAGLIPTIN 100 MG/1
TABLET, FILM COATED ORAL
Qty: 30 TABLET | Refills: 0 | Status: SHIPPED | OUTPATIENT
Start: 2019-11-26 | End: 2020-01-02

## 2019-12-12 RX ORDER — ROSUVASTATIN CALCIUM 20 MG/1
TABLET, COATED ORAL
Qty: 30 TABLET | Refills: 2 | Status: SHIPPED | OUTPATIENT
Start: 2019-12-12 | End: 2020-03-09

## 2020-01-02 RX ORDER — SITAGLIPTIN 100 MG/1
TABLET, FILM COATED ORAL
Qty: 30 TABLET | Refills: 0 | Status: SHIPPED | OUTPATIENT
Start: 2020-01-02 | End: 2020-01-28

## 2020-01-28 RX ORDER — SITAGLIPTIN 100 MG/1
TABLET, FILM COATED ORAL
Qty: 30 TABLET | Refills: 0 | Status: SHIPPED | OUTPATIENT
Start: 2020-01-28 | End: 2020-02-03 | Stop reason: SDUPTHER

## 2020-01-31 RX ORDER — MONTELUKAST SODIUM 10 MG/1
TABLET ORAL
Qty: 60 TABLET | Refills: 6 | Status: SHIPPED | OUTPATIENT
Start: 2020-01-31 | End: 2021-02-07

## 2020-03-01 DIAGNOSIS — I10 ESSENTIAL HYPERTENSION: ICD-10-CM

## 2020-03-02 RX ORDER — AMLODIPINE BESYLATE 5 MG/1
TABLET ORAL
Qty: 90 TABLET | Refills: 0 | Status: SHIPPED | OUTPATIENT
Start: 2020-03-02 | End: 2020-04-22

## 2020-03-03 RX ORDER — SITAGLIPTIN 100 MG/1
TABLET, FILM COATED ORAL
Qty: 90 TABLET | Refills: 1 | Status: SHIPPED | OUTPATIENT
Start: 2020-03-03 | End: 2020-08-30

## 2020-03-04 ENCOUNTER — OFFICE VISIT (OUTPATIENT)
Dept: INTERNAL MEDICINE | Facility: CLINIC | Age: 75
End: 2020-03-04

## 2020-03-04 VITALS
DIASTOLIC BLOOD PRESSURE: 60 MMHG | SYSTOLIC BLOOD PRESSURE: 160 MMHG | BODY MASS INDEX: 34.41 KG/M2 | WEIGHT: 187 LBS | HEIGHT: 62 IN | OXYGEN SATURATION: 98 % | HEART RATE: 56 BPM

## 2020-03-04 DIAGNOSIS — J20.8 ACUTE BRONCHITIS DUE TO OTHER SPECIFIED ORGANISMS: Primary | ICD-10-CM

## 2020-03-04 PROCEDURE — 99213 OFFICE O/P EST LOW 20 MIN: CPT | Performed by: INTERNAL MEDICINE

## 2020-03-04 RX ORDER — AZITHROMYCIN 250 MG/1
TABLET, FILM COATED ORAL
Qty: 6 TABLET | Refills: 0 | Status: SHIPPED | OUTPATIENT
Start: 2020-03-04 | End: 2020-03-24

## 2020-03-04 RX ORDER — BENZONATATE 200 MG/1
200 CAPSULE ORAL 3 TIMES DAILY PRN
Qty: 30 CAPSULE | Refills: 9 | Status: SHIPPED | OUTPATIENT
Start: 2020-03-04 | End: 2021-03-25

## 2020-03-04 NOTE — PROGRESS NOTES
Subjective     Kassi Mathis is a 74 y.o. female who presents with   Chief Complaint   Patient presents with   • URI   • Cough       History of Present Illness     Cough for the past one week.  Cough with production.  No SOA.  No fever.      Review of Systems   Constitutional: Positive for fatigue.   Musculoskeletal: Negative for arthralgias.       The following portions of the patient's history were reviewed and updated as appropriate: allergies, current medications and problem list.    Patient Active Problem List    Diagnosis Date Noted   • Type 2 diabetes mellitus (CMS/[facility]) 01/17/2016     Priority: High   • Cervical radiculopathy 10/08/2018   • Hammer toes of both feet 10/17/2017   • Depression 01/17/2016   • Allergic rhinitis 01/17/2016   • Hypertension 01/17/2016   • Dementia without behavioral disturbance (CMS/[facility]) 01/17/2016     Note Last Updated: 7/20/2016     By neuropysch evaluation 7/20/2016     • Paroxysmal atrial fibrillation (CMS/[facility]) 01/17/2016   • Cobalamin deficiency 01/17/2016     Note Last Updated: 6/8/2016     Boarderline only with normal MMA/Homocysteine/IF antibody.  Encouraged MVI daily.       • Hyperlipidemia 01/17/2016       Current Outpatient Medications on File Prior to Visit   Medication Sig Dispense Refill   • acetaminophen (TYLENOL) 325 MG tablet Take 650 mg by mouth Every 6 (Six) Hours.     • amLODIPine (NORVASC) 5 MG tablet TAKE ONE TABLET BY MOUTH DAILY 90 tablet 0   • buPROPion XL (WELLBUTRIN XL) 300 MG 24 hr tablet daily.       • dabigatran etexilate (PRADAXA) 150 MG capsu Take 1 capsule by mouth Every 12 (Twelve) Hours. 180 capsule 3   • donepezil (ARICEPT) 10 MG tablet 20 mg Daily.     • fexofenadine (ALLEGRA ALLERGY) 180 MG tablet Take 1 tablet by mouth Daily.     • glucose blood test strip Use daily for type 2 DM. 100 each 3   • hydrALAZINE (APRESOLINE) 100 MG tablet TAKE ONE TABLET BY MOUTH TWICE A  tablet 2   • JANUVIA 100 MG tablet TAKE ONE TABLET BY MOUTH DAILY 90  "tablet 1   • JARDIANCE 10 MG tablet TAKE ONE TABLET BY MOUTH DAILY 30 tablet 9   • Melatonin 10 MG capsule Take  by mouth.     • metFORMIN ER (GLUCOPHAGE-XR) 500 MG 24 hr tablet TAKE TWO TABLETS BY MOUTH EVERY 12 HOURS 360 tablet 0   • metoprolol succinate XL (TOPROL-XL) 50 MG 24 hr tablet TAKE 2 TABLETS BY MOUTH EVERY MORNING. 180 tablet 1   • montelukast (SINGULAIR) 10 MG tablet TAKE ONE TABLET BY MOUTH EVERY NIGHT AT BEDTIME 60 tablet 6   • Multiple Vitamin (MULTIVITAMINS PO) Take 1 tablet by mouth daily.     • MYRBETRIQ 50 MG tablet sustained-release 24 hour 24 hr tablet TAKE ONE TABLET BY MOUTH DAILY 90 tablet 2   • rosuvastatin (CRESTOR) 20 MG tablet TAKE ONE TABLET BY MOUTH EVERY NIGHT AT BEDTIME 30 tablet 2   • sertraline (ZOLOFT) 100 MG tablet Take 200 mg by mouth Daily.     • vitamin B-12 (CYANOCOBALAMIN) 1000 MCG tablet Take 1,000 mcg by mouth Daily.     • [DISCONTINUED] azithromycin (ZITHROMAX Z-MARU) 250 MG tablet Take 2 tablets the first day, then 1 tablet daily for 4 days. 6 tablet 0   • [DISCONTINUED] QUEtiapine (SEROquel) 25 MG tablet Take 25 mg by mouth Every Night.     • [DISCONTINUED] Scopolamine (TRANSDERM-SCOP, 1.5 MG,) 1.5 MG/3DAYS patch Place 1 patch on the skin as directed by provider Every 72 (Seventy-Two) Hours. 1 patch 0     No current facility-administered medications on file prior to visit.        Objective     /60   Pulse 56   Ht 157.5 cm (62.01\")   Wt 84.8 kg (187 lb)   LMP  (LMP Unknown)   SpO2 98%   BMI 34.19 kg/m²     Physical Exam   Constitutional: She is oriented to person, place, and time. She appears well-developed and well-nourished.   HENT:   Head: Normocephalic and atraumatic.   Right Ear: Hearing and tympanic membrane normal.   Left Ear: Hearing and tympanic membrane normal.   Mouth/Throat: No oropharyngeal exudate or posterior oropharyngeal erythema.   Cardiovascular: Normal rate, regular rhythm and normal heart sounds.   Pulmonary/Chest: Effort normal and " breath sounds normal.   Neurological: She is alert and oriented to person, place, and time.   Skin: Skin is warm and dry.   Psychiatric: She has a normal mood and affect. Her behavior is normal.       Assessment/Plan   Kassi was seen today for uri and cough.    Diagnoses and all orders for this visit:    Acute bronchitis due to other specified organisms    Other orders  -     azithromycin (ZITHROMAX Z-MARU) 250 MG tablet; Take 2 tablets the first day, then 1 tablet daily for 4 days.  -     benzonatate (TESSALON) 200 MG capsule; Take 1 capsule by mouth 3 (Three) Times a Day As Needed for Cough.        Discussion    Patient presents with episode of acute bronchitis.  A prescription for antibiotics is provided today.  The patient is instructed to take along with tessalon perrles.  Let me know they are not feeling better over the next 3 days or if there is any change in symptoms.             Future Appointments   Date Time Provider Department Center   3/16/2020  1:30 PM Mode Gongora MD [facility] None   3/30/2020  1:00 PM [facility] None   4/3/2020  1:30 PM Parul Jaime MD [facility] None

## 2020-03-08 NOTE — TELEPHONE ENCOUNTER
Will need to follow up with pt's daughter to see if pt is back to taking her regular dose of Metoprolol.

## 2020-03-09 RX ORDER — ROSUVASTATIN CALCIUM 20 MG/1
TABLET, COATED ORAL
Qty: 90 TABLET | Refills: 1 | Status: SHIPPED | OUTPATIENT
Start: 2020-03-09 | End: 2020-09-09

## 2020-03-17 ENCOUNTER — TELEPHONE (OUTPATIENT)
Dept: CARDIOLOGY | Facility: CLINIC | Age: 75
End: 2020-03-17

## 2020-03-17 RX ORDER — METOPROLOL SUCCINATE 50 MG/1
100 TABLET, EXTENDED RELEASE ORAL DAILY
Qty: 180 TABLET | Refills: 1 | Status: SHIPPED | OUTPATIENT
Start: 2020-03-17 | End: 2020-09-18

## 2020-03-17 NOTE — TELEPHONE ENCOUNTER
Pt's daughter called to inquire about pt's Hydralazine 100 mg daily.  Pt went to see Dr. Jaime and she instructed pt to discuss with your regarding hopefully decreasing the Hydralazine due to severe headaches.  The pt's daughter has attempted to decrease the dosage to 25 mg qd which has helped.  Do you want pt to decrease her Hydralazine?     Other cardiac meds include:   Toprol 100 mg qd  Amlodipine 5 mg qd  Dabigatran 150 mg bid    Below are her HBP readings.             Note: Pt's appt was cx due to the epidemic and will be r/s at a later date.

## 2020-03-18 NOTE — TELEPHONE ENCOUNTER
Hydralazine 25mg once per day is not doing anything for her BP, and unlikely to be causing a HA.That is such an insignificant dose.    But it's okay to stop it.

## 2020-03-23 RX ORDER — EMPAGLIFLOZIN 10 MG/1
TABLET, FILM COATED ORAL
Qty: 90 TABLET | Refills: 8 | Status: SHIPPED | OUTPATIENT
Start: 2020-03-23 | End: 2021-03-23

## 2020-03-23 RX ORDER — ATENOLOL 100 MG/1
TABLET ORAL
Qty: 180 CAPSULE | Refills: 1 | Status: SHIPPED | OUTPATIENT
Start: 2020-03-23 | End: 2020-09-28

## 2020-03-24 ENCOUNTER — OFFICE VISIT (OUTPATIENT)
Dept: CARDIOLOGY | Facility: CLINIC | Age: 75
End: 2020-03-24

## 2020-03-24 DIAGNOSIS — I10 ESSENTIAL HYPERTENSION: ICD-10-CM

## 2020-03-24 DIAGNOSIS — I48.0 PAROXYSMAL ATRIAL FIBRILLATION (HCC): Primary | ICD-10-CM

## 2020-03-24 PROCEDURE — 99213 OFFICE O/P EST LOW 20 MIN: CPT | Performed by: INTERNAL MEDICINE

## 2020-03-24 NOTE — PROGRESS NOTES
Date of Office Visit: 2020  Encounter Provider: Mode Gongora MD  Place of Service: [facility]  Patient Name: Kassi Mathis  :1945    Chief Complaint   Patient presents with   • Atrial Fibrillation   :     HPI: Kassi Mathis is a 74 y.o. female who follows up via telephone visit.     She has a long-standing history of high blood pressure. She has paroxysmal atrial fibrillation. She had an echocardiogram in 2015 which revealed normal left ventricular systolic function without pulmonary hypertension or valvular heart disease. She only had grade I diastolic dysfunction. She had a Cardiolite stress test which was normal. She was markedly hypertensive at that time and she was placed on an increased dose of hydralazine/isosorbide as well as furosemide.  The diuretic then had to be stopped because she was getting orthostatic and dehydrated. She was unable to get the combination medication so she was changed to hydralazine alone. That was subsequently tapered off as her BP remained controlled without it and as it caused headaches.  Her daughter checks her BP twice a day at home and it's consistently 120s-140s/60s.     She had occasional palpitations, which are isolated. She has not had any sustained atrial fibrillation. She denies edema, dyspnea, lightheadedness, syncope, or chest pain. She has not had bleeding on dabigatran.  Her memory continues to decline. Her daughter assists with history.    With the recent outbreak of COVID-19, we are conducting a telehealth visit.  The patient has been deemed appropriate for a telephone visit and the patient/family has consented to this.      Past Medical History:   Diagnosis Date   • Allergic rhinitis    • Anemia    • Chronic venous insufficiency    • Dementia (CMS/HCC)    • Depression    • Diabetes mellitus (CMS/HCC)    • Hidradenitis suppurativa    • Hyperlipidemia    • Hypertension    • Osteoarthritis    •  Paroxysmal atrial fibrillation (CMS/HCC)    • Peripheral neuropathy    • Stroke (CMS/HCC)    • Vitamin B 12 deficiency     Boarderline only with normal MMA/Homocysteine/IF antibody       Past Surgical History:   Procedure Laterality Date   • BACK SURGERY     • HYSTERECTOMY      PARTIAL   • KNEE SURGERY Left        Social History     Socioeconomic History   • Marital status:      Spouse name: Not on file   • Number of children: Not on file   • Years of education: Not on file   • Highest education level: Not on file   Tobacco Use   • Smoking status: Former Smoker   • Smokeless tobacco: Never Used   • Tobacco comment: CAFFEINE USE/ SOFT DRINKS.    Substance and Sexual Activity   • Alcohol use: No   • Drug use: No   • Sexual activity: Defer       Family History   Problem Relation Age of Onset   • Breast cancer Mother    • Stroke Father    • Stroke Maternal Grandmother    • Heart attack Maternal Grandmother    • Heart attack Maternal Grandfather      Review of Systems   Reason unable to perform ROS: telephone/dementia.   Constitution: Positive for malaise/fatigue.   Cardiovascular: Negative for chest pain, leg swelling and palpitations.   Musculoskeletal: Positive for falls.   Psychiatric/Behavioral: Positive for memory loss.   All other systems reviewed and are negative.      Allergies   Allergen Reactions   • Enalapril Swelling and Other (See Comments)     Other reaction(s): Other: See Comments  Aka vasotec tabs  Aka vasotec tabs  Aka vasotec tabs  Aka vasotec tabs   • Enalapril Maleate Swelling   • Memantine Hcl Other (See Comments)     imbalance  imbalance   • Penicillins Swelling         Current Outpatient Medications:   •  acetaminophen (TYLENOL) 325 MG tablet, Take 650 mg by mouth Every 6 (Six) Hours As Needed., Disp: , Rfl:   •  amLODIPine (NORVASC) 5 MG tablet, TAKE ONE TABLET BY MOUTH DAILY, Disp: 90 tablet, Rfl: 0  •  benzonatate (TESSALON) 200 MG capsule, Take 1 capsule by mouth 3 (Three) Times a Day  As Needed for Cough., Disp: 30 capsule, Rfl: 9  •  buPROPion XL (WELLBUTRIN XL) 300 MG 24 hr tablet, daily.  , Disp: , Rfl:   •  donepezil (ARICEPT) 10 MG tablet, 20 mg Daily., Disp: , Rfl:   •  fexofenadine (ALLEGRA ALLERGY) 180 MG tablet, Take 1 tablet by mouth Daily., Disp: , Rfl:   •  glucose blood test strip, Use daily for type 2 DM., Disp: 100 each, Rfl: 3  •  JANUVIA 100 MG tablet, TAKE ONE TABLET BY MOUTH DAILY, Disp: 90 tablet, Rfl: 1  •  JARDIANCE 10 MG tablet, TAKE ONE TABLET BY MOUTH DAILY, Disp: 90 tablet, Rfl: 8  •  Melatonin 10 MG capsule, Take  by mouth., Disp: , Rfl:   •  metFORMIN ER (GLUCOPHAGE-XR) 500 MG 24 hr tablet, TAKE TWO TABLETS BY MOUTH EVERY 12 HOURS (Patient taking differently: Take 500 mg by mouth Daily With Breakfast.), Disp: 360 tablet, Rfl: 0  •  metoprolol succinate XL (TOPROL-XL) 50 MG 24 hr tablet, Take 2 tablets by mouth Daily., Disp: 180 tablet, Rfl: 1  •  montelukast (SINGULAIR) 10 MG tablet, TAKE ONE TABLET BY MOUTH EVERY NIGHT AT BEDTIME, Disp: 60 tablet, Rfl: 6  •  Multiple Vitamin (MULTIVITAMINS PO), Take 1 tablet by mouth daily., Disp: , Rfl:   •  MYRBETRIQ 50 MG tablet sustained-release 24 hour 24 hr tablet, TAKE ONE TABLET BY MOUTH DAILY, Disp: 90 tablet, Rfl: 2  •  PRADAXA 150 MG capsu, TAKE ONE CAPSULE BY MOUTH EVERY 12 HOURS, Disp: 180 capsule, Rfl: 1  •  rosuvastatin (CRESTOR) 20 MG tablet, TAKE ONE TABLET BY MOUTH EVERY NIGHT AT BEDTIME, Disp: 90 tablet, Rfl: 1  •  sertraline (ZOLOFT) 100 MG tablet, Take 200 mg by mouth Daily., Disp: , Rfl:   •  vitamin B-12 (CYANOCOBALAMIN) 1000 MCG tablet, Take 1,000 mcg by mouth Daily., Disp: , Rfl:      Objective:     There were no vitals filed for this visit.  There is no height or weight on file to calculate BMI.    Physical Exam   Abdominal: Soft.   Neurological: She is alert.   Psychiatric: She has a normal mood and affect. Her behavior is normal. Cognition and memory are impaired.   Vitals reviewed.      Procedures       Assessment:       Diagnosis Plan   1. Paroxysmal atrial fibrillation (CMS/HCC)     2. Essential hypertension            Plan:       1.  Atrial Fibrillation and Atrial Flutter  Assessment  • The patient has paroxysmal atrial fibrillation  • This is non-valvular in etiology  • The patient's CHADS2-VASc score is 7  • A [id number] score of 2 or more is considered a high risk for a thromboembolic event  • Dabigatran prescribed    Plan  • Attempt to maintain sinus rhythm  • Continue dabigatran for antithrombotic therapy, bleeding issues discussed  • Continue beta blocker for rhythm control  • If her dementia progresses and she starts falling, we will have to reconsider the NOAC.    2.  Her SBP is within goal for her age and memory impairment.    This patient has consented to a telehealth visit via phone. I spent 20 minutes in direct conversation with this patient. Any vitals recorded within this visit are reported by the patient.    Sincerely,       Mode Gongora MD

## 2020-03-25 NOTE — PROGRESS NOTES
I spoke with the patient's daughter Flaca and she is going to call back at a later time to schedule the appointment.

## 2020-04-22 DIAGNOSIS — I10 ESSENTIAL HYPERTENSION: ICD-10-CM

## 2020-04-22 RX ORDER — AMLODIPINE BESYLATE 5 MG/1
TABLET ORAL
Qty: 90 TABLET | Refills: 0 | Status: SHIPPED | OUTPATIENT
Start: 2020-04-22 | End: 2020-08-30

## 2020-05-27 RX ORDER — BUPROPION HYDROCHLORIDE 300 MG/1
300 TABLET ORAL EVERY MORNING
Qty: 90 TABLET | Refills: 1 | Status: SHIPPED | OUTPATIENT
Start: 2020-05-27 | End: 2021-05-24

## 2020-05-28 DIAGNOSIS — Z00.00 HEALTHCARE MAINTENANCE: Primary | ICD-10-CM

## 2020-05-28 DIAGNOSIS — E11.22 TYPE 2 DIABETES MELLITUS WITH DIABETIC CHRONIC KIDNEY DISEASE, UNSPECIFIED CKD STAGE, UNSPECIFIED WHETHER LONG TERM INSULIN USE (HCC): ICD-10-CM

## 2020-05-28 DIAGNOSIS — E78.5 HYPERLIPIDEMIA, UNSPECIFIED HYPERLIPIDEMIA TYPE: ICD-10-CM

## 2020-05-28 DIAGNOSIS — I10 ESSENTIAL HYPERTENSION: ICD-10-CM

## 2020-05-28 DIAGNOSIS — I48.0 PAROXYSMAL ATRIAL FIBRILLATION (HCC): ICD-10-CM

## 2020-06-02 LAB
ALBUMIN SERPL-MCNC: 3.8 G/DL (ref 3.5–5.2)
ALBUMIN/GLOB SERPL: 1.4 G/DL
ALP SERPL-CCNC: 64 U/L (ref 39–117)
ALT SERPL-CCNC: 34 U/L (ref 1–33)
APPEARANCE UR: CLEAR
AST SERPL-CCNC: 31 U/L (ref 1–32)
BACTERIA #/AREA URNS HPF: ABNORMAL /HPF
BASOPHILS # BLD AUTO: 0.05 10*3/MM3 (ref 0–0.2)
BASOPHILS NFR BLD AUTO: 0.6 % (ref 0–1.5)
BILIRUB SERPL-MCNC: 0.5 MG/DL (ref 0.2–1.2)
BILIRUB UR QL STRIP: NEGATIVE
BUN SERPL-MCNC: 16 MG/DL (ref 8–23)
BUN/CREAT SERPL: 15 (ref 7–25)
CALCIUM SERPL-MCNC: 9.7 MG/DL (ref 8.6–10.5)
CHLORIDE SERPL-SCNC: 102 MMOL/L (ref 98–107)
CHOLEST SERPL-MCNC: 186 MG/DL (ref 0–200)
CO2 SERPL-SCNC: 26.8 MMOL/L (ref 22–29)
COLOR UR: YELLOW
CREAT SERPL-MCNC: 1.07 MG/DL (ref 0.57–1)
CRYSTALS URNS MICRO: ABNORMAL
EOSINOPHIL # BLD AUTO: 0.26 10*3/MM3 (ref 0–0.4)
EOSINOPHIL NFR BLD AUTO: 3.3 % (ref 0.3–6.2)
EPI CELLS #/AREA URNS HPF: ABNORMAL /HPF (ref 0–10)
ERYTHROCYTE [DISTWIDTH] IN BLOOD BY AUTOMATED COUNT: 14.9 % (ref 12.3–15.4)
GLOBULIN SER CALC-MCNC: 2.8 GM/DL
GLUCOSE SERPL-MCNC: 158 MG/DL (ref 65–99)
GLUCOSE UR QL: ABNORMAL
HBA1C MFR BLD: 7.7 % (ref 4.8–5.6)
HCT VFR BLD AUTO: 38.3 % (ref 34–46.6)
HDLC SERPL-MCNC: 84 MG/DL (ref 40–60)
HGB BLD-MCNC: 12.5 G/DL (ref 12–15.9)
HGB UR QL STRIP: NEGATIVE
IMM GRANULOCYTES # BLD AUTO: 0.03 10*3/MM3 (ref 0–0.05)
IMM GRANULOCYTES NFR BLD AUTO: 0.4 % (ref 0–0.5)
KETONES UR QL STRIP: NEGATIVE
LDLC SERPL CALC-MCNC: 87 MG/DL (ref 0–100)
LEUKOCYTE ESTERASE UR QL STRIP: NEGATIVE
LYMPHOCYTES # BLD AUTO: 2.36 10*3/MM3 (ref 0.7–3.1)
LYMPHOCYTES NFR BLD AUTO: 29.5 % (ref 19.6–45.3)
MCH RBC QN AUTO: 28.8 PG (ref 26.6–33)
MCHC RBC AUTO-ENTMCNC: 32.6 G/DL (ref 31.5–35.7)
MCV RBC AUTO: 88.2 FL (ref 79–97)
MICRO URNS: ABNORMAL
MICRO URNS: ABNORMAL
MONOCYTES # BLD AUTO: 0.73 10*3/MM3 (ref 0.1–0.9)
MONOCYTES NFR BLD AUTO: 9.1 % (ref 5–12)
MUCOUS THREADS URNS QL MICRO: PRESENT /HPF
NEUTROPHILS # BLD AUTO: 4.56 10*3/MM3 (ref 1.7–7)
NEUTROPHILS NFR BLD AUTO: 57.1 % (ref 42.7–76)
NITRITE UR QL STRIP: NEGATIVE
NRBC BLD AUTO-RTO: 0 /100 WBC (ref 0–0.2)
PH UR STRIP: 5.5 [PH] (ref 5–7.5)
PLATELET # BLD AUTO: 260 10*3/MM3 (ref 140–450)
POTASSIUM SERPL-SCNC: 5 MMOL/L (ref 3.5–5.2)
PROT SERPL-MCNC: 6.6 G/DL (ref 6–8.5)
PROT UR QL STRIP: NEGATIVE
RBC # BLD AUTO: 4.34 10*6/MM3 (ref 3.77–5.28)
RBC #/AREA URNS HPF: ABNORMAL /HPF (ref 0–2)
SODIUM SERPL-SCNC: 138 MMOL/L (ref 136–145)
SP GR UR: 1.02 (ref 1–1.03)
TRIGL SERPL-MCNC: 75 MG/DL (ref 0–150)
TSH SERPL DL<=0.005 MIU/L-ACNC: 1.93 UIU/ML (ref 0.27–4.2)
UNIDENT CRYS URNS QL MICRO: PRESENT /LPF
URINALYSIS REFLEX: ABNORMAL
UROBILINOGEN UR STRIP-MCNC: 0.2 MG/DL (ref 0.2–1)
VLDLC SERPL CALC-MCNC: 15 MG/DL
WBC # BLD AUTO: 7.99 10*3/MM3 (ref 3.4–10.8)
WBC #/AREA URNS HPF: ABNORMAL /HPF (ref 0–5)
YEAST #/AREA URNS HPF: PRESENT /HPF

## 2020-06-05 ENCOUNTER — OFFICE VISIT (OUTPATIENT)
Dept: INTERNAL MEDICINE | Facility: CLINIC | Age: 75
End: 2020-06-05

## 2020-06-05 VITALS — WEIGHT: 187 LBS | HEIGHT: 62 IN | BODY MASS INDEX: 34.41 KG/M2

## 2020-06-05 DIAGNOSIS — Z00.00 MEDICARE ANNUAL WELLNESS VISIT, SUBSEQUENT: Primary | ICD-10-CM

## 2020-06-05 DIAGNOSIS — R13.10 DYSPHAGIA, UNSPECIFIED TYPE: ICD-10-CM

## 2020-06-05 DIAGNOSIS — E11.22 TYPE 2 DIABETES MELLITUS WITH DIABETIC CHRONIC KIDNEY DISEASE, UNSPECIFIED CKD STAGE, UNSPECIFIED WHETHER LONG TERM INSULIN USE (HCC): ICD-10-CM

## 2020-06-05 DIAGNOSIS — G30.9 ALZHEIMER'S DEMENTIA WITHOUT BEHAVIORAL DISTURBANCE, UNSPECIFIED TIMING OF DEMENTIA ONSET: ICD-10-CM

## 2020-06-05 DIAGNOSIS — E78.5 HYPERLIPIDEMIA, UNSPECIFIED HYPERLIPIDEMIA TYPE: ICD-10-CM

## 2020-06-05 DIAGNOSIS — F02.80 ALZHEIMER'S DEMENTIA WITHOUT BEHAVIORAL DISTURBANCE, UNSPECIFIED TIMING OF DEMENTIA ONSET: ICD-10-CM

## 2020-06-05 DIAGNOSIS — I10 ESSENTIAL HYPERTENSION: ICD-10-CM

## 2020-06-05 PROCEDURE — 99214 OFFICE O/P EST MOD 30 MIN: CPT | Performed by: INTERNAL MEDICINE

## 2020-06-05 PROCEDURE — G0439 PPPS, SUBSEQ VISIT: HCPCS | Performed by: INTERNAL MEDICINE

## 2020-06-05 RX ORDER — DONEPEZIL HYDROCHLORIDE 10 MG/1
10 TABLET, FILM COATED ORAL DAILY
Qty: 90 TABLET | Refills: 3
Start: 2020-06-05 | End: 2021-03-24 | Stop reason: SDUPTHER

## 2020-06-05 RX ORDER — ALBUTEROL SULFATE 90 UG/1
2 AEROSOL, METERED RESPIRATORY (INHALATION) EVERY 4 HOURS PRN
Qty: 1 INHALER | Refills: 0 | Status: SHIPPED | OUTPATIENT
Start: 2020-06-05

## 2020-06-05 RX ORDER — METFORMIN HYDROCHLORIDE 500 MG/1
500 TABLET, EXTENDED RELEASE ORAL
Qty: 90 TABLET | Refills: 3
Start: 2020-06-05 | End: 2022-11-02

## 2020-06-05 NOTE — PROGRESS NOTES
Subjective     Kassi Mathis is a 75 y.o. female who presents for an annual wellness visit as well as check up of dm-2, htn, hld.      History of Present Illness     DM-2. She is maintained on metformin, Januvia and Jardiance.  Control is excellent.  HTN. She is under good control at home.  HLD. Control is excellent.  Alzheimers dementia.  She is maintained on Aricept only.  She is overall stable.    Runny nose, cough, sinus HA.  She has seen an allergist.  Known allergy to cats.  She has five in her home.      Review of Systems   HENT: Positive for rhinorrhea.         Occsaional dysphagia   Respiratory: Positive for cough. Negative for shortness of breath.    Cardiovascular: Negative for chest pain.       The following portions of the patient's history were reviewed and updated as appropriate: allergies, current medications, past family history, past medical history, past social history, past surgical history and problem list.  Health maintenance tab was reviewed and updated with the patient.       Patient Active Problem List    Diagnosis Date Noted   • Type 2 diabetes mellitus (CMS/HCC) 01/17/2016     Priority: High   • Cervical radiculopathy 10/08/2018   • Hammer toes of both feet 10/17/2017   • Depression 01/17/2016   • Allergic rhinitis 01/17/2016   • Hypertension 01/17/2016   • Dementia without behavioral disturbance (CMS/HCC) 01/17/2016     Note Last Updated: 7/20/2016     By neuropysch evaluation 7/20/2016     • Paroxysmal atrial fibrillation (CMS/HCC) 01/17/2016   • Cobalamin deficiency 01/17/2016     Note Last Updated: 6/8/2016     Boarderline only with normal MMA/Homocysteine/IF antibody.  Encouraged MVI daily.       • Hyperlipidemia 01/17/2016       Past Medical History:   Diagnosis Date   • Allergic rhinitis    • Anemia    • Chronic venous insufficiency    • Dementia (CMS/HCC)    • Depression    • Diabetes mellitus (CMS/HCC)    • Hidradenitis suppurativa    • Hyperlipidemia    • Hypertension    •  Osteoarthritis    • Paroxysmal atrial fibrillation (CMS/HCC)    • Peripheral neuropathy    • Stroke (CMS/HCC)    • Vitamin B 12 deficiency     Boarderline only with normal MMA/Homocysteine/IF antibody       Past Surgical History:   Procedure Laterality Date   • BACK SURGERY     • HYSTERECTOMY      PARTIAL   • KNEE SURGERY Left        Family History   Problem Relation Age of Onset   • Breast cancer Mother    • Stroke Father    • Stroke Maternal Grandmother    • Heart attack Maternal Grandmother    • Heart attack Maternal Grandfather        Social History     Socioeconomic History   • Marital status:      Spouse name: Not on file   • Number of children: Not on file   • Years of education: Not on file   • Highest education level: Not on file   Tobacco Use   • Smoking status: Former Smoker   • Smokeless tobacco: Never Used   Substance and Sexual Activity   • Alcohol use: No     Comment: CAFFEINE USE/ SOFT DRINKS.    • Drug use: No   • Sexual activity: Defer       Current Outpatient Medications on File Prior to Visit   Medication Sig Dispense Refill   • acetaminophen (TYLENOL) 325 MG tablet Take 650 mg by mouth Every 6 (Six) Hours As Needed.     • amLODIPine (NORVASC) 5 MG tablet TAKE ONE TABLET BY MOUTH DAILY 90 tablet 0   • benzonatate (TESSALON) 200 MG capsule Take 1 capsule by mouth 3 (Three) Times a Day As Needed for Cough. 30 capsule 9   • buPROPion XL (WELLBUTRIN XL) 300 MG 24 hr tablet Take 1 tablet by mouth Every Morning. 90 tablet 1   • fexofenadine (ALLEGRA ALLERGY) 180 MG tablet Take 1 tablet by mouth Daily.     • glucose blood test strip Use daily for type 2 DM. 100 each 3   • JANUVIA 100 MG tablet TAKE ONE TABLET BY MOUTH DAILY 90 tablet 1   • JARDIANCE 10 MG tablet TAKE ONE TABLET BY MOUTH DAILY 90 tablet 8   • Melatonin 10 MG capsule Take  by mouth.     • metoprolol succinate XL (TOPROL-XL) 50 MG 24 hr tablet Take 2 tablets by mouth Daily. 180 tablet 1   • montelukast (SINGULAIR) 10 MG tablet TAKE  "ONE TABLET BY MOUTH EVERY NIGHT AT BEDTIME 60 tablet 6   • Multiple Vitamin (MULTIVITAMINS PO) Take 1 tablet by mouth daily.     • MYRBETRIQ 50 MG tablet sustained-release 24 hour 24 hr tablet TAKE ONE TABLET BY MOUTH DAILY 90 tablet 2   • PRADAXA 150 MG capsu TAKE ONE CAPSULE BY MOUTH EVERY 12 HOURS 180 capsule 1   • rosuvastatin (CRESTOR) 20 MG tablet TAKE ONE TABLET BY MOUTH EVERY NIGHT AT BEDTIME 90 tablet 1   • sertraline (ZOLOFT) 100 MG tablet Take 200 mg by mouth Daily.     • vitamin B-12 (CYANOCOBALAMIN) 1000 MCG tablet Take 1,000 mcg by mouth Daily.     • [DISCONTINUED] donepezil (ARICEPT) 10 MG tablet 20 mg Daily.     • [DISCONTINUED] metFORMIN ER (GLUCOPHAGE-XR) 500 MG 24 hr tablet TAKE TWO TABLETS BY MOUTH EVERY 12 HOURS (Patient taking differently: Take 500 mg by mouth Daily With Breakfast.) 360 tablet 0     No current facility-administered medications on file prior to visit.        Allergies   Allergen Reactions   • Enalapril Swelling and Other (See Comments)     Other reaction(s): Other: See Comments  Aka vasotec tabs  Aka vasotec tabs  Aka vasotec tabs  Aka vasotec tabs   • Enalapril Maleate Swelling   • Memantine Hcl Other (See Comments)     imbalance  imbalance   • Penicillins Swelling       Immunization History   Administered Date(s) Administered   • Fluzone High Dose =>65 Years ([facility] ONLY) 10/12/2016, 10/17/2017, 10/12/2018, 09/16/2019   • Influenza Quad Vaccine (Inpatient) 08/24/2015   • Influenza TIV (IM) 08/31/2015   • Pneumococcal Conjugate 13-Valent (PCV13) 08/24/2015   • Pneumococcal Polysaccharide (PPSV23) 01/01/2008, 10/17/2017   • Zostavax 07/14/2015       Objective     Ht 157.5 cm (62.01\")   Wt 84.8 kg (187 lb)   LMP  (LMP Unknown)   BMI 34.19 kg/m²     Physical Exam   Constitutional: She is oriented to person, place, and time. She appears well-developed and well-nourished.   HENT:   Head: Normocephalic and atraumatic.   Pulmonary/Chest: Effort normal.   Neurological: She is alert " and oriented to person, place, and time.   Psychiatric: She has a normal mood and affect. Her behavior is normal.       Assessment/Plan   Diagnoses and all orders for this visit:    Medicare annual wellness visit, subsequent    Type 2 diabetes mellitus with diabetic chronic kidney disease, unspecified CKD stage, unspecified whether long term insulin use (CMS/[facility])    Essential hypertension    Hyperlipidemia, unspecified hyperlipidemia type    Alzheimer's dementia without behavioral disturbance, unspecified timing of dementia onset (CMS/[facility])    Dysphagia, unspecified type  -     FL Esophagram Complete Double-Contrast; Future    Other orders  -     metFORMIN ER (GLUCOPHAGE-XR) 500 MG 24 hr tablet; Take 1 tablet by mouth Daily With Breakfast.  -     donepezil (ARICEPT) 10 MG tablet; Take 1 tablet by mouth Daily.  -     albuterol sulfate  (90 Base) MCG/ACT inhaler; Inhale 2 puffs Every 4 (Four) Hours As Needed for Wheezing.        Discussion    AWV.  See below for karla history, PHQ-9, functional ability questionnaire, cognitive impairment screening.  Direct observation of cognitive abilities:  Memory is impaired. These were all reviewed with the patient and the patient was provided with a personal prevention plan of service in patient instructions.  Patient was given advice or information on the following topics:  nutrition, exercise.    Dm-2.  The patient will continue current regimen.      HTN.  The patient will continue current regimen.      HLD.  The patient will continue current regimen.      Dysphagia.  Further evaluate with esophagram.    AR/cough.  On singulair and allegra.  I would recommend adding Flonase.  Prn albuterol for wheezing.     Depression Screen:    PHQ-2/PHQ-9 Depression Screening 6/5/2020   Little interest or pleasure in doing things 0   Feeling down, depressed, or hopeless 0   Trouble falling or staying asleep, or sleeping too much -   Feeling tired or having little energy -   Poor  appetite or overeating -   Feeling bad about yourself - or that you are a failure or have let yourself or your family down -   Trouble concentrating on things, such as reading the newspaper or watching television -   Moving or speaking so slowly that other people could have noticed. Or the opposite - being so fidgety or restless that you have been moving around a lot more than usual -   Thoughts that you would be better off dead, or of hurting yourself in some way -   Total Score 0   If you checked off any problems, how difficult have these problems made it for you to do your work, take care of things at home, or get along with other people? -       Fall Risk Screen:  DAIJA Fall Risk Assessment has not been completed.    Health Habits and Functional/Cognitive screen:  Functional & Cognitive Status 6/5/2020   Do you have difficulty preparing food and eating? Yes   Do you have difficulty bathing yourself, getting dressed or grooming yourself? No   Do you have difficulty using the toilet? No   Do you have difficulty moving around from place to place? Yes   Do you have trouble with steps or getting out of a bed or a chair? No   Current Diet Well Balanced Diet   Dental Exam Up to date   Eye Exam Up to date   Exercise (times per week) 0 times per week   Current Exercise Activities Include None   Do you need help using the phone?  No   Are you deaf or do you have serious difficulty hearing?  No   Do you need help with transportation? Yes   Do you need help shopping? No   Do you need help preparing meals?  No   Do you need help with housework?  Yes   Do you need help with laundry? Yes   Do you need help taking your medications? No   Do you need help managing money? No   Do you ever drive or ride in a car without wearing a seat belt? No   Have you felt unusual stress, anger or loneliness in the last month? No   Who do you live with? Child   If you need help, do you have trouble finding someone available to you? No   Have  you been bothered in the last four weeks by sexual problems? No   Do you have difficulty concentrating, remembering or making decisions? No       I have recommended that the patient get the following immunizations:  Shingrix and tdap.        Health Maintenance   Topic Date Due   • TDAP/TD VACCINES (1 - Tdap) 03/29/1956   • ZOSTER VACCINE (2 of 3) 09/08/2015   • HEPATITIS C SCREENING  01/19/2016   • URINE MICROALBUMIN  10/11/2018   • MEDICARE ANNUAL WELLNESS  03/20/2020   • INFLUENZA VACCINE  08/01/2020   • DIABETIC EYE EXAM  10/11/2020   • HEMOGLOBIN A1C  12/01/2020   • LIPID PANEL  06/01/2021   • MAMMOGRAM  08/27/2021   • COLONOSCOPY  11/11/2025   • PNEUMOCOCCAL VACCINE (65+ HIGH RISK)  Completed            Future Appointments   Date Time Provider Department Center   3/25/2021  2:30 PM Mode Gongora MD [id number] None

## 2020-06-05 NOTE — PATIENT INSTRUCTIONS
Medicare Wellness  Personal Prevention Plan of Service     Date of Office Visit:  2020  Encounter Provider:  Parul Jaime MD  Place of Service:  [facility]  Patient Name: Kassi Mathis  :  1945    As part of the Medicare Wellness portion of your visit today, we are providing you with this personalized preventive plan of services (PPPS). This plan is based upon recommendations of the [place] Preventive Services Task Force (USPSTF) and the Advisory Committee on Immunization Practices (ACIP).    This lists the preventive care services that should be considered, and provides dates of when you are due. Items listed as completed are up-to-date and do not require any further intervention.    Health Maintenance   Topic Date Due   • TDAP/TD VACCINES (1 - Tdap) 1956   • ZOSTER VACCINE (2 of 3) 2015   • HEPATITIS C SCREENING  2016   • URINE MICROALBUMIN  10/11/2018   • MEDICARE ANNUAL WELLNESS  2020   • INFLUENZA VACCINE  2020   • DIABETIC EYE EXAM  10/11/2020   • HEMOGLOBIN A1C  2020   • LIPID PANEL  2021   • MAMMOGRAM  2021   • COLONOSCOPY  2025   • PNEUMOCOCCAL VACCINE (65+ HIGH RISK)  Completed       No orders of the defined types were placed in this encounter.      Return in about 6 months (around 2020).

## 2020-06-11 RX ORDER — MIRABEGRON 50 MG/1
TABLET, FILM COATED, EXTENDED RELEASE ORAL
Qty: 90 TABLET | Refills: 0 | Status: SHIPPED | OUTPATIENT
Start: 2020-06-11 | End: 2020-09-09

## 2020-06-12 ENCOUNTER — TRANSCRIBE ORDERS (OUTPATIENT)
Dept: SLEEP MEDICINE | Facility: HOSPITAL | Age: 75
End: 2020-06-12

## 2020-06-12 DIAGNOSIS — Z01.818 OTHER SPECIFIED PRE-OPERATIVE EXAMINATION: Primary | ICD-10-CM

## 2020-06-16 ENCOUNTER — LAB (OUTPATIENT)
Dept: LAB | Facility: HOSPITAL | Age: 75
End: 2020-06-16

## 2020-06-18 ENCOUNTER — APPOINTMENT (OUTPATIENT)
Dept: GENERAL RADIOLOGY | Facility: HOSPITAL | Age: 75
End: 2020-06-18

## 2020-08-29 DIAGNOSIS — I10 ESSENTIAL HYPERTENSION: ICD-10-CM

## 2020-08-30 RX ORDER — AMLODIPINE BESYLATE 5 MG/1
TABLET ORAL
Qty: 90 TABLET | Refills: 0 | Status: SHIPPED | OUTPATIENT
Start: 2020-08-30 | End: 2020-11-27

## 2020-08-30 RX ORDER — SITAGLIPTIN 100 MG/1
TABLET, FILM COATED ORAL
Qty: 90 TABLET | Refills: 0 | Status: SHIPPED | OUTPATIENT
Start: 2020-08-30 | End: 2020-11-27

## 2020-08-31 ENCOUNTER — TELEPHONE (OUTPATIENT)
Dept: INTERNAL MEDICINE | Facility: CLINIC | Age: 75
End: 2020-08-31

## 2020-08-31 NOTE — TELEPHONE ENCOUNTER
I d/w her daughter.  She is going to check with her specialist at the [facility] for opinion.  RISHI

## 2020-08-31 NOTE — TELEPHONE ENCOUNTER
Patient's daughter states her mother dementia is progressing. She wants to know your thought on holistic medicine.

## 2020-09-08 DIAGNOSIS — Z12.31 ENCOUNTER FOR SCREENING MAMMOGRAM FOR BREAST CANCER: Primary | ICD-10-CM

## 2020-09-08 PROBLEM — M17.11 OSTEOARTHRITIS OF RIGHT KNEE: Status: ACTIVE | Noted: 2020-09-08

## 2020-09-09 RX ORDER — ROSUVASTATIN CALCIUM 20 MG/1
TABLET, COATED ORAL
Qty: 90 TABLET | Refills: 0 | Status: SHIPPED | OUTPATIENT
Start: 2020-09-09 | End: 2020-12-12

## 2020-09-09 RX ORDER — MIRABEGRON 50 MG/1
TABLET, FILM COATED, EXTENDED RELEASE ORAL
Qty: 90 TABLET | Refills: 0 | Status: SHIPPED | OUTPATIENT
Start: 2020-09-09 | End: 2020-12-12

## 2020-09-18 RX ORDER — METOPROLOL SUCCINATE 50 MG/1
TABLET, EXTENDED RELEASE ORAL
Qty: 180 TABLET | Refills: 1 | Status: SHIPPED | OUTPATIENT
Start: 2020-09-18 | End: 2021-03-24

## 2020-09-28 RX ORDER — ATENOLOL 100 MG/1
TABLET ORAL
Qty: 180 CAPSULE | Refills: 1 | Status: SHIPPED | OUTPATIENT
Start: 2020-09-28 | End: 2021-04-12

## 2020-09-30 ENCOUNTER — TELEPHONE (OUTPATIENT)
Dept: CARDIOLOGY | Facility: CLINIC | Age: 75
End: 2020-09-30

## 2020-09-30 NOTE — TELEPHONE ENCOUNTER
Mrs. Mathis daughter called to let us know that her mother, who typically takes Pradaxa 150mg BID, accidentally took a second dose 3 hours after her first dose.    I explained side effects of blood thinners and what to watch for and when it would be a problem and told her to have the patient hold her evening dose of pradaxa.      Dr. Gongora was notified verbally and in agreement and patient's family notified.    Thank you,  Lorena Burleson RN  [facility]  Triage

## 2020-10-07 ENCOUNTER — LAB (OUTPATIENT)
Dept: INTERNAL MEDICINE | Facility: CLINIC | Age: 75
End: 2020-10-07

## 2020-10-07 DIAGNOSIS — Z23 NEED FOR INFLUENZA VACCINATION: Primary | ICD-10-CM

## 2020-10-07 PROCEDURE — G0008 ADMIN INFLUENZA VIRUS VAC: HCPCS | Performed by: INTERNAL MEDICINE

## 2020-10-07 PROCEDURE — 90694 VACC AIIV4 NO PRSRV 0.5ML IM: CPT | Performed by: INTERNAL MEDICINE

## 2020-11-24 ENCOUNTER — TELEPHONE (OUTPATIENT)
Dept: INTERNAL MEDICINE | Facility: CLINIC | Age: 75
End: 2020-11-24

## 2020-11-24 RX ORDER — DOXYCYCLINE 100 MG/1
100 CAPSULE ORAL EVERY 12 HOURS SCHEDULED
Qty: 20 CAPSULE | Refills: 0 | Status: SHIPPED | OUTPATIENT
Start: 2020-11-24 | End: 2021-03-25

## 2020-11-24 NOTE — TELEPHONE ENCOUNTER
PATIENT'S DAUGHTER, JP, CALLED AND STATED THAT THE PATIENT TESTED NEGATIVE FOR COVID, BUT DOES HAVE A BAD COUGH. THE PATIENT ALSO HAS RUNNY NOSE AND CHEST CONGESTION. PATIENT IS TAKING MUCINEX DM, BUT THE DAUGHTER THINKS SHE MAY NEED AN ANTIBIOTIC. JP LIVES WITH HER MOTHER AND HAS TESTED POSITIVE FOR COVID, SHE IS QUARANTINED DOWNSTAIRS AWAY FROM THE PATIENT. JP REQUEST A CALLBACK REGARDING HER MOTHER'S COUGH.    JP SHAIKH CALLBACK: [phone]    PHARMACY CONFIRMED:    [facility] - [phone]  - [phone] FX

## 2020-11-24 NOTE — TELEPHONE ENCOUNTER
Advise Roseanna I called in an antibiotic for Kassi.  Her negative COVID test could be false negative however.  Continue Mucinex DM.  Monitor her sats as well.  RISHI

## 2020-11-26 DIAGNOSIS — I10 ESSENTIAL HYPERTENSION: ICD-10-CM

## 2020-11-27 RX ORDER — AMLODIPINE BESYLATE 5 MG/1
TABLET ORAL
Qty: 90 TABLET | Refills: 0 | Status: SHIPPED | OUTPATIENT
Start: 2020-11-27 | End: 2021-02-25

## 2020-11-27 RX ORDER — SITAGLIPTIN 100 MG/1
TABLET, FILM COATED ORAL
Qty: 90 TABLET | Refills: 0 | Status: SHIPPED | OUTPATIENT
Start: 2020-11-27 | End: 2021-02-05

## 2020-12-04 ENCOUNTER — TELEPHONE (OUTPATIENT)
Dept: INTERNAL MEDICINE | Facility: CLINIC | Age: 75
End: 2020-12-04

## 2020-12-04 NOTE — TELEPHONE ENCOUNTER
Caller: Flaca Shaikh    Relationship to patient: Emergency Contact    Best call back number: [phone]      Concerns or Questions if Applicable:     FLACA CALLED TODAY STATING THAT HER MOTHER HAS BEEN EXPOSED TO HER AND HER DAUGHTER WHO BOTH TESTED POSITIVE FOR COVID,BUT HAVE SINCE TESTED NEGATIVE.     SHE STATES THAT IT HAS BEEN LESS THAN 14 DAYS AND MS. SHAIKH HAS BEEN QUARINTEENED TO A DIFFERENT PART OF THE HOUSE. SHE SAYS THAT MS. SHAIKH TESTED NEG AND DR. CALDERON HAS HER ON ANTIBIOTICS, SHE STILL HAS A COUGH.     SHE  WANTS HER TO KEEP APPOINTMENT (12/7/2020) SO THAT DR. CALDERON COULD LISTEN TO HER CHEST.       I TOLD HER THAT SHE COULD NOT BE SEEN IN OFFICE IF SHE DID NOT PASS COVID QUESTION .    WHEN ASKED SHE SAYS IT HAS BEEN WITHIN 14 DAYS OF CONTACT AND SHE HAS COUGH.

## 2020-12-07 ENCOUNTER — TELEMEDICINE (OUTPATIENT)
Dept: INTERNAL MEDICINE | Facility: CLINIC | Age: 75
End: 2020-12-07

## 2020-12-07 DIAGNOSIS — R05.9 COUGH: Primary | ICD-10-CM

## 2020-12-07 DIAGNOSIS — Z20.822 CLOSE EXPOSURE TO COVID-19 VIRUS: ICD-10-CM

## 2020-12-07 DIAGNOSIS — R05.3 PERSISTENT COUGH: Primary | ICD-10-CM

## 2020-12-07 PROCEDURE — 99213 OFFICE O/P EST LOW 20 MIN: CPT | Performed by: INTERNAL MEDICINE

## 2020-12-07 NOTE — PROGRESS NOTES
Subjective     Kassi Mathis is a 75 y.o. female who presents with No chief complaint on file.      History of Present Illness     She was exposed to family members with COVID.  She tested negative but developed a cough.  She was treated for bronchitis with doxycycline.  She continues to have fatigue and cough.  O2 sats are 95%.  No SOA.  No fever.  No sore throat.  No achiness.  No chest pain.  No confusion.  She tested negative on the 24th.  No confusion.      Review of Systems   Constitutional: Positive for fatigue. Negative for fever.   HENT: Negative for sore throat.    Respiratory: Negative for shortness of breath.    Cardiovascular: Negative for chest pain.       The following portions of the patient's history were reviewed and updated as appropriate: allergies, current medications and problem list.    Patient Active Problem List    Diagnosis Date Noted   • Type 2 diabetes mellitus (CMS/[facility]) 01/17/2016     Priority: High   • Osteoarthritis of right knee 09/08/2020   • Cervical radiculopathy 10/08/2018   • Hammer toes of both feet 10/17/2017   • Depression 01/17/2016   • Allergic rhinitis 01/17/2016   • Hypertension 01/17/2016   • Dementia without behavioral disturbance (CMS/[facility]) 01/17/2016     Note Last Updated: 7/20/2016     By neuropysch evaluation 7/20/2016     • Paroxysmal atrial fibrillation (CMS/[facility]) 01/17/2016   • Cobalamin deficiency 01/17/2016     Note Last Updated: 6/8/2016     Boarderline only with normal MMA/Homocysteine/IF antibody.  Encouraged MVI daily.       • Hyperlipidemia 01/17/2016       Current Outpatient Medications on File Prior to Visit   Medication Sig Dispense Refill   • acetaminophen (TYLENOL) 325 MG tablet Take 650 mg by mouth Every 6 (Six) Hours As Needed.     • albuterol sulfate  (90 Base) MCG/ACT inhaler Inhale 2 puffs Every 4 (Four) Hours As Needed for Wheezing. 1 inhaler 0   • amLODIPine (NORVASC) 5 MG tablet TAKE ONE TABLET BY MOUTH DAILY 90 tablet 0   • benzonatate  (TESSALON) 200 MG capsule Take 1 capsule by mouth 3 (Three) Times a Day As Needed for Cough. 30 capsule 9   • buPROPion XL (WELLBUTRIN XL) 300 MG 24 hr tablet Take 1 tablet by mouth Every Morning. 90 tablet 1   • donepezil (ARICEPT) 10 MG tablet Take 1 tablet by mouth Daily. 90 tablet 3   • doxycycline (MONODOX) 100 MG capsule Take 1 capsule by mouth Every 12 (Twelve) Hours. 20 capsule 0   • fexofenadine (ALLEGRA ALLERGY) 180 MG tablet Take 1 tablet by mouth Daily.     • glucose blood test strip Use daily for type 2 DM. 100 each 3   • Januvia 100 MG tablet TAKE ONE TABLET BY MOUTH DAILY 90 tablet 0   • JARDIANCE 10 MG tablet TAKE ONE TABLET BY MOUTH DAILY 90 tablet 8   • Melatonin 10 MG capsule Take  by mouth.     • metFORMIN ER (GLUCOPHAGE-XR) 500 MG 24 hr tablet Take 1 tablet by mouth Daily With Breakfast. 90 tablet 3   • metoprolol succinate XL (TOPROL-XL) 50 MG 24 hr tablet TAKE TWO TABLETS BY MOUTH DAILY 180 tablet 1   • montelukast (SINGULAIR) 10 MG tablet TAKE ONE TABLET BY MOUTH EVERY NIGHT AT BEDTIME 60 tablet 6   • Multiple Vitamin (MULTIVITAMINS PO) Take 1 tablet by mouth daily.     • MYRBETRIQ 50 MG tablet sustained-release 24 hour 24 hr tablet TAKE ONE TABLET BY MOUTH DAILY 90 tablet 0   • Pradaxa 150 MG capsu TAKE ONE CAPSULE BY MOUTH EVERY 12 HOURS 180 capsule 1   • rosuvastatin (CRESTOR) 20 MG tablet TAKE ONE TABLET BY MOUTH EVERY NIGHT AT BEDTIME 90 tablet 0   • sertraline (ZOLOFT) 100 MG tablet Take 200 mg by mouth Daily.     • vitamin B-12 (CYANOCOBALAMIN) 1000 MCG tablet Take 1,000 mcg by mouth Daily.       No current facility-administered medications on file prior to visit.        Objective     LMP  (LMP Unknown)     Physical Exam  Constitutional:       Appearance: She is well-developed.   HENT:      Head: Normocephalic and atraumatic.   Pulmonary:      Effort: Pulmonary effort is normal.   Neurological:      Mental Status: She is alert and oriented to person, place, and time.   Psychiatric:          Behavior: Behavior normal.         Assessment/Plan   Diagnoses and all orders for this visit:    1. Persistent cough (Primary)    2. Close exposure to COVID-19 virus        Discussion    Patient presents with a persistent cough and fatigue.  Although she tested negative for COVID-19 there is a high degree of possibility that she had it.  She will do a drive thru test tomorrow.  In the meantime, I recommend attention to rest and fluids.  I recommend as needed tylenol for fevers and aches.  I recommend adding vitamin D, C and zinc.  I recommend continued monitoring pulse oximetry.  Reasons to go to the ER include severe trouble breathing, chest pain, confusion, inability to wake or stay awake, and bluish lips or face.  Continue supportive measures and let me know if there is any change in symptoms.         Future Appointments   Date Time Provider Department Center   3/25/2021  2:30 PM Mode Gongora MD [facility] None

## 2020-12-09 LAB — SARS-COV-2 RNA RESP QL NAA+PROBE: DETECTED

## 2020-12-12 RX ORDER — MIRABEGRON 50 MG/1
TABLET, FILM COATED, EXTENDED RELEASE ORAL
Qty: 90 TABLET | Refills: 0 | Status: SHIPPED | OUTPATIENT
Start: 2020-12-12 | End: 2021-03-25

## 2020-12-12 RX ORDER — ROSUVASTATIN CALCIUM 20 MG/1
TABLET, COATED ORAL
Qty: 90 TABLET | Refills: 0 | Status: SHIPPED | OUTPATIENT
Start: 2020-12-12 | End: 2021-08-25

## 2021-01-13 ENCOUNTER — OFFICE VISIT (OUTPATIENT)
Dept: INTERNAL MEDICINE | Facility: CLINIC | Age: 76
End: 2021-01-13

## 2021-01-13 VITALS
SYSTOLIC BLOOD PRESSURE: 130 MMHG | OXYGEN SATURATION: 96 % | TEMPERATURE: 96.6 F | HEART RATE: 71 BPM | DIASTOLIC BLOOD PRESSURE: 70 MMHG | BODY MASS INDEX: 33.86 KG/M2 | HEIGHT: 62 IN | WEIGHT: 184 LBS

## 2021-01-13 DIAGNOSIS — R21 RASH AND OTHER NONSPECIFIC SKIN ERUPTION: Primary | ICD-10-CM

## 2021-01-13 PROCEDURE — 99213 OFFICE O/P EST LOW 20 MIN: CPT | Performed by: INTERNAL MEDICINE

## 2021-01-13 NOTE — PROGRESS NOTES
Patient Education        Low Sodium Diet (2,000 Milligram): Care Instructions  Your Care Instructions    Too much sodium causes your body to hold on to extra water. This can raise your blood pressure and force your heart and kidneys to work harder. In very serious cases, this could cause you to be put in the hospital. It might even be life-threatening. By limiting sodium, you will feel better and lower your risk of serious problems. The most common source of sodium is salt. People get most of the salt in their diet from canned, prepared, and packaged foods. Fast food and restaurant meals also are very high in sodium. Your doctor will probably limit your sodium to less than 2,000 milligrams (mg) a day. This limit counts all the sodium in prepared and packaged foods and any salt you add to your food. Follow-up care is a key part of your treatment and safety. Be sure to make and go to all appointments, and call your doctor if you are having problems. It's also a good idea to know your test results and keep a list of the medicines you take. How can you care for yourself at home? Read food labels  · Read labels on cans and food packages. The labels tell you how much sodium is in each serving. Make sure that you look at the serving size. If you eat more than the serving size, you have eaten more sodium. · Food labels also tell you the Percent Daily Value for sodium. Choose products with low Percent Daily Values for sodium. · Be aware that sodium can come in forms other than salt, including monosodium glutamate (MSG), sodium citrate, and sodium bicarbonate (baking soda). MSG is often added to Asian food. When you eat out, you can sometimes ask for food without MSG or added salt. Buy low-sodium foods  · Buy foods that are labeled \"unsalted\" (no salt added), \"sodium-free\" (less than 5 mg of sodium per serving), or \"low-sodium\" (less than 140 mg of sodium per serving).  Foods labeled \"reduced-sodium\" and \"light sodium\" Subjective     Kassi Mathis is a 75 y.o. female who presents with   Chief Complaint   Patient presents with   • Earache     glass in ear feeling       History of Present Illness     Right ear hurts for past three weeks.  No drainage.  Bilateral ears.  On outside of ear canals.      Review of Systems   Constitutional: Negative for fever.   HENT: Positive for ear pain. Negative for hearing loss, rhinorrhea and sore throat.    Gastrointestinal: Negative for abdominal pain, diarrhea and vomiting.   Musculoskeletal: Negative for neck pain.   Skin: Negative for rash.   Neurological: Negative for headaches.       The following portions of the patient's history were reviewed and updated as appropriate: allergies, current medications and problem list.    Patient Active Problem List    Diagnosis Date Noted   • Type 2 diabetes mellitus (CMS/[facility]) 01/17/2016     Priority: High   • Osteoarthritis of right knee 09/08/2020   • Cervical radiculopathy 10/08/2018   • Hammer toes of both feet 10/17/2017   • Depression 01/17/2016   • Allergic rhinitis 01/17/2016   • Hypertension 01/17/2016   • Dementia without behavioral disturbance (CMS/[facility]) 01/17/2016     Note Last Updated: 7/20/2016     By neuropysch evaluation 7/20/2016     • Paroxysmal atrial fibrillation (CMS/[facility]) 01/17/2016   • Cobalamin deficiency 01/17/2016     Note Last Updated: 6/8/2016     Boarderline only with normal MMA/Homocysteine/IF antibody.  Encouraged MVI daily.       • Hyperlipidemia 01/17/2016       Current Outpatient Medications on File Prior to Visit   Medication Sig Dispense Refill   • albuterol sulfate  (90 Base) MCG/ACT inhaler Inhale 2 puffs Every 4 (Four) Hours As Needed for Wheezing. 1 inhaler 0   • amLODIPine (NORVASC) 5 MG tablet TAKE ONE TABLET BY MOUTH DAILY 90 tablet 0   • buPROPion XL (WELLBUTRIN XL) 300 MG 24 hr tablet Take 1 tablet by mouth Every Morning. 90 tablet 1   • donepezil (ARICEPT) 10 MG tablet Take 1 tablet by mouth Daily. 90  "tablet 3   • fexofenadine (ALLEGRA ALLERGY) 180 MG tablet Take 1 tablet by mouth Daily.     • glucose blood test strip Use daily for type 2 DM. 100 each 3   • Januvia 100 MG tablet TAKE ONE TABLET BY MOUTH DAILY 90 tablet 0   • JARDIANCE 10 MG tablet TAKE ONE TABLET BY MOUTH DAILY 90 tablet 8   • Melatonin 10 MG capsule Take  by mouth.     • metFORMIN ER (GLUCOPHAGE-XR) 500 MG 24 hr tablet Take 1 tablet by mouth Daily With Breakfast. 90 tablet 3   • metoprolol succinate XL (TOPROL-XL) 50 MG 24 hr tablet TAKE TWO TABLETS BY MOUTH DAILY 180 tablet 1   • montelukast (SINGULAIR) 10 MG tablet TAKE ONE TABLET BY MOUTH EVERY NIGHT AT BEDTIME 60 tablet 6   • Multiple Vitamin (MULTIVITAMINS PO) Take 1 tablet by mouth daily.     • Pradaxa 150 MG capsu TAKE ONE CAPSULE BY MOUTH EVERY 12 HOURS 180 capsule 1   • rosuvastatin (CRESTOR) 20 MG tablet TAKE ONE TABLET BY MOUTH EVERY NIGHT AT BEDTIME 90 tablet 0   • sertraline (ZOLOFT) 100 MG tablet Take 200 mg by mouth Daily.     • vitamin B-12 (CYANOCOBALAMIN) 1000 MCG tablet Take 1,000 mcg by mouth Daily.     • benzonatate (TESSALON) 200 MG capsule Take 1 capsule by mouth 3 (Three) Times a Day As Needed for Cough. 30 capsule 9   • doxycycline (MONODOX) 100 MG capsule Take 1 capsule by mouth Every 12 (Twelve) Hours. 20 capsule 0   • Myrbetriq 50 MG tablet sustained-release 24 hour 24 hr tablet TAKE ONE TABLET BY MOUTH DAILY 90 tablet 0   • [DISCONTINUED] acetaminophen (TYLENOL) 325 MG tablet Take 650 mg by mouth Every 6 (Six) Hours As Needed.       No current facility-administered medications on file prior to visit.        Objective     /70   Pulse 71   Temp 96.6 °F (35.9 °C)   Ht 157.5 cm (62\")   Wt 83.5 kg (184 lb)   LMP  (LMP Unknown)   SpO2 96%   BMI 33.65 kg/m²     Physical Exam  Constitutional:       Appearance: She is well-developed.   HENT:      Head: Normocephalic and atraumatic.      Right Ear: Hearing, tympanic membrane and ear canal normal.      Left Ear: " may still have too much sodium. Be sure to read the label to see how much sodium you are getting. · Buy fresh vegetables, or frozen vegetables without added sauces. Buy low-sodium versions of canned vegetables, soups, and other canned goods. Prepare low-sodium meals  · Cut back on the amount of salt you use in cooking. This will help you adjust to the taste. Do not add salt after cooking. One teaspoon of salt has about 2,300 mg of sodium. · Take the salt shaker off the table. · Flavor your food with garlic, lemon juice, onion, vinegar, herbs, and spices. Do not use soy sauce, lite soy sauce, steak sauce, onion salt, garlic salt, celery salt, mustard, or ketchup on your food. · Use low-sodium salad dressings, sauces, and ketchup. Or make your own salad dressings and sauces without adding salt. · Use less salt (or none) when recipes call for it. You can often use half the salt a recipe calls for without losing flavor. Other foods such as rice, pasta, and grains do not need added salt. · Rinse canned vegetables, and cook them in fresh water. This removes some--but not all--of the salt. · Avoid water that is naturally high in sodium or that has been treated with water softeners, which add sodium. Call your local water company to find out the sodium content of your water supply. If you buy bottled water, read the label and choose a sodium-free brand. Avoid high-sodium foods  · Avoid eating:  ? Smoked, cured, salted, and canned meat, fish, and poultry. ? Ham, dahl, hot dogs, and luncheon meats. ? Regular, hard, and processed cheese and regular peanut butter. ? Crackers with salted tops, and other salted snack foods such as pretzels, chips, and salted popcorn. ? Frozen prepared meals, unless labeled low-sodium. ? Canned and dried soups, broths, and bouillon, unless labeled sodium-free or low-sodium. ? Canned vegetables, unless labeled sodium-free or low-sodium. ?  [place] fries, pizza, tacos, and other fast Hearing, tympanic membrane and ear canal normal.      Ears:      Comments: Erythematous scaling rash outside of bilateral ear canals.    Pulmonary:      Effort: Pulmonary effort is normal.   Neurological:      Mental Status: She is alert and oriented to person, place, and time.   Psychiatric:         Behavior: Behavior normal.         Assessment/Plan   Diagnoses and all orders for this visit:    1. Rash and other nonspecific skin eruption (Primary)    Other orders  -     mupirocin (Bactroban) 2 % ointment; Apply  topically to the appropriate area as directed 3 (Three) Times a Day.  Dispense: 15 g; Refill: 0        Discussion    Patient presents with a rash outside of ear canals.  Possible component of ACD.  She is scratching it a lot.  She is instructed to make sure her ears have no residual soap in them after showering.  Make sure they are dry.  No not itch.  Trial of topical bactroban.  Let me know if not feeling better over the next 5-7 days or if there is any change in symptoms.           Future Appointments   Date Time Provider Department Center   3/25/2021  2:30 PM Mode Gongora MD [facility] None         Answers for HPI/ROS submitted by the patient on 1/12/2021   Ear pain  What is the primary reason for your visit?: Ear Pain     foods.  ? Pickles, olives, ketchup, and other condiments, especially soy sauce, unless labeled sodium-free or low-sodium. Where can you learn more? Go to http://trent-eliot.info/. Enter L544 in the search box to learn more about \"Low Sodium Diet (2,000 Milligram): Care Instructions. \"  Current as of: November 7, 2018  Content Version: 12.2  © 4971-6376 [employer]. Care instructions adapted under license by [employer] (which disclaims liability or warranty for this information). If you have questions about a medical condition or this instruction, always ask your healthcare professional. [place] [address] any warranty or liability for your use of this information.

## 2021-02-05 ENCOUNTER — TELEPHONE (OUTPATIENT)
Dept: INTERNAL MEDICINE | Facility: CLINIC | Age: 76
End: 2021-02-05

## 2021-02-05 RX ORDER — SEMAGLUTIDE 1.34 MG/ML
0.25 INJECTION, SOLUTION SUBCUTANEOUS WEEKLY
Qty: 1 PEN | Refills: 0 | Status: SHIPPED | OUTPATIENT
Start: 2021-02-05 | End: 2021-08-02

## 2021-02-05 NOTE — TELEPHONE ENCOUNTER
PATIENT'S DAUGHTER STATED DR BEATTY HAS SUGGESTED THAT PATIENT DISCOINTUED THE Januvia 100 MG tablet AND START OZEMPIC WEEKLY. DAUGHTER IS REQUESTING A CALL BACK.      CALL BACK: [phone]

## 2021-02-07 RX ORDER — MONTELUKAST SODIUM 10 MG/1
TABLET ORAL
Qty: 90 TABLET | Refills: 5 | Status: SHIPPED | OUTPATIENT
Start: 2021-02-07 | End: 2021-03-25

## 2021-02-17 LAB — HBA1C MFR BLD: 6.9 %

## 2021-02-25 ENCOUNTER — TELEPHONE (OUTPATIENT)
Dept: INTERNAL MEDICINE | Facility: CLINIC | Age: 76
End: 2021-02-25

## 2021-02-25 DIAGNOSIS — I10 ESSENTIAL HYPERTENSION: ICD-10-CM

## 2021-02-25 RX ORDER — AMLODIPINE BESYLATE 5 MG/1
TABLET ORAL
Qty: 90 TABLET | Refills: 0 | Status: SHIPPED | OUTPATIENT
Start: 2021-02-25 | End: 2021-06-01

## 2021-02-25 RX ORDER — SITAGLIPTIN 100 MG/1
TABLET, FILM COATED ORAL
Qty: 90 TABLET | Refills: 0 | Status: SHIPPED | OUTPATIENT
Start: 2021-02-25 | End: 2021-03-25

## 2021-02-25 NOTE — TELEPHONE ENCOUNTER
Daughter states see asked and you stated it was okay for her to take the ozempic and that is what she has been taking.

## 2021-02-25 NOTE — TELEPHONE ENCOUNTER
Call daughter.  I she taking Ozempic?  If yes, she should not be taking Januvia.  I received refill request for Januvia today.  Let me know.

## 2021-03-02 DIAGNOSIS — Z23 IMMUNIZATION DUE: ICD-10-CM

## 2021-03-05 NOTE — PROGRESS NOTES
RM:________     PCP: Parul Jaime MD    : 1945  AGE: 75 y.o.  EST PATIENT   REASON FOR VISIT/  CC:    BP Readings from Last 3 Encounters:   21 130/70   20 160/60   19 138/80        WT: ____________ BP: __________L __________R HR______    CHEST PAIN: _____________    SOA: _____________PALPS: _______________     LIGHTHEADED: ___________FATIGUE: ________________ EDEMA __________    ALLERGIES:Enalapril, Enalapril maleate, Memantine hcl, and Penicillins SMOKING HISTORY:  Social History     Tobacco Use   • Smoking status: Former Smoker   • Smokeless tobacco: Never Used   Substance Use Topics   • Alcohol use: No     Comment: CAFFEINE USE/ SOFT DRINKS.    • Drug use: No     CAFFEINE USE_________________  ALCOHOL ______________________    Below is the patient's most recent value for Albumin, ALT, AST, BUN, Calcium, Chloride, Cholesterol, CO2, Creatinine, GFR, Glucose, HDL, Hematocrit, Hemoglobin, Hemoglobin A1C, LDL, Magnesium, Phosphorus, Platelets, Potassium, PSA, Sodium, Triglycerides, TSH and WBC.   Lab Results   Component Value Date    ALBUMIN 3.80 2020    ALT 34 (H) 2020    AST 31 2020    BUN 16 2020    CALCIUM 9.7 2020     2020    CO2 26.8 2020    CREATININE 1.07 (H) 2020     (H) 2020    HDL 84 (H) 2020    HCT 38.3 2020    HGB 12.5 2020    HGBA1C 6.9 2020    LDL 87 2020     2020    K 5.0 2020     2020    TRIG 75 2020    TSH 1.930 2020    WBC 7.99 2020          NEW DIAGNOSIS/ SURGERY/ HOSP OR ED VISITS: ______________________    __________________________________________________________________      RECENT LABS OR DIAGNOSTIC TESTING:  _____________________________    __________________________________________________________________      ASSESSMENT/ PLAN:  _______________________________________________    __________________________________________________________________

## 2021-03-23 RX ORDER — EMPAGLIFLOZIN 10 MG/1
TABLET, FILM COATED ORAL
Qty: 90 TABLET | Refills: 7 | Status: SHIPPED | OUTPATIENT
Start: 2021-03-23 | End: 2022-03-28

## 2021-03-24 ENCOUNTER — TELEPHONE (OUTPATIENT)
Dept: INTERNAL MEDICINE | Facility: CLINIC | Age: 76
End: 2021-03-24

## 2021-03-24 RX ORDER — DONEPEZIL HYDROCHLORIDE 10 MG/1
10 TABLET, FILM COATED ORAL DAILY
Qty: 90 TABLET | Refills: 3 | Status: SHIPPED | OUTPATIENT
Start: 2021-03-24 | End: 2022-08-15

## 2021-03-24 RX ORDER — DONEPEZIL HYDROCHLORIDE 10 MG/1
10 TABLET, FILM COATED ORAL DAILY
Qty: 90 TABLET | Refills: 3 | Status: CANCELLED
Start: 2021-03-24

## 2021-03-24 RX ORDER — METOPROLOL SUCCINATE 50 MG/1
TABLET, EXTENDED RELEASE ORAL
Qty: 180 TABLET | Refills: 0 | Status: SHIPPED | OUTPATIENT
Start: 2021-03-24 | End: 2021-06-28

## 2021-03-24 NOTE — TELEPHONE ENCOUNTER
Caller: Flaca Mathis    Relationship: Emergency Contact    Best call back number:     Medication needed:   Requested Prescriptions     Pending Prescriptions Disp Refills   • donepezil (ARICEPT) 10 MG tablet 90 tablet 3     Sig: Take 1 tablet by mouth Daily.       When do you need the refill by: ASAP      Does the patient have less than a 3 day supply:  [x] Yes  [] No    What is the patient's preferred pharmacy: [address] [phone] [facility] [phone]

## 2021-03-25 ENCOUNTER — OFFICE VISIT (OUTPATIENT)
Dept: CARDIOLOGY | Facility: CLINIC | Age: 76
End: 2021-03-25

## 2021-03-25 VITALS
HEART RATE: 60 BPM | HEIGHT: 62 IN | WEIGHT: 172 LBS | SYSTOLIC BLOOD PRESSURE: 128 MMHG | DIASTOLIC BLOOD PRESSURE: 62 MMHG | BODY MASS INDEX: 31.65 KG/M2

## 2021-03-25 DIAGNOSIS — I10 ESSENTIAL HYPERTENSION: ICD-10-CM

## 2021-03-25 DIAGNOSIS — I48.0 PAROXYSMAL ATRIAL FIBRILLATION (HCC): Primary | ICD-10-CM

## 2021-03-25 PROCEDURE — 99213 OFFICE O/P EST LOW 20 MIN: CPT | Performed by: INTERNAL MEDICINE

## 2021-03-25 PROCEDURE — 93000 ELECTROCARDIOGRAM COMPLETE: CPT | Performed by: INTERNAL MEDICINE

## 2021-03-25 RX ORDER — MULTIVIT WITH MINERALS/LUTEIN
250 TABLET ORAL DAILY
COMMUNITY

## 2021-03-25 NOTE — PROGRESS NOTES
Date of Office Visit: 2021  Encounter Provider: Mode Gongora MD  Place of Service: [facility] CARDIOLOGY  Patient Name: Kassi Mathis  :1945    Chief Complaint   Patient presents with   • Atrial Fibrillation   :     HPI: Kassi Mathis is a 75 y.o. female who follows up. I have reviewed prior notes and there are no changes except for any new updates described below. I have also reviewed any information entered into the medical record by the patient or by ancillary staff.     She has a long-standing history of high blood pressure. She has paroxysmal atrial fibrillation. She had an echocardiogram in 2015 which revealed normal left ventricular systolic function without pulmonary hypertension or valvular heart disease. She only had grade I diastolic dysfunction. She had a Cardiolite stress test which was normal. She was markedly hypertensive at that time and she was placed on an increased dose of hydralazine/isosorbide as well as furosemide.  The diuretic then had to be stopped because she was getting orthostatic and dehydrated. She was unable to get the combination medication so she was changed to hydralazine alone. That was subsequently tapered off as her BP remained controlled without it and as it caused headaches.  Her daughter checks her BP twice a day at home and it's well controlled.      She has not had any sustained atrial fibrillation. She denies edema, dyspnea, lightheadedness, syncope, or chest pain. She has not had bleeding on dabigatran.  Her memory continues to decline. Her daughter assists with history. She has fallen just a few times, but not regularly.     Past Medical History:   Diagnosis Date   • Allergic rhinitis    • Anemia    • Chronic venous insufficiency    • Dementia (CMS/HCC)    • Depression    • Diabetes mellitus (CMS/HCC)    • Hidradenitis suppurativa    • Hyperlipidemia    • Hypertension    • Osteoarthritis    • Paroxysmal atrial  fibrillation (CMS/HCC)    • Peripheral neuropathy    • Stroke (CMS/HCC)    • Vitamin B 12 deficiency     Boarderline only with normal MMA/Homocysteine/IF antibody       Past Surgical History:   Procedure Laterality Date   • BACK SURGERY     • HYSTERECTOMY      PARTIAL   • KNEE SURGERY Left        Social History     Socioeconomic History   • Marital status:      Spouse name: Not on file   • Number of children: Not on file   • Years of education: Not on file   • Highest education level: Not on file   Tobacco Use   • Smoking status: Former Smoker   • Smokeless tobacco: Never Used   Substance and Sexual Activity   • Alcohol use: No     Comment: CAFFEINE USE/ SOFT DRINKS.    • Drug use: No   • Sexual activity: Defer       Family History   Problem Relation Age of Onset   • Breast cancer Mother    • Stroke Father    • Stroke Maternal Grandmother    • Heart attack Maternal Grandmother    • Heart attack Maternal Grandfather      Review of Systems   Reason unable to perform ROS: telephone/dementia.   Constitutional: Positive for malaise/fatigue.   Cardiovascular: Negative for chest pain, leg swelling and palpitations.   Musculoskeletal: Positive for falls.   Psychiatric/Behavioral: Positive for memory loss.   All other systems reviewed and are negative.      Allergies   Allergen Reactions   • Enalapril Swelling and Other (See Comments)     Other reaction(s): Other: See Comments  Aka vasotec tabs  Aka vasotec tabs  Aka vasotec tabs  Aka vasotec tabs   • Enalapril Maleate Swelling   • Memantine Hcl Other (See Comments)     imbalance  imbalance   • Penicillins Swelling         Current Outpatient Medications:   •  albuterol sulfate  (90 Base) MCG/ACT inhaler, Inhale 2 puffs Every 4 (Four) Hours As Needed for Wheezing., Disp: 1 inhaler, Rfl: 0  •  amLODIPine (NORVASC) 5 MG tablet, TAKE ONE TABLET BY MOUTH DAILY, Disp: 90 tablet, Rfl: 0  •  buPROPion XL (WELLBUTRIN XL) 300 MG 24 hr tablet, Take 1 tablet by mouth  Every Morning., Disp: 90 tablet, Rfl: 1  •  Cholecalciferol (VITAMIN D3 PO), Take  by mouth., Disp: , Rfl:   •  donepezil (ARICEPT) 10 MG tablet, Take 1 tablet by mouth Daily., Disp: 90 tablet, Rfl: 3  •  glucose blood test strip, Use daily for type 2 DM., Disp: 100 each, Rfl: 3  •  Jardiance 10 MG tablet, TAKE ONE TABLET BY MOUTH DAILY, Disp: 90 tablet, Rfl: 7  •  Melatonin 10 MG capsule, Take  by mouth., Disp: , Rfl:   •  metFORMIN ER (GLUCOPHAGE-XR) 500 MG 24 hr tablet, Take 1 tablet by mouth Daily With Breakfast., Disp: 90 tablet, Rfl: 3  •  metoprolol succinate XL (TOPROL-XL) 50 MG 24 hr tablet, TAKE TWO TABLETS BY MOUTH DAILY, Disp: 180 tablet, Rfl: 0  •  Multiple Vitamin (MULTIVITAMINS PO), Take 1 tablet by mouth daily., Disp: , Rfl:   •  Pradaxa 150 MG capsu, TAKE ONE CAPSULE BY MOUTH EVERY 12 HOURS, Disp: 180 capsule, Rfl: 1  •  rosuvastatin (CRESTOR) 20 MG tablet, TAKE ONE TABLET BY MOUTH EVERY NIGHT AT BEDTIME (Patient taking differently: Twice weekly), Disp: 90 tablet, Rfl: 0  •  Semaglutide,0.25 or 0.5MG/DOS, (Ozempic, 0.25 or 0.5 MG/DOSE,) 2 MG/1.5ML solution pen-injector, Inject 0.25 mg under the skin into the appropriate area as directed 1 (One) Time Per Week., Disp: 1 pen, Rfl: 0  •  sertraline (ZOLOFT) 100 MG tablet, Take 200 mg by mouth Daily., Disp: , Rfl:   •  vitamin B-12 (CYANOCOBALAMIN) 1000 MCG tablet, Take 1,000 mcg by mouth Daily., Disp: , Rfl:   •  vitamin C (ASCORBIC ACID) 250 MG tablet, Take 250 mg by mouth Daily., Disp: , Rfl:   •  benzonatate (TESSALON) 200 MG capsule, Take 1 capsule by mouth 3 (Three) Times a Day As Needed for Cough., Disp: 30 capsule, Rfl: 9  •  doxycycline (MONODOX) 100 MG capsule, Take 1 capsule by mouth Every 12 (Twelve) Hours., Disp: 20 capsule, Rfl: 0  •  Myrbetriq 50 MG tablet sustained-release 24 hour 24 hr tablet, TAKE ONE TABLET BY MOUTH DAILY, Disp: 90 tablet, Rfl: 0     Objective:     Vitals:    03/25/21 1402   BP: 128/62   BP Location: Right arm   Pulse:  "60   Weight: 78 kg (172 lb)   Height: 157.5 cm (62\")     Body mass index is 31.46 kg/m².    Physical Exam  Vitals reviewed.   HENT:      Head: Normocephalic.      Nose: Nose normal.      Mouth/Throat:      Comments: masked  Eyes:      Conjunctiva/sclera: Conjunctivae normal.   Cardiovascular:      Rate and Rhythm: Normal rate and regular rhythm.   Pulmonary:      Effort: Pulmonary effort is normal.      Breath sounds: Normal breath sounds.   Abdominal:      Palpations: Abdomen is soft.      Tenderness: There is no abdominal tenderness.   Musculoskeletal:         General: No swelling. Normal range of motion.      Cervical back: Normal range of motion.   Skin:     General: Skin is warm and dry.   Neurological:      General: No focal deficit present.      Mental Status: She is alert.   Psychiatric:         Mood and Affect: Mood normal.         Behavior: Behavior normal.         Cognition and Memory: Memory is impaired.           ECG 12 Lead    Date/Time: 3/25/2021 2:39 PM  Performed by: Mode Gongora MD  Authorized by: Mode Gongora MD   Comparison: compared with previous ECG   Similar to previous ECG  Rhythm: sinus rhythm  Conduction: conduction normal  ST Segments: ST segments normal  T flattening: all  QRS axis: normal  Other findings: non-specific ST-T wave changes    Clinical impression: non-specific ECG              Assessment:       Diagnosis Plan   1. Paroxysmal atrial fibrillation (CMS/HCC)     2. Essential hypertension        Plan:       1.  Atrial Fibrillation and Atrial Flutter  Assessment  • The patient has paroxysmal atrial fibrillation  • This is non-valvular in etiology  • The patient's CHADS2-VASc score is 7  • A [id number] score of 2 or more is considered a high risk for a thromboembolic event  • Dabigatran prescribed    Plan  • Attempt to maintain sinus rhythm  • Continue dabigatran for antithrombotic therapy, bleeding issues discussed  • Continue beta blocker for rhythm control    Her dementia " is progressing slowly.  She has fallen once.  Her daughter knows to let me know if this becomes more problematic, as the risks of anticoagulation may start to outweigh the benefits.    2.  Her SBP is within goal for her age and memory impairment.    Sincerely,       Mode Gongora MD

## 2021-04-06 RX ORDER — SERTRALINE HYDROCHLORIDE 100 MG/1
200 TABLET, FILM COATED ORAL DAILY
Qty: 180 TABLET | Refills: 3 | Status: SHIPPED | OUTPATIENT
Start: 2021-04-06 | End: 2022-04-11

## 2021-04-06 NOTE — TELEPHONE ENCOUNTER
Caller: Kassi Mathis    Relationship: Self    Best call back number: [phone]    Medication needed:   Requested Prescriptions     Pending Prescriptions Disp Refills   • sertraline (ZOLOFT) 100 MG tablet       Sig: Take 2 tablets by mouth Daily.       What is the patient's preferred pharmacy: [facility] [address] [facility] [phone] [facility] [phone] FX

## 2021-04-12 RX ORDER — ATENOLOL 100 MG/1
TABLET ORAL
Qty: 180 CAPSULE | Refills: 3 | Status: SHIPPED | OUTPATIENT
Start: 2021-04-12 | End: 2022-04-15

## 2021-04-13 ENCOUNTER — TELEPHONE (OUTPATIENT)
Dept: INTERNAL MEDICINE | Facility: CLINIC | Age: 76
End: 2021-04-13

## 2021-04-13 NOTE — TELEPHONE ENCOUNTER
[facility]ICATION [facility] ON THE PATIENT.    PLEASE ADVISE: [phone]

## 2021-05-24 RX ORDER — BUPROPION HYDROCHLORIDE 300 MG/1
TABLET ORAL
Qty: 90 TABLET | Refills: 3 | Status: SHIPPED | OUTPATIENT
Start: 2021-05-24 | End: 2021-06-01

## 2021-05-30 DIAGNOSIS — I10 ESSENTIAL HYPERTENSION: ICD-10-CM

## 2021-06-01 RX ORDER — BUPROPION HYDROCHLORIDE 300 MG/1
TABLET ORAL
Qty: 90 TABLET | Refills: 3 | Status: SHIPPED | OUTPATIENT
Start: 2021-06-01 | End: 2022-06-13 | Stop reason: ALTCHOICE

## 2021-06-01 RX ORDER — AMLODIPINE BESYLATE 5 MG/1
TABLET ORAL
Qty: 90 TABLET | Refills: 3 | Status: SHIPPED | OUTPATIENT
Start: 2021-06-01 | End: 2022-05-25

## 2021-06-28 RX ORDER — METOPROLOL SUCCINATE 50 MG/1
TABLET, EXTENDED RELEASE ORAL
Qty: 180 TABLET | Refills: 3 | Status: SHIPPED | OUTPATIENT
Start: 2021-06-28 | End: 2022-06-27

## 2021-06-28 NOTE — TELEPHONE ENCOUNTER
Dr. Gongora Pt:  Out of office:  Hospital Rounding week   [phone]  Please advise.      Received refill request for pt's Metoprolol Succinate 50 mg (two tablets daily).  Pt's last OV 21 to follow up in one yr.  Please see pending rx.

## 2021-07-07 ENCOUNTER — TELEPHONE (OUTPATIENT)
Dept: INTERNAL MEDICINE | Facility: CLINIC | Age: 76
End: 2021-07-07

## 2021-07-07 DIAGNOSIS — M25.50 ARTHRALGIA, UNSPECIFIED JOINT: ICD-10-CM

## 2021-07-07 DIAGNOSIS — E11.22 TYPE 2 DIABETES MELLITUS WITH DIABETIC CHRONIC KIDNEY DISEASE, UNSPECIFIED CKD STAGE, UNSPECIFIED WHETHER LONG TERM INSULIN USE (HCC): Primary | ICD-10-CM

## 2021-07-07 NOTE — TELEPHONE ENCOUNTER
Caller: Flaca Mathis    Relationship: Emergency Contact    Best call back number: [phone]     What orders are you requesting (i.e. lab or imaging): LAB    In what timeframe would the patient need to come in: TOMORROW     Where will you receive your lab/imaging services: FAX TO DAUGHTER F:[phone]    Additional notes: PATIENTS DAUGHTER WOULD LIKE TO HAVE HER ANTI- CCP TESTED SHE STATED IF  HAS ANY QUESTIONS AS TO WHY TO PLEASE GIVER HER A CALL

## 2021-07-12 ENCOUNTER — OFFICE VISIT (OUTPATIENT)
Dept: INTERNAL MEDICINE | Facility: CLINIC | Age: 76
End: 2021-07-12

## 2021-07-12 VITALS
HEART RATE: 60 BPM | OXYGEN SATURATION: 98 % | DIASTOLIC BLOOD PRESSURE: 70 MMHG | WEIGHT: 154 LBS | BODY MASS INDEX: 28.34 KG/M2 | SYSTOLIC BLOOD PRESSURE: 124 MMHG | HEIGHT: 62 IN

## 2021-07-12 DIAGNOSIS — R22.41 MASS OF RIGHT LOWER EXTREMITY: Primary | ICD-10-CM

## 2021-07-12 DIAGNOSIS — R22.42 MASS OF LEFT LOWER EXTREMITY: ICD-10-CM

## 2021-07-12 PROCEDURE — 99213 OFFICE O/P EST LOW 20 MIN: CPT | Performed by: INTERNAL MEDICINE

## 2021-07-12 NOTE — PROGRESS NOTES
Subjective     Kassi Mathis is a 76 y.o. female who presents with   Chief Complaint   Patient presents with   • Sophia on thight       History of Present Illness     Patient presents with masses on the lower extremities.  One on the RLE and one on the LLE.  The one on the LLE is tender.  Unknown how long present of if growing.  Her dementia makes her a poor historian.        Review of Systems   Respiratory: Negative.    Cardiovascular: Negative.        The following portions of the patient's history were reviewed and updated as appropriate: allergies, current medications and problem list.    Patient Active Problem List    Diagnosis Date Noted   • Type 2 diabetes mellitus (CMS/[facility]) 01/17/2016     Priority: High   • Osteoarthritis of right knee 09/08/2020   • Cervical radiculopathy 10/08/2018   • Hammer toes of both feet 10/17/2017   • Depression 01/17/2016   • Allergic rhinitis 01/17/2016   • Hypertension 01/17/2016   • Dementia without behavioral disturbance (CMS/[facility]) 01/17/2016     Note Last Updated: 7/20/2016     By neuropysch evaluation 7/20/2016     • Paroxysmal atrial fibrillation (CMS/[facility]) 01/17/2016   • Cobalamin deficiency 01/17/2016     Note Last Updated: 6/8/2016     Boarderline only with normal MMA/Homocysteine/IF antibody.  Encouraged MVI daily.       • Hyperlipidemia 01/17/2016       Current Outpatient Medications on File Prior to Visit   Medication Sig Dispense Refill   • albuterol sulfate  (90 Base) MCG/ACT inhaler Inhale 2 puffs Every 4 (Four) Hours As Needed for Wheezing. 1 inhaler 0   • amLODIPine (NORVASC) 5 MG tablet TAKE ONE TABLET BY MOUTH DAILY 90 tablet 3   • buPROPion XL (WELLBUTRIN XL) 300 MG 24 hr tablet TAKE ONE TABLET BY MOUTH EVERY MORNING 90 tablet 3   • Cholecalciferol (VITAMIN D3 PO) Take  by mouth.     • donepezil (ARICEPT) 10 MG tablet Take 1 tablet by mouth Daily. 90 tablet 3   • glucose blood test strip Use daily for type 2 DM. 100 each 3   • Jardiance 10 MG tablet TAKE  "ONE TABLET BY MOUTH DAILY 90 tablet 7   • Melatonin 10 MG capsule Take  by mouth.     • metFORMIN ER (GLUCOPHAGE-XR) 500 MG 24 hr tablet Take 1 tablet by mouth Daily With Breakfast. 90 tablet 3   • metoprolol succinate XL (TOPROL-XL) 50 MG 24 hr tablet TAKE TWO TABLETS BY MOUTH DAILY 180 tablet 3   • Multiple Vitamin (MULTIVITAMINS PO) Take 1 tablet by mouth daily.     • Pradaxa 150 MG capsu TAKE ONE CAPSULE BY MOUTH EVERY 12 HOURS 180 capsule 3   • rosuvastatin (CRESTOR) 20 MG tablet TAKE ONE TABLET BY MOUTH EVERY NIGHT AT BEDTIME (Patient taking differently: Twice weekly) 90 tablet 0   • Semaglutide,0.25 or 0.5MG/DOS, (Ozempic, 0.25 or 0.5 MG/DOSE,) 2 MG/1.5ML solution pen-injector Inject 0.25 mg under the skin into the appropriate area as directed 1 (One) Time Per Week. 1 pen 0   • sertraline (ZOLOFT) 100 MG tablet Take 2 tablets by mouth Daily. 180 tablet 3   • vitamin B-12 (CYANOCOBALAMIN) 1000 MCG tablet Take 1,000 mcg by mouth Daily.     • vitamin C (ASCORBIC ACID) 250 MG tablet Take 250 mg by mouth Daily.       No current facility-administered medications on file prior to visit.       Objective     /70   Pulse 60   Ht 157.5 cm (62.01\")   Wt 69.9 kg (154 lb)   LMP  (LMP Unknown)   SpO2 98%   BMI 28.16 kg/m²     Physical Exam  Constitutional:       Appearance: She is well-developed.   HENT:      Head: Normocephalic and atraumatic.   Pulmonary:      Effort: Pulmonary effort is normal.   Musculoskeletal:      Comments: Golf ball sized mass lateral right thigh non painful and mobile.  No induration.  No drainage.   Smaller mass left thigh.       Neurological:      Mental Status: She is alert and oriented to person, place, and time.   Psychiatric:         Behavior: Behavior normal.         Assessment/Plan   Diagnoses and all orders for this visit:    1. Mass of right lower extremity (Primary)  -     Ambulatory Referral to General Surgery    2. Mass of left lower extremity  -     Ambulatory Referral to " General Surgery        Discussion    Patient presents with masses of her lower extremities most likely c/w lipomas.  I would like her to see general surgery for opinion on this.         Future Appointments   Date Time Provider Department Center   8/2/2021  1:30 PM Parul Jaime MD [facility]   3/24/2022  1:30 PM Mode Gongora MD [facility]

## 2021-07-13 LAB
ALBUMIN SERPL-MCNC: 4 G/DL (ref 3.7–4.7)
ALBUMIN/GLOB SERPL: 1.7 {RATIO} (ref 1.2–2.2)
ALP SERPL-CCNC: 61 IU/L (ref 48–121)
ALT SERPL-CCNC: 27 IU/L (ref 0–32)
AST SERPL-CCNC: 35 IU/L (ref 0–40)
BASOPHILS # BLD AUTO: 0 X10E3/UL (ref 0–0.2)
BASOPHILS NFR BLD AUTO: 0 %
BILIRUB SERPL-MCNC: 0.6 MG/DL (ref 0–1.2)
BUN SERPL-MCNC: 11 MG/DL (ref 8–27)
BUN/CREAT SERPL: 11 (ref 12–28)
CALCIUM SERPL-MCNC: 9.3 MG/DL (ref 8.7–10.3)
CCP IGA+IGG SERPL IA-ACNC: 5 UNITS (ref 0–19)
CHLORIDE SERPL-SCNC: 101 MMOL/L (ref 96–106)
CHOLEST SERPL-MCNC: 173 MG/DL (ref 100–199)
CO2 SERPL-SCNC: 26 MMOL/L (ref 20–29)
CREAT SERPL-MCNC: 0.99 MG/DL (ref 0.57–1)
EOSINOPHIL # BLD AUTO: 0.1 X10E3/UL (ref 0–0.4)
EOSINOPHIL NFR BLD AUTO: 2 %
ERYTHROCYTE [DISTWIDTH] IN BLOOD BY AUTOMATED COUNT: 13.9 % (ref 11.7–15.4)
GLOBULIN SER CALC-MCNC: 2.4 G/DL (ref 1.5–4.5)
GLUCOSE SERPL-MCNC: ABNORMAL MG/DL
HBA1C MFR BLD: 5.9 % (ref 4.8–5.6)
HCT VFR BLD AUTO: 39.2 % (ref 34–46.6)
HDLC SERPL-MCNC: 67 MG/DL
HGB BLD-MCNC: 12.8 G/DL (ref 11.1–15.9)
IMM GRANULOCYTES # BLD AUTO: 0 X10E3/UL (ref 0–0.1)
IMM GRANULOCYTES NFR BLD AUTO: 0 %
LDLC SERPL CALC-MCNC: 88 MG/DL (ref 0–99)
LYMPHOCYTES # BLD AUTO: 2.6 X10E3/UL (ref 0.7–3.1)
LYMPHOCYTES NFR BLD AUTO: 36 %
MCH RBC QN AUTO: 29.7 PG (ref 26.6–33)
MCHC RBC AUTO-ENTMCNC: 32.7 G/DL (ref 31.5–35.7)
MCV RBC AUTO: 91 FL (ref 79–97)
MONOCYTES # BLD AUTO: 0.6 X10E3/UL (ref 0.1–0.9)
MONOCYTES NFR BLD AUTO: 9 %
NEUTROPHILS # BLD AUTO: 3.9 X10E3/UL (ref 1.4–7)
NEUTROPHILS NFR BLD AUTO: 53 %
PLATELET # BLD AUTO: 265 X10E3/UL (ref 150–450)
POTASSIUM SERPL-SCNC: ABNORMAL MMOL/L
PROT SERPL-MCNC: 6.4 G/DL (ref 6–8.5)
RBC # BLD AUTO: 4.31 X10E6/UL (ref 3.77–5.28)
SODIUM SERPL-SCNC: 142 MMOL/L (ref 134–144)
TRIGL SERPL-MCNC: 102 MG/DL (ref 0–149)
TSH SERPL DL<=0.005 MIU/L-ACNC: 2.31 UIU/ML (ref 0.45–4.5)
UNABLE TO VOID: NORMAL
VLDLC SERPL CALC-MCNC: 18 MG/DL (ref 5–40)
WBC # BLD AUTO: 7.4 X10E3/UL (ref 3.4–10.8)

## 2021-08-02 ENCOUNTER — OFFICE VISIT (OUTPATIENT)
Dept: INTERNAL MEDICINE | Facility: CLINIC | Age: 76
End: 2021-08-02

## 2021-08-02 VITALS
BODY MASS INDEX: 27.42 KG/M2 | OXYGEN SATURATION: 97 % | SYSTOLIC BLOOD PRESSURE: 146 MMHG | WEIGHT: 149 LBS | DIASTOLIC BLOOD PRESSURE: 70 MMHG | HEART RATE: 60 BPM | HEIGHT: 62 IN

## 2021-08-02 DIAGNOSIS — E78.5 HYPERLIPIDEMIA, UNSPECIFIED HYPERLIPIDEMIA TYPE: ICD-10-CM

## 2021-08-02 DIAGNOSIS — I10 ESSENTIAL HYPERTENSION: ICD-10-CM

## 2021-08-02 DIAGNOSIS — G30.9 ALZHEIMER'S DEMENTIA WITHOUT BEHAVIORAL DISTURBANCE, UNSPECIFIED TIMING OF DEMENTIA ONSET: ICD-10-CM

## 2021-08-02 DIAGNOSIS — F33.1 MAJOR DEPRESSIVE DISORDER, RECURRENT, MODERATE (HCC): ICD-10-CM

## 2021-08-02 DIAGNOSIS — E11.22 TYPE 2 DIABETES MELLITUS WITH DIABETIC CHRONIC KIDNEY DISEASE, UNSPECIFIED CKD STAGE, UNSPECIFIED WHETHER LONG TERM INSULIN USE (HCC): ICD-10-CM

## 2021-08-02 DIAGNOSIS — F02.80 ALZHEIMER'S DEMENTIA WITHOUT BEHAVIORAL DISTURBANCE, UNSPECIFIED TIMING OF DEMENTIA ONSET: ICD-10-CM

## 2021-08-02 DIAGNOSIS — Z00.00 MEDICARE ANNUAL WELLNESS VISIT, SUBSEQUENT: Primary | ICD-10-CM

## 2021-08-02 PROCEDURE — 99214 OFFICE O/P EST MOD 30 MIN: CPT | Performed by: INTERNAL MEDICINE

## 2021-08-02 PROCEDURE — G0439 PPPS, SUBSEQ VISIT: HCPCS | Performed by: INTERNAL MEDICINE

## 2021-08-02 NOTE — PROGRESS NOTES
The ABCs of the Annual Wellness Visit  Subsequent Medicare Wellness Visit    Chief Complaint   Patient presents with   • Medicare Wellness-subsequent       Subjective   History of Present Illness:  Kassi Mathis is a 76 y.o. female who presents for a Subsequent Medicare Wellness Visit.    The following data was reviewed by: Parul Jaime MD on 08/02/2021:  Common labs    Common Labsle 12/18/20 7/9/21 7/9/21 7/9/21 7/9/21     1514 1514 1514 1514   Glucose   CANCELED     BUN   11     Creatinine   0.99     eGFR Non  Am   55 (A)     eGFR African Am   64     Sodium   142     Potassium   CANCELED     Chloride   101     Calcium   9.3     Total Protein   6.4     Albumin   4.0     Total Bilirubin   0.6     Alkaline Phosphatase   61     AST (SGOT)   35     ALT (SGPT)   27     WBC  7.4      Hemoglobin  12.8      Hematocrit  39.2      Platelets  265      Total Cholesterol    173    Triglycerides    102    HDL Cholesterol    67    LDL Cholesterol     88    Hemoglobin A1C 6.9    5.9 (A)   (A) Abnormal value       Comments are available for some flowsheets but are not being displayed.           Dm-2.  Control is good.  HTN.  Control has been good at home.   HLD.  Control is good on Crestor.   MDD.  Feels well on current.  Dementia.  Stable on Aricept.         HEALTH RISK ASSESSMENT    Recent Hospitalizations:  No hospitalization(s) within the last year.    Current Medical Providers:  Patient Care Team:  Parul Jaime MD as PCP - General (Internal Medicine)    Smoking Status:  Social History     Tobacco Use   Smoking Status Former Smoker   Smokeless Tobacco Never Used       Alcohol Consumption:  Social History     Substance and Sexual Activity   Alcohol Use No    Comment: CAFFEINE USE/ SOFT DRINKS.        Depression Screen:   PHQ-2/PHQ-9 Depression Screening 8/2/2021   Little interest or pleasure in doing things 0   Feeling down, depressed, or hopeless 0   Trouble falling or staying asleep, or sleeping too much -    Feeling tired or having little energy -   Poor appetite or overeating -   Feeling bad about yourself - or that you are a failure or have let yourself or your family down -   Trouble concentrating on things, such as reading the newspaper or watching television -   Moving or speaking so slowly that other people could have noticed. Or the opposite - being so fidgety or restless that you have been moving around a lot more than usual -   Thoughts that you would be better off dead, or of hurting yourself in some way -   Total Score 0   If you checked off any problems, how difficult have these problems made it for you to do your work, take care of things at home, or get along with other people? -       Fall Risk Screen:  DAIJA Fall Risk Assessment was completed, and patient is at MODERATE risk for falls. Assessment completed on:8/2/2021    Health Habits and Functional and Cognitive Screening:  Functional & Cognitive Status 8/2/2021   Do you have difficulty preparing food and eating? No   Do you have difficulty bathing yourself, getting dressed or grooming yourself? No   Do you have difficulty using the toilet? No   Do you have difficulty moving around from place to place? Yes   Do you have trouble with steps or getting out of a bed or a chair? Yes   Current Diet Well Balanced Diet   Dental Exam Up to date   Eye Exam Up to date   Exercise (times per week) 0 times per week   Current Exercises Include No Regular Exercise   Current Exercise Activities Include -   Do you need help using the phone?  No   Are you deaf or do you have serious difficulty hearing?  Yes   Do you need help with transportation? Yes   Do you need help shopping? Yes   Do you need help preparing meals?  Yes   Do you need help with housework?  Yes   Do you need help with laundry? Yes   Do you need help taking your medications? No   Do you need help managing money? Yes   Do you ever drive or ride in a car without wearing a seat belt? No   Have you felt  unusual stress, anger or loneliness in the last month? No   Who do you live with? Child   If you need help, do you have trouble finding someone available to you? No   Have you been bothered in the last four weeks by sexual problems? No   Do you have difficulty concentrating, remembering or making decisions? No         Does the patient have evidence of cognitive impairment? Yes    Asprin use counseling:Does not need ASA (and currently is not on it)    Age-appropriate Screening Schedule:  Refer to the list below for future screening recommendations based on patient's age, sex and/or medical conditions. Orders for these recommended tests are listed in the plan section. The patient has been provided with a written plan.    Health Maintenance   Topic Date Due   • TDAP/TD VACCINES (1 - Tdap) Never done   • ZOSTER VACCINE (2 of 3) 09/08/2015   • URINE MICROALBUMIN  10/11/2018   • MAMMOGRAM  08/27/2021   • DXA SCAN  09/16/2021   • INFLUENZA VACCINE  10/01/2021   • DIABETIC EYE EXAM  01/08/2022   • HEMOGLOBIN A1C  01/09/2022   • LIPID PANEL  07/09/2022          The following portions of the patient's history were reviewed and updated as appropriate: allergies, current medications, past family history, past medical history, past social history, past surgical history and problem list.    Outpatient Medications Prior to Visit   Medication Sig Dispense Refill   • albuterol sulfate  (90 Base) MCG/ACT inhaler Inhale 2 puffs Every 4 (Four) Hours As Needed for Wheezing. 1 inhaler 0   • amLODIPine (NORVASC) 5 MG tablet TAKE ONE TABLET BY MOUTH DAILY 90 tablet 3   • buPROPion XL (WELLBUTRIN XL) 300 MG 24 hr tablet TAKE ONE TABLET BY MOUTH EVERY MORNING 90 tablet 3   • Cholecalciferol (VITAMIN D3 PO) Take  by mouth.     • donepezil (ARICEPT) 10 MG tablet Take 1 tablet by mouth Daily. 90 tablet 3   • glucose blood test strip Use daily for type 2 DM. 100 each 3   • Melatonin 10 MG capsule Take  by mouth.     • metoprolol succinate  XL (TOPROL-XL) 50 MG 24 hr tablet TAKE TWO TABLETS BY MOUTH DAILY 180 tablet 3   • Multiple Vitamin (MULTIVITAMINS PO) Take 1 tablet by mouth daily.     • Pradaxa 150 MG capsu TAKE ONE CAPSULE BY MOUTH EVERY 12 HOURS 180 capsule 3   • rosuvastatin (CRESTOR) 20 MG tablet TAKE ONE TABLET BY MOUTH EVERY NIGHT AT BEDTIME (Patient taking differently: Twice weekly) 90 tablet 0   • Semaglutide,0.25 or 0.5MG/DOS, (Ozempic, 0.25 or 0.5 MG/DOSE,) 2 MG/1.5ML solution pen-injector Inject 0.25 mg under the skin into the appropriate area as directed 1 (One) Time Per Week. 1 pen 0   • sertraline (ZOLOFT) 100 MG tablet Take 2 tablets by mouth Daily. 180 tablet 3   • vitamin B-12 (CYANOCOBALAMIN) 1000 MCG tablet Take 1,000 mcg by mouth Daily.     • vitamin C (ASCORBIC ACID) 250 MG tablet Take 250 mg by mouth Daily.     • Jardiance 10 MG tablet TAKE ONE TABLET BY MOUTH DAILY 90 tablet 7   • metFORMIN ER (GLUCOPHAGE-XR) 500 MG 24 hr tablet Take 1 tablet by mouth Daily With Breakfast. 90 tablet 3     No facility-administered medications prior to visit.       Patient Active Problem List   Diagnosis   • Depression   • Allergic rhinitis   • Hypertension   • Dementia without behavioral disturbance (CMS/HCC)   • Paroxysmal atrial fibrillation (CMS/HCC)   • Cobalamin deficiency   • Type 2 diabetes mellitus (CMS/HCC)   • Hyperlipidemia   • Hammer toes of both feet   • Cervical radiculopathy   • Osteoarthritis of right knee       Advanced Care Planning:  ACP discussion was held with the patient during this visit. Patient does not have an advance directive, information provided.    Review of Systems   Respiratory: Negative.    Cardiovascular: Negative.    Neurological: Positive for headaches.       Compared to one year ago, the patient feels her physical health is the same.  Compared to one year ago, the patient feels her mental health is the same.    Reviewed chart for potential of high risk medication in the elderly: yes  Reviewed chart for  "potential of harmful drug interactions in the elderly:yes    Objective         Vitals:    08/02/21 1319   BP: 146/70   Pulse: 60   SpO2: 97%   Weight: 67.6 kg (149 lb)   Height: 157.5 cm (62.01\")   PainSc: 0-No pain       Body mass index is 27.25 kg/m².  Discussed the patient's BMI with her. The BMI is in the acceptable range.    Physical Exam  Constitutional:       Appearance: She is well-developed.   HENT:      Head: Normocephalic and atraumatic.      Right Ear: Hearing, tympanic membrane and external ear normal.      Left Ear: Hearing, tympanic membrane and external ear normal.      Nose: Nose normal.   Neck:      Thyroid: No thyromegaly.   Cardiovascular:      Rate and Rhythm: Normal rate and regular rhythm.      Heart sounds: Normal heart sounds. No murmur heard.     Pulmonary:      Effort: Pulmonary effort is normal.      Breath sounds: Normal breath sounds.   Chest:      Breasts:         Right: No mass.         Left: No mass.   Abdominal:      General: There is no distension.      Palpations: Abdomen is soft.      Tenderness: There is no abdominal tenderness.   Musculoskeletal:      Cervical back: Neck supple.   Lymphadenopathy:      Cervical: No cervical adenopathy.   Skin:     General: Skin is warm and dry.   Neurological:      Mental Status: She is alert and oriented to person, place, and time.   Psychiatric:         Speech: Speech normal.         Behavior: Behavior normal.         Thought Content: Thought content normal.         Judgment: Judgment normal.         Lab Results   Component Value Date    GLU CANCELED 07/09/2021    CHLPL 173 07/09/2021    TRIG 102 07/09/2021    HDL 67 07/09/2021    LDL 88 07/09/2021    VLDL 18 07/09/2021    HGBA1C 5.9 (H) 07/09/2021        Assessment/Plan   Medicare Risks and Personalized Health Plan  CMS Preventative Services Quick Reference  Immunizations Discussed/Encouraged (specific immunizations; Tdap, Shingrix and COVID19 )    The above risks/problems have been " discussed with the patient.  Pertinent information has been shared with the patient in the After Visit Summary.  Follow up plans and orders are seen below in the Assessment/Plan Section.    Diagnoses and all orders for this visit:    1. Medicare annual wellness visit, subsequent (Primary)    2. Type 2 diabetes mellitus with diabetic chronic kidney disease, unspecified CKD stage, unspecified whether long term insulin use (CMS/[facility])    3. Essential hypertension    4. Hyperlipidemia, unspecified hyperlipidemia type    5. Alzheimer's dementia without behavioral disturbance, unspecified timing of dementia onset (CMS/[facility])    6. Major depressive disorder, recurrent, moderate (CMS/[facility])    Dm-2.  D/c Ozempic and monitor.   HTN.  Control is good at home.  The patient is advised to continue current dosage of metoprolol and norvasc.    HLD.  Control is good.  The patient is advised to continue current dosage of Crestor.   MDD.  Control is good.  The patient is advised to continue current dosage of Wellbutrin and Zoloft.    Dementia.  Continue Aricept.      Follow Up:  Return in about 6 months (around 2/2/2022) for Recheck.     An After Visit Summary and PPPS were given to the patient.

## 2021-08-02 NOTE — PATIENT INSTRUCTIONS

## 2021-08-09 ENCOUNTER — TELEPHONE (OUTPATIENT)
Dept: INTERNAL MEDICINE | Facility: CLINIC | Age: 76
End: 2021-08-09

## 2021-08-09 DIAGNOSIS — F02.80 ALZHEIMER'S DEMENTIA WITHOUT BEHAVIORAL DISTURBANCE, UNSPECIFIED TIMING OF DEMENTIA ONSET: Primary | ICD-10-CM

## 2021-08-09 DIAGNOSIS — G30.9 ALZHEIMER'S DEMENTIA WITHOUT BEHAVIORAL DISTURBANCE, UNSPECIFIED TIMING OF DEMENTIA ONSET: Primary | ICD-10-CM

## 2021-08-09 NOTE — TELEPHONE ENCOUNTER
Caller: Flaca Mathis    Relationship: Emergency Contact    Best call back number: [phone] (M)    What is the medical concern/diagnosis: ALZHEIMERS    What specialty or service is being requested: NEUROLOGY    What is the provider, practice or medical service name: [facility]    What is the office location: UNKNOWN    What is the office phone number: [phone]    Any additional details: PATIENT'S DAUGHTER IS REQUESTING THIS REFERRAL, PLEASE ADVISE WHEN READY

## 2021-08-25 RX ORDER — ROSUVASTATIN CALCIUM 20 MG/1
TABLET, COATED ORAL
Qty: 90 TABLET | Refills: 0 | Status: SHIPPED | OUTPATIENT
Start: 2021-08-25 | End: 2021-11-29

## 2021-09-27 ENCOUNTER — APPOINTMENT (OUTPATIENT)
Dept: CT IMAGING | Facility: HOSPITAL | Age: 76
End: 2021-09-27

## 2021-09-27 ENCOUNTER — APPOINTMENT (OUTPATIENT)
Dept: GENERAL RADIOLOGY | Facility: HOSPITAL | Age: 76
End: 2021-09-27

## 2021-09-27 ENCOUNTER — HOSPITAL ENCOUNTER (EMERGENCY)
Facility: HOSPITAL | Age: 76
Discharge: HOME | End: 2021-09-27
Attending: EMERGENCY MEDICINE | Admitting: EMERGENCY MEDICINE

## 2021-09-27 VITALS
HEART RATE: 67 BPM | BODY MASS INDEX: 27.6 KG/M2 | WEIGHT: 150 LBS | SYSTOLIC BLOOD PRESSURE: 179 MMHG | DIASTOLIC BLOOD PRESSURE: 80 MMHG | TEMPERATURE: 96.6 F | OXYGEN SATURATION: 100 % | HEIGHT: 62 IN | RESPIRATION RATE: 18 BRPM

## 2021-09-27 DIAGNOSIS — S09.90XA INJURY OF HEAD, INITIAL ENCOUNTER: ICD-10-CM

## 2021-09-27 DIAGNOSIS — S43.004A SHOULDER DISLOCATION, RIGHT, INITIAL ENCOUNTER: Primary | ICD-10-CM

## 2021-09-27 DIAGNOSIS — R91.1 PULMONARY NODULE: ICD-10-CM

## 2021-09-27 LAB
ALBUMIN SERPL-MCNC: 4.2 G/DL (ref 3.5–5.2)
ALBUMIN/GLOB SERPL: 1.4 G/DL
ALP SERPL-CCNC: 80 U/L (ref 39–117)
ALT SERPL W P-5'-P-CCNC: 56 U/L (ref 1–33)
ANION GAP SERPL CALCULATED.3IONS-SCNC: 10.9 MMOL/L (ref 5–15)
AST SERPL-CCNC: 49 U/L (ref 1–32)
BASOPHILS # BLD AUTO: 0.05 10*3/MM3 (ref 0–0.2)
BASOPHILS NFR BLD AUTO: 0.4 % (ref 0–1.5)
BILIRUB SERPL-MCNC: 0.4 MG/DL (ref 0–1.2)
BUN SERPL-MCNC: 14 MG/DL (ref 8–23)
BUN/CREAT SERPL: 18.2 (ref 7–25)
CALCIUM SPEC-SCNC: 9.6 MG/DL (ref 8.6–10.5)
CHLORIDE SERPL-SCNC: 100 MMOL/L (ref 98–107)
CO2 SERPL-SCNC: 28.1 MMOL/L (ref 22–29)
CREAT SERPL-MCNC: 0.77 MG/DL (ref 0.57–1)
DEPRECATED RDW RBC AUTO: 47.8 FL (ref 37–54)
EOSINOPHIL # BLD AUTO: 0.04 10*3/MM3 (ref 0–0.4)
EOSINOPHIL NFR BLD AUTO: 0.3 % (ref 0.3–6.2)
ERYTHROCYTE [DISTWIDTH] IN BLOOD BY AUTOMATED COUNT: 13.7 % (ref 12.3–15.4)
GFR SERPL CREATININE-BSD FRML MDRD: 88 ML/MIN/1.73
GLOBULIN UR ELPH-MCNC: 3.1 GM/DL
GLUCOSE SERPL-MCNC: 175 MG/DL (ref 65–99)
HCT VFR BLD AUTO: 43.1 % (ref 34–46.6)
HGB BLD-MCNC: 13.6 G/DL (ref 12–15.9)
IMM GRANULOCYTES # BLD AUTO: 0.05 10*3/MM3 (ref 0–0.05)
IMM GRANULOCYTES NFR BLD AUTO: 0.4 % (ref 0–0.5)
LYMPHOCYTES # BLD AUTO: 1.55 10*3/MM3 (ref 0.7–3.1)
LYMPHOCYTES NFR BLD AUTO: 13 % (ref 19.6–45.3)
MCH RBC QN AUTO: 29.8 PG (ref 26.6–33)
MCHC RBC AUTO-ENTMCNC: 31.6 G/DL (ref 31.5–35.7)
MCV RBC AUTO: 94.3 FL (ref 79–97)
MONOCYTES # BLD AUTO: 0.47 10*3/MM3 (ref 0.1–0.9)
MONOCYTES NFR BLD AUTO: 3.9 % (ref 5–12)
NEUTROPHILS NFR BLD AUTO: 82 % (ref 42.7–76)
NEUTROPHILS NFR BLD AUTO: 9.75 10*3/MM3 (ref 1.7–7)
NRBC BLD AUTO-RTO: 0 /100 WBC (ref 0–0.2)
PLATELET # BLD AUTO: 299 10*3/MM3 (ref 140–450)
PMV BLD AUTO: 10.3 FL (ref 6–12)
POTASSIUM SERPL-SCNC: 4.1 MMOL/L (ref 3.5–5.2)
PROT SERPL-MCNC: 7.3 G/DL (ref 6–8.5)
RBC # BLD AUTO: 4.57 10*6/MM3 (ref 3.77–5.28)
SODIUM SERPL-SCNC: 139 MMOL/L (ref 136–145)
WBC # BLD AUTO: 11.91 10*3/MM3 (ref 3.4–10.8)

## 2021-09-27 PROCEDURE — 85025 COMPLETE CBC W/AUTO DIFF WBC: CPT | Performed by: EMERGENCY MEDICINE

## 2021-09-27 PROCEDURE — 96374 THER/PROPH/DIAG INJ IV PUSH: CPT

## 2021-09-27 PROCEDURE — 73030 X-RAY EXAM OF SHOULDER: CPT

## 2021-09-27 PROCEDURE — 25010000002 PROPOFOL 10 MG/ML EMULSION: Performed by: EMERGENCY MEDICINE

## 2021-09-27 PROCEDURE — 80053 COMPREHEN METABOLIC PANEL: CPT | Performed by: EMERGENCY MEDICINE

## 2021-09-27 PROCEDURE — 99152 MOD SED SAME PHYS/QHP 5/>YRS: CPT

## 2021-09-27 PROCEDURE — 25010000002 MORPHINE PER 10 MG: Performed by: EMERGENCY MEDICINE

## 2021-09-27 PROCEDURE — 70450 CT HEAD/BRAIN W/O DYE: CPT

## 2021-09-27 PROCEDURE — 72125 CT NECK SPINE W/O DYE: CPT

## 2021-09-27 PROCEDURE — 99283 EMERGENCY DEPT VISIT LOW MDM: CPT

## 2021-09-27 RX ORDER — SODIUM CHLORIDE 0.9 % (FLUSH) 0.9 %
10 SYRINGE (ML) INJECTION AS NEEDED
Status: DISCONTINUED | OUTPATIENT
Start: 2021-09-27 | End: 2021-09-27 | Stop reason: HOSPADM

## 2021-09-27 RX ORDER — PROPOFOL 10 MG/ML
VIAL (ML) INTRAVENOUS
Status: COMPLETED | OUTPATIENT
Start: 2021-09-27 | End: 2021-09-27

## 2021-09-27 RX ORDER — MORPHINE SULFATE 2 MG/ML
2 INJECTION, SOLUTION INTRAMUSCULAR; INTRAVENOUS ONCE
Status: COMPLETED | OUTPATIENT
Start: 2021-09-27 | End: 2021-09-27

## 2021-09-27 RX ADMIN — PROPOFOL 30 MG: 10 INJECTION, EMULSION INTRAVENOUS at 19:45

## 2021-09-27 RX ADMIN — PROPOFOL 20 MG: 10 INJECTION, EMULSION INTRAVENOUS at 19:44

## 2021-09-27 RX ADMIN — MORPHINE SULFATE 2 MG: 2 INJECTION, SOLUTION INTRAMUSCULAR; INTRAVENOUS at 17:32

## 2021-09-27 RX ADMIN — PROPOFOL 30 MG: 10 INJECTION, EMULSION INTRAVENOUS at 19:43

## 2021-09-29 ENCOUNTER — OFFICE VISIT (OUTPATIENT)
Dept: INTERNAL MEDICINE | Facility: CLINIC | Age: 76
End: 2021-09-29

## 2021-09-29 VITALS
DIASTOLIC BLOOD PRESSURE: 68 MMHG | OXYGEN SATURATION: 98 % | HEART RATE: 57 BPM | BODY MASS INDEX: 26.87 KG/M2 | HEIGHT: 62 IN | SYSTOLIC BLOOD PRESSURE: 130 MMHG | WEIGHT: 146 LBS
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
## 2021-09-29 DIAGNOSIS — G30.9 ALZHEIMER'S DEMENTIA WITHOUT BEHAVIORAL DISTURBANCE, UNSPECIFIED TIMING OF DEMENTIA ONSET: ICD-10-CM

## 2021-09-29 DIAGNOSIS — S43.004A DISLOCATION OF RIGHT SHOULDER JOINT, INITIAL ENCOUNTER: Primary | ICD-10-CM

## 2021-09-29 DIAGNOSIS — F02.80 ALZHEIMER'S DEMENTIA WITHOUT BEHAVIORAL DISTURBANCE, UNSPECIFIED TIMING OF DEMENTIA ONSET: ICD-10-CM

## 2021-09-29 DIAGNOSIS — Z91.81 AT HIGH RISK FOR FALLS: ICD-10-CM

## 2021-09-29 PROCEDURE — 90662 IIV NO PRSV INCREASED AG IM: CPT | Performed by: INTERNAL MEDICINE

## 2021-09-29 PROCEDURE — 99213 OFFICE O/P EST LOW 20 MIN: CPT | Performed by: INTERNAL MEDICINE

## 2021-09-29 PROCEDURE — G0008 ADMIN INFLUENZA VIRUS VAC: HCPCS | Performed by: INTERNAL MEDICINE

## 2021-09-29 NOTE — PROGRESS NOTES
Subjective     Kassi Mathis is a 76 y.o. female who presents with   Chief Complaint   Patient presents with   • Shoulder Dislocation       History of Present Illness     The following data was reviewed by: Parul Jaime MD on 09/29/2021:  Common labs    Common Labsle 12/18/20 7/9/21 7/9/21 7/9/21 7/9/21 9/27/21 9/27/21     1514 1514 1514 1514 1726 1726   Glucose       175 (A)   Glucose   CANCELED       BUN   11    14   Creatinine   0.99    0.77   eGFR Non African Am   55 (A)       eGFR  Am   64    88   Sodium   142    139   Potassium   CANCELED    4.1   Chloride   101    100   Calcium   9.3    9.6   Total Protein   6.4       Albumin   4.0    4.20   Total Bilirubin   0.6    0.4   Alkaline Phosphatase   61    80   AST (SGOT)   35    49 (A)   ALT (SGPT)   27    56 (A)   WBC  7.4    11.91 (A)    Hemoglobin  12.8    13.6    Hematocrit  39.2    43.1    Platelets  265    299    Total Cholesterol    173      Triglycerides    102      HDL Cholesterol    67      LDL Cholesterol     88      Hemoglobin A1C 6.9    5.9 (A)     (A) Abnormal value       Comments are available for some flowsheets but are not being displayed.           Data reviewed: Radiologic studies shoulder x-rays, ct cervical spine and ct head on 9/27/2021     Patient present in f/u of shoulder dislocation.  Dislocation was result of a fall on 9/27/2021.  She presented to the ER and shoulder was reduced on 9/29/2021.  Probable Hill-Sachs fracture on f/u x-ray.      Dementia and fall risk.  She is interested in a PureWick Female Catheter to help with urination at night.      Review of Systems   Respiratory: Negative.    Cardiovascular: Negative.        The following portions of the patient's history were reviewed and updated as appropriate: allergies, current medications and problem list.    Patient Active Problem List    Diagnosis Date Noted   • Type 2 diabetes mellitus (CMS/[facility]) 01/17/2016     Priority: High   • At high risk for falls 09/29/2021   •  Osteoarthritis of right knee 09/08/2020   • Cervical radiculopathy 10/08/2018   • Hammer toes of both feet 10/17/2017   • Depression 01/17/2016   • Allergic rhinitis 01/17/2016   • Hypertension 01/17/2016   • Dementia without behavioral disturbance (CMS/HCC) 01/17/2016     Note Last Updated: 7/20/2016     By neuropysch evaluation 7/20/2016     • Paroxysmal atrial fibrillation (CMS/HCC) 01/17/2016   • Cobalamin deficiency 01/17/2016     Note Last Updated: 6/8/2016     Boarderline only with normal MMA/Homocysteine/IF antibody.  Encouraged MVI daily.       • Hyperlipidemia 01/17/2016       Current Outpatient Medications on File Prior to Visit   Medication Sig Dispense Refill   • albuterol sulfate  (90 Base) MCG/ACT inhaler Inhale 2 puffs Every 4 (Four) Hours As Needed for Wheezing. 1 inhaler 0   • amLODIPine (NORVASC) 5 MG tablet TAKE ONE TABLET BY MOUTH DAILY 90 tablet 3   • buPROPion XL (WELLBUTRIN XL) 300 MG 24 hr tablet TAKE ONE TABLET BY MOUTH EVERY MORNING 90 tablet 3   • Cholecalciferol (VITAMIN D3 PO) Take  by mouth.     • donepezil (ARICEPT) 10 MG tablet Take 1 tablet by mouth Daily. 90 tablet 3   • glucose blood test strip Use daily for type 2 DM. 100 each 3   • Jardiance 10 MG tablet TAKE ONE TABLET BY MOUTH DAILY 90 tablet 7   • Melatonin 10 MG capsule Take  by mouth.     • metFORMIN ER (GLUCOPHAGE-XR) 500 MG 24 hr tablet Take 1 tablet by mouth Daily With Breakfast. 90 tablet 3   • metoprolol succinate XL (TOPROL-XL) 50 MG 24 hr tablet TAKE TWO TABLETS BY MOUTH DAILY 180 tablet 3   • Multiple Vitamin (MULTIVITAMINS PO) Take 1 tablet by mouth daily.     • Pradaxa 150 MG capsu TAKE ONE CAPSULE BY MOUTH EVERY 12 HOURS 180 capsule 3   • rosuvastatin (CRESTOR) 20 MG tablet TAKE ONE TABLET BY MOUTH EVERY NIGHT AT BEDTIME 90 tablet 0   • sertraline (ZOLOFT) 100 MG tablet Take 2 tablets by mouth Daily. 180 tablet 3   • vitamin B-12 (CYANOCOBALAMIN) 1000 MCG tablet Take 1,000 mcg by mouth Daily.     • vitamin  "C (ASCORBIC ACID) 250 MG tablet Take 250 mg by mouth Daily.       No current facility-administered medications on file prior to visit.       Objective     /68   Pulse 57   Ht 157.5 cm (62.01\")   Wt 66.2 kg (146 lb)   LMP  (LMP Unknown)   SpO2 98%   BMI 26.70 kg/m²     Physical Exam  Constitutional:       Appearance: She is well-developed.   HENT:      Head: Normocephalic and atraumatic.   Pulmonary:      Effort: Pulmonary effort is normal.   Musculoskeletal:      Right shoulder: Swelling, tenderness and bony tenderness present. No deformity.   Neurological:      Mental Status: She is alert and oriented to person, place, and time.   Psychiatric:         Behavior: Behavior normal.         Assessment/Plan   Diagnoses and all orders for this visit:    1. Dislocation of right shoulder joint, initial encounter (Primary)    2. Fall, initial encounter    3. Alzheimer's dementia without behavioral disturbance, unspecified timing of dementia onset (HCC)    4. At high risk for falls    Other orders  -     Fluzone High-Dose 65+yrs ([id number])        Discussion    F/u shoulder dislocation.  F/u with orthopedic as planned tomorrow.    High fall risk and dementia.  I wrote Rx for PureWick Female External Cathetar which I believe could prevent falls.         Future Appointments   Date Time Provider Department Center   2/4/2022 11:30 AM [facility]   2/11/2022  1:00 PM Parul Jaime MD [facility]   3/24/2022  1:30 PM Mode Gongora MD [facility]         "

## 2021-10-30 ENCOUNTER — APPOINTMENT (OUTPATIENT)
Dept: VACCINE CLINIC | Facility: HOSPITAL | Age: 76
End: 2021-10-30

## 2021-10-30 ENCOUNTER — IMMUNIZATION (OUTPATIENT)
Dept: VACCINE CLINIC | Facility: HOSPITAL | Age: 76
End: 2021-10-30

## 2021-10-30 PROCEDURE — 91300 HC SARSCOV02 VAC 30MCG/0.3ML IM: CPT | Performed by: INTERNAL MEDICINE

## 2021-10-30 PROCEDURE — 0003A: CPT | Performed by: INTERNAL MEDICINE

## 2021-10-30 PROCEDURE — 0004A HC ADM SARSCOV2 30MCG/0.3ML BOOSTER: CPT | Performed by: INTERNAL MEDICINE

## 2021-11-29 RX ORDER — ROSUVASTATIN CALCIUM 20 MG/1
TABLET, COATED ORAL
Qty: 90 TABLET | Refills: 0 | Status: SHIPPED | OUTPATIENT
Start: 2021-11-29 | End: 2023-02-09

## 2022-02-03 DIAGNOSIS — E11.22 TYPE 2 DIABETES MELLITUS WITH DIABETIC CHRONIC KIDNEY DISEASE, UNSPECIFIED CKD STAGE, UNSPECIFIED WHETHER LONG TERM INSULIN USE: ICD-10-CM

## 2022-02-03 DIAGNOSIS — I10 PRIMARY HYPERTENSION: Primary | ICD-10-CM

## 2022-02-03 DIAGNOSIS — E53.8 COBALAMIN DEFICIENCY: ICD-10-CM

## 2022-02-03 DIAGNOSIS — E78.5 HYPERLIPIDEMIA, UNSPECIFIED HYPERLIPIDEMIA TYPE: ICD-10-CM

## 2022-02-08 ENCOUNTER — HOSPITAL ENCOUNTER (OUTPATIENT)
Dept: GENERAL RADIOLOGY | Facility: HOSPITAL | Age: 77
Discharge: HOME | End: 2022-02-08

## 2022-02-08 ENCOUNTER — OFFICE VISIT (OUTPATIENT)
Dept: INTERNAL MEDICINE | Facility: CLINIC | Age: 77
End: 2022-02-08

## 2022-02-08 ENCOUNTER — HOSPITAL ENCOUNTER (OUTPATIENT)
Dept: CARDIOLOGY | Facility: HOSPITAL | Age: 77
Discharge: HOME | End: 2022-02-08

## 2022-02-08 VITALS
DIASTOLIC BLOOD PRESSURE: 90 MMHG | HEIGHT: 62 IN | OXYGEN SATURATION: 98 % | HEART RATE: 51 BPM | WEIGHT: 149 LBS | SYSTOLIC BLOOD PRESSURE: 138 MMHG | BODY MASS INDEX: 27.42 KG/M2

## 2022-02-08 DIAGNOSIS — R60.0 LEG EDEMA, RIGHT: ICD-10-CM

## 2022-02-08 DIAGNOSIS — E78.5 HYPERLIPIDEMIA, UNSPECIFIED HYPERLIPIDEMIA TYPE: ICD-10-CM

## 2022-02-08 DIAGNOSIS — R05.3 PERSISTENT COUGH: ICD-10-CM

## 2022-02-08 DIAGNOSIS — I10 PRIMARY HYPERTENSION: ICD-10-CM

## 2022-02-08 DIAGNOSIS — R60.0 LEG EDEMA, RIGHT: Primary | ICD-10-CM

## 2022-02-08 DIAGNOSIS — E11.22 TYPE 2 DIABETES MELLITUS WITH DIABETIC CHRONIC KIDNEY DISEASE, UNSPECIFIED CKD STAGE, UNSPECIFIED WHETHER LONG TERM INSULIN USE: ICD-10-CM

## 2022-02-08 PROCEDURE — 93971 EXTREMITY STUDY: CPT

## 2022-02-08 PROCEDURE — 99214 OFFICE O/P EST MOD 30 MIN: CPT | Performed by: INTERNAL MEDICINE

## 2022-02-08 PROCEDURE — 71046 X-RAY EXAM CHEST 2 VIEWS: CPT

## 2022-02-08 NOTE — PROGRESS NOTES
Subjective     Kassi Mathis is a 76 y.o. female who presents with   Chief Complaint   Patient presents with   • Joint Swelling   • Cough       History of Present Illness     Right leg swelling.  Going on 3-4 weeks.  Foot and calve.  Mainly on right.  Minor on left.      C/o cough.  Off and on for a while.  No fever.  No SOA.  Coughs with eating.  Coughs at night.      Dm-2.  Due for labs.  HTN.  Fair control.  HLD. Due for labs.      Review of Systems   Constitutional: Negative for fever.   HENT: Negative for sore throat.    Respiratory: Positive for cough. Negative for chest tightness, shortness of breath and wheezing.    Cardiovascular: Positive for leg swelling. Negative for chest pain.       The following portions of the patient's history were reviewed and updated as appropriate: allergies, current medications and problem list.    Patient Active Problem List    Diagnosis Date Noted   • Type 2 diabetes mellitus (HCC) 01/17/2016     Priority: High   • At high risk for falls 09/29/2021   • Osteoarthritis of right knee 09/08/2020   • Cervical radiculopathy 10/08/2018   • Hammer toes of both feet 10/17/2017   • Depression 01/17/2016   • Allergic rhinitis 01/17/2016   • Hypertension 01/17/2016   • Dementia without behavioral disturbance (HCC) 01/17/2016     Note Last Updated: 7/20/2016     By neuropysch evaluation 7/20/2016     • Paroxysmal atrial fibrillation (HCC) 01/17/2016   • Cobalamin deficiency 01/17/2016     Note Last Updated: 6/8/2016     Boarderline only with normal MMA/Homocysteine/IF antibody.  Encouraged MVI daily.       • Hyperlipidemia 01/17/2016       Current Outpatient Medications on File Prior to Visit   Medication Sig Dispense Refill   • albuterol sulfate  (90 Base) MCG/ACT inhaler Inhale 2 puffs Every 4 (Four) Hours As Needed for Wheezing. 1 inhaler 0   • amLODIPine (NORVASC) 5 MG tablet TAKE ONE TABLET BY MOUTH DAILY 90 tablet 3   • buPROPion XL (WELLBUTRIN XL) 300 MG 24 hr tablet TAKE ONE  "TABLET BY MOUTH EVERY MORNING (Patient taking differently: 150 mg.) 90 tablet 3   • Cholecalciferol (VITAMIN D3 PO) Take  by mouth.     • donepezil (ARICEPT) 10 MG tablet Take 1 tablet by mouth Daily. 90 tablet 3   • glucose blood test strip Use daily for type 2 DM. 100 each 3   • Jardiance 10 MG tablet TAKE ONE TABLET BY MOUTH DAILY 90 tablet 7   • Melatonin 10 MG capsule Take  by mouth.     • metFORMIN ER (GLUCOPHAGE-XR) 500 MG 24 hr tablet Take 1 tablet by mouth Daily With Breakfast. 90 tablet 3   • metoprolol succinate XL (TOPROL-XL) 50 MG 24 hr tablet TAKE TWO TABLETS BY MOUTH DAILY 180 tablet 3   • Multiple Vitamin (MULTIVITAMINS PO) Take 1 tablet by mouth daily.     • Pradaxa 150 MG capsu TAKE ONE CAPSULE BY MOUTH EVERY 12 HOURS 180 capsule 3   • rosuvastatin (CRESTOR) 20 MG tablet TAKE ONE TABLET BY MOUTH EVERY NIGHT AT BEDTIME 90 tablet 0   • sertraline (ZOLOFT) 100 MG tablet Take 2 tablets by mouth Daily. (Patient taking differently: Take 100 mg by mouth Daily.) 180 tablet 3   • vitamin B-12 (CYANOCOBALAMIN) 1000 MCG tablet Take 1,000 mcg by mouth Daily.     • vitamin C (ASCORBIC ACID) 250 MG tablet Take 250 mg by mouth Daily.       No current facility-administered medications on file prior to visit.       Objective     /90   Pulse 51   Ht 157.5 cm (62.01\")   Wt 67.6 kg (149 lb)   LMP  (LMP Unknown)   SpO2 98%   BMI 27.25 kg/m²     Physical Exam  Constitutional:       Appearance: She is well-developed.   HENT:      Head: Normocephalic and atraumatic.   Cardiovascular:      Rate and Rhythm: Normal rate and regular rhythm.      Heart sounds: Normal heart sounds.   Pulmonary:      Effort: Pulmonary effort is normal.      Breath sounds: Normal breath sounds.   Skin:     General: Skin is warm and dry.   Neurological:      Mental Status: She is alert and oriented to person, place, and time.   Psychiatric:         Behavior: Behavior normal.         Assessment/Plan   Diagnoses and all orders for this " visit:    1. Leg edema, right (Primary)  -     Duplex Venous Lower Extremity - Right CAR; Future  -     CBC & Differential  -     Comprehensive Metabolic Panel  -     Lipid Panel  -     TSH Rfx On Abnormal To Free T4  -     Hemoglobin A1c    2. Persistent cough  -     XR Chest PA & Lateral; Future  -     FL esophagram complete; Future    3. Type 2 diabetes mellitus with diabetic chronic kidney disease, unspecified CKD stage, unspecified whether long term insulin use (HCC)  -     CBC & Differential  -     Comprehensive Metabolic Panel  -     Lipid Panel  -     TSH Rfx On Abnormal To Free T4  -     Hemoglobin A1c    4. Primary hypertension    5. Hyperlipidemia, unspecified hyperlipidemia type        Discussion    Patient presents with new RLE edema.  Further evaluate with STAT venous doppler.    Persistent cough with eating.  Check CXR.  Check swallow evaluation to start.    Dm-2.  Check labs.   HTN.  Continue current regimen although Norvasc may causing edema.    HLD.  Check labs on Crestor.         Future Appointments   Date Time Provider Department Center   3/24/2022  1:30 PM Mode Gongora MD [facility]

## 2022-02-09 LAB
ALBUMIN SERPL-MCNC: 4.2 G/DL (ref 3.7–4.7)
ALBUMIN/GLOB SERPL: 1.7 {RATIO} (ref 1.2–2.2)
ALP SERPL-CCNC: 58 IU/L (ref 44–121)
ALT SERPL-CCNC: 19 IU/L (ref 0–32)
AST SERPL-CCNC: 23 IU/L (ref 0–40)
BASOPHILS # BLD AUTO: 0 X10E3/UL (ref 0–0.2)
BASOPHILS NFR BLD AUTO: 0 %
BH CV LOWER VASCULAR LEFT COMMON FEMORAL AUGMENT: NORMAL
BH CV LOWER VASCULAR LEFT COMMON FEMORAL COMPETENT: NORMAL
BH CV LOWER VASCULAR LEFT COMMON FEMORAL COMPRESS: NORMAL
BH CV LOWER VASCULAR LEFT COMMON FEMORAL PHASIC: NORMAL
BH CV LOWER VASCULAR LEFT COMMON FEMORAL SPONT: NORMAL
BH CV LOWER VASCULAR RIGHT COMMON FEMORAL AUGMENT: NORMAL
BH CV LOWER VASCULAR RIGHT COMMON FEMORAL COMPETENT: NORMAL
BH CV LOWER VASCULAR RIGHT COMMON FEMORAL COMPRESS: NORMAL
BH CV LOWER VASCULAR RIGHT COMMON FEMORAL PHASIC: NORMAL
BH CV LOWER VASCULAR RIGHT COMMON FEMORAL SPONT: NORMAL
BH CV LOWER VASCULAR RIGHT DISTAL FEMORAL COMPRESS: NORMAL
BH CV LOWER VASCULAR RIGHT GASTRONEMIUS COMPRESS: NORMAL
BH CV LOWER VASCULAR RIGHT GREATER SAPH AK COMPRESS: NORMAL
BH CV LOWER VASCULAR RIGHT GREATER SAPH BK COMPRESS: NORMAL
BH CV LOWER VASCULAR RIGHT LESSER SAPH COMPRESS: NORMAL
BH CV LOWER VASCULAR RIGHT MID FEMORAL AUGMENT: NORMAL
BH CV LOWER VASCULAR RIGHT MID FEMORAL COMPETENT: NORMAL
BH CV LOWER VASCULAR RIGHT MID FEMORAL COMPRESS: NORMAL
BH CV LOWER VASCULAR RIGHT MID FEMORAL PHASIC: NORMAL
BH CV LOWER VASCULAR RIGHT MID FEMORAL SPONT: NORMAL
BH CV LOWER VASCULAR RIGHT PERONEAL COMPRESS: NORMAL
BH CV LOWER VASCULAR RIGHT POPLITEAL AUGMENT: NORMAL
BH CV LOWER VASCULAR RIGHT POPLITEAL COMPETENT: NORMAL
BH CV LOWER VASCULAR RIGHT POPLITEAL COMPRESS: NORMAL
BH CV LOWER VASCULAR RIGHT POPLITEAL PHASIC: NORMAL
BH CV LOWER VASCULAR RIGHT POPLITEAL SPONT: NORMAL
BH CV LOWER VASCULAR RIGHT POSTERIOR TIBIAL COMPRESS: NORMAL
BH CV LOWER VASCULAR RIGHT PROFUNDA FEMORAL COMPRESS: NORMAL
BH CV LOWER VASCULAR RIGHT PROXIMAL FEMORAL COMPRESS: NORMAL
BH CV LOWER VASCULAR RIGHT SAPHENOFEMORAL JUNCTION COMPRESS: NORMAL
BH CV VAS POP FLUID COLLECTED: 1
BILIRUB SERPL-MCNC: 0.7 MG/DL (ref 0–1.2)
BUN SERPL-MCNC: 21 MG/DL (ref 8–27)
BUN/CREAT SERPL: 22 (ref 12–28)
CALCIUM SERPL-MCNC: 9.4 MG/DL (ref 8.7–10.3)
CHLORIDE SERPL-SCNC: 101 MMOL/L (ref 96–106)
CHOLEST SERPL-MCNC: 192 MG/DL (ref 100–199)
CO2 SERPL-SCNC: 22 MMOL/L (ref 20–29)
CREAT SERPL-MCNC: 0.96 MG/DL (ref 0.57–1)
EOSINOPHIL # BLD AUTO: 0.1 X10E3/UL (ref 0–0.4)
EOSINOPHIL NFR BLD AUTO: 1 %
ERYTHROCYTE [DISTWIDTH] IN BLOOD BY AUTOMATED COUNT: 13.1 % (ref 11.7–15.4)
GLOBULIN SER CALC-MCNC: 2.5 G/DL (ref 1.5–4.5)
GLUCOSE SERPL-MCNC: ABNORMAL MG/DL
HBA1C MFR BLD: 6.9 % (ref 4.8–5.6)
HCT VFR BLD AUTO: 40.5 % (ref 34–46.6)
HDLC SERPL-MCNC: 75 MG/DL
HGB BLD-MCNC: 13 G/DL (ref 11.1–15.9)
IMM GRANULOCYTES # BLD AUTO: 0 X10E3/UL (ref 0–0.1)
IMM GRANULOCYTES NFR BLD AUTO: 0 %
LDLC SERPL CALC-MCNC: 94 MG/DL (ref 0–99)
LYMPHOCYTES # BLD AUTO: 3 X10E3/UL (ref 0.7–3.1)
LYMPHOCYTES NFR BLD AUTO: 38 %
MCH RBC QN AUTO: 29.9 PG (ref 26.6–33)
MCHC RBC AUTO-ENTMCNC: 32.1 G/DL (ref 31.5–35.7)
MCV RBC AUTO: 93 FL (ref 79–97)
MONOCYTES # BLD AUTO: 0.7 X10E3/UL (ref 0.1–0.9)
MONOCYTES NFR BLD AUTO: 9 %
NEUTROPHILS # BLD AUTO: 4 X10E3/UL (ref 1.4–7)
NEUTROPHILS NFR BLD AUTO: 52 %
PLATELET # BLD AUTO: 270 X10E3/UL (ref 150–450)
POTASSIUM SERPL-SCNC: ABNORMAL MMOL/L
PROT SERPL-MCNC: 6.7 G/DL (ref 6–8.5)
RBC # BLD AUTO: 4.35 X10E6/UL (ref 3.77–5.28)
SODIUM SERPL-SCNC: 142 MMOL/L (ref 134–144)
TRIGL SERPL-MCNC: 137 MG/DL (ref 0–149)
TSH SERPL DL<=0.005 MIU/L-ACNC: 1.38 UIU/ML (ref 0.45–4.5)
VLDLC SERPL CALC-MCNC: 23 MG/DL (ref 5–40)
WBC # BLD AUTO: 7.7 X10E3/UL (ref 3.4–10.8)

## 2022-02-23 ENCOUNTER — TELEPHONE (OUTPATIENT)
Dept: INTERNAL MEDICINE | Facility: CLINIC | Age: 77
End: 2022-02-23

## 2022-02-23 NOTE — TELEPHONE ENCOUNTER
I d/w patient.  Swelling is likely coming from Baker's cyst and right knee inflammation.  Try Voltaren gel on knee.  DVT has already been done for unilateral swelling.

## 2022-02-23 NOTE — TELEPHONE ENCOUNTER
Caller: Flaca Mathis    Relationship: Emergency Contact    Best call back number: [phone]    What was the call regarding: PATIENT STILL HAS SWELLING IN LEFT FOOT AND ANKLE. IT'S BEEN 3 WEEKS. HER DAUGHTER IS CALLING TO SEE WHAT THE NEXT STEP IS. PLEASE ADVISE.     Do you require a callback: YES

## 2022-03-08 ENCOUNTER — APPOINTMENT (OUTPATIENT)
Dept: GENERAL RADIOLOGY | Facility: HOSPITAL | Age: 77
End: 2022-03-08

## 2022-03-28 RX ORDER — EMPAGLIFLOZIN 10 MG/1
TABLET, FILM COATED ORAL
Qty: 90 TABLET | Refills: 7 | Status: SHIPPED | OUTPATIENT
Start: 2022-03-28

## 2022-04-11 RX ORDER — SERTRALINE HYDROCHLORIDE 100 MG/1
TABLET, FILM COATED ORAL
Qty: 180 TABLET | Refills: 3 | Status: SHIPPED | OUTPATIENT
Start: 2022-04-11 | End: 2022-12-05 | Stop reason: SDUPTHER

## 2022-04-15 RX ORDER — ATENOLOL 100 MG/1
TABLET ORAL
Qty: 180 CAPSULE | Refills: 1 | Status: SHIPPED | OUTPATIENT
Start: 2022-04-15 | End: 2022-10-13

## 2022-05-25 DIAGNOSIS — I10 ESSENTIAL HYPERTENSION: ICD-10-CM

## 2022-05-25 RX ORDER — AMLODIPINE BESYLATE 5 MG/1
TABLET ORAL
Qty: 90 TABLET | Refills: 3 | Status: SHIPPED | OUTPATIENT
Start: 2022-05-25

## 2022-06-13 ENCOUNTER — OFFICE VISIT (OUTPATIENT)
Dept: CARDIOLOGY | Facility: CLINIC | Age: 77
End: 2022-06-13

## 2022-06-13 VITALS
HEART RATE: 55 BPM | DIASTOLIC BLOOD PRESSURE: 70 MMHG | HEIGHT: 62 IN | BODY MASS INDEX: 27.23 KG/M2 | WEIGHT: 148 LBS | SYSTOLIC BLOOD PRESSURE: 148 MMHG

## 2022-06-13 DIAGNOSIS — G30.9 ALZHEIMER'S DEMENTIA WITHOUT BEHAVIORAL DISTURBANCE, UNSPECIFIED TIMING OF DEMENTIA ONSET: ICD-10-CM

## 2022-06-13 DIAGNOSIS — M79.89 LEG SWELLING: ICD-10-CM

## 2022-06-13 DIAGNOSIS — I10 PRIMARY HYPERTENSION: ICD-10-CM

## 2022-06-13 DIAGNOSIS — F02.80 ALZHEIMER'S DEMENTIA WITHOUT BEHAVIORAL DISTURBANCE, UNSPECIFIED TIMING OF DEMENTIA ONSET: ICD-10-CM

## 2022-06-13 DIAGNOSIS — I48.0 PAROXYSMAL ATRIAL FIBRILLATION: ICD-10-CM

## 2022-06-13 DIAGNOSIS — T14.8XXA BRUISING: ICD-10-CM

## 2022-06-13 PROCEDURE — 93000 ELECTROCARDIOGRAM COMPLETE: CPT | Performed by: INTERNAL MEDICINE

## 2022-06-13 PROCEDURE — 99214 OFFICE O/P EST MOD 30 MIN: CPT | Performed by: INTERNAL MEDICINE

## 2022-06-13 NOTE — PROGRESS NOTES
Date of Office Visit: 2022  Encounter Provider: Mode Gongora MD  Place of Service: [facility] CARDIOLOGY  Patient Name: Kassi Mathis  :1945    Chief Complaint   Patient presents with   • Atrial Fibrillation   :     HPI: Kassi Mathis is a 77 y.o. female who follows up. I have reviewed prior notes and there are no changes except for any new updates described below. I have also reviewed any information entered into the medical record by the patient or by ancillary staff.     She has a long-standing history of high blood pressure. She has paroxysmal atrial fibrillation. She had an echocardiogram in 2015 which revealed normal left ventricular systolic function without pulmonary hypertension or valvular heart disease. She only had grade I diastolic dysfunction. She had a Cardiolite stress test which was normal. She was markedly hypertensive at that time and she was placed on an increased dose of hydralazine/isosorbide as well as furosemide.  The diuretic then had to be stopped because she was getting orthostatic and dehydrated. She was unable to get the combination medication so she was changed to hydralazine alone. That was subsequently tapered off as her BP remained controlled without it and as it caused headaches.    She has worsening dementia.  Her children are very involved in her care.  They have noticed increasing lower extremity edema as well as some bruising.  The patient cannot remember if she is falling or not.  She says she is not short of breath and denies any palpitations or chest pain.    Past Medical History:   Diagnosis Date   • Allergic rhinitis    • Anemia    • Chronic venous insufficiency    • Dementia (HCC)    • Depression    • Diabetes mellitus (HCC)    • Headache     Sinus?   • Hidradenitis suppurativa    • Hyperlipidemia    • Hypertension    • Osteoarthritis    • Paroxysmal atrial fibrillation (HCC)    • Peripheral neuropathy    • Stroke  (HCC)    • Vitamin B 12 deficiency     Boarderline only with normal MMA/Homocysteine/IF antibody       Past Surgical History:   Procedure Laterality Date   • BACK SURGERY     • HYSTERECTOMY      PARTIAL   • KNEE SURGERY Left        Social History     Socioeconomic History   • Marital status:    Tobacco Use   • Smoking status: Former Smoker     Packs/day: 0.50     Years: 0.00     Pack years: 0.00     Types: Cigarettes   • Smokeless tobacco: Never Used   Vaping Use   • Vaping Use: Never used   Substance and Sexual Activity   • Alcohol use: No     Comment: CAFFEINE USE/ SOFT DRINKS.    • Drug use: No   • Sexual activity: Defer       Family History   Problem Relation Age of Onset   • Breast cancer Mother    • Stroke Father    • Stroke Maternal Grandmother    • Heart attack Maternal Grandmother    • Heart attack Maternal Grandfather      Review of Systems   Reason unable to perform ROS: telephone/dementia.   Constitutional: Positive for malaise/fatigue.   Cardiovascular: Positive for leg swelling. Negative for chest pain and palpitations.   Hematologic/Lymphatic: Bruises/bleeds easily.   Psychiatric/Behavioral: Positive for memory loss.   All other systems reviewed and are negative.      Allergies   Allergen Reactions   • Enalapril Swelling and Other (See Comments)     Other reaction(s): Other: See Comments  Aka vasotec tabs  Aka vasotec tabs  Aka vasotec tabs  Aka vasotec tabs   • Enalapril Maleate Swelling   • Memantine Hcl Other (See Comments)     imbalance  imbalance   • Penicillins Swelling         Current Outpatient Medications:   •  albuterol sulfate  (90 Base) MCG/ACT inhaler, Inhale 2 puffs Every 4 (Four) Hours As Needed for Wheezing., Disp: 1 inhaler, Rfl: 0  •  amLODIPine (NORVASC) 5 MG tablet, TAKE ONE TABLET BY MOUTH DAILY, Disp: 90 tablet, Rfl: 3  •  Cholecalciferol (VITAMIN D3 PO), Take  by mouth., Disp: , Rfl:   •  donepezil (ARICEPT) 10 MG tablet, Take 1 tablet by mouth Daily., Disp: 90  "tablet, Rfl: 3  •  Jardiance 10 MG tablet tablet, TAKE ONE TABLET BY MOUTH DAILY, Disp: 90 tablet, Rfl: 7  •  Melatonin 10 MG capsule, Take  by mouth., Disp: , Rfl:   •  metFORMIN ER (GLUCOPHAGE-XR) 500 MG 24 hr tablet, Take 1 tablet by mouth Daily With Breakfast., Disp: 90 tablet, Rfl: 3  •  metoprolol succinate XL (TOPROL-XL) 50 MG 24 hr tablet, TAKE TWO TABLETS BY MOUTH DAILY, Disp: 180 tablet, Rfl: 3  •  Multiple Vitamin (MULTIVITAMINS PO), Take 1 tablet by mouth daily., Disp: , Rfl:   •  Pradaxa 150 MG capsu, TAKE ONE CAPSULE BY MOUTH EVERY 12 HOURS, Disp: 180 capsule, Rfl: 1  •  rosuvastatin (CRESTOR) 20 MG tablet, TAKE ONE TABLET BY MOUTH EVERY NIGHT AT BEDTIME, Disp: 90 tablet, Rfl: 0  •  sertraline (ZOLOFT) 100 MG tablet, TAKE TWO TABLETS BY MOUTH DAILY, Disp: 180 tablet, Rfl: 3  •  vitamin B-12 (CYANOCOBALAMIN) 1000 MCG tablet, Take 1,000 mcg by mouth Daily., Disp: , Rfl:   •  vitamin C (ASCORBIC ACID) 250 MG tablet, Take 250 mg by mouth Daily., Disp: , Rfl:   •  glucose blood test strip, Use daily for type 2 DM., Disp: 100 each, Rfl: 3     Objective:     Vitals:    06/13/22 1456 06/13/22 1543   BP: 158/66 148/70   BP Location: Left arm    Pulse: 55    Weight: 67.1 kg (148 lb)    Height: 157.5 cm (62.01\")      Body mass index is 27.06 kg/m².    Physical Exam  Vitals reviewed.   HENT:      Head: Normocephalic.      Nose: Nose normal.      Mouth/Throat:      Comments: masked  Eyes:      Conjunctiva/sclera: Conjunctivae normal.   Cardiovascular:      Rate and Rhythm: Normal rate and regular rhythm.      Pulses: Normal pulses.      Heart sounds: Normal heart sounds.   Pulmonary:      Effort: Pulmonary effort is normal.      Breath sounds: Normal breath sounds.   Abdominal:      Palpations: Abdomen is soft.      Tenderness: There is no abdominal tenderness.   Musculoskeletal:         General: Swelling (1+) present. Normal range of motion.      Cervical back: Normal range of motion.   Skin:     General: Skin is " warm and dry.   Neurological:      General: No focal deficit present.      Mental Status: She is alert.   Psychiatric:         Cognition and Memory: Memory is impaired.      Comments: tearful           ECG 12 Lead    Date/Time: 6/13/2022 3:44 PM  Performed by: Mode Gongora MD  Authorized by: Mode Gongora MD   Comparison: compared with previous ECG   Similar to previous ECG  Rhythm: sinus bradycardia  Conduction: conduction normal  ST Segments: ST segments normal  T Waves: T waves normal  QRS axis: normal  Other: no other findings    Clinical impression: normal ECG              Assessment:       Diagnosis Plan   1. Leg swelling  Adult Transthoracic Echo Complete W/ Cont if Necessary Per Protocol   2. Bruising     3. Paroxysmal atrial fibrillation (HCC)  Adult Transthoracic Echo Complete W/ Cont if Necessary Per Protocol   4. Primary hypertension     5. Alzheimer's dementia without behavioral disturbance, unspecified timing of dementia onset (HCC)        Plan:       1.  She does have some mild lower extremity edema.  I am going to get an echo to rule out any significant systolic or diastolic dysfunction.  Unfortunately, my medication options are quite limited in her.  If I stop her amlodipine, I do not really have many options for its replacement.  She has a documented allergy to ACE inhibitor's, which I have to presume is angioedema, as she cannot remember.  For that reason, I cannot use an angiotensin receptor blocker.  In the past, hydralazine caused headaches.  When she was on a thiazide in the past, she became orthostatic.  I cannot really increase her beta-blocker because her heart rate is low.  She is already on empagliflozin.  I will wait till the echo was done to make my decision.    2.  She has some scattered bruising.  I explained that this is more likely when one takes a blood thinner.  I am worried that she falls and that we simply do not know, and she cannot remember because of dementia.  Her  daughter states that she does not think falling is too big of an issue.  I explained that eventually, the risks of anticoagulation will become greater than the benefits, and we will have to assess that at every visit.    3.  Atrial Fibrillation and Atrial Flutter  Assessment  • The patient has paroxysmal atrial fibrillation  • This is non-valvular in etiology  • The patient's CHADS2-VASc score is 7  • A [id number] score of 2 or more is considered a high risk for a thromboembolic event  • Dabigatran prescribed    Plan  • Attempt to maintain sinus rhythm  • Continue dabigatran for antithrombotic therapy, bleeding issues discussed  • Continue beta blocker for rhythm control    Her dementia is progressing. As above, we may reach a point soon where we have to stop AC.    4/5. As above.    Sincerely,       Mode Gongora MD

## 2022-06-27 RX ORDER — METOPROLOL SUCCINATE 50 MG/1
TABLET, EXTENDED RELEASE ORAL
Qty: 180 TABLET | Refills: 3 | Status: SHIPPED | OUTPATIENT
Start: 2022-06-27 | End: 2022-09-08 | Stop reason: SDUPTHER

## 2022-07-05 ENCOUNTER — TELEPHONE (OUTPATIENT)
Dept: INTERNAL MEDICINE | Facility: CLINIC | Age: 77
End: 2022-07-05

## 2022-07-05 ENCOUNTER — HOSPITAL ENCOUNTER (OUTPATIENT)
Dept: CARDIOLOGY | Facility: HOSPITAL | Age: 77
Discharge: HOME | End: 2022-07-05
Admitting: INTERNAL MEDICINE

## 2022-07-05 VITALS
HEIGHT: 62 IN | SYSTOLIC BLOOD PRESSURE: 140 MMHG | DIASTOLIC BLOOD PRESSURE: 80 MMHG | WEIGHT: 148 LBS | HEART RATE: 70 BPM | BODY MASS INDEX: 27.23 KG/M2 | OXYGEN SATURATION: 99 %

## 2022-07-05 DIAGNOSIS — M79.89 LEG SWELLING: ICD-10-CM

## 2022-07-05 DIAGNOSIS — I48.0 PAROXYSMAL ATRIAL FIBRILLATION: ICD-10-CM

## 2022-07-05 LAB
AORTIC ARCH: 2.8 CM
ASCENDING AORTA: 3.2 CM
BH CV ECHO LEFT ATRIAL STRAIN: 17.5 %
BH CV ECHO MEAS - ACS: 1.79 CM
BH CV ECHO MEAS - AO MAX PG: 8.1 MMHG
BH CV ECHO MEAS - AO MEAN PG: 4.9 MMHG
BH CV ECHO MEAS - AO ROOT DIAM: 2.8 CM
BH CV ECHO MEAS - AO V2 MAX: 142 CM/SEC
BH CV ECHO MEAS - AO V2 VTI: 37.4 CM
BH CV ECHO MEAS - AVA(I,D): 2.6 CM2
BH CV ECHO MEAS - EDV(CUBED): 80.7 ML
BH CV ECHO MEAS - EDV(MOD-SP2): 56 ML
BH CV ECHO MEAS - EDV(MOD-SP4): 77 ML
BH CV ECHO MEAS - EF(MOD-BP): 64.6 %
BH CV ECHO MEAS - EF(MOD-SP2): 60.7 %
BH CV ECHO MEAS - EF(MOD-SP4): 67.5 %
BH CV ECHO MEAS - EF_3D-VOL: 69 %
BH CV ECHO MEAS - ESV(CUBED): 26.5 ML
BH CV ECHO MEAS - ESV(MOD-SP2): 22 ML
BH CV ECHO MEAS - ESV(MOD-SP4): 25 ML
BH CV ECHO MEAS - FS: 31.1 %
BH CV ECHO MEAS - IVS/LVPW: 0.92 CM
BH CV ECHO MEAS - IVSD: 0.97 CM
BH CV ECHO MEAS - LA 3D VOL INDEX: 54
BH CV ECHO MEAS - LAT PEAK E' VEL: 5 CM/SEC
BH CV ECHO MEAS - LV DIASTOLIC VOL/BSA (35-75): 45.8 CM2
BH CV ECHO MEAS - LV MASS(C)D: 146.4 GRAMS
BH CV ECHO MEAS - LV MAX PG: 5.3 MMHG
BH CV ECHO MEAS - LV MEAN PG: 3.1 MMHG
BH CV ECHO MEAS - LV SYSTOLIC VOL/BSA (12-30): 14.9 CM2
BH CV ECHO MEAS - LV V1 MAX: 114.9 CM/SEC
BH CV ECHO MEAS - LV V1 VTI: 29.2 CM
BH CV ECHO MEAS - LVIDD: 4.3 CM
BH CV ECHO MEAS - LVIDS: 3 CM
BH CV ECHO MEAS - LVOT AREA: 3.3 CM2
BH CV ECHO MEAS - LVOT DIAM: 2.06 CM
BH CV ECHO MEAS - LVPWD: 1.06 CM
BH CV ECHO MEAS - MED PEAK E' VEL: 5.8 CM/SEC
BH CV ECHO MEAS - MV A DUR: 0.11 SEC
BH CV ECHO MEAS - MV A MAX VEL: 105.9 CM/SEC
BH CV ECHO MEAS - MV DEC SLOPE: 470.5 CM/SEC2
BH CV ECHO MEAS - MV DEC TIME: 0.15 MSEC
BH CV ECHO MEAS - MV E MAX VEL: 97.9 CM/SEC
BH CV ECHO MEAS - MV E/A: 0.92
BH CV ECHO MEAS - MV MAX PG: 5.6 MMHG
BH CV ECHO MEAS - MV MEAN PG: 2.9 MMHG
BH CV ECHO MEAS - MV P1/2T: 71.8 MSEC
BH CV ECHO MEAS - MV V2 VTI: 38.8 CM
BH CV ECHO MEAS - MVA(P1/2T): 3.1 CM2
BH CV ECHO MEAS - MVA(VTI): 2.5 CM2
BH CV ECHO MEAS - PA ACC TIME: 0.08 SEC
BH CV ECHO MEAS - PA PR(ACCEL): 42.6 MMHG
BH CV ECHO MEAS - PA V2 MAX: 93.7 CM/SEC
BH CV ECHO MEAS - PULM A REVS DUR: 0.1 SEC
BH CV ECHO MEAS - PULM A REVS VEL: 31.3 CM/SEC
BH CV ECHO MEAS - PULM DIAS VEL: 66 CM/SEC
BH CV ECHO MEAS - PULM S/D: 1.45
BH CV ECHO MEAS - PULM SYS VEL: 95.5 CM/SEC
BH CV ECHO MEAS - QP/QS: 0.67
BH CV ECHO MEAS - RAP SYSTOLE: 3 MMHG
BH CV ECHO MEAS - RV MAX PG: 1.24 MMHG
BH CV ECHO MEAS - RV V1 MAX: 55.7 CM/SEC
BH CV ECHO MEAS - RV V1 VTI: 17.9 CM
BH CV ECHO MEAS - RVOT DIAM: 2.14 CM
BH CV ECHO MEAS - RVSP: 35 MMHG
BH CV ECHO MEAS - SI(MOD-SP2): 20.2 ML/M2
BH CV ECHO MEAS - SI(MOD-SP4): 30.9 ML/M2
BH CV ECHO MEAS - SUP REN AO DIAM: 1.7 CM
BH CV ECHO MEAS - SV(LVOT): 97.1 ML
BH CV ECHO MEAS - SV(MOD-SP2): 34 ML
BH CV ECHO MEAS - SV(MOD-SP4): 52 ML
BH CV ECHO MEAS - SV(RVOT): 64.8 ML
BH CV ECHO MEAS - TAPSE (>1.6): 2.28 CM
BH CV ECHO MEAS - TR MAX PG: 32 MMHG
BH CV ECHO MEAS - TR MAX VEL: 287.8 CM/SEC
BH CV ECHO MEASUREMENTS AVERAGE E/E' RATIO: 18.13
BH CV XLRA - RV BASE: 2.8 CM
BH CV XLRA - RV LENGTH: 6.3 CM
BH CV XLRA - RV MID: 3 CM
BH CV XLRA - TDI S': 13.6 CM/SEC
LEFT ATRIUM VOLUME INDEX: 44.7 ML/M2
MAXIMAL PREDICTED HEART RATE: 142 BPM
SINUS: 2.9 CM
STJ: 3 CM
STRESS TARGET HR: 121 BPM

## 2022-07-05 PROCEDURE — 93306 TTE W/DOPPLER COMPLETE: CPT | Performed by: INTERNAL MEDICINE

## 2022-07-05 PROCEDURE — 93306 TTE W/DOPPLER COMPLETE: CPT

## 2022-07-05 NOTE — TELEPHONE ENCOUNTER
Caller: Flaca Mathis    Relationship: Emergency Contact    Best call back number:  [phone]     PATIENT'S DAUGHTER IS CALLING TO INFORM DR. CALDERON THAT THE PATIENT'S RT LEG UP TO THE KNEE IS SWELLING AND HE LEFT FOOT AND ANKLE ARE SWELLING. ELPIDIO WOULD LIKE A CALL BACK WITH INFORMATION ON HOW DR. CALDERON WANTS TO TREAT.

## 2022-07-05 NOTE — PROGRESS NOTES
Can her family bring her to see either TK or MM this week?  We need to start her on a loop diuretics.  Triage, will you call her daughter?    CC'd to Dr Jaime.    manuel

## 2022-07-06 ENCOUNTER — TELEPHONE (OUTPATIENT)
Dept: CARDIOLOGY | Facility: CLINIC | Age: 77
End: 2022-07-06

## 2022-07-06 NOTE — TELEPHONE ENCOUNTER
----- Message from Mode Gongora MD sent at 7/5/2022  4:45 PM EDT -----  Can her family bring her to see either TK or MM this week?  We need to start her on a loop diuretics.  Triage, will you call her daughter?    CC'd to Dr Jaime.    manuel

## 2022-07-06 NOTE — TELEPHONE ENCOUNTER
Called and spoke with patient's daughter. They will come see Angy next Wednesday 7-13-22.    Thank you,    Amarilis Guerrero, RN  Triage [facility]  07/06/22 10:03 EDT

## 2022-07-08 ENCOUNTER — TELEPHONE (OUTPATIENT)
Dept: CARDIOLOGY | Facility: CLINIC | Age: 77
End: 2022-07-08

## 2022-07-08 NOTE — TELEPHONE ENCOUNTER
Notified patient's daughter Flaca of Dr. Gongora's recommendations. Patient rescheduled for Monday.    Thanks,   Amarilis Felton RN  [facility] Triage  07/08/22  15:03 EDT

## 2022-07-08 NOTE — TELEPHONE ENCOUNTER
Yes, get her in on Monday, and if leg is red or hot they need to go to the ED. Keep them elevated this weekend.

## 2022-07-08 NOTE — TELEPHONE ENCOUNTER
Dr. Gongora,     Patient's daughter Flaca called today. Patient continues to have lower extremity edema that is worse in the right leg that she felt was a side effect of amlodipine. Dr. Jaime discontinued amlodipine on 7/5/2022 but her swelling has continued. Denies SOA, temperature changes or discoloration just bruising that she said you are already aware of. Her right calf is tender to the touch.     Patient is scheduled to see Angy on 7/13/2022 to start diuretic but Flaca is concerned about waiting that long. Do you have any recommendations? I see Angy has an appointment on available on Monday if you feel she should be seen sooner.     Thanks,   Amarilis Felton RN  [facility] Triage  07/08/22  14:13 EDT

## 2022-07-11 ENCOUNTER — HOSPITAL ENCOUNTER (OUTPATIENT)
Dept: CARDIOLOGY | Facility: HOSPITAL | Age: 77
Discharge: HOME | End: 2022-07-11
Admitting: NURSE PRACTITIONER

## 2022-07-11 ENCOUNTER — OFFICE VISIT (OUTPATIENT)
Dept: CARDIOLOGY | Facility: CLINIC | Age: 77
End: 2022-07-11

## 2022-07-11 VITALS
BODY MASS INDEX: 31.28 KG/M2 | WEIGHT: 170 LBS | DIASTOLIC BLOOD PRESSURE: 80 MMHG | OXYGEN SATURATION: 99 % | HEART RATE: 53 BPM | HEIGHT: 62 IN | SYSTOLIC BLOOD PRESSURE: 144 MMHG

## 2022-07-11 DIAGNOSIS — F02.80 ALZHEIMER'S DEMENTIA WITHOUT BEHAVIORAL DISTURBANCE, UNSPECIFIED TIMING OF DEMENTIA ONSET: ICD-10-CM

## 2022-07-11 DIAGNOSIS — I48.0 PAROXYSMAL ATRIAL FIBRILLATION: ICD-10-CM

## 2022-07-11 DIAGNOSIS — M79.89 RIGHT LEG SWELLING: ICD-10-CM

## 2022-07-11 DIAGNOSIS — E11.22 TYPE 2 DIABETES MELLITUS WITH DIABETIC CHRONIC KIDNEY DISEASE, UNSPECIFIED CKD STAGE, UNSPECIFIED WHETHER LONG TERM INSULIN USE: ICD-10-CM

## 2022-07-11 DIAGNOSIS — M79.89 RIGHT LEG SWELLING: Primary | ICD-10-CM

## 2022-07-11 DIAGNOSIS — G30.9 ALZHEIMER'S DEMENTIA WITHOUT BEHAVIORAL DISTURBANCE, UNSPECIFIED TIMING OF DEMENTIA ONSET: ICD-10-CM

## 2022-07-11 DIAGNOSIS — I10 PRIMARY HYPERTENSION: ICD-10-CM

## 2022-07-11 DIAGNOSIS — I51.89 DIASTOLIC DYSFUNCTION: ICD-10-CM

## 2022-07-11 LAB
BH CV LOWER VASCULAR LEFT COMMON FEMORAL AUGMENT: NORMAL
BH CV LOWER VASCULAR LEFT COMMON FEMORAL COMPETENT: NORMAL
BH CV LOWER VASCULAR LEFT COMMON FEMORAL COMPRESS: NORMAL
BH CV LOWER VASCULAR LEFT COMMON FEMORAL PHASIC: NORMAL
BH CV LOWER VASCULAR LEFT COMMON FEMORAL SPONT: NORMAL
BH CV LOWER VASCULAR RIGHT COMMON FEMORAL AUGMENT: NORMAL
BH CV LOWER VASCULAR RIGHT COMMON FEMORAL COMPETENT: NORMAL
BH CV LOWER VASCULAR RIGHT COMMON FEMORAL COMPRESS: NORMAL
BH CV LOWER VASCULAR RIGHT COMMON FEMORAL PHASIC: NORMAL
BH CV LOWER VASCULAR RIGHT COMMON FEMORAL SPONT: NORMAL
BH CV LOWER VASCULAR RIGHT DISTAL FEMORAL COMPRESS: NORMAL
BH CV LOWER VASCULAR RIGHT GASTRONEMIUS COMPRESS: NORMAL
BH CV LOWER VASCULAR RIGHT GREATER SAPH AK COMPRESS: NORMAL
BH CV LOWER VASCULAR RIGHT GREATER SAPH BK COMPRESS: NORMAL
BH CV LOWER VASCULAR RIGHT LESSER SAPH COMPRESS: NORMAL
BH CV LOWER VASCULAR RIGHT MID FEMORAL AUGMENT: NORMAL
BH CV LOWER VASCULAR RIGHT MID FEMORAL COMPETENT: NORMAL
BH CV LOWER VASCULAR RIGHT MID FEMORAL COMPRESS: NORMAL
BH CV LOWER VASCULAR RIGHT MID FEMORAL PHASIC: NORMAL
BH CV LOWER VASCULAR RIGHT MID FEMORAL SPONT: NORMAL
BH CV LOWER VASCULAR RIGHT PERONEAL COMPRESS: NORMAL
BH CV LOWER VASCULAR RIGHT POPLITEAL AUGMENT: NORMAL
BH CV LOWER VASCULAR RIGHT POPLITEAL COMPETENT: NORMAL
BH CV LOWER VASCULAR RIGHT POPLITEAL COMPRESS: NORMAL
BH CV LOWER VASCULAR RIGHT POPLITEAL PHASIC: NORMAL
BH CV LOWER VASCULAR RIGHT POPLITEAL SPONT: NORMAL
BH CV LOWER VASCULAR RIGHT POSTERIOR TIBIAL COMPRESS: NORMAL
BH CV LOWER VASCULAR RIGHT PROFUNDA FEMORAL COMPRESS: NORMAL
BH CV LOWER VASCULAR RIGHT PROXIMAL FEMORAL COMPRESS: NORMAL
BH CV LOWER VASCULAR RIGHT SAPHENOFEMORAL JUNCTION COMPRESS: NORMAL
BH CV VAS POP FLUID COLLECTED: 1
MAXIMAL PREDICTED HEART RATE: 142 BPM
STRESS TARGET HR: 121 BPM

## 2022-07-11 PROCEDURE — 99214 OFFICE O/P EST MOD 30 MIN: CPT | Performed by: NURSE PRACTITIONER

## 2022-07-11 PROCEDURE — 93971 EXTREMITY STUDY: CPT

## 2022-07-11 RX ORDER — FUROSEMIDE 20 MG/1
20 TABLET ORAL DAILY
Qty: 30 TABLET | Refills: 11 | Status: SHIPPED | OUTPATIENT
Start: 2022-07-11 | End: 2022-07-26

## 2022-07-11 NOTE — PROGRESS NOTES
[facility] CARDIOLOGY  [address]  [facility] [address]  [facility] [address]  Phone: [phone]      Patient Name: Kassi Mathis  :1945  Age: 77 y.o.  Primary Cardiologist: Mode Gongora MD  Encounter Provider:  JENNIFER West      Chief Complaint     Chief Complaint: leg swelling    SUBJECTIVE     History of Present Illness:  Kassi Mathis is a 77 y.o. Black female whose medical history includes hypertension, dementia, and type II diabetes. She is followed in our office by Dr. Gongora for atrial fibrillation and diastolic heart failure.     22 Follow-up:  She is here for follow-up of echocardiogram and I am seeing her for the first time today.  She saw Dr. Gongora on 2022 for worsening lower extremity edema.  Primary care physician stopped amlodipine to see if edema would improve. She has continued to have right leg swelling from the thigh down. She has minimal swelling in her left leg. Breathing is comfortable, she denies orthopnea, but her daughter has noticed a cough during the day. She does not weigh herself at home, but weight is up 22 lbs since last visit. BP at home is usually 140s/80s. No chest pain or palpitations. She is unsteady on her feet but walks with a cane.     Below is a summary of pertinent cardiology findings:  • Echocardiogram 2015 showed normal left ventricular systolic function, no pulmonary hypertension, no valvular heart disease, and grade 1 diastolic dysfunction.  Cardiolite stress test was normal.  Blood pressure was markedly elevated at that time she was started on hydralazine, isosorbide, and Lasix.  Lasix had to be stopped due to orthostatic hypotension.  Hydralazine was stopped due to history of headaches.  • 2022 echocardiogram showed EF 64.6%, grade 2 with high LAP left ventricular diastolic dysfunction, left atrial cavity moderately dilated, and no pulmonary hypertension.    Past Medical History:   Diagnosis Date    • Allergic rhinitis    • Anemia    • Chronic venous insufficiency    • Dementia ([facility])    • Depression    • Diabetes mellitus ([facility])    • Headache    • Hidradenitis suppurativa    • Hyperlipidemia    • Hypertension    • Osteoarthritis    • Paroxysmal atrial fibrillation ([facility])    • Peripheral neuropathy    • Stroke ([facility])    • Vitamin B 12 deficiency          Past Surgical History:   Procedure Laterality Date   • BACK SURGERY     • HYSTERECTOMY      PARTIAL   • KNEE SURGERY Left          Social History     Socioeconomic History   • Marital status:    Tobacco Use   • Smoking status: Former Smoker     Packs/day: 0.50     Years: 0.00     Pack years: 0.00     Types: Cigarettes   • Smokeless tobacco: Never Used   Vaping Use   • Vaping Use: Never used   Substance and Sexual Activity   • Alcohol use: No     Comment: CAFFEINE USE/ SOFT DRINKS.    • Drug use: No   • Sexual activity: Defer         Review of Systems     Review of Systems   Cardiovascular: Positive for leg swelling. Negative for chest pain, cyanosis, dyspnea on exertion, irregular heartbeat, near-syncope, orthopnea and palpitations.   Respiratory: Positive for cough.    Neurological: Positive for loss of balance.         Medications     Allergies as of 07/11/2022 - Reviewed 07/11/2022   Allergen Reaction Noted   • Enalapril Swelling and Other (See Comments) 01/13/2016   • Enalapril maleate Swelling 11/18/2016   • Memantine hcl Other (See Comments) 04/13/2017   • Penicillins Swelling 01/28/2013         Current Outpatient Medications:   •  albuterol sulfate  (90 Base) MCG/ACT inhaler, Inhale 2 puffs Every 4 (Four) Hours As Needed for Wheezing., Disp: 1 inhaler, Rfl: 0  •  amLODIPine (NORVASC) 5 MG tablet, TAKE ONE TABLET BY MOUTH DAILY, Disp: 90 tablet, Rfl: 3  •  Cholecalciferol (VITAMIN D3 PO), Take  by mouth., Disp: , Rfl:   •  donepezil (ARICEPT) 10 MG tablet, Take 1 tablet by mouth Daily., Disp: 90 tablet, Rfl: 3  •  glucose blood test strip,  "Use daily for type 2 DM., Disp: 100 each, Rfl: 3  •  Jardiance 10 MG tablet tablet, TAKE ONE TABLET BY MOUTH DAILY, Disp: 90 tablet, Rfl: 7  •  Melatonin 10 MG capsule, Take  by mouth., Disp: , Rfl:   •  metFORMIN ER (GLUCOPHAGE-XR) 500 MG 24 hr tablet, Take 1 tablet by mouth Daily With Breakfast., Disp: 90 tablet, Rfl: 3  •  metoprolol succinate XL (TOPROL-XL) 50 MG 24 hr tablet, TAKE TWO TABLETS BY MOUTH DAILY, Disp: 180 tablet, Rfl: 3  •  Multiple Vitamin (MULTIVITAMINS PO), Take 1 tablet by mouth daily., Disp: , Rfl:   •  Pradaxa 150 MG capsu, TAKE ONE CAPSULE BY MOUTH EVERY 12 HOURS, Disp: 180 capsule, Rfl: 1  •  rosuvastatin (CRESTOR) 20 MG tablet, TAKE ONE TABLET BY MOUTH EVERY NIGHT AT BEDTIME, Disp: 90 tablet, Rfl: 0  •  sertraline (ZOLOFT) 100 MG tablet, TAKE TWO TABLETS BY MOUTH DAILY, Disp: 180 tablet, Rfl: 3  •  vitamin B-12 (CYANOCOBALAMIN) 1000 MCG tablet, Take 1,000 mcg by mouth Daily., Disp: , Rfl:   •  vitamin C (ASCORBIC ACID) 250 MG tablet, Take 250 mg by mouth Daily., Disp: , Rfl:   •  furosemide (LASIX) 20 MG tablet, Take 1 tablet by mouth Daily., Disp: 30 tablet, Rfl: 11        OBJECTIVE     Vital Signs:   /80 (BP Location: Left arm)   Pulse 53   Ht 157.5 cm (62\")   Wt 77.1 kg (170 lb)   SpO2 99%   BMI 31.09 kg/m²       Weight:  Wt Readings from Last 3 Encounters:   07/11/22 77.1 kg (170 lb)   07/05/22 67.1 kg (148 lb)   06/13/22 67.1 kg (148 lb)     Body mass index is 31.09 kg/m².        Physical Exam     Physical Exam  Constitutional:       General: She is not in acute distress.  HENT:      Head: Normocephalic and atraumatic.      Mouth/Throat:      Mouth: Mucous membranes are moist.   Eyes:      General: No scleral icterus.     Extraocular Movements: Extraocular movements intact.      Conjunctiva/sclera: Conjunctivae normal.      Pupils: Pupils are equal, round, and reactive to light.   Cardiovascular:      Rate and Rhythm: Normal rate and regular rhythm.      Pulses: Normal " pulses.      Heart sounds: S1 normal and S2 normal.   Pulmonary:      Effort: No respiratory distress.      Breath sounds: Normal breath sounds. No wheezing, rhonchi or rales.   Abdominal:      General: Bowel sounds are normal. There is no distension.      Palpations: Abdomen is soft.      Tenderness: There is no abdominal tenderness.   Musculoskeletal:         General: Normal range of motion.      Cervical back: Normal range of motion and neck supple.      Right lower le+ Edema present.      Left lower le+ Edema present.   Skin:     General: Skin is warm and dry.      Coloration: Skin is not jaundiced.   Neurological:      Mental Status: She is alert and oriented to person, place, and time.   Psychiatric:         Mood and Affect: Mood normal.         Reviewed Data     Result Review :  The following data was reviewed by JENNIFER West on 22:  • Labs on 2022 showed creatinine 1.0, hemoglobin A1c 6.9, TSH 1.380, total cholesterol 192, HDL 75, LDL 94, and triglycerides 137.  Hemoglobin was 13.0 and count was 270.        Procedures   NO EKG today; no changes noted on EKG from 22.    Assessment and Plan        Assessment and Plan     Assessment:  1. Right leg swelling    2. Diastolic dysfunction    3. Paroxysmal atrial fibrillation (HCC)    4. Primary hypertension    5. Type 2 diabetes mellitus with diabetic chronic kidney disease, unspecified CKD stage, unspecified whether long term insulin use ([facility])    6. Alzheimer's dementia without behavioral disturbance, unspecified timing of dementia onset ([facility])         1. Right leg swelling: She has discrepant swelling with 2+ edema up to her right thigh and trace edema to the left leg.  Her lungs are clear.  Echocardiogram showed normal EF but grade 2 diastolic dysfunction with high LAP.  No improvement with swelling off amlodipine.  She had normal right lower extremity venous Doppler on 2022.  She previously had  orthostasis on Lasix.  2. Diastolic dysfunction: Reported as grade 2 with high LAP on echocardiogram from July 5, 2022.  3. Paroxysmal atrial fibrillation: Noted to be in sinus rhythm today.  Heart rate is controlled with 100 mg Toprol daily and she is anticoagulated with 150 mg Pradaxa.  4. Hypertension: Her blood pressure has been controlled off amlodipine.  She has previously had AN allergic reaction to ACE inhibitor and headaches with hydralazine.  5. Type 2 diabetes: She is treated with metformin and Jardiance.  6. Dementia: She lives with her family.    Plan:  1. I will check a right lower extremity venous Doppler given her significant discrepant edema and clear lungs.  2. I will start 20 mg Lasix daily.  We discussed checking her weight daily.  I will call on Thursday to see how her weight and swelling are doing.  3. I will see her back in 1 month.        Follow Up:  Return in about 4 weeks (around 8/8/2022) for Follow-up with JENNIFER Mayer.  Orders Placed This Encounter   Procedures   • Basic Metabolic Panel          I appreciate the opportunity to participate in this patient's care.    Thank you,  JENNIFER Layton

## 2022-07-21 ENCOUNTER — TELEPHONE (OUTPATIENT)
Dept: CARDIOLOGY | Facility: CLINIC | Age: 77
End: 2022-07-21

## 2022-07-21 NOTE — TELEPHONE ENCOUNTER
Pt was seen on 22. This was the plan:        Since she started lasix 20 MG daily. She weight 170 lb at her last office visit on 22.     This is her daily weight lo/12/22: 166.2.     22: 160.4     22:  161.2    7/15/22: 162.4    22: 160    22: 164    She is wanting to know if she should increase her lasix?    PT#: [phone]

## 2022-07-26 RX ORDER — FUROSEMIDE 40 MG/1
40 TABLET ORAL DAILY
Qty: 30 TABLET | Refills: 11 | Status: SHIPPED | OUTPATIENT
Start: 2022-07-26

## 2022-07-26 NOTE — TELEPHONE ENCOUNTER
Called pt, she states her leg swelling is better on the Lasix 40mg.    She has been taking her BP but hasn't written any readings down.   She states she will take her BP today and I will call get her reading tomorrow.      Thank you,     Susana Mayer, RN  [facility]

## 2022-07-26 NOTE — TELEPHONE ENCOUNTER
Please call and check to see how leg swelling and BP are doing on 40 mg lasix.     Thanks!  Angy Borja, APRN

## 2022-07-26 NOTE — TELEPHONE ENCOUNTER
Just to clarify, do you want to put her on the Lasix 40mg daily x 3 days or 40mg daily from now on?    Thank you,     Susana Mayer RN  [facility] Triage Department

## 2022-07-27 NOTE — TELEPHONE ENCOUNTER
Spoke with pt's daughter. Pt's vitals as follows:    7/24=  Wt 164,   /60, HR 56  7/25=  Wt 160.4  7/26=  Wt 160.2  /70, HR 60  Pt's daughter has not taken any vitals today.  She is concerned about HR being low.  Advised her to call if pt's HR less than 50 and is symptomatic.    Advised pt's daughter of Angy's recommendations and instructions.  She verbalized understanding.        Susana Mayer RN  [place] Triage Department

## 2022-08-09 ENCOUNTER — OFFICE VISIT (OUTPATIENT)
Dept: CARDIOLOGY | Facility: CLINIC | Age: 77
End: 2022-08-09

## 2022-08-09 VITALS
DIASTOLIC BLOOD PRESSURE: 50 MMHG | BODY MASS INDEX: 29.19 KG/M2 | HEART RATE: 52 BPM | SYSTOLIC BLOOD PRESSURE: 158 MMHG | WEIGHT: 158.6 LBS | OXYGEN SATURATION: 98 % | HEIGHT: 62 IN

## 2022-08-09 DIAGNOSIS — I48.0 PAROXYSMAL ATRIAL FIBRILLATION: ICD-10-CM

## 2022-08-09 DIAGNOSIS — E11.22 TYPE 2 DIABETES MELLITUS WITH DIABETIC CHRONIC KIDNEY DISEASE, UNSPECIFIED CKD STAGE, UNSPECIFIED WHETHER LONG TERM INSULIN USE: ICD-10-CM

## 2022-08-09 DIAGNOSIS — Z91.81 AT HIGH RISK FOR FALLS: Primary | ICD-10-CM

## 2022-08-09 DIAGNOSIS — I50.32 CHRONIC DIASTOLIC CONGESTIVE HEART FAILURE: ICD-10-CM

## 2022-08-09 DIAGNOSIS — I10 PRIMARY HYPERTENSION: ICD-10-CM

## 2022-08-09 DIAGNOSIS — E78.5 HYPERLIPIDEMIA, UNSPECIFIED HYPERLIPIDEMIA TYPE: ICD-10-CM

## 2022-08-09 PROCEDURE — 99214 OFFICE O/P EST MOD 30 MIN: CPT | Performed by: NURSE PRACTITIONER

## 2022-08-09 PROCEDURE — 93000 ELECTROCARDIOGRAM COMPLETE: CPT | Performed by: NURSE PRACTITIONER

## 2022-08-09 NOTE — PROGRESS NOTES
Date of Office Visit: 2022  Encounter Provider: JENNIFER Tejada  Place of Service: [facility] CARDIOLOGY  Patient Name: Kassi Mathis  :1945    Chief Complaint   Patient presents with   • Right leg swelling   • Follow-up   :     HPI: Kassi Mathis is a 77 y.o. female who is a patient of Dr. Gongora.  She is new to me today and presents for 1 month follow-up after presenting with lower extremity edema recently.  She has a history of hypertension, atrial fibrillation, diastolic heart failure, diabetes type 2 and dementia.  Her last office visit with Angy Borja on 2022, PCP had stopped her amlodipine to see if edema would improve.  Patient continued to have right lower extremity swelling from thigh down and minimal swelling in her left leg.  She was not complaining of any dyspnea, orthopnea but daughter did notice a cough..  Her weight was up 22 pounds since her last visit 1 month prior.    An echocardiogram on 2022 showed normal LV systolic function, grade 2 diastolic dysfunction, moderately dilated left atrial cavity, no significant valvular disease and no pulmonary hypertension.  She had right popliteal fossa fluid collection and normal right lower extremity venous duplex scan on 2022.  Orthopedic MD is aware of fluid collection, as this in not new.      Today Ms. Mathis presents with her daughter.  She does not have any complaints.  Her right leg has improved quite a bit and appears to be same size as left leg.  She is not having any chest pain, pressure, dizziness or palpitations.  Daughter states that patient has some dysphagia however she does eat and drink sufficiently.  Daughter also endorses cough however the cough is not when she is eating.  Blood pressure is slightly elevated at 158/50 today in the office.  Her daughter checks her blood pressure on a normal basis and systolic is usually in the 130s to 140s.  EKG is stable.     Previous  testing and notes have been reviewed by me.   Past Medical History:   Diagnosis Date   • Allergic rhinitis    • Anemia    • Chronic venous insufficiency    • Dementia (HCC)    • Depression    • Diabetes mellitus (HCC)    • Headache     Sinus?   • Hidradenitis suppurativa    • Hyperlipidemia    • Hypertension    • Osteoarthritis    • Paroxysmal atrial fibrillation (HCC)    • Peripheral neuropathy    • Stroke (HCC)    • Vitamin B 12 deficiency     Boarderline only with normal MMA/Homocysteine/IF antibody       Past Surgical History:   Procedure Laterality Date   • BACK SURGERY     • HYSTERECTOMY      PARTIAL   • KNEE SURGERY Left        Social History     Socioeconomic History   • Marital status:    Tobacco Use   • Smoking status: Former Smoker     Packs/day: 0.50     Years: 0.00     Pack years: 0.00     Types: Cigarettes   • Smokeless tobacco: Never Used   Vaping Use   • Vaping Use: Never used   Substance and Sexual Activity   • Alcohol use: No     Comment: CAFFEINE USE/ SOFT DRINKS.    • Drug use: No   • Sexual activity: Defer       Family History   Problem Relation Age of Onset   • Breast cancer Mother    • Stroke Father    • Stroke Maternal Grandmother    • Heart attack Maternal Grandmother    • Heart attack Maternal Grandfather        Review of Systems   Constitutional: Negative.   HENT: Negative.    Eyes: Negative.    Cardiovascular: Positive for leg swelling.   Respiratory: Positive for cough.    Endocrine: Negative.    Hematologic/Lymphatic: Negative.         Pradaxa   Skin: Negative.    Musculoskeletal: Negative.    Gastrointestinal: Negative.    Genitourinary: Negative.    Neurological: Positive for difficulty with concentration and disturbances in coordination.   Psychiatric/Behavioral: Positive for memory loss.   Allergic/Immunologic: Negative.        Allergies   Allergen Reactions   • Enalapril Swelling and Other (See Comments)     Other reaction(s): Other: See Comments  Aka vasotec tabs  Aka  "vasotec tabs  Aka vasotec tabs  Aka vasotec tabs   • Enalapril Maleate Swelling   • Memantine Hcl Other (See Comments)     imbalance  imbalance   • Penicillins Swelling         Current Outpatient Medications:   •  albuterol sulfate  (90 Base) MCG/ACT inhaler, Inhale 2 puffs Every 4 (Four) Hours As Needed for Wheezing., Disp: 1 inhaler, Rfl: 0  •  amLODIPine (NORVASC) 5 MG tablet, TAKE ONE TABLET BY MOUTH DAILY, Disp: 90 tablet, Rfl: 3  •  Cholecalciferol (VITAMIN D3 PO), Take  by mouth., Disp: , Rfl:   •  donepezil (ARICEPT) 10 MG tablet, Take 1 tablet by mouth Daily., Disp: 90 tablet, Rfl: 3  •  furosemide (LASIX) 40 MG tablet, Take 1 tablet by mouth Daily., Disp: 30 tablet, Rfl: 11  •  glucose blood test strip, Use daily for type 2 DM., Disp: 100 each, Rfl: 3  •  Jardiance 10 MG tablet tablet, TAKE ONE TABLET BY MOUTH DAILY, Disp: 90 tablet, Rfl: 7  •  Melatonin 10 MG capsule, Take  by mouth., Disp: , Rfl:   •  metFORMIN ER (GLUCOPHAGE-XR) 500 MG 24 hr tablet, Take 1 tablet by mouth Daily With Breakfast., Disp: 90 tablet, Rfl: 3  •  metoprolol succinate XL (TOPROL-XL) 50 MG 24 hr tablet, TAKE TWO TABLETS BY MOUTH DAILY, Disp: 180 tablet, Rfl: 3  •  Multiple Vitamin (MULTIVITAMINS PO), Take 1 tablet by mouth daily., Disp: , Rfl:   •  Pradaxa 150 MG capsu, TAKE ONE CAPSULE BY MOUTH EVERY 12 HOURS, Disp: 180 capsule, Rfl: 1  •  rosuvastatin (CRESTOR) 20 MG tablet, TAKE ONE TABLET BY MOUTH EVERY NIGHT AT BEDTIME, Disp: 90 tablet, Rfl: 0  •  sertraline (ZOLOFT) 100 MG tablet, TAKE TWO TABLETS BY MOUTH DAILY, Disp: 180 tablet, Rfl: 3  •  vitamin B-12 (CYANOCOBALAMIN) 1000 MCG tablet, Take 1,000 mcg by mouth Daily., Disp: , Rfl:   •  vitamin C (ASCORBIC ACID) 250 MG tablet, Take 250 mg by mouth Daily., Disp: , Rfl:       Objective:     Vitals:    08/09/22 1210   BP: 158/50   BP Location: Left arm   Patient Position: Sitting   Pulse: 52   SpO2: 98%   Weight: 71.9 kg (158 lb 9.6 oz)   Height: 157.5 cm (62\")     Body " mass index is 29.01 kg/m².    PHYSICAL EXAM:    Constitutional:       Appearance: Not in distress. Frail.   Neck:      Vascular: No JVR. JVD normal.   Pulmonary:      Effort: Pulmonary effort is normal.      Breath sounds: Normal breath sounds. No wheezing. No rhonchi. No rales.   Chest:      Chest wall: Not tender to palpatation.   Cardiovascular:      PMI at left midclavicular line. Normal rate. Regular rhythm. Normal S1. Normal S2.      Murmurs: There is no murmur.      No gallop. No click. No rub.      Comments: Generalized nonpitting BLE  Pulses:     Intact distal pulses.   Edema:     Peripheral edema present.  Abdominal:      General: Bowel sounds are normal.      Palpations: Abdomen is soft.      Tenderness: There is no abdominal tenderness.   Musculoskeletal: Normal range of motion.         General: No tenderness. Skin:     General: Skin is warm and dry.   Neurological:      General: No focal deficit present.      Mental Status: Alert and oriented to person, place and time.           ECG 12 Lead    Date/Time: 8/9/2022 1:33 PM  Performed by: Sarah Ewing APRN  Authorized by: Sarah Ewing APRN   Comparison: compared with previous ECG from 6/13/2022  Similar to previous ECG  Rhythm: sinus bradycardia  BPM: 52  Conduction: conduction normal  ST Segments: ST segments normal  T Waves: T waves normal    Clinical impression: normal ECG              Assessment:       Diagnosis Plan   1. At high risk for falls     2. Primary hypertension     3. Paroxysmal atrial fibrillation (HCC)     4. Type 2 diabetes mellitus with diabetic chronic kidney disease, unspecified CKD stage, unspecified whether long term insulin use (HCC)     5. Chronic diastolic congestive heart failure (HCC)     6. Hyperlipidemia, unspecified hyperlipidemia type       No orders of the defined types were placed in this encounter.         Plan:       1.  Right lower extremity edema: Improved with diuresis.  2.  Chronic diastolic heart  failure: Started on Lasix in July.  Continue.  Daily a.m. weights.  Patient has lost 12 pounds since her last visit.  3.  Hypertension: Controlled on current medications.  Slightly elevated in the office today.  Patient's daughter will continue to monitor at home and call with any issues.  4.  Dysphagia  5.  Diabetes type 2: On oral medication  6.  Paroxysmal atrial fibrillation: On Pradaxa  7.  Hyperlipidemia: On statin therapy    Ms. Mathis will follow up with Dr. Gongora in 6 months.  She will call sooner for any questions or concerns.         Your medication list          Accurate as of August 9, 2022  1:31 PM. If you have any questions, ask your nurse or doctor.            CONTINUE taking these medications      Instructions Last Dose Given Next Dose Due   albuterol sulfate  (90 Base) MCG/ACT inhaler  Commonly known as: PROVENTIL HFA;VENTOLIN HFA;PROAIR HFA      Inhale 2 puffs Every 4 (Four) Hours As Needed for Wheezing.       amLODIPine 5 MG tablet  Commonly known as: NORVASC      TAKE ONE TABLET BY MOUTH DAILY       donepezil 10 MG tablet  Commonly known as: ARICEPT      Take 1 tablet by mouth Daily.       furosemide 40 MG tablet  Commonly known as: LASIX      Take 1 tablet by mouth Daily.       glucose blood test strip      Use daily for type 2 DM.       Jardiance 10 MG tablet tablet  Generic drug: empagliflozin      TAKE ONE TABLET BY MOUTH DAILY       Melatonin 10 MG capsule      Take  by mouth.       metFORMIN  MG 24 hr tablet  Commonly known as: GLUCOPHAGE-XR      Take 1 tablet by mouth Daily With Breakfast.       metoprolol succinate XL 50 MG 24 hr tablet  Commonly known as: TOPROL-XL      TAKE TWO TABLETS BY MOUTH DAILY       MULTIVITAMINS PO      Take 1 tablet by mouth daily.       Pradaxa 150 MG capsu  Generic drug: dabigatran etexilate      TAKE ONE CAPSULE BY MOUTH EVERY 12 HOURS       rosuvastatin 20 MG tablet  Commonly known as: CRESTOR      TAKE ONE TABLET BY MOUTH EVERY NIGHT AT  BEDTIME       sertraline 100 MG tablet  Commonly known as: ZOLOFT      TAKE TWO TABLETS BY MOUTH DAILY       vitamin B-12 1000 MCG tablet  Commonly known as: CYANOCOBALAMIN      Take 1,000 mcg by mouth Daily.       vitamin C 250 MG tablet  Commonly known as: ASCORBIC ACID      Take 250 mg by mouth Daily.       VITAMIN D3 PO      Take  by mouth.                As always, it has been a pleasure to participate in your patient's care.      Sincerely,       JENNIFER Luciano

## 2022-08-15 RX ORDER — DONEPEZIL HYDROCHLORIDE 10 MG/1
TABLET, FILM COATED ORAL
Qty: 90 TABLET | Refills: 3 | Status: SHIPPED | OUTPATIENT
Start: 2022-08-15

## 2022-08-18 ENCOUNTER — TELEPHONE (OUTPATIENT)
Dept: INTERNAL MEDICINE | Facility: CLINIC | Age: 77
End: 2022-08-18

## 2022-08-18 NOTE — TELEPHONE ENCOUNTER
PTS DAUGHTER CALLED ABOUT PT HAVING SOME PAIN IN THE GROIN AREA AND SOME DISCOLORATION IN THE LOWER PART OF HER LEG AND FOOT. PTS DAUGHTER SAID THAT SHE WAS NOT IN PAIN. I WENT AHEAD AND SCHEDULED PT FOR AN APPT ON MONDAY, BUT SHE WANTS YOU TO SEE HER TOMORROW IF POSSIBLE. PLEASE ADVISE

## 2022-08-19 ENCOUNTER — OFFICE VISIT (OUTPATIENT)
Dept: INTERNAL MEDICINE | Facility: CLINIC | Age: 77
End: 2022-08-19

## 2022-08-19 VITALS
HEART RATE: 60 BPM | BODY MASS INDEX: 29.63 KG/M2 | TEMPERATURE: 97.5 F | SYSTOLIC BLOOD PRESSURE: 140 MMHG | HEIGHT: 62 IN | RESPIRATION RATE: 18 BRPM | DIASTOLIC BLOOD PRESSURE: 62 MMHG | OXYGEN SATURATION: 99 % | WEIGHT: 161 LBS

## 2022-08-19 DIAGNOSIS — R10.31 RIGHT GROIN PAIN: Primary | ICD-10-CM

## 2022-08-19 DIAGNOSIS — E78.5 HYPERLIPIDEMIA, UNSPECIFIED HYPERLIPIDEMIA TYPE: ICD-10-CM

## 2022-08-19 DIAGNOSIS — I10 PRIMARY HYPERTENSION: ICD-10-CM

## 2022-08-19 DIAGNOSIS — E11.22 TYPE 2 DIABETES MELLITUS WITH DIABETIC CHRONIC KIDNEY DISEASE, UNSPECIFIED CKD STAGE, UNSPECIFIED WHETHER LONG TERM INSULIN USE: ICD-10-CM

## 2022-08-19 PROCEDURE — 99213 OFFICE O/P EST LOW 20 MIN: CPT | Performed by: INTERNAL MEDICINE

## 2022-08-19 NOTE — PROGRESS NOTES
Subjective     Kassi Mathis is a 77 y.o. female who presents with   Chief Complaint   Patient presents with   • Right Sided Groin Pain   • Foot Discoloration       History of Present Illness     Two days ago with c/o right groin pain while going to bed.  Yesterday daughter noticed foot discoloation but that is now improved.  Daughter worried about blood clot.    Review of Systems   Musculoskeletal: Positive for back pain.       The following portions of the patient's history were reviewed and updated as appropriate: allergies, current medications and problem list.    Patient Active Problem List    Diagnosis Date Noted   • Type 2 diabetes mellitus (HCC) 01/17/2016     Priority: High   • Chronic diastolic congestive heart failure (HCC) 08/09/2022   • At high risk for falls 09/29/2021   • Osteoarthritis of right knee 09/08/2020   • Cervical radiculopathy 10/08/2018   • Hammer toes of both feet 10/17/2017   • Depression 01/17/2016   • Allergic rhinitis 01/17/2016   • Hypertension 01/17/2016   • Dementia without behavioral disturbance (HCC) 01/17/2016     Note Last Updated: 7/20/2016     By neuropysch evaluation 7/20/2016     • Paroxysmal atrial fibrillation (HCC) 01/17/2016   • Cobalamin deficiency 01/17/2016     Note Last Updated: 6/8/2016     Boarderline only with normal MMA/Homocysteine/IF antibody.  Encouraged MVI daily.       • Hyperlipidemia 01/17/2016       Current Outpatient Medications on File Prior to Visit   Medication Sig Dispense Refill   • albuterol sulfate  (90 Base) MCG/ACT inhaler Inhale 2 puffs Every 4 (Four) Hours As Needed for Wheezing. 1 inhaler 0   • amLODIPine (NORVASC) 5 MG tablet TAKE ONE TABLET BY MOUTH DAILY 90 tablet 3   • Cholecalciferol (VITAMIN D3 PO) Take  by mouth.     • donepezil (ARICEPT) 10 MG tablet TAKE ONE TABLET BY MOUTH DAILY 90 tablet 3   • furosemide (LASIX) 40 MG tablet Take 1 tablet by mouth Daily. 30 tablet 11   • glucose blood test strip Use daily for type 2 DM. 100  "each 3   • Jardiance 10 MG tablet tablet TAKE ONE TABLET BY MOUTH DAILY 90 tablet 7   • Melatonin 10 MG capsule Take  by mouth.     • metFORMIN ER (GLUCOPHAGE-XR) 500 MG 24 hr tablet Take 1 tablet by mouth Daily With Breakfast. 90 tablet 3   • metoprolol succinate XL (TOPROL-XL) 50 MG 24 hr tablet TAKE TWO TABLETS BY MOUTH DAILY 180 tablet 3   • Multiple Vitamin (MULTIVITAMINS PO) Take 1 tablet by mouth daily.     • Pradaxa 150 MG capsu TAKE ONE CAPSULE BY MOUTH EVERY 12 HOURS 180 capsule 1   • rosuvastatin (CRESTOR) 20 MG tablet TAKE ONE TABLET BY MOUTH EVERY NIGHT AT BEDTIME 90 tablet 0   • sertraline (ZOLOFT) 100 MG tablet TAKE TWO TABLETS BY MOUTH DAILY 180 tablet 3   • vitamin B-12 (CYANOCOBALAMIN) 1000 MCG tablet Take 1,000 mcg by mouth Daily.     • vitamin C (ASCORBIC ACID) 250 MG tablet Take 250 mg by mouth Daily.       No current facility-administered medications on file prior to visit.       Objective     /62   Pulse 60   Temp 97.5 °F (36.4 °C) (Infrared)   Resp 18   Ht 157.5 cm (62\")   Wt 73 kg (161 lb)   LMP  (LMP Unknown)   SpO2 99%   BMI 29.45 kg/m²     Physical Exam  Constitutional:       Appearance: She is well-developed.   HENT:      Head: Normocephalic and atraumatic.   Pulmonary:      Effort: Pulmonary effort is normal.   Abdominal:      General: There is no distension.      Palpations: Abdomen is soft. There is no mass.      Tenderness: There is no abdominal tenderness. There is no guarding or rebound.   Musculoskeletal:      Right hip: Normal. No tenderness. Normal range of motion.      Right lower leg: Normal. No deformity or tenderness. No edema.      Left lower leg: No edema.   Neurological:      Mental Status: She is alert and oriented to person, place, and time.   Psychiatric:         Behavior: Behavior normal.         Assessment & Plan   Diagnoses and all orders for this visit:    1. Right groin pain (Primary)    2. Type 2 diabetes mellitus with diabetic chronic kidney " disease, unspecified CKD stage, unspecified whether long term insulin use (HCC)  -     CBC & Differential  -     Comprehensive Metabolic Panel  -     Lipid Panel  -     TSH Rfx On Abnormal To Free T4  -     Hemoglobin A1c    3. Primary hypertension  -     CBC & Differential  -     Comprehensive Metabolic Panel  -     Lipid Panel  -     TSH Rfx On Abnormal To Free T4  -     Hemoglobin A1c    4. Hyperlipidemia, unspecified hyperlipidemia type  -     CBC & Differential  -     Comprehensive Metabolic Panel  -     Lipid Panel  -     TSH Rfx On Abnormal To Free T4  -     Hemoglobin A1c        Discussion    Patient presents with right groin pain and transient discoloration of right leg without edema.  Daugther is concerned for blood clot.  I do think low risk given negative exam and lack of swelling.  She is also appropriately anticoagulated with Pradaxa.         Future Appointments   Date Time Provider Department Center   8/22/2022 11:00 AM Parul Jaime MD [facility]   2/9/2023 11:45 AM Mode Gongora MD [facility]

## 2022-08-20 LAB
ALBUMIN SERPL-MCNC: 3.9 G/DL (ref 3.7–4.7)
ALBUMIN/GLOB SERPL: 1.8 {RATIO} (ref 1.2–2.2)
ALP SERPL-CCNC: 57 IU/L (ref 44–121)
ALT SERPL-CCNC: 17 IU/L (ref 0–32)
AST SERPL-CCNC: 20 IU/L (ref 0–40)
BASOPHILS # BLD AUTO: 0 X10E3/UL (ref 0–0.2)
BASOPHILS NFR BLD AUTO: 1 %
BILIRUB SERPL-MCNC: 0.6 MG/DL (ref 0–1.2)
BUN SERPL-MCNC: 27 MG/DL (ref 8–27)
BUN/CREAT SERPL: 26 (ref 12–28)
CALCIUM SERPL-MCNC: 9.1 MG/DL (ref 8.7–10.3)
CHLORIDE SERPL-SCNC: 104 MMOL/L (ref 96–106)
CHOLEST SERPL-MCNC: 189 MG/DL (ref 100–199)
CO2 SERPL-SCNC: 25 MMOL/L (ref 20–29)
CREAT SERPL-MCNC: 1.04 MG/DL (ref 0.57–1)
EGFRCR-CYS SERPLBLD CKD-EPI 2021: 55 ML/MIN/1.73
EOSINOPHIL # BLD AUTO: 0.1 X10E3/UL (ref 0–0.4)
EOSINOPHIL NFR BLD AUTO: 2 %
ERYTHROCYTE [DISTWIDTH] IN BLOOD BY AUTOMATED COUNT: 13.2 % (ref 11.7–15.4)
GLOBULIN SER CALC-MCNC: 2.2 G/DL (ref 1.5–4.5)
GLUCOSE SERPL-MCNC: ABNORMAL MG/DL
HBA1C MFR BLD: 7.3 % (ref 4.8–5.6)
HCT VFR BLD AUTO: 39 % (ref 34–46.6)
HDLC SERPL-MCNC: 71 MG/DL
HGB BLD-MCNC: 12.8 G/DL (ref 11.1–15.9)
IMM GRANULOCYTES # BLD AUTO: 0 X10E3/UL (ref 0–0.1)
IMM GRANULOCYTES NFR BLD AUTO: 0 %
LDLC SERPL CALC-MCNC: 105 MG/DL (ref 0–99)
LYMPHOCYTES # BLD AUTO: 2.4 X10E3/UL (ref 0.7–3.1)
LYMPHOCYTES NFR BLD AUTO: 37 %
MCH RBC QN AUTO: 30.3 PG (ref 26.6–33)
MCHC RBC AUTO-ENTMCNC: 32.8 G/DL (ref 31.5–35.7)
MCV RBC AUTO: 92 FL (ref 79–97)
MONOCYTES # BLD AUTO: 0.7 X10E3/UL (ref 0.1–0.9)
MONOCYTES NFR BLD AUTO: 11 %
NEUTROPHILS # BLD AUTO: 3.2 X10E3/UL (ref 1.4–7)
NEUTROPHILS NFR BLD AUTO: 49 %
PLATELET # BLD AUTO: 238 X10E3/UL (ref 150–450)
POTASSIUM SERPL-SCNC: ABNORMAL MMOL/L
PROT SERPL-MCNC: 6.1 G/DL (ref 6–8.5)
RBC # BLD AUTO: 4.22 X10E6/UL (ref 3.77–5.28)
SODIUM SERPL-SCNC: 144 MMOL/L (ref 134–144)
TRIGL SERPL-MCNC: 68 MG/DL (ref 0–149)
TSH SERPL DL<=0.005 MIU/L-ACNC: 1.5 UIU/ML (ref 0.45–4.5)
VLDLC SERPL CALC-MCNC: 13 MG/DL (ref 5–40)
WBC # BLD AUTO: 6.4 X10E3/UL (ref 3.4–10.8)

## 2022-09-07 ENCOUNTER — TELEPHONE (OUTPATIENT)
Dept: CARDIOLOGY | Facility: CLINIC | Age: 77
End: 2022-09-07

## 2022-09-07 NOTE — TELEPHONE ENCOUNTER
Pt's daughter lvm states that she is concerned about her mom's H/R 53 .         This her B/P 129/80  .4     She has been taking her Lasix 40 mg .      She wants to know if this ok .

## 2022-09-08 ENCOUNTER — OFFICE VISIT (OUTPATIENT)
Dept: CARDIOLOGY | Facility: CLINIC | Age: 77
End: 2022-09-08

## 2022-09-08 VITALS
SYSTOLIC BLOOD PRESSURE: 130 MMHG | BODY MASS INDEX: 30.22 KG/M2 | HEIGHT: 62 IN | DIASTOLIC BLOOD PRESSURE: 76 MMHG | HEART RATE: 48 BPM | WEIGHT: 164.2 LBS

## 2022-09-08 DIAGNOSIS — I48.0 PAROXYSMAL ATRIAL FIBRILLATION: ICD-10-CM

## 2022-09-08 DIAGNOSIS — I10 PRIMARY HYPERTENSION: ICD-10-CM

## 2022-09-08 DIAGNOSIS — R00.1 BRADYCARDIA, SINUS: Primary | ICD-10-CM

## 2022-09-08 PROCEDURE — 99214 OFFICE O/P EST MOD 30 MIN: CPT | Performed by: NURSE PRACTITIONER

## 2022-09-08 PROCEDURE — 93000 ELECTROCARDIOGRAM COMPLETE: CPT | Performed by: NURSE PRACTITIONER

## 2022-09-08 RX ORDER — METOPROLOL SUCCINATE 50 MG/1
50 TABLET, EXTENDED RELEASE ORAL DAILY
Qty: 90 TABLET | Refills: 0
Start: 2022-09-08

## 2022-09-08 NOTE — PROGRESS NOTES
Date of Office Visit: 2022  Encounter Provider: Petrona Lau, BRITTNY, APRN  Place of Service: [facility] CARDIOLOGY  Patient Name: Kassi Mathis  :1945        Subjective:     Chief Complaint:  Bradycardia, fatigue      History of Present Illness:  Kassi Mathis is a 77 y.o. female patient of Dr. Gongora.  This patient is new to me and I have reviewed her records.    Patient has a history of hypertension, paroxysmal atrial fibrillation, diastolic dysfunction, dementia, lower extremity edema.    Patient was seen in the office for follow-up 2022 at which point she was noted to have worsening dementia.  Her children were noted to be very involved in her care.  She had had increased lower extremity swelling.  She had echocardiogram 2022 showing normal LV systolic function with EF of 65 to 69%, grade 2 diastolic dysfunction, moderately dilated left atrial cavity, trace aortic and mitral valvular regurgitation, normal RVSP.  Her amlodipine was stopped.  Venous Doppler ultrasound 2020 showed right popliteal fluid collection, otherwise normal.  Patient/family called the office earlier today reporting normal blood pressure but heart rate in the 40s with associated fatigue.  Appointment was recommended.      Patient presents to office today for follow-up appointment.  Patient's daughter is with her in the office today, per patient preference.  They report that patient has been feeling well overall since last visit.  She has some chronic shortness of breath with exertion which is unchanged.  Her edema has resolved with the low-dose Lasix.  She had labs in August showing stable renal function.  They report that her blood pressure has been very well controlled recently, staying less than 130/80 on average.  They saw some heart rate readings last week in the 50s but patient was asymptomatic.  However this week they have seen heart rate in the upper 40s and patient is having increased  fatigue with this.  No dizziness or lightheadedness or near syncope.          Past Medical History:   Diagnosis Date   • Allergic rhinitis    • Anemia    • Chronic venous insufficiency    • Dementia (HCC)    • Depression    • Diabetes mellitus (HCC)    • Headache     Sinus?   • Hidradenitis suppurativa    • Hyperlipidemia    • Hypertension    • Osteoarthritis    • Paroxysmal atrial fibrillation (HCC)    • Peripheral neuropathy    • Stroke (HCC)    • Vitamin B 12 deficiency     Boarderline only with normal MMA/Homocysteine/IF antibody     Past Surgical History:   Procedure Laterality Date   • BACK SURGERY     • HYSTERECTOMY      PARTIAL   • KNEE SURGERY Left      Outpatient Medications Prior to Visit   Medication Sig Dispense Refill   • albuterol sulfate  (90 Base) MCG/ACT inhaler Inhale 2 puffs Every 4 (Four) Hours As Needed for Wheezing. 1 inhaler 0   • amLODIPine (NORVASC) 5 MG tablet TAKE ONE TABLET BY MOUTH DAILY 90 tablet 3   • Cholecalciferol (VITAMIN D3 PO) Take  by mouth.     • donepezil (ARICEPT) 10 MG tablet TAKE ONE TABLET BY MOUTH DAILY 90 tablet 3   • furosemide (LASIX) 40 MG tablet Take 1 tablet by mouth Daily. 30 tablet 11   • glucose blood test strip Use daily for type 2 DM. 100 each 3   • Jardiance 10 MG tablet tablet TAKE ONE TABLET BY MOUTH DAILY 90 tablet 7   • Melatonin 10 MG capsule Take  by mouth.     • metFORMIN ER (GLUCOPHAGE-XR) 500 MG 24 hr tablet Take 1 tablet by mouth Daily With Breakfast. 90 tablet 3   • Multiple Vitamin (MULTIVITAMINS PO) Take 1 tablet by mouth daily.     • Pradaxa 150 MG capsu TAKE ONE CAPSULE BY MOUTH EVERY 12 HOURS 180 capsule 1   • rosuvastatin (CRESTOR) 20 MG tablet TAKE ONE TABLET BY MOUTH EVERY NIGHT AT BEDTIME 90 tablet 0   • sertraline (ZOLOFT) 100 MG tablet TAKE TWO TABLETS BY MOUTH DAILY 180 tablet 3   • vitamin B-12 (CYANOCOBALAMIN) 1000 MCG tablet Take 1,000 mcg by mouth Daily.     • vitamin C (ASCORBIC ACID) 250 MG tablet Take 250 mg by mouth  "Daily.     • metoprolol succinate XL (TOPROL-XL) 50 MG 24 hr tablet TAKE TWO TABLETS BY MOUTH DAILY 180 tablet 3     No facility-administered medications prior to visit.       Allergies as of 09/08/2022 - Reviewed 09/08/2022   Allergen Reaction Noted   • Enalapril Swelling and Other (See Comments) 01/13/2016   • Enalapril maleate Swelling 11/18/2016   • Memantine hcl Other (See Comments) 04/13/2017   • Penicillins Swelling 01/28/2013     Social History     Socioeconomic History   • Marital status:    Tobacco Use   • Smoking status: Former Smoker     Packs/day: 0.50     Years: 0.00     Pack years: 0.00     Types: Cigarettes   • Smokeless tobacco: Never Used   Vaping Use   • Vaping Use: Never used   Substance and Sexual Activity   • Alcohol use: No     Comment: CAFFEINE USE/ SOFT DRINKS.    • Drug use: No   • Sexual activity: Defer     Family History   Problem Relation Age of Onset   • Breast cancer Mother    • Stroke Father    • Stroke Maternal Grandmother    • Heart attack Maternal Grandmother    • Heart attack Maternal Grandfather        Review of Systems   Constitutional: Positive for malaise/fatigue.   Cardiovascular:        SEE HPI   Respiratory: Positive for shortness of breath (Chronic at baseline).    Hematologic/Lymphatic: Negative for bleeding problem.   Gastrointestinal: Negative for melena.   Genitourinary: Negative for hematuria.   Neurological: Negative for dizziness.   Psychiatric/Behavioral: Negative for altered mental status.          Objective:     Vitals:    09/08/22 1422   BP: 130/76   BP Location: Left arm   Patient Position: Sitting   Pulse: (!) 48   Weight: 74.5 kg (164 lb 3.2 oz)   Height: 157.5 cm (62\")     HR on recheck 50 bpm  Body mass index is 30.03 kg/m².        PHYSICAL EXAM:  Constitutional:       General: Not in acute distress.     Appearance: Well-developed. Not diaphoretic.   Eyes:      Pupils: Pupils are equal, round, and reactive to light.   HENT:      Head: Normocephalic " and atraumatic.   Pulmonary:      Effort: Pulmonary effort is normal. No respiratory distress.      Breath sounds: Normal breath sounds. No wheezing. No rales.   Cardiovascular:      Normal rate. Regular rhythm.      Murmurs: There is no murmur.      No gallop. No click. No rub.   Edema:     Peripheral edema absent.   Abdominal:      General: Bowel sounds are normal.      Palpations: Abdomen is soft.   Musculoskeletal:         General: No tenderness or deformity. Skin:     General: Skin is warm and dry.      Findings: No erythema.   Neurological:      Mental Status: Alert and oriented to person, place, and time.             ECG 12 Lead    Date/Time: 9/8/2022 4:12 PM  Performed by: Petrona Lau DNP, APRN  Authorized by: Petrona Lau DNP, JENNIFER   Comparison: compared with previous ECG from 8/9/2022  Rhythm: sinus bradycardia  BPM: 48  Comments: Heart rate slightly lower on EKG today compared with previous.  Otherwise no significant changes.              Assessment:       Diagnosis Plan   1. Bradycardia, sinus  ECG 12 Lead   2. Paroxysmal atrial fibrillation (HCC)  ECG 12 Lead   3. Primary hypertension  ECG 12 Lead         Plan:     1. Bradycardia: Mild but associated with some fatigue.  Discussed with Dr. Gongora and reviewed EKG.  We will decrease metoprolol dosing to 50 mg daily.  Patient/daughter to monitor heart rate and blood pressure daily and call with some updated readings next week or call sooner for issues or concerns.  2. Hypertension: Monitor blood pressure at decrease beta-blocker dose, as above.  Will await updated blood pressure log.  3. Lower extremity edema: Resolved with Lasix.  Weight still fluctuates at home 160 to 164 pounds but staying stable per family.  4. Paroxysmal atrial fibrillation: Maintaining sinus rhythm in the office today.  Denies palpitations or tachycardia or evidence of recurrence.  Tolerating blood thinner without abnormal bleeding or recent falls.    Patient to keep  February follow-up with Dr. Gongora as scheduled or follow-up sooner if needed for any new, recurrent, or worsening symptoms prior to that time.  Discussed in detail signs/symptoms that warrant sooner call or follow-up to the office or ER visit.           Your medication list          Accurate as of September 8, 2022  4:14 PM. If you have any questions, ask your nurse or doctor.            CHANGE how you take these medications      Instructions Last Dose Given Next Dose Due   metoprolol succinate XL 50 MG 24 hr tablet  Commonly known as: TOPROL-XL  What changed: how much to take  Changed by: Petrona Lau, DNP, APRN      Take 1 tablet by mouth Daily.          CONTINUE taking these medications      Instructions Last Dose Given Next Dose Due   albuterol sulfate  (90 Base) MCG/ACT inhaler  Commonly known as: PROVENTIL HFA;VENTOLIN HFA;PROAIR HFA      Inhale 2 puffs Every 4 (Four) Hours As Needed for Wheezing.       amLODIPine 5 MG tablet  Commonly known as: NORVASC      TAKE ONE TABLET BY MOUTH DAILY       donepezil 10 MG tablet  Commonly known as: ARICEPT      TAKE ONE TABLET BY MOUTH DAILY       furosemide 40 MG tablet  Commonly known as: LASIX      Take 1 tablet by mouth Daily.       glucose blood test strip      Use daily for type 2 DM.       Jardiance 10 MG tablet tablet  Generic drug: empagliflozin      TAKE ONE TABLET BY MOUTH DAILY       Melatonin 10 MG capsule      Take  by mouth.       metFORMIN  MG 24 hr tablet  Commonly known as: GLUCOPHAGE-XR      Take 1 tablet by mouth Daily With Breakfast.       MULTIVITAMINS PO      Take 1 tablet by mouth daily.       Pradaxa 150 MG capsu  Generic drug: dabigatran etexilate      TAKE ONE CAPSULE BY MOUTH EVERY 12 HOURS       rosuvastatin 20 MG tablet  Commonly known as: CRESTOR      TAKE ONE TABLET BY MOUTH EVERY NIGHT AT BEDTIME       sertraline 100 MG tablet  Commonly known as: ZOLOFT      TAKE TWO TABLETS BY MOUTH DAILY       vitamin B-12 1000 MCG  tablet  Commonly known as: CYANOCOBALAMIN      Take 1,000 mcg by mouth Daily.       vitamin C 250 MG tablet  Commonly known as: ASCORBIC ACID      Take 250 mg by mouth Daily.       VITAMIN D3 PO      Take  by mouth.             Where to Get Your Medications      Information about where to get these medications is not yet available    Ask your nurse or doctor about these medications  · metoprolol succinate XL 50 MG 24 hr tablet         The above medication changes may not have been made by this provider.  Patient's medication list was updated to reflect medications they are currently taking including medication changes made by other providers.            Thanks,    Pertona Lau, DNP, APRN  09/08/2022         Dictated utilizing Dragon dictation

## 2022-09-08 NOTE — TELEPHONE ENCOUNTER
"Dr. Gongora,     Pt's daughter called stating pt's HR is now 45-47 and feels \"very, very tired,\" and is falling asleep all the time.  She is also off balance but denies dizziness, lightheadedness or  SOA. BP is 126/81.   Reviewed cardiac medications.  Pt is taking as prescribed.  I notified pt's daughter that I would give you an update and reiterated that as long as she is feeling ok and no lightheadedness, then continue to observe.  Pt's daughter mentioned that pt has ALD and this makes her off balance.    Do you have any recommendations?     Thank you,     Susana Mayer RN  [facility]    "

## 2022-09-19 ENCOUNTER — TELEPHONE (OUTPATIENT)
Dept: CARDIOLOGY | Facility: CLINIC | Age: 77
End: 2022-09-19

## 2022-09-19 DIAGNOSIS — I10 PRIMARY HYPERTENSION: Primary | ICD-10-CM

## 2022-09-19 NOTE — TELEPHONE ENCOUNTER
Pt's daughter notified of results and recommendations, and verbalized understanding. She agrees to plan  Pt's daughter states pt feels better, but still remains off balance and confused.    Thanks,     Susana Mayer RN  [facility] [place] Triage Department

## 2022-09-19 NOTE — TELEPHONE ENCOUNTER
Please let patient/daughter know that blood pressures overall looks stable.  Heart rate looks improved, staying in the 50s.  Weight looks stable.  Is she feeling okay since last visit?    Would continue the lower dose of the metoprolol XL at 50 mg daily and call for heart rate below 50 or for symptoms or concerns prior to next visit.  Would continue to monitor blood pressure at home as well and would call if blood pressure staying greater than 130s-140s over 80s on average.  Would make sure that they are checking blood pressure at least an hour or 2 after medications and after sitting calmly a good 10 minutes.

## 2022-09-19 NOTE — TELEPHONE ENCOUNTER
Plan:      1. Bradycardia: Mild but associated with some fatigue.  Discussed with Dr. Gongora and reviewed EKG.  We will decrease metoprolol dosing to 50 mg daily.  Patient/daughter to monitor heart rate and blood pressure daily and call with some updated readings next week or call sooner for issues or concerns.  2. Hypertension: Monitor blood pressure at decrease beta-blocker dose, as above.  Will await updated blood pressure log.        Pts daughter faxed pts blood pressure log to the office    I have placed this in your in box for review

## 2022-09-19 NOTE — TELEPHONE ENCOUNTER
Called and spoke with patient's daughter.  Patient has chronic balance issues.  Recommended discussing balance PT with PCP.  She also has chronic dementia issues.  She does have some confusion and we discussed calling PCP today to rule out UTI.  Denies strokelike symptoms.  Denies dizziness or lightheadedness.  Would like to check BMP to ensure kidney function electrolytes stable on current dose of diuretic.  They do feel that she is benefiting from the current dose if she can continue to tolerate it from a kidney standpoint.  Recommended that they stop by the outpatient lab to get BMP drawn.  Call for any additional questions or concerns.  Call for heart rate below 50 or if she develops any new, recurrent, or worsening symptoms prior to next visit.

## 2022-09-23 ENCOUNTER — IMMUNIZATION (OUTPATIENT)
Dept: VACCINE CLINIC | Facility: HOSPITAL | Age: 77
End: 2022-09-23

## 2022-09-23 ENCOUNTER — TELEPHONE (OUTPATIENT)
Dept: INTERNAL MEDICINE | Facility: CLINIC | Age: 77
End: 2022-09-23

## 2022-09-23 DIAGNOSIS — Z23 NEED FOR VACCINATION: Primary | ICD-10-CM

## 2022-09-23 PROCEDURE — 0124A: CPT | Performed by: INTERNAL MEDICINE

## 2022-09-23 PROCEDURE — 91312 HC SARSCOV2 VAC 30MCG/0.3ML IM BIVALENT BOOSTER 12 YRS AND OLDER: CPT | Performed by: INTERNAL MEDICINE

## 2022-09-23 NOTE — TELEPHONE ENCOUNTER
Caller: Flaca Mathis    Relationship: Emergency Contact    Best call back number: [phone]    What orders are you requesting (i.e. lab or imaging): URINE CULTURE     In what timeframe would the patient need to come in: ASAP     Where will you receive your lab/imaging services: [facility]     Additional notes: PATIENT IS REQUESTING A URINE CULTURE TO TEST FOR A UTI

## 2022-10-07 ENCOUNTER — TELEPHONE (OUTPATIENT)
Dept: CARDIOLOGY | Facility: CLINIC | Age: 77
End: 2022-10-07

## 2022-10-07 NOTE — TELEPHONE ENCOUNTER
Please remind patient's daughter that they still need to get BMP checked at the outpatient lab.  Please see if there is a lab closer to them where we can fax the order if that is easier for them.  Thanks!

## 2022-10-07 NOTE — TELEPHONE ENCOUNTER
Called and spoke with patient's daughter and they will make sure to get the labs drawn within the next week.  His daughter had been sick and forgot.

## 2022-10-13 DIAGNOSIS — E78.5 HYPERLIPIDEMIA, UNSPECIFIED HYPERLIPIDEMIA TYPE: ICD-10-CM

## 2022-10-13 DIAGNOSIS — E53.8 COBALAMIN DEFICIENCY: ICD-10-CM

## 2022-10-13 DIAGNOSIS — I10 PRIMARY HYPERTENSION: Primary | ICD-10-CM

## 2022-10-13 DIAGNOSIS — E11.22 TYPE 2 DIABETES MELLITUS WITH DIABETIC CHRONIC KIDNEY DISEASE, UNSPECIFIED CKD STAGE, UNSPECIFIED WHETHER LONG TERM INSULIN USE: ICD-10-CM

## 2022-10-13 RX ORDER — DABIGATRAN ETEXILATE 150 MG/1
150 CAPSULE ORAL EVERY 12 HOURS
Qty: 180 CAPSULE | Refills: 1 | Status: SHIPPED | OUTPATIENT
Start: 2022-10-13

## 2022-10-28 ENCOUNTER — FLU SHOT (OUTPATIENT)
Dept: INTERNAL MEDICINE | Facility: CLINIC | Age: 77
End: 2022-10-28

## 2022-10-28 PROCEDURE — 90662 IIV NO PRSV INCREASED AG IM: CPT | Performed by: INTERNAL MEDICINE

## 2022-10-28 PROCEDURE — G0008 ADMIN INFLUENZA VIRUS VAC: HCPCS | Performed by: INTERNAL MEDICINE

## 2022-11-02 ENCOUNTER — TELEMEDICINE (OUTPATIENT)
Dept: INTERNAL MEDICINE | Facility: CLINIC | Age: 77
End: 2022-11-02

## 2022-11-02 DIAGNOSIS — R51.9 CHRONIC INTRACTABLE HEADACHE, UNSPECIFIED HEADACHE TYPE: Primary | ICD-10-CM

## 2022-11-02 DIAGNOSIS — R13.12 OROPHARYNGEAL DYSPHAGIA: ICD-10-CM

## 2022-11-02 DIAGNOSIS — G89.29 CHRONIC INTRACTABLE HEADACHE, UNSPECIFIED HEADACHE TYPE: Primary | ICD-10-CM

## 2022-11-02 PROCEDURE — 99213 OFFICE O/P EST LOW 20 MIN: CPT | Performed by: INTERNAL MEDICINE

## 2022-11-02 NOTE — PROGRESS NOTES
Subjective     Kassi Mathis is a 77 y.o. female who presents with   Chief Complaint   Patient presents with   • Headache   • pill dysphagia       History of Present Illness     C/o increased headaches recently.  She is getting nearly daily.  She does have chronic HAs.  Daughter feels like they are sinus.  Allergy pill some help.      C/o dysphagia.  Trouble with pills primary especially metformin.      Review of Systems   Respiratory: Negative.    Cardiovascular: Negative.        The following portions of the patient's history were reviewed and updated as appropriate: allergies, current medications and problem list.    Patient Active Problem List    Diagnosis Date Noted   • Type 2 diabetes mellitus (HCC) 01/17/2016     Priority: High   • Bradycardia, sinus 09/08/2022   • Chronic diastolic congestive heart failure (HCC) 08/09/2022   • At high risk for falls 09/29/2021   • Osteoarthritis of right knee 09/08/2020   • Cervical radiculopathy 10/08/2018   • Hammer toes of both feet 10/17/2017   • Depression 01/17/2016   • Allergic rhinitis 01/17/2016   • Hypertension 01/17/2016   • Dementia without behavioral disturbance (HCC) 01/17/2016     Note Last Updated: 7/20/2016     By neuropysch evaluation 7/20/2016     • Paroxysmal atrial fibrillation (HCC) 01/17/2016   • Cobalamin deficiency 01/17/2016     Note Last Updated: 6/8/2016     Boarderline only with normal MMA/Homocysteine/IF antibody.  Encouraged MVI daily.       • Hyperlipidemia 01/17/2016       Current Outpatient Medications on File Prior to Visit   Medication Sig Dispense Refill   • albuterol sulfate  (90 Base) MCG/ACT inhaler Inhale 2 puffs Every 4 (Four) Hours As Needed for Wheezing. 1 inhaler 0   • amLODIPine (NORVASC) 5 MG tablet TAKE ONE TABLET BY MOUTH DAILY 90 tablet 3   • Cholecalciferol (VITAMIN D3 PO) Take  by mouth.     • dabigatran etexilate (Pradaxa) 150 MG capsu Take 1 capsule by mouth Every 12 (Twelve) Hours. 180 capsule 1   • donepezil  (ARICEPT) 10 MG tablet TAKE ONE TABLET BY MOUTH DAILY 90 tablet 3   • furosemide (LASIX) 40 MG tablet Take 1 tablet by mouth Daily. 30 tablet 11   • glucose blood test strip Use daily for type 2 DM. 100 each 3   • Jardiance 10 MG tablet tablet TAKE ONE TABLET BY MOUTH DAILY 90 tablet 7   • Melatonin 10 MG capsule Take  by mouth.     • metoprolol succinate XL (TOPROL-XL) 50 MG 24 hr tablet Take 1 tablet by mouth Daily. 90 tablet 0   • Multiple Vitamin (MULTIVITAMINS PO) Take 1 tablet by mouth daily.     • rosuvastatin (CRESTOR) 20 MG tablet TAKE ONE TABLET BY MOUTH EVERY NIGHT AT BEDTIME 90 tablet 0   • sertraline (ZOLOFT) 100 MG tablet TAKE TWO TABLETS BY MOUTH DAILY 180 tablet 3   • vitamin B-12 (CYANOCOBALAMIN) 1000 MCG tablet Take 1,000 mcg by mouth Daily.     • vitamin C (ASCORBIC ACID) 250 MG tablet Take 250 mg by mouth Daily.     • [DISCONTINUED] metFORMIN ER (GLUCOPHAGE-XR) 500 MG 24 hr tablet Take 1 tablet by mouth Daily With Breakfast. 90 tablet 3     No current facility-administered medications on file prior to visit.       Objective     LMP  (LMP Unknown)     Physical Exam  Constitutional:       Appearance: She is well-developed.   HENT:      Head: Normocephalic and atraumatic.   Pulmonary:      Effort: Pulmonary effort is normal.   Neurological:      Mental Status: She is alert and oriented to person, place, and time.   Psychiatric:         Behavior: Behavior normal.         Assessment & Plan   Diagnoses and all orders for this visit:    1. Chronic intractable headache, unspecified headache type (Primary)  -     MRI Brain With & Without Contrast; Future    2. Oropharyngeal dysphagia  -     FL upper gi single contrast w kub; Future    Other orders  -     metFORMIN (Glucophage) 500 MG tablet; Take 0.5 tablets by mouth 2 (Two) Times a Day With Meals.  Dispense: 90 tablet; Refill: 3        Discussion    Patient presents by video visit with chronic headaches that have worsened.  I do think there is some  element of sinus pressure.  We discussed adding Flonase.  Daughter requests MRI to further evaluate which I think is reasonable.    Oropharyngeal dysphagia.  UGI is ordered to further evaluate.         Future Appointments   Date Time Provider Department Center   11/29/2022 11:10 AM [facility]   12/5/2022  2:45 PM Parul Jaime MD [facility]   2/9/2023 11:45 AM Mode Gongora MD [facility]

## 2022-11-08 ENCOUNTER — TELEPHONE (OUTPATIENT)
Dept: INTERNAL MEDICINE | Facility: CLINIC | Age: 77
End: 2022-11-08

## 2022-11-08 DIAGNOSIS — F40.240 CLAUSTROPHOBIA: Primary | ICD-10-CM

## 2022-11-08 RX ORDER — ALPRAZOLAM 0.25 MG/1
0.25 TABLET ORAL ONCE
Qty: 1 TABLET | Refills: 0 | Status: SHIPPED | OUTPATIENT
Start: 2022-11-08 | End: 2022-11-08

## 2022-11-08 NOTE — TELEPHONE ENCOUNTER
Caller: Flaca Mathis    Relationship: Emergency Contact    Best call back number: [phone]    What is the best time to reach you: ANY    Who are you requesting to speak with (clinical staff, provider,  specific staff member): CLINICAL    What was the call regarding: PATIENT'S DAUGHTER CALLED AND STATED THAT ON 11/18, THE PATIENT WILL BE HAVING AN MRI. THE PATIENT'S DAUGHTER STATES THAT THE PATIENT IS VERY CLAUSTROPHOBIC AND WILL NEED SOMETHING TO SEDATE HER.     Do you require a callback: YES    PLEASE ADVISE

## 2022-11-23 DIAGNOSIS — E11.22 TYPE 2 DIABETES MELLITUS WITH DIABETIC CHRONIC KIDNEY DISEASE, UNSPECIFIED CKD STAGE, UNSPECIFIED WHETHER LONG TERM INSULIN USE: ICD-10-CM

## 2022-11-23 DIAGNOSIS — E53.8 COBALAMIN DEFICIENCY: ICD-10-CM

## 2022-11-23 DIAGNOSIS — E78.5 HYPERLIPIDEMIA, UNSPECIFIED HYPERLIPIDEMIA TYPE: ICD-10-CM

## 2022-11-23 DIAGNOSIS — I10 PRIMARY HYPERTENSION: Primary | ICD-10-CM

## 2022-11-30 LAB
ALBUMIN SERPL-MCNC: 3.8 G/DL (ref 3.5–5.2)
ALBUMIN/CREAT UR: 51 MG/G CREAT (ref 0–29)
ALBUMIN/GLOB SERPL: 1.6 G/DL
ALP SERPL-CCNC: 64 U/L (ref 39–117)
ALT SERPL-CCNC: 11 U/L (ref 1–33)
APPEARANCE UR: CLEAR
AST SERPL-CCNC: 16 U/L (ref 1–32)
BACTERIA #/AREA URNS HPF: ABNORMAL /HPF
BASOPHILS # BLD AUTO: 0.04 10*3/MM3 (ref 0–0.2)
BASOPHILS NFR BLD AUTO: 0.5 % (ref 0–1.5)
BILIRUB SERPL-MCNC: 0.9 MG/DL (ref 0–1.2)
BILIRUB UR QL STRIP: NEGATIVE
BUN SERPL-MCNC: 15 MG/DL (ref 8–23)
BUN/CREAT SERPL: 15.6 (ref 7–25)
CALCIUM SERPL-MCNC: 9.1 MG/DL (ref 8.6–10.5)
CASTS URNS QL MICRO: ABNORMAL /LPF
CHLORIDE SERPL-SCNC: 102 MMOL/L (ref 98–107)
CHOLEST SERPL-MCNC: 170 MG/DL (ref 0–200)
CO2 SERPL-SCNC: 30 MMOL/L (ref 22–29)
COLOR UR: YELLOW
CREAT SERPL-MCNC: 0.96 MG/DL (ref 0.57–1)
CREAT UR-MCNC: 120 MG/DL
CRYSTALS URNS MICRO: ABNORMAL
EGFRCR SERPLBLD CKD-EPI 2021: 60.7 ML/MIN/1.73
EOSINOPHIL # BLD AUTO: 0.1 10*3/MM3 (ref 0–0.4)
EOSINOPHIL NFR BLD AUTO: 1.3 % (ref 0.3–6.2)
EPI CELLS #/AREA URNS HPF: ABNORMAL /HPF (ref 0–10)
ERYTHROCYTE [DISTWIDTH] IN BLOOD BY AUTOMATED COUNT: 13.5 % (ref 12.3–15.4)
GLOBULIN SER CALC-MCNC: 2.4 GM/DL
GLUCOSE SERPL-MCNC: 151 MG/DL (ref 65–99)
GLUCOSE UR QL STRIP: ABNORMAL
HBA1C MFR BLD: 7.2 % (ref 4.8–5.6)
HCT VFR BLD AUTO: 40.1 % (ref 34–46.6)
HDLC SERPL-MCNC: 68 MG/DL (ref 40–60)
HGB BLD-MCNC: 12.7 G/DL (ref 12–15.9)
HGB UR QL STRIP: NEGATIVE
IMM GRANULOCYTES # BLD AUTO: 0.02 10*3/MM3 (ref 0–0.05)
IMM GRANULOCYTES NFR BLD AUTO: 0.3 % (ref 0–0.5)
KETONES UR QL STRIP: NEGATIVE
LDLC SERPL CALC-MCNC: 79 MG/DL (ref 0–100)
LEUKOCYTE ESTERASE UR QL STRIP: NEGATIVE
LYMPHOCYTES # BLD AUTO: 2.22 10*3/MM3 (ref 0.7–3.1)
LYMPHOCYTES NFR BLD AUTO: 29.6 % (ref 19.6–45.3)
MCH RBC QN AUTO: 29.8 PG (ref 26.6–33)
MCHC RBC AUTO-ENTMCNC: 31.7 G/DL (ref 31.5–35.7)
MCV RBC AUTO: 94.1 FL (ref 79–97)
MICRO URNS: ABNORMAL
MICRO URNS: ABNORMAL
MICROALBUMIN UR-MCNC: 61.7 UG/ML
MONOCYTES # BLD AUTO: 0.67 10*3/MM3 (ref 0.1–0.9)
MONOCYTES NFR BLD AUTO: 8.9 % (ref 5–12)
NEUTROPHILS # BLD AUTO: 4.44 10*3/MM3 (ref 1.7–7)
NEUTROPHILS NFR BLD AUTO: 59.4 % (ref 42.7–76)
NITRITE UR QL STRIP: NEGATIVE
NRBC BLD AUTO-RTO: 0 /100 WBC (ref 0–0.2)
PH UR STRIP: 5.5 [PH] (ref 5–7.5)
PLATELET # BLD AUTO: 234 10*3/MM3 (ref 140–450)
POTASSIUM SERPL-SCNC: 4.5 MMOL/L (ref 3.5–5.2)
PROT SERPL-MCNC: 6.2 G/DL (ref 6–8.5)
PROT UR QL STRIP: ABNORMAL
RBC # BLD AUTO: 4.26 10*6/MM3 (ref 3.77–5.28)
RBC #/AREA URNS HPF: ABNORMAL /HPF (ref 0–2)
SODIUM SERPL-SCNC: 141 MMOL/L (ref 136–145)
SP GR UR STRIP: >=1.03 (ref 1–1.03)
TRIGL SERPL-MCNC: 131 MG/DL (ref 0–150)
TSH SERPL DL<=0.005 MIU/L-ACNC: 1.41 UIU/ML (ref 0.27–4.2)
UNIDENT CRYS URNS QL MICRO: PRESENT /LPF
URINALYSIS REFLEX: ABNORMAL
UROBILINOGEN UR STRIP-MCNC: 0.2 MG/DL (ref 0.2–1)
VIT B12 SERPL-MCNC: 1690 PG/ML (ref 211–946)
VLDLC SERPL CALC-MCNC: 23 MG/DL (ref 5–40)
WBC # BLD AUTO: 7.49 10*3/MM3 (ref 3.4–10.8)
WBC #/AREA URNS HPF: ABNORMAL /HPF (ref 0–5)

## 2022-12-05 ENCOUNTER — OFFICE VISIT (OUTPATIENT)
Dept: INTERNAL MEDICINE | Facility: CLINIC | Age: 77
End: 2022-12-05

## 2022-12-05 VITALS
HEART RATE: 54 BPM | HEIGHT: 62 IN | BODY MASS INDEX: 30.73 KG/M2 | WEIGHT: 167 LBS | SYSTOLIC BLOOD PRESSURE: 146 MMHG | DIASTOLIC BLOOD PRESSURE: 62 MMHG | OXYGEN SATURATION: 99 %

## 2022-12-05 DIAGNOSIS — R05.3 PERSISTENT COUGH: ICD-10-CM

## 2022-12-05 DIAGNOSIS — Z78.0 MENOPAUSE: ICD-10-CM

## 2022-12-05 DIAGNOSIS — E11.9 TYPE 2 DIABETES MELLITUS WITHOUT COMPLICATION, WITHOUT LONG-TERM CURRENT USE OF INSULIN: ICD-10-CM

## 2022-12-05 DIAGNOSIS — I10 PRIMARY HYPERTENSION: ICD-10-CM

## 2022-12-05 DIAGNOSIS — E78.5 HYPERLIPIDEMIA, UNSPECIFIED HYPERLIPIDEMIA TYPE: ICD-10-CM

## 2022-12-05 DIAGNOSIS — Z13.820 SCREENING FOR OSTEOPOROSIS: ICD-10-CM

## 2022-12-05 DIAGNOSIS — Z00.00 MEDICARE ANNUAL WELLNESS VISIT, SUBSEQUENT: Primary | ICD-10-CM

## 2022-12-05 PROCEDURE — 1126F AMNT PAIN NOTED NONE PRSNT: CPT | Performed by: INTERNAL MEDICINE

## 2022-12-05 PROCEDURE — 71046 X-RAY EXAM CHEST 2 VIEWS: CPT | Performed by: INTERNAL MEDICINE

## 2022-12-05 PROCEDURE — 1159F MED LIST DOCD IN RCRD: CPT | Performed by: INTERNAL MEDICINE

## 2022-12-05 PROCEDURE — 99213 OFFICE O/P EST LOW 20 MIN: CPT | Performed by: INTERNAL MEDICINE

## 2022-12-05 PROCEDURE — 1170F FXNL STATUS ASSESSED: CPT | Performed by: INTERNAL MEDICINE

## 2022-12-05 PROCEDURE — G0439 PPPS, SUBSEQ VISIT: HCPCS | Performed by: INTERNAL MEDICINE

## 2022-12-05 RX ORDER — SERTRALINE HYDROCHLORIDE 100 MG/1
100 TABLET, FILM COATED ORAL DAILY
Qty: 90 TABLET | Refills: 3
Start: 2022-12-05

## 2022-12-05 NOTE — PATIENT INSTRUCTIONS
Fall Prevention in the Home, Adult  Falls can cause injuries and affect people of all ages. There are many simple things that you can do to make your home safe and to help prevent falls. Ask for help when making these changes, if needed.  What actions can I take to prevent falls?  General instructions  • Use good lighting in all rooms. Replace any light bulbs that burn out, turn on lights if it is dark, and use night-lights.  • Place frequently used items in easy-to-reach places. Lower the shelves around your home if necessary.  • Set up furniture so that there are clear paths around it. Avoid moving your furniture around.  • Remove throw rugs and other tripping hazards from the floor.  • Avoid walking on wet floors.  • Fix any uneven floor surfaces.  • Add color or contrast paint or tape to grab bars and handrails in your home. Place contrasting color strips on the first and last steps of staircases.  • When you use a stepladder, make sure that it is completely opened and that the sides and supports are firmly locked. Have someone hold the ladder while you are using it. Do not climb a closed stepladder.  • Know where your pets are when moving through your home.  What can I do in the bathroom?     • Keep the floor dry. Immediately clean up any water that is on the floor.  • Remove soap buildup in the tub or shower regularly.  • Use nonskid mats or decals on the floor of the tub or shower.  • Attach bath mats securely with double-sided, nonslip rug tape.  • If you need to sit down while you are in the shower, use a plastic, nonslip stool.  • Install grab bars by the toilet and in the tub and shower. Do not use towel bars as grab bars.  What can I do in the bedroom?  • Make sure that a bedside light is easy to reach.  • Do not use oversized bedding that reaches the floor.  • Have a firm chair that has side arms to use for getting dressed.  What can I do in the kitchen?  • Clean up any spills right away.  • If you  need to reach for something above you, use a sturdy step stool that has a grab bar.  • Keep electrical cables out of the way.  • Do not use floor polish or wax that makes floors slippery. If you must use wax, make sure that it is non-skid floor wax.  What can I do with my stairs?  • Do not leave any items on the stairs.  • Make sure that you have a light switch at the top and the bottom of the stairs. Have them installed if you do not have them.  • Make sure that there are handrails on both sides of the stairs. Fix handrails that are broken or loose. Make sure that handrails are as long as the staircases.  • Install non-slip stair treads on all stairs in your home.  • Avoid having throw rugs at the top or bottom of stairs, or secure the rugs with carpet tape to prevent them from moving.  • Choose a carpet design that does not hide the edge of steps on the stairs.  • Check any carpeting to make sure that it is firmly attached to the stairs. Fix any carpet that is loose or worn.  What can I do on the outside of my home?  • Use bright outdoor lighting.  • Regularly repair the edges of walkways and driveways and fix any cracks.  • Remove high doorway thresholds.  • Trim any shrubbery on the main path into your home.  • Regularly check that handrails are securely fastened and in good repair. Both sides of all steps should have handrails.  • Install guardrails along the edges of any raised decks or porches.  • Clear walkways of debris and clutter, including tools and rocks.  • Have leaves, snow, and ice cleared regularly.  • Use sand or salt on walkways during winter months.  • In the garage, clean up any spills right away, including grease or oil spills.  What other actions can I take?  • Wear closed-toe shoes that fit well and support your feet. Wear shoes that have rubber soles or low heels.  • Use mobility aids as needed, such as canes, walkers, scooters, and crutches.  • Review your medicines with your health care  provider. Some medicines can cause dizziness or changes in blood pressure, which increase your risk of falling.  Talk with your health care provider about other ways that you can decrease your risk of falls. This may include working with a physical therapist or  to improve your strength, balance, and endurance.  Where to find more information  • Centers for Disease Control and Prevention, STEADI: www.cdc.gov  • National [employer] Aging: www.tad.nih.gov  Contact a health care provider if:  • You are afraid of falling at home.  • You feel weak, drowsy, or dizzy at home.  • You fall at home.  Summary  • There are many simple things that you can do to make your home safe and to help prevent falls.  • Ways to make your home safe include removing tripping hazards and installing grab bars in the bathroom.  • Ask for help when making these changes in your home.  This information is not intended to replace advice given to you by your health care provider. Make sure you discuss any questions you have with your health care provider.  Document Revised: 07/21/2021 Document Reviewed: 07/21/2021  Elsevier Patient Education © 2022 [employer]

## 2022-12-05 NOTE — PROGRESS NOTES
The ABCs of the Annual Wellness Visit  Subsequent Medicare Wellness Visit    Chief Complaint   Patient presents with   • Medicare Wellness-subsequent      Subjective    History of Present Illness:  Kassi Mathis is a 77 y.o. female who presents for a Subsequent Medicare Wellness Visit.    The following data was reviewed by: Parul Jaime MD on 12/05/2022:  Common labs    Common Labs 2/8/22 2/8/22 2/8/22 2/8/22 8/19/22 8/19/22 8/19/22 8/19/22 11/29/22 11/29/22 11/29/22 11/29/22 11/29/22    1500 1500 1500 1500 1047 1047 1047 1047 1109 1109 1109 1109 1109   Glucose  CANCELED    CANCELED    151 (A)      BUN  21    27    15      Creatinine  0.96    1.04 (A)    0.96      eGFR Non African Am  57 (A)              eGFR  Am  66              Sodium  142    144    141      Potassium  CANCELED    CANCELED    4.5      Chloride  101    104    102      Calcium  9.4    9.1    9.1      Total Protein  6.7    6.1    6.2      Albumin  4.2    3.9    3.80      Total Bilirubin  0.7    0.6    0.9      Alkaline Phosphatase  58    57    64      AST (SGOT)  23    20    16      ALT (SGPT)  19    17    11      WBC 7.7    6.4    7.49       Hemoglobin 13.0    12.8    12.7       Hematocrit 40.5    39.0    40.1       Platelets 270    238    234       Total Cholesterol   192    189    170     Triglycerides   137    68    131     HDL Cholesterol   75    71    68 (A)     LDL Cholesterol    94    105 (A)    79     Hemoglobin A1C    6.9 (A)    7.3 (A)    7.20 (A)    Microalbumin, Urine             61.7   (A) Abnormal value       Comments are available for some flowsheets but are not being displayed.           Dm-2.  Fair control of A1c at 7.2.    HTN.  Mild increase today but good at home.  HLD.  Good LDL control.      Mild cough.  One month. No illness.  Nonproductive.          The following portions of the patient's history were reviewed and   updated as appropriate: allergies, current medications, past family history, past medical history,  past social history, past surgical history and problem list.    Compared to one year ago, the patient feels her physical   health is the same.    Compared to one year ago, the patient feels her mental   health is the same.    Recent Hospitalizations:  She was not admitted to the hospital during the last year.       Current Medical Providers:  Patient Care Team:  Parul Jaime MD as PCP - General (Internal Medicine)    Outpatient Medications Prior to Visit   Medication Sig Dispense Refill   • albuterol sulfate  (90 Base) MCG/ACT inhaler Inhale 2 puffs Every 4 (Four) Hours As Needed for Wheezing. 1 inhaler 0   • amLODIPine (NORVASC) 5 MG tablet TAKE ONE TABLET BY MOUTH DAILY 90 tablet 3   • Cholecalciferol (VITAMIN D3 PO) Take  by mouth.     • dabigatran etexilate (Pradaxa) 150 MG capsu Take 1 capsule by mouth Every 12 (Twelve) Hours. 180 capsule 1   • donepezil (ARICEPT) 10 MG tablet TAKE ONE TABLET BY MOUTH DAILY 90 tablet 3   • furosemide (LASIX) 40 MG tablet Take 1 tablet by mouth Daily. 30 tablet 11   • glucose blood test strip Use daily for type 2 DM. 100 each 3   • Jardiance 10 MG tablet tablet TAKE ONE TABLET BY MOUTH DAILY 90 tablet 7   • Melatonin 10 MG capsule Take  by mouth.     • metFORMIN (Glucophage) 500 MG tablet Take 0.5 tablets by mouth 2 (Two) Times a Day With Meals. 90 tablet 3   • metoprolol succinate XL (TOPROL-XL) 50 MG 24 hr tablet Take 1 tablet by mouth Daily. 90 tablet 0   • Multiple Vitamin (MULTIVITAMINS PO) Take 1 tablet by mouth daily.     • rosuvastatin (CRESTOR) 20 MG tablet TAKE ONE TABLET BY MOUTH EVERY NIGHT AT BEDTIME 90 tablet 0   • vitamin B-12 (CYANOCOBALAMIN) 1000 MCG tablet Take 1,000 mcg by mouth Daily.     • vitamin C (ASCORBIC ACID) 250 MG tablet Take 250 mg by mouth Daily.     • sertraline (ZOLOFT) 100 MG tablet TAKE TWO TABLETS BY MOUTH DAILY 180 tablet 3     No facility-administered medications prior to visit.       No opioid medication identified on active  "medication list. I have reviewed chart for other potential  high risk medication/s and harmful drug interactions in the elderly.          Aspirin is not on active medication list.  Aspirin use is not indicated based on review of current medical condition/s. Risk of harm outweighs potential benefits.  .    Patient Active Problem List   Diagnosis   • Depression   • Allergic rhinitis   • Hypertension   • Dementia without behavioral disturbance (HCC)   • Paroxysmal atrial fibrillation (HCC)   • Cobalamin deficiency   • Type 2 diabetes mellitus (HCC)   • Hyperlipidemia   • Hammer toes of both feet   • Cervical radiculopathy   • Osteoarthritis of right knee   • At high risk for falls   • Chronic diastolic congestive heart failure (HCC)   • Bradycardia, sinus         Review of Systems   HENT: Positive for trouble swallowing.    Respiratory: Positive for cough. Negative for shortness of breath and wheezing.    Cardiovascular: Negative for chest pain.        Objective    Vitals:    12/05/22 1409   BP: 146/62   Pulse: 54   SpO2: 99%   Weight: 75.8 kg (167 lb)   Height: 157.5 cm (62.01\")   PainSc: 0-No pain     Estimated body mass index is 30.54 kg/m² as calculated from the following:    Height as of this encounter: 157.5 cm (62.01\").    Weight as of this encounter: 75.8 kg (167 lb).    BMI is >= 30 and <35. (Class 1 Obesity). The following options were offered after discussion;: exercise counseling/recommendations      Does the patient have evidence of cognitive impairment? No    Physical Exam  Constitutional:       Appearance: She is well-developed.   HENT:      Head: Normocephalic and atraumatic.      Right Ear: Hearing, tympanic membrane and external ear normal.      Left Ear: Hearing, tympanic membrane and external ear normal.      Nose: Nose normal.   Neck:      Thyroid: No thyromegaly.   Cardiovascular:      Rate and Rhythm: Normal rate and regular rhythm.      Heart sounds: Normal heart sounds. No murmur " heard.  Pulmonary:      Effort: Pulmonary effort is normal.      Breath sounds: Normal breath sounds.   Abdominal:      General: There is no distension.      Palpations: Abdomen is soft.      Tenderness: There is no abdominal tenderness.   Musculoskeletal:      Cervical back: Neck supple.   Lymphadenopathy:      Cervical: No cervical adenopathy.   Skin:     General: Skin is warm and dry.   Neurological:      Mental Status: She is alert and oriented to person, place, and time.   Psychiatric:         Speech: Speech normal.         Behavior: Behavior normal.         Thought Content: Thought content normal.         Judgment: Judgment normal.       Lab Results   Component Value Date    CHLPL 170 11/29/2022    TRIG 131 11/29/2022    HDL 68 (H) 11/29/2022    LDL 79 11/29/2022    VLDL 23 11/29/2022    HGBA1C 7.20 (H) 11/29/2022            HEALTH RISK ASSESSMENT    Smoking Status:  Social History     Tobacco Use   Smoking Status Former   • Packs/day: 0.50   • Years: 0.00   • Pack years: 0.00   • Types: Cigarettes   Smokeless Tobacco Never     Alcohol Consumption:  Social History     Substance and Sexual Activity   Alcohol Use Never    Comment: CAFFEINE USE/ SOFT DRINKS.      Fall Risk Screen:    STEADI Fall Risk Assessment was completed, and patient is at HIGH risk for falls. Assessment completed on:12/5/2022    Depression Screening:  PHQ-2/PHQ-9 Depression Screening 12/5/2022   Retired PHQ-9 Total Score -   Retired Total Score -   Little Interest or Pleasure in Doing Things 0-->not at all   Feeling Down, Depressed or Hopeless 0-->not at all   PHQ-9: Brief Depression Severity Measure Score 0       Health Habits and Functional and Cognitive Screening:  Functional & Cognitive Status 12/5/2022   Do you have difficulty preparing food and eating? No   Do you have difficulty bathing yourself, getting dressed or grooming yourself? No   Do you have difficulty using the toilet? No   Do you have difficulty moving around from place to  place? No   Do you have trouble with steps or getting out of a bed or a chair? No   Current Diet Well Balanced Diet   Dental Exam Up to date   Eye Exam Up to date   Exercise (times per week) 0 times per week   Current Exercises Include No Regular Exercise   Current Exercise Activities Include -   Do you need help using the phone?  No   Are you deaf or do you have serious difficulty hearing?  Yes   Do you need help with transportation? Yes   Do you need help shopping? Yes   Do you need help preparing meals?  No   Do you need help with housework?  No   Do you need help with laundry? No   Do you need help taking your medications? Yes   Do you need help managing money? No   Do you ever drive or ride in a car without wearing a seat belt? No   Have you felt unusual stress, anger or loneliness in the last month? No   Who do you live with? Child   If you need help, do you have trouble finding someone available to you? No   Have you been bothered in the last four weeks by sexual problems? No   Do you have difficulty concentrating, remembering or making decisions? No       Age-appropriate Screening Schedule:  Refer to the list below for future screening recommendations based on patient's age, sex and/or medical conditions. Orders for these recommended tests are listed in the plan section. The patient has been provided with a written plan.    Health Maintenance   Topic Date Due   • TDAP/TD VACCINES (1 - Tdap) Never done   • ZOSTER VACCINE (2 of 3) 09/08/2015   • DXA SCAN  09/16/2021   • DIABETIC EYE EXAM  01/08/2022   • HEMOGLOBIN A1C  05/29/2023   • LIPID PANEL  11/29/2023   • URINE MICROALBUMIN  11/29/2023   • MAMMOGRAM  11/16/2024   • INFLUENZA VACCINE  Completed              X-rays of the chest  performed today for following indication:   cough.  X-ray reveal nad.  There is no available x-ray for comparison.  X-ray sent to radiology for official interpretation and findings.        Assessment & Plan   CMS Preventative  Services Quick Reference  Risk Factors Identified During Encounter  Immunizations Discussed/Encouraged (specific Immunizations; Tdap and Shingrix  The above risks/problems have been discussed with the patient.  Follow up actions/plans if indicated are seen below in the Assessment/Plan Section.  Pertinent information has been shared with the patient in the After Visit Summary.    Diagnoses and all orders for this visit:    1. Medicare annual wellness visit, subsequent (Primary)    2. Type 2 diabetes mellitus without complication, without long-term current use of insulin (HCC)    3. Primary hypertension    4. Hyperlipidemia, unspecified hyperlipidemia type    5. Persistent cough  -     XR Chest PA & Lateral    6. Menopause  -     DEXA Bone Density Axial; Future    7. Screening for osteoporosis  -     DEXA Bone Density Axial; Future    Other orders  -     sertraline (ZOLOFT) 100 MG tablet; Take 1 tablet by mouth Daily.  Dispense: 90 tablet; Refill: 3    Dm-2.  Control is fair.  The patient is advised to continue current dosage of Jardiance and metformin.   HTN.  Control is good at home.  The patient is advised to continue current dosage of metoprolol and norvasc.   HLD.  Control is good.  The patient is advised to continue current dosage of Crestor.    Persistent cough.  Check CXR with radiologist.  Await UGI results.           Follow Up:   Return in about 6 months (around 6/5/2023) for Recheck.     An After Visit Summary and PPPS were made available to the patient.

## 2022-12-06 ENCOUNTER — APPOINTMENT (OUTPATIENT)
Dept: GENERAL RADIOLOGY | Facility: HOSPITAL | Age: 77
End: 2022-12-06

## 2023-02-09 RX ORDER — ROSUVASTATIN CALCIUM 20 MG/1
TABLET, COATED ORAL
Qty: 90 TABLET | Refills: 0 | Status: SHIPPED | OUTPATIENT
Start: 2023-02-09

## 2023-03-21 ENCOUNTER — TELEPHONE (OUTPATIENT)
Dept: INTERNAL MEDICINE | Facility: CLINIC | Age: 78
End: 2023-03-21
Payer: MEDICARE

## 2023-04-10 RX ORDER — EMPAGLIFLOZIN 10 MG/1
TABLET, FILM COATED ORAL
Qty: 90 TABLET | Refills: 7 | Status: SHIPPED | OUTPATIENT
Start: 2023-04-10

## 2023-04-26 RX ORDER — SERTRALINE HYDROCHLORIDE 100 MG/1
TABLET, FILM COATED ORAL
Qty: 180 TABLET | Refills: 1 | Status: SHIPPED | OUTPATIENT
Start: 2023-04-26

## 2023-05-05 NOTE — PROGRESS NOTES
RM:________     PCP: Parul Jaime MD    : 1945  AGE: 78 y.o.  EST PATIENT   REASON FOR VISIT/  CC:    BP Readings from Last 3 Encounters:   22 146/62   22 130/76   22 140/62        WT: ____________ BP: __________L __________R HR______    CHEST PAIN: _____________    SOA: _____________PALPS: _______________     LIGHTHEADED: ___________FATIGUE: ________________ EDEMA __________    ALLERGIES:Enalapril, Enalapril maleate, Memantine hcl, and Penicillins SMOKING HISTORY:  Social History     Tobacco Use   • Smoking status: Former     Packs/day: 0.50     Years: 0.00     Pack years: 0.00     Types: Cigarettes   • Smokeless tobacco: Never   Vaping Use   • Vaping Use: Never used   Substance Use Topics   • Alcohol use: Never     Comment: CAFFEINE USE/ SOFT DRINKS.    • Drug use: Never     CAFFEINE USE_________________  ALCOHOL ______________________    Below is the patient's most recent value for Albumin, ALT, AST, BUN, Calcium, Chloride, Cholesterol, CO2, Creatinine, GFR, Glucose, HDL, Hematocrit, Hemoglobin, Hemoglobin A1C, LDL, Magnesium, Phosphorus, Platelets, Potassium, PSA, Sodium, Triglycerides, TSH and WBC.   Lab Results   Component Value Date    ALBUMIN 3.80 2022    ALT 11 2022    AST 16 2022    BUN 15 2022    CALCIUM 9.1 2022     2022    CO2 30.0 (H) 2022    CREATININE 0.96 2022    HDL 68 (H) 2022    HCT 40.1 2022    HGB 12.7 2022    HGBA1C 7.20 (H) 2022    LDL 79 2022     2022    K 4.5 2022     2022    TRIG 131 2022    TSH 1.410 2022    WBC 7.49 2022          NEW DIAGNOSIS/ SURGERY/ HOSP OR ED VISITS: ______________________    __________________________________________________________________      RECENT LABS OR DIAGNOSTIC TESTING:  _____________________________    __________________________________________________________________      ASSESSMENT/ PLAN:  _______________________________________________    __________________________________________________________________

## 2023-05-17 ENCOUNTER — OFFICE VISIT (OUTPATIENT)
Dept: CARDIOLOGY | Facility: CLINIC | Age: 78
End: 2023-05-17
Payer: MEDICARE

## 2023-05-17 VITALS
WEIGHT: 152.8 LBS | SYSTOLIC BLOOD PRESSURE: 128 MMHG | BODY MASS INDEX: 28.12 KG/M2 | DIASTOLIC BLOOD PRESSURE: 80 MMHG | HEART RATE: 51 BPM | HEIGHT: 62 IN

## 2023-05-17 DIAGNOSIS — I10 PRIMARY HYPERTENSION: ICD-10-CM

## 2023-05-17 DIAGNOSIS — R00.1 BRADYCARDIA, SINUS: ICD-10-CM

## 2023-05-17 DIAGNOSIS — F03.90 DEMENTIA WITHOUT BEHAVIORAL DISTURBANCE: ICD-10-CM

## 2023-05-17 DIAGNOSIS — I48.0 PAROXYSMAL ATRIAL FIBRILLATION: Primary | ICD-10-CM

## 2023-05-17 RX ORDER — FUROSEMIDE 10 MG/ML
SOLUTION ORAL
Qty: 200 ML | Refills: 1 | Status: SHIPPED | OUTPATIENT
Start: 2023-05-17

## 2023-05-17 RX ORDER — ATENOLOL 25 MG/1
25 TABLET ORAL DAILY
Qty: 90 TABLET | Refills: 2 | Status: SHIPPED | OUTPATIENT
Start: 2023-05-17

## 2023-05-17 NOTE — PROGRESS NOTES
Date of Office Visit: 2023  Encounter Provider: Mode Gongora MD  Place of Service: [facility] CARDIOLOGY  Patient Name: Kassi Mathis  :1945    Chief Complaint   Patient presents with   • Atrial Fibrillation   :     HPI: Kassi Mathis is a 78 y.o. female who follows up. I have reviewed prior notes and there are no changes except for any new updates described below. I have also reviewed any information entered into the medical record by the patient or by ancillary staff.     She has a long-standing history of high blood pressure. She has paroxysmal atrial fibrillation.  She is known to have mild sinus bradycardia as well.  She has worsening dementia and she does have some balance issues.  She has fallen a couple of times but not recently.  Her children are very involved in her care and feel that things are stable.    She had an echocardiogram in 2022 that revealed normal left ventricular systolic function, grade 2 diastolic dysfunction, and moderate left atrial dilation.    She is having a lot of trouble swallowing and her daughter inquires as to whether or not any of her medications can be changed to a liquid variety or crushable variety.    Past Medical History:   Diagnosis Date   • Allergic rhinitis    • Anemia    • Cataract    • Chronic venous insufficiency    • Dementia    • Depression    • Diabetes mellitus    • Headache     Sinus?   • Hidradenitis suppurativa    • History of medical problems     A-fib   • Hyperlipidemia    • Hypertension    • Low back pain    • Osteoarthritis    • Paroxysmal atrial fibrillation    • Peripheral neuropathy    • Stroke    • Vitamin B 12 deficiency     Boarderline only with normal MMA/Homocysteine/IF antibody       Past Surgical History:   Procedure Laterality Date   • BACK SURGERY     • COLONOSCOPY     • HYSTERECTOMY      PARTIAL   • JOINT REPLACEMENT      Knee replacement   • KNEE SURGERY Left    • SUBTOTAL HYSTERECTOMY     •  TONSILLECTOMY         Social History     Socioeconomic History   • Marital status:    Tobacco Use   • Smoking status: Former     Packs/day: 0.50     Years: 0.00     Pack years: 0.00     Types: Cigarettes   • Smokeless tobacco: Never   Vaping Use   • Vaping Use: Never used   Substance and Sexual Activity   • Alcohol use: Never     Comment: CAFFEINE USE/ SOFT DRINKS.    • Drug use: Never   • Sexual activity: Not Currently     Partners: Male       Family History   Problem Relation Age of Onset   • Breast cancer Mother    • Depression Mother    • Stroke Father    • Alcohol abuse Father    • Stroke Maternal Grandmother    • Heart attack Maternal Grandmother    • Heart disease Maternal Grandmother    • Heart attack Maternal Grandfather    • Cancer Maternal Grandfather         Throat cancer   • Hyperlipidemia Daughter    • Liver disease Son      Review of Systems   Reason unable to perform ROS: telephone/dementia.   Constitutional: Positive for malaise/fatigue.   Cardiovascular: Negative for chest pain, leg swelling and palpitations.   Hematologic/Lymphatic: Bruises/bleeds easily.   Psychiatric/Behavioral: Positive for memory loss.   All other systems reviewed and are negative.      Allergies   Allergen Reactions   • Enalapril Swelling and Other (See Comments)     Other reaction(s): Other: See Comments  Aka vasotec tabs  Aka vasotec tabs  Aka vasotec tabs  Aka vasotec tabs   • Enalapril Maleate Swelling   • Memantine Hcl Other (See Comments)     imbalance  imbalance   • Penicillins Swelling         Current Outpatient Medications:   •  albuterol sulfate  (90 Base) MCG/ACT inhaler, Inhale 2 puffs Every 4 (Four) Hours As Needed for Wheezing., Disp: 1 inhaler, Rfl: 0  •  amLODIPine (NORVASC) 5 MG tablet, TAKE ONE TABLET BY MOUTH DAILY, Disp: 90 tablet, Rfl: 3  •  Cholecalciferol (VITAMIN D3 PO), Take  by mouth., Disp: , Rfl:   •  donepezil (ARICEPT) 10 MG tablet, TAKE ONE TABLET BY MOUTH DAILY, Disp: 90 tablet,  "Rfl: 3  •  glucose blood test strip, Use daily for type 2 DM., Disp: 100 each, Rfl: 3  •  Jardiance 10 MG tablet tablet, TAKE ONE TABLET BY MOUTH DAILY, Disp: 90 tablet, Rfl: 7  •  Melatonin 10 MG capsule, Take  by mouth., Disp: , Rfl:   •  metFORMIN (Glucophage) 500 MG tablet, Take 0.5 tablets by mouth 2 (Two) Times a Day With Meals., Disp: 90 tablet, Rfl: 3  •  Multiple Vitamin (MULTIVITAMINS PO), Take 1 tablet by mouth daily., Disp: , Rfl:   •  rosuvastatin (CRESTOR) 20 MG tablet, TAKE ONE TABLET BY MOUTH EVERY NIGHT AT BEDTIME, Disp: 90 tablet, Rfl: 0  •  sertraline (ZOLOFT) 100 MG tablet, TAKE TWO TABLETS BY MOUTH DAILY, Disp: 180 tablet, Rfl: 1  •  vitamin B-12 (CYANOCOBALAMIN) 1000 MCG tablet, Take 1 tablet by mouth Daily., Disp: , Rfl:   •  vitamin C (ASCORBIC ACID) 250 MG tablet, Take 1 tablet by mouth Daily., Disp: , Rfl:   •  apixaban (ELIQUIS) 5 MG tablet tablet, Take 1 tablet by mouth 2 (Two) Times a Day., Disp: 180 tablet, Rfl: 1  •  atenolol (TENORMIN) 25 MG tablet, Take 1 tablet by mouth Daily., Disp: 90 tablet, Rfl: 2  •  furosemide (LASIX) 10 MG/ML ORAL solution, Take 2mL daily; can take an extra 2mL daily up to twice per week for swelling, Disp: 200 mL, Rfl: 1     Objective:     Vitals:    05/17/23 1430 05/17/23 1501   BP: 144/80 128/80   BP Location: Right arm    Pulse: 51    Weight: 69.3 kg (152 lb 12.8 oz)    Height: 157.5 cm (62.01\")      Body mass index is 27.94 kg/m².    Physical Exam  Vitals reviewed.   HENT:      Head: Normocephalic.      Nose: Nose normal.      Mouth/Throat:      Comments: masked  Eyes:      Conjunctiva/sclera: Conjunctivae normal.   Cardiovascular:      Rate and Rhythm: Regular rhythm. Bradycardia present.      Pulses: Normal pulses.      Heart sounds: Normal heart sounds.   Pulmonary:      Effort: Pulmonary effort is normal.      Breath sounds: Normal breath sounds.   Abdominal:      Palpations: Abdomen is soft.      Tenderness: There is no abdominal tenderness. "   Musculoskeletal:         General: No swelling. Normal range of motion.      Cervical back: Normal range of motion.   Skin:     General: Skin is warm and dry.   Neurological:      General: No focal deficit present.      Mental Status: She is alert.   Psychiatric:         Mood and Affect: Mood normal.         Cognition and Memory: Memory is impaired.      Comments: tearful           ECG 12 Lead    Date/Time: 5/17/2023 3:27 PM  Performed by: Mode Gongora MD  Authorized by: Mode Gongora MD   Comparison: compared with previous ECG   Similar to previous ECG  Rhythm: sinus bradycardia  Conduction: conduction normal  ST Segments: ST segments normal  T Waves: T waves normal  QRS axis: normal  Other findings: non-specific ST-T wave changes    Clinical impression: non-specific ECG              Assessment:       Diagnosis Plan   1. Paroxysmal atrial fibrillation        2. Bradycardia, sinus        3. Primary hypertension        4. Dementia without behavioral disturbance           Plan:       1/2.  Atrial Fibrillation and Atrial Flutter  Assessment  • The patient has paroxysmal atrial fibrillation  • This is non-valvular in etiology  • The patient's CHADS2-VASc score is 7  • A [id number]-SSCc score of 2 or more is considered a high risk for a thromboembolic event  • Dabigatran prescribed    Plan  • Attempt to maintain sinus rhythm  • Add apixaban for antithrombotic therapy  • Continue beta blocker for rhythm control    Her dementia is progressing. She has some balance issues but has only fallen twice. Things appears stable but we may reach a point soon where we have to stop AC.     Because of her swallowing issues, I changed her from dabigatran to apixaban as the latter can be crushed (dabigatran is a capsule).     Her mild sinus alexandria is stable. I switched her from metoprolol succinate to atenolol, 25mg daily, due to the need to crush the medication.    3. Her BP is within goal for age. I changed furosemide to a liquid  formulation. Amlodipine can be crushed.     Sincerely,       Mode Gongora MD

## 2023-05-22 DIAGNOSIS — I10 ESSENTIAL HYPERTENSION: ICD-10-CM

## 2023-05-22 RX ORDER — AMLODIPINE BESYLATE 5 MG/1
TABLET ORAL
Qty: 90 TABLET | Refills: 3 | Status: SHIPPED | OUTPATIENT
Start: 2023-05-22

## 2023-06-01 ENCOUNTER — TELEPHONE (OUTPATIENT)
Dept: CARDIOLOGY | Facility: CLINIC | Age: 78
End: 2023-06-01

## 2023-06-01 DIAGNOSIS — I10 PRIMARY HYPERTENSION: Primary | ICD-10-CM

## 2023-06-01 DIAGNOSIS — E11.9 TYPE 2 DIABETES MELLITUS WITHOUT COMPLICATION, WITHOUT LONG-TERM CURRENT USE OF INSULIN: ICD-10-CM

## 2023-06-01 DIAGNOSIS — E78.5 HYPERLIPIDEMIA, UNSPECIFIED HYPERLIPIDEMIA TYPE: ICD-10-CM

## 2023-06-01 NOTE — TELEPHONE ENCOUNTER
I have had patients report headaches with Eliquis.  I would recommend that they do a trial of Xarelto instead of Eliquis.  Can we provide them with a few bottles of samples of 20 mg?  It is once a day and it can be crushed and given with applesauce.  It is just a straight switch, Eliquis one day and then Xarelto in the next.  If that helps the headache, they should notice in 2 to 4 days.    Zia jackson

## 2023-06-01 NOTE — TELEPHONE ENCOUNTER
Flaca, Kassi Mathis' daughter, called to report patient's headache.    Flaca stated that patient started complaining of headaches about 2 days after starting atenolol and eliquis.  Flaca stated that patient has dementia so it is difficult to obtain accurate information from her, but that patient has had to lay down and rest due to headaches.  Flaca stated patient's headache seems to improve after tylenol.  Headaches initially were daily, but seem to occur every other day now.  Flaca denies patient has had any falls recently.    Date Time BP HR Notes   29-May  129/79 67    30-May  119/74 60    31-May Midday 138/79 60     /77 57 before PM meds     Flaca is asking if new medications could be causing patient's headaches?    Please let me know how you would like to proceed.    Thank you,  Lotus MAGALLANES RN  Triage Nurse [facility]   14:37 EDT

## 2023-06-01 NOTE — TELEPHONE ENCOUNTER
Reviewed Dr. Cornelius' message with Flaca and she verbalized understanding.  Flaca stated she will come in to  samples tomorrow.  Requested Flaca update office on how patient is doing next week so prescription can be sent in if patient tolerates the Xarelto.  Flaca stated she will do this.    LOT: [id number]  Exp: 03/2025    Thank you,  Lotus MAGALLANES RN  [place] Nurse [facility] [place]   14:58 EDT

## 2023-06-02 LAB
ALBUMIN SERPL-MCNC: 3.8 G/DL (ref 3.5–5.2)
ALBUMIN/GLOB SERPL: 1.5 G/DL
ALP SERPL-CCNC: 58 U/L (ref 39–117)
ALT SERPL-CCNC: 25 U/L (ref 1–33)
AST SERPL-CCNC: 23 U/L (ref 1–32)
BASOPHILS # BLD AUTO: 0.03 10*3/MM3 (ref 0–0.2)
BASOPHILS NFR BLD AUTO: 0.3 % (ref 0–1.5)
BILIRUB SERPL-MCNC: 0.8 MG/DL (ref 0–1.2)
BUN SERPL-MCNC: 24 MG/DL (ref 8–23)
BUN/CREAT SERPL: 26.4 (ref 7–25)
CALCIUM SERPL-MCNC: 10.1 MG/DL (ref 8.6–10.5)
CHLORIDE SERPL-SCNC: 106 MMOL/L (ref 98–107)
CHOLEST SERPL-MCNC: 196 MG/DL (ref 0–200)
CO2 SERPL-SCNC: 32.5 MMOL/L (ref 22–29)
CREAT SERPL-MCNC: 0.91 MG/DL (ref 0.57–1)
EGFRCR SERPLBLD CKD-EPI 2021: 64.3 ML/MIN/1.73
EOSINOPHIL # BLD AUTO: 0.1 10*3/MM3 (ref 0–0.4)
EOSINOPHIL NFR BLD AUTO: 1.1 % (ref 0.3–6.2)
ERYTHROCYTE [DISTWIDTH] IN BLOOD BY AUTOMATED COUNT: 13.1 % (ref 12.3–15.4)
GLOBULIN SER CALC-MCNC: 2.5 GM/DL
GLUCOSE SERPL-MCNC: 136 MG/DL (ref 65–99)
HBA1C MFR BLD: 8.3 % (ref 4.8–5.6)
HCT VFR BLD AUTO: 41 % (ref 34–46.6)
HDLC SERPL-MCNC: 75 MG/DL (ref 40–60)
HGB BLD-MCNC: 13.6 G/DL (ref 12–15.9)
IMM GRANULOCYTES # BLD AUTO: 0.03 10*3/MM3 (ref 0–0.05)
IMM GRANULOCYTES NFR BLD AUTO: 0.3 % (ref 0–0.5)
LDLC SERPL CALC-MCNC: 105 MG/DL (ref 0–100)
LYMPHOCYTES # BLD AUTO: 2.2 10*3/MM3 (ref 0.7–3.1)
LYMPHOCYTES NFR BLD AUTO: 24.1 % (ref 19.6–45.3)
MCH RBC QN AUTO: 29.6 PG (ref 26.6–33)
MCHC RBC AUTO-ENTMCNC: 33.2 G/DL (ref 31.5–35.7)
MCV RBC AUTO: 89.3 FL (ref 79–97)
MONOCYTES # BLD AUTO: 0.52 10*3/MM3 (ref 0.1–0.9)
MONOCYTES NFR BLD AUTO: 5.7 % (ref 5–12)
NEUTROPHILS # BLD AUTO: 6.24 10*3/MM3 (ref 1.7–7)
NEUTROPHILS NFR BLD AUTO: 68.5 % (ref 42.7–76)
NRBC BLD AUTO-RTO: 0 /100 WBC (ref 0–0.2)
PLATELET # BLD AUTO: 266 10*3/MM3 (ref 140–450)
POTASSIUM SERPL-SCNC: 4.2 MMOL/L (ref 3.5–5.2)
PROT SERPL-MCNC: 6.3 G/DL (ref 6–8.5)
RBC # BLD AUTO: 4.59 10*6/MM3 (ref 3.77–5.28)
SODIUM SERPL-SCNC: 146 MMOL/L (ref 136–145)
TRIGL SERPL-MCNC: 91 MG/DL (ref 0–150)
VLDLC SERPL CALC-MCNC: 16 MG/DL (ref 5–40)
WBC # BLD AUTO: 9.12 10*3/MM3 (ref 3.4–10.8)

## 2023-06-07 ENCOUNTER — TELEMEDICINE (OUTPATIENT)
Dept: INTERNAL MEDICINE | Facility: CLINIC | Age: 78
End: 2023-06-07
Payer: MEDICARE

## 2023-06-07 DIAGNOSIS — E78.5 HYPERLIPIDEMIA, UNSPECIFIED HYPERLIPIDEMIA TYPE: ICD-10-CM

## 2023-06-07 DIAGNOSIS — I10 PRIMARY HYPERTENSION: ICD-10-CM

## 2023-06-07 DIAGNOSIS — E11.9 TYPE 2 DIABETES MELLITUS WITHOUT COMPLICATION, WITHOUT LONG-TERM CURRENT USE OF INSULIN: Primary | ICD-10-CM

## 2023-06-07 NOTE — PROGRESS NOTES
Subjective     Kassi Mathis is a 78 y.o. female who presents with   Chief Complaint   Patient presents with    Diabetes    Hypertension    Hyperlipidemia       History of Present Illness     The following data was reviewed by: Parul Jaime MD on 06/07/2023:  Common labs          8/19/2022    10:47 11/29/2022    11:09 6/2/2023    11:08   Common Labs   Glucose CANCELED  151  136    BUN 27  15  24    Creatinine 1.04  0.96  0.91    Sodium 144  141  146    Potassium CANCELED  4.5  4.2    Chloride 104  102  106    Calcium 9.1  9.1  10.1    Total Protein 6.1  6.2  6.3    Albumin 3.9  3.80  3.8    Total Bilirubin 0.6  0.9  0.8    Alkaline Phosphatase 57  64  58    AST (SGOT) 20  16  23    ALT (SGPT) 17  11  25    WBC 6.4  7.49  9.12    Hemoglobin 12.8  12.7  13.6    Hematocrit 39.0  40.1  41.0    Platelets 238  234  266    Total Cholesterol 189  170  196    Triglycerides 68  131  91    HDL Cholesterol 71  68  75    LDL Cholesterol  105  79  105    Hemoglobin A1C 7.3  7.20  8.30    Microalbumin, Urine  61.7       You have chosen to receive care through a telehealth visit.  Do you consent to use a video/audio connection for your medical care today? Yes  Provider is located in [place].  The patient is located in [place].      DM-2.  Control is not optimal.  She eats a lot of simple carbs at home. HTN.  Blood pressure is under good control at home.  HLD.  LDL cholesterol is under good control.          Review of Systems   Respiratory: Negative.     Cardiovascular: Negative.      The following portions of the patient's history were reviewed and updated as appropriate: allergies, current medications and problem list.    Patient Active Problem List    Diagnosis Date Noted    Type 2 diabetes mellitus 01/17/2016     Priority: High    Bradycardia, sinus 09/08/2022    Chronic diastolic congestive heart failure 08/09/2022    At high risk for falls 09/29/2021    Osteoarthritis of right knee 09/08/2020    Cervical radiculopathy  10/08/2018    Hammer toes of both feet 10/17/2017    Depression 01/17/2016    Allergic rhinitis 01/17/2016    Hypertension 01/17/2016    Dementia without behavioral disturbance 01/17/2016     Note Last Updated: 7/20/2016     By neuropysch evaluation 7/20/2016      Paroxysmal atrial fibrillation 01/17/2016    Cobalamin deficiency 01/17/2016     Note Last Updated: 6/8/2016     Boarderline only with normal MMA/Homocysteine/IF antibody.  Encouraged MVI daily.        Hyperlipidemia 01/17/2016       Current Outpatient Medications on File Prior to Visit   Medication Sig Dispense Refill    albuterol sulfate  (90 Base) MCG/ACT inhaler Inhale 2 puffs Every 4 (Four) Hours As Needed for Wheezing. 1 inhaler 0    amLODIPine (NORVASC) 5 MG tablet TAKE ONE TABLET BY MOUTH DAILY 90 tablet 3    atenolol (TENORMIN) 25 MG tablet Take 1 tablet by mouth Daily. 90 tablet 2    Cholecalciferol (VITAMIN D3 PO) Take  by mouth.      donepezil (ARICEPT) 10 MG tablet TAKE ONE TABLET BY MOUTH DAILY 90 tablet 3    furosemide (LASIX) 10 MG/ML ORAL solution Take 2mL daily; can take an extra 2mL daily up to twice per week for swelling 200 mL 1    glucose blood test strip Use daily for type 2 DM. 100 each 3    Jardiance 10 MG tablet tablet TAKE ONE TABLET BY MOUTH DAILY 90 tablet 7    Melatonin 10 MG capsule Take  by mouth.      metFORMIN (Glucophage) 500 MG tablet Take 0.5 tablets by mouth 2 (Two) Times a Day With Meals. 90 tablet 3    Multiple Vitamin (MULTIVITAMINS PO) Take 1 tablet by mouth daily.      rivaroxaban (Xarelto) 20 MG tablet Take 1 tablet by mouth Daily With Dinner.      rosuvastatin (CRESTOR) 20 MG tablet TAKE ONE TABLET BY MOUTH EVERY NIGHT AT BEDTIME 90 tablet 0    sertraline (ZOLOFT) 100 MG tablet TAKE TWO TABLETS BY MOUTH DAILY 180 tablet 1    vitamin B-12 (CYANOCOBALAMIN) 1000 MCG tablet Take 1 tablet by mouth Daily.      vitamin C (ASCORBIC ACID) 250 MG tablet Take 1 tablet by mouth Daily.      [DISCONTINUED] apixaban  (ELIQUIS) 5 MG tablet tablet Take 1 tablet by mouth 2 (Two) Times a Day. 180 tablet 1     No current facility-administered medications on file prior to visit.       Objective     LMP  (LMP Unknown)     Physical Exam  Constitutional:       Appearance: She is well-developed.   HENT:      Head: Normocephalic and atraumatic.   Pulmonary:      Effort: Pulmonary effort is normal.   Neurological:      Mental Status: She is alert and oriented to person, place, and time.   Psychiatric:         Behavior: Behavior normal.       Assessment & Plan   Diagnoses and all orders for this visit:    1. Type 2 diabetes mellitus without complication, without long-term current use of insulin (Primary)    2. Primary hypertension    3. Hyperlipidemia, unspecified hyperlipidemia type        Discussion    DM-2. Not optimal.  Increase attention to diet in the home.  HTN.  Control is good at home.  The patient is advised to continue current dosage of atenolol.   HLD.  Control is good.  The patient is advised to continue current dosage of Crestor.             Future Appointments   Date Time Provider Department Center   6/16/2023  1:00 PM [facility]   11/27/2023  1:30 PM Kassandra Phillips APRN [facility]   12/13/2023 11:10 AM [facility]   12/20/2023  2:15 PM Parul Jaime MD [facility]

## 2023-06-08 RX ORDER — FUROSEMIDE 40 MG/1
TABLET ORAL
Qty: 90 TABLET | OUTPATIENT
Start: 2023-06-08

## 2023-08-21 ENCOUNTER — OFFICE VISIT (OUTPATIENT)
Dept: INTERNAL MEDICINE | Facility: CLINIC | Age: 78
End: 2023-08-21
Payer: MEDICARE

## 2023-08-21 VITALS
DIASTOLIC BLOOD PRESSURE: 76 MMHG | HEART RATE: 78 BPM | OXYGEN SATURATION: 98 % | BODY MASS INDEX: 27.97 KG/M2 | HEIGHT: 62 IN | WEIGHT: 152 LBS | SYSTOLIC BLOOD PRESSURE: 128 MMHG

## 2023-08-21 DIAGNOSIS — J20.8 ACUTE BRONCHITIS DUE TO OTHER SPECIFIED ORGANISMS: Primary | ICD-10-CM

## 2023-08-21 DIAGNOSIS — R05.9 COUGH, UNSPECIFIED TYPE: ICD-10-CM

## 2023-08-21 PROCEDURE — 3078F DIAST BP <80 MM HG: CPT | Performed by: INTERNAL MEDICINE

## 2023-08-21 PROCEDURE — 3074F SYST BP LT 130 MM HG: CPT | Performed by: INTERNAL MEDICINE

## 2023-08-21 PROCEDURE — 1160F RVW MEDS BY RX/DR IN RCRD: CPT | Performed by: INTERNAL MEDICINE

## 2023-08-21 PROCEDURE — 99213 OFFICE O/P EST LOW 20 MIN: CPT | Performed by: INTERNAL MEDICINE

## 2023-08-21 PROCEDURE — 1159F MED LIST DOCD IN RCRD: CPT | Performed by: INTERNAL MEDICINE

## 2023-08-21 RX ORDER — DOXYCYCLINE 100 MG/1
100 CAPSULE ORAL EVERY 12 HOURS SCHEDULED
Qty: 14 CAPSULE | Refills: 0 | Status: SHIPPED | OUTPATIENT
Start: 2023-08-21

## 2023-08-21 NOTE — PROGRESS NOTES
Subjective     Kassi Mathis is a 78 y.o. female who presents with   Chief Complaint   Patient presents with    Cough    URI       History of Present Illness     Head and nasal congestion.  Sinus pain and congestion.  Cough.  No Sore throat.  No fever.  Going on four days.  Covid negative.  Chest hurts to breath.      Review of Systems   Respiratory:  Positive for chest tightness. Negative for shortness of breath and wheezing.    Cardiovascular:  Positive for chest pain.     The following portions of the patient's history were reviewed and updated as appropriate: allergies, current medications and problem list.    Patient Active Problem List    Diagnosis Date Noted    Type 2 diabetes mellitus 01/17/2016     Priority: High    Bradycardia, sinus 09/08/2022    Chronic diastolic congestive heart failure 08/09/2022    At high risk for falls 09/29/2021    Osteoarthritis of right knee 09/08/2020    Cervical radiculopathy 10/08/2018    Hammer toes of both feet 10/17/2017    Depression 01/17/2016    Allergic rhinitis 01/17/2016    Hypertension 01/17/2016    Dementia without behavioral disturbance 01/17/2016     Note Last Updated: 7/20/2016     By neuropysch evaluation 7/20/2016      Paroxysmal atrial fibrillation 01/17/2016    Cobalamin deficiency 01/17/2016     Note Last Updated: 6/8/2016     Boarderline only with normal MMA/Homocysteine/IF antibody.  Encouraged MVI daily.        Hyperlipidemia 01/17/2016       Current Outpatient Medications on File Prior to Visit   Medication Sig Dispense Refill    albuterol sulfate  (90 Base) MCG/ACT inhaler Inhale 2 puffs Every 4 (Four) Hours As Needed for Wheezing. 1 inhaler 0    amLODIPine (NORVASC) 5 MG tablet TAKE ONE TABLET BY MOUTH DAILY 90 tablet 3    atenolol (TENORMIN) 25 MG tablet Take 1 tablet by mouth Daily. 90 tablet 2    Cholecalciferol (VITAMIN D3 PO) Take  by mouth.      donepezil (ARICEPT) 10 MG tablet TAKE ONE TABLET BY MOUTH DAILY 90 tablet 3    furosemide  "(LASIX) 10 MG/ML ORAL solution Take 2mL daily; can take an extra 2mL daily up to twice per week for swelling 200 mL 1    glucose blood test strip Use daily for type 2 DM. 100 each 3    Jardiance 10 MG tablet tablet TAKE ONE TABLET BY MOUTH DAILY 90 tablet 7    Melatonin 10 MG capsule Take  by mouth.      metFORMIN (Glucophage) 500 MG tablet Take 0.5 tablets by mouth 2 (Two) Times a Day With Meals. 90 tablet 3    Multiple Vitamin (MULTIVITAMINS PO) Take 1 tablet by mouth daily.      rivaroxaban (Xarelto) 20 MG tablet Take 1 tablet by mouth Daily With Dinner. 90 tablet 2    rosuvastatin (CRESTOR) 20 MG tablet TAKE ONE TABLET BY MOUTH EVERY NIGHT AT BEDTIME 90 tablet 0    sertraline (ZOLOFT) 100 MG tablet TAKE TWO TABLETS BY MOUTH DAILY 180 tablet 1    vitamin B-12 (CYANOCOBALAMIN) 1000 MCG tablet Take 1 tablet by mouth Daily.      vitamin C (ASCORBIC ACID) 250 MG tablet Take 1 tablet by mouth Daily.       No current facility-administered medications on file prior to visit.       Objective     /76   Pulse 78   Ht 157.5 cm (62.01\")   Wt 68.9 kg (152 lb)   LMP  (LMP Unknown)   SpO2 98%   BMI 27.79 kg/mý     Physical Exam  Constitutional:       Appearance: She is well-developed.   HENT:      Head: Normocephalic and atraumatic.      Right Ear: Hearing and tympanic membrane normal.      Left Ear: Hearing and tympanic membrane normal.      Mouth/Throat:      Pharynx: No oropharyngeal exudate or posterior oropharyngeal erythema.   Cardiovascular:      Rate and Rhythm: Normal rate and regular rhythm.      Heart sounds: Normal heart sounds.   Pulmonary:      Effort: Pulmonary effort is normal.      Breath sounds: Normal breath sounds.   Skin:     General: Skin is warm and dry.   Neurological:      Mental Status: She is alert and oriented to person, place, and time.   Psychiatric:         Behavior: Behavior normal.       Assessment & Plan   Diagnoses and all orders for this visit:    1. Acute bronchitis due to other " specified organisms (Primary)    2. Cough, unspecified type  -     XR Chest PA & Lateral    Other orders  -     doxycycline (MONODOX) 100 MG capsule; Take 1 capsule by mouth Every 12 (Twelve) Hours.  Dispense: 14 capsule; Refill: 0        Discussion    Patient presents with episode of acute bronchitis.  A prescription for antibiotics is provided today.  The patient is instructed to take along with Mucinex DM.  Let me know they are not feeling better over the next 3 days or if there is any change in symptoms.  Return for CXR.  Orders are in.           Future Appointments   Date Time Provider Department Center   8/25/2023 11:20 AM [facility]   11/29/2023  1:30 PM Kassandra Phillips APRN [facility]   12/13/2023 11:10 AM [facility]   12/20/2023  2:15 PM Parul Jaime MD [facility]

## 2023-08-22 RX ORDER — DONEPEZIL HYDROCHLORIDE 10 MG/1
TABLET, FILM COATED ORAL
Qty: 90 TABLET | Refills: 3 | Status: SHIPPED | OUTPATIENT
Start: 2023-08-22

## 2023-09-06 ENCOUNTER — TELEPHONE (OUTPATIENT)
Dept: INTERNAL MEDICINE | Facility: CLINIC | Age: 78
End: 2023-09-06
Payer: MEDICARE

## 2023-09-06 ENCOUNTER — OFFICE VISIT (OUTPATIENT)
Dept: INTERNAL MEDICINE | Facility: CLINIC | Age: 78
End: 2023-09-06
Payer: MEDICARE

## 2023-09-06 VITALS
DIASTOLIC BLOOD PRESSURE: 72 MMHG | SYSTOLIC BLOOD PRESSURE: 160 MMHG | HEIGHT: 62 IN | HEART RATE: 65 BPM | WEIGHT: 152 LBS | OXYGEN SATURATION: 99 % | BODY MASS INDEX: 27.97 KG/M2

## 2023-09-06 DIAGNOSIS — R05.9 COUGH, UNSPECIFIED TYPE: ICD-10-CM

## 2023-09-06 DIAGNOSIS — M25.512 ACUTE PAIN OF LEFT SHOULDER: ICD-10-CM

## 2023-09-06 DIAGNOSIS — J20.8 ACUTE BRONCHITIS DUE TO OTHER SPECIFIED ORGANISMS: Primary | ICD-10-CM

## 2023-09-06 PROCEDURE — 3077F SYST BP >= 140 MM HG: CPT | Performed by: INTERNAL MEDICINE

## 2023-09-06 PROCEDURE — 99213 OFFICE O/P EST LOW 20 MIN: CPT | Performed by: INTERNAL MEDICINE

## 2023-09-06 PROCEDURE — 3078F DIAST BP <80 MM HG: CPT | Performed by: INTERNAL MEDICINE

## 2023-09-06 RX ORDER — DEXTROMETHORPHAN HYDROBROMIDE AND PROMETHAZINE HYDROCHLORIDE 15; 6.25 MG/5ML; MG/5ML
5 SYRUP ORAL
Qty: 118 ML | Refills: 0 | Status: SHIPPED | OUTPATIENT
Start: 2023-09-06

## 2023-09-06 RX ORDER — METHYLPREDNISOLONE 4 MG/1
TABLET ORAL
Qty: 21 TABLET | Refills: 0 | Status: SHIPPED | OUTPATIENT
Start: 2023-09-06

## 2023-09-06 RX ORDER — AZITHROMYCIN 250 MG/1
TABLET, FILM COATED ORAL
Qty: 6 TABLET | Refills: 0 | Status: SHIPPED | OUTPATIENT
Start: 2023-09-06

## 2023-09-06 NOTE — TELEPHONE ENCOUNTER
"Pts daughter, Flaca, calling stating that pt saw Dr. Jaime last week for coughing/bronchitis- pt got some medication and started feeling better but in the last day or so she started feeling bad again; coughing and having some \"pain in chest and underneath breast\" from the coughing- I did not see any available appts for today- please advise  "

## 2023-09-07 NOTE — PROGRESS NOTES
Subjective     Kassi Mathis is a 78 y.o. female who presents with   Chief Complaint   Patient presents with    Shoulder Pain    Chest Pain       Chest Pain        C/o persistent cough.  Going on a couple week.  Chest tenderness is associated.  No fever.  Doxycycline initially helpful but it came back.      C/o recent fall.  Her left shoulder has been persistently tender.      Review of Systems   Cardiovascular:  Positive for chest pain.     The following portions of the patient's history were reviewed and updated as appropriate: allergies, current medications and problem list.    Patient Active Problem List    Diagnosis Date Noted    Type 2 diabetes mellitus 01/17/2016     Priority: High    Bradycardia, sinus 09/08/2022    Chronic diastolic congestive heart failure 08/09/2022    At high risk for falls 09/29/2021    Osteoarthritis of right knee 09/08/2020    Cervical radiculopathy 10/08/2018    Hammer toes of both feet 10/17/2017    Depression 01/17/2016    Allergic rhinitis 01/17/2016    Hypertension 01/17/2016    Dementia without behavioral disturbance 01/17/2016     Note Last Updated: 7/20/2016     By neuropysch evaluation 7/20/2016      Paroxysmal atrial fibrillation 01/17/2016    Cobalamin deficiency 01/17/2016     Note Last Updated: 6/8/2016     Boarderline only with normal MMA/Homocysteine/IF antibody.  Encouraged MVI daily.        Hyperlipidemia 01/17/2016       Current Outpatient Medications on File Prior to Visit   Medication Sig Dispense Refill    albuterol sulfate  (90 Base) MCG/ACT inhaler Inhale 2 puffs Every 4 (Four) Hours As Needed for Wheezing. 1 inhaler 0    amLODIPine (NORVASC) 5 MG tablet TAKE ONE TABLET BY MOUTH DAILY 90 tablet 3    atenolol (TENORMIN) 25 MG tablet Take 1 tablet by mouth Daily. 90 tablet 2    Cholecalciferol (VITAMIN D3 PO) Take  by mouth.      donepezil (ARICEPT) 10 MG tablet TAKE ONE TABLET BY MOUTH DAILY 90 tablet 3    furosemide (LASIX) 10 MG/ML ORAL solution Take 2mL  "daily; can take an extra 2mL daily up to twice per week for swelling 200 mL 1    glucose blood test strip Use daily for type 2 DM. 100 each 3    Jardiance 10 MG tablet tablet TAKE ONE TABLET BY MOUTH DAILY 90 tablet 7    Melatonin 10 MG capsule Take  by mouth.      metFORMIN (Glucophage) 500 MG tablet Take 0.5 tablets by mouth 2 (Two) Times a Day With Meals. 90 tablet 3    Multiple Vitamin (MULTIVITAMINS PO) Take 1 tablet by mouth daily.      rivaroxaban (Xarelto) 20 MG tablet Take 1 tablet by mouth Daily With Dinner. 90 tablet 2    rosuvastatin (CRESTOR) 20 MG tablet TAKE ONE TABLET BY MOUTH EVERY NIGHT AT BEDTIME 90 tablet 0    sertraline (ZOLOFT) 100 MG tablet TAKE TWO TABLETS BY MOUTH DAILY 180 tablet 1    vitamin B-12 (CYANOCOBALAMIN) 1000 MCG tablet Take 1 tablet by mouth Daily.      vitamin C (ASCORBIC ACID) 250 MG tablet Take 1 tablet by mouth Daily.       No current facility-administered medications on file prior to visit.       Objective     /72   Pulse 65   Ht 157.5 cm (62.01\")   Wt 68.9 kg (152 lb)   LMP  (LMP Unknown)   SpO2 99%   BMI 27.79 kg/m²     Physical Exam  Constitutional:       Appearance: She is well-developed.   HENT:      Head: Normocephalic and atraumatic.   Cardiovascular:      Rate and Rhythm: Normal rate and regular rhythm.      Heart sounds: Normal heart sounds.   Pulmonary:      Effort: Pulmonary effort is normal.      Breath sounds: Normal breath sounds.   Musculoskeletal:      Left shoulder: No deformity, effusion, tenderness, bony tenderness or crepitus. Normal range of motion.   Skin:     General: Skin is warm and dry.   Neurological:      Mental Status: She is alert and oriented to person, place, and time.   Psychiatric:         Behavior: Behavior normal.     X-rays of the left shoulder and chest  performed today for following indication:   cough and left shoulder pain.  X-ray reveal nad.  There is no available x-ray for comparison.  X-ray sent to radiology for " official interpretation and findings.        Assessment & Plan   Diagnoses and all orders for this visit:    1. Acute bronchitis due to other specified organisms (Primary)    2. Cough, unspecified type  -     XR Chest PA & Lateral    3. Acute pain of left shoulder  -     XR Shoulder 2+ View Left (In Office)    Other orders  -     azithromycin (Zithromax Z-[place]) 250 MG tablet; Take 2 tablets by mouth on day 1, then 1 tablet daily on days 2-5  Dispense: 6 tablet; Refill: 0  -     methylPREDNISolone (MEDROL) 4 MG dose pack; Take as directed on package instructions.  Dispense: 21 tablet; Refill: 0  -     promethazine-dextromethorphan (PROMETHAZINE-DM) 6.25-15 MG/5ML syrup; Take 5 mL by mouth every night at bedtime.  Dispense: 118 mL; Refill: 0        Discussion    Patient presents with episode of acute bronchitis.  A prescription for antibiotics is provided today.  The patient is instructed to take along with Mucinex DM.and phenergan cough medicaiton.  Let me know they are not feeling better over the next 3 days or if there is any change in symptoms.    Left shoulder pain after recent fall.  No evidence of fracture on x-rays.            Future Appointments   Date Time Provider Department Center   11/29/2023  1:30 PM Kassandra Phillips APRN [facility]   12/13/2023 11:10 AM [facility]   12/20/2023  2:15 PM Parul Jaime MD [facility]

## 2023-09-13 DIAGNOSIS — S42.035A CLOSED NONDISPLACED FRACTURE OF ACROMIAL END OF LEFT CLAVICLE, INITIAL ENCOUNTER: Primary | ICD-10-CM

## 2023-09-29 RX ORDER — ROSUVASTATIN CALCIUM 20 MG/1
TABLET, COATED ORAL
Qty: 90 TABLET | Refills: 0 | Status: SHIPPED | OUTPATIENT
Start: 2023-09-29

## 2023-11-21 ENCOUNTER — TELEPHONE (OUTPATIENT)
Dept: CARDIOLOGY | Facility: CLINIC | Age: 78
End: 2023-11-21
Payer: MEDICARE

## 2023-11-21 NOTE — TELEPHONE ENCOUNTER
Called and spoke with patient's daughter. Dr. Gongora had some open appts tomorrow. Added pt to JK's schedule. Pt's daughter verbalized understanding.    Thank you,    Amarilis Guerrero, RN  Triage [facility]  11/21/23 13:01 EST

## 2023-11-21 NOTE — TELEPHONE ENCOUNTER
Dr. Gongora,    Pt's daughter called this am and said that her mom is staying in afib. Here are a few readings:    144/76, HR 48  163/97, HR 51    Pt is having swelling on and off. She has also been tired for a few weeks. Do you have any recommendations for her?    Thank you,    Amarilis Guerrero, RN  Triage [facility]  11/21/23 10:13 EST

## 2023-11-22 ENCOUNTER — OFFICE VISIT (OUTPATIENT)
Dept: CARDIOLOGY | Facility: CLINIC | Age: 78
End: 2023-11-22
Payer: MEDICARE

## 2023-11-22 VITALS
BODY MASS INDEX: 28.01 KG/M2 | HEIGHT: 62 IN | DIASTOLIC BLOOD PRESSURE: 68 MMHG | HEART RATE: 53 BPM | WEIGHT: 152.2 LBS | SYSTOLIC BLOOD PRESSURE: 156 MMHG

## 2023-11-22 DIAGNOSIS — I10 PRIMARY HYPERTENSION: ICD-10-CM

## 2023-11-22 DIAGNOSIS — R00.1 BRADYCARDIA, SINUS: Primary | ICD-10-CM

## 2023-11-22 DIAGNOSIS — I50.32 CHRONIC DIASTOLIC CONGESTIVE HEART FAILURE: ICD-10-CM

## 2023-11-22 DIAGNOSIS — F03.90 DEMENTIA WITHOUT BEHAVIORAL DISTURBANCE: ICD-10-CM

## 2023-11-22 DIAGNOSIS — I48.0 PAROXYSMAL ATRIAL FIBRILLATION: ICD-10-CM

## 2023-11-22 RX ORDER — TRAZODONE HYDROCHLORIDE 50 MG/1
25 TABLET ORAL DAILY
COMMUNITY
Start: 2023-09-20 | End: 2023-12-19

## 2023-11-22 RX ORDER — FUROSEMIDE 40 MG/1
40 TABLET ORAL 2 TIMES DAILY
COMMUNITY

## 2023-11-22 NOTE — PROGRESS NOTES
RM:________     PCP: Parul Jaime MD    : 1945  AGE: 78 y.o.  EST PATIENT     REASON FOR VISIT/  CC:        BP Readings from Last 3 Encounters:   23 160/72   23 128/76   23 128/80      Wt Readings from Last 3 Encounters:   23 68.9 kg (152 lb)   23 68.9 kg (152 lb)   23 69.3 kg (152 lb 12.8 oz)        WT: ____________ BP: __________L __________R HR______    CHEST PAIN: _____________    SOA: _____________PALPS: _______________     LIGHTHEADED: ___________FATIGUE: ________________ EDEMA __________    ALLERGIES:Enalapril, Enalapril maleate, Memantine hcl, and Penicillins SMOKING HISTORY:  Social History     Tobacco Use    Smoking status: Former     Packs/day: 0.50     Years: 0.00     Additional pack years: 0.00     Total pack years: 0.00     Types: Cigarettes    Smokeless tobacco: Never   Vaping Use    Vaping Use: Never used   Substance Use Topics    Alcohol use: Never     Comment: CAFFEINE USE/ SOFT DRINKS.     Drug use: Never     CAFFEINE USE_________________  ALCOHOL ______________________

## 2023-11-22 NOTE — Clinical Note
Hi! Will you be checking a TSH with her yearly labs next month? If not, can you add one on for me, given her low HR?  Thanks! JOSEFINA

## 2023-11-22 NOTE — PROGRESS NOTES
Date of Office Visit: 2023  Encounter Provider: Mode Gongora MD  Place of Service: [facility] CARDIOLOGY  Patient Name: Kassi Mathis  :1945    Chief Complaint   Patient presents with    Slow Heart Rate   :     HPI: Kassi Mathis is a 78 y.o. female who presents today for a sick visit. I have reviewed prior notes and there are no changes except for any new updates described below. I have also reviewed any information entered into the medical record by the patient or by ancillary staff.     She has a long-standing history of high blood pressure. She has paroxysmal atrial fibrillation.  She has a history of mild sinus bradycardia as well. She has chronic diastolic CHF/grade 2 diastolic dysfunction.    She still lives at home with her , and her children are very involved in her care.  She has dementia and it appears to have worsened significantly since I saw her last in the office.  Her children are concerned because she falls asleep so easily, even while just sitting in a chair.  She is not passing out or losing consciousness.  She is just falling asleep a lot more often.  They have been checking her heart rate and blood pressure at home and her heart rate has been as low as 48 bpm.  They wonder if this is causing her to be so sleepy.  They have not noticed any other worrisome things like heavy breathing, chest pain, or leg swelling.    Past Medical History:   Diagnosis Date    Allergic rhinitis     Anemia     Cataract     Chronic venous insufficiency     Dementia     Depression     Diabetes mellitus     Headache     Sinus?    Hidradenitis suppurativa     History of medical problems     A-fib    Hyperlipidemia     Hypertension     Low back pain     Osteoarthritis     Paroxysmal atrial fibrillation     Peripheral neuropathy     Stroke     Vitamin B 12 deficiency     Boarderline only with normal MMA/Homocysteine/IF antibody       Past Surgical History:   Procedure  Laterality Date    BACK SURGERY      COLONOSCOPY      HYSTERECTOMY      PARTIAL    JOINT REPLACEMENT      Knee replacement    KNEE SURGERY Left     SUBTOTAL HYSTERECTOMY      TONSILLECTOMY         Social History     Socioeconomic History    Marital status:    Tobacco Use    Smoking status: Former     Packs/day: 0.50     Years: 0.00     Additional pack years: 0.00     Total pack years: 0.00     Types: Cigarettes    Smokeless tobacco: Never   Vaping Use    Vaping Use: Never used   Substance and Sexual Activity    Alcohol use: Never     Comment: CAFFEINE USE/ SOFT DRINKS.     Drug use: Never    Sexual activity: Not Currently     Partners: Male       Family History   Problem Relation Age of Onset    Breast cancer Mother     Depression Mother     Stroke Father     Alcohol abuse Father     Stroke Maternal Grandmother     Heart attack Maternal Grandmother     Heart disease Maternal Grandmother     Heart attack Maternal Grandfather     Cancer Maternal Grandfather         Throat cancer    Hyperlipidemia Daughter     Liver disease Son      Review of Systems   Reason unable to perform ROS: telephone/dementia.   Constitutional: Positive for malaise/fatigue.   Cardiovascular:  Negative for chest pain, leg swelling and palpitations.   Neurological:  Positive for excessive daytime sleepiness.   Psychiatric/Behavioral:  Positive for memory loss.    All other systems reviewed and are negative.      Allergies   Allergen Reactions    Enalapril Swelling and Other (See Comments)     Other reaction(s): Other: See Comments  Aka vasotec tabs  Aka vasotec tabs  Aka vasotec tabs  Aka vasotec tabs    Enalapril Maleate Swelling    Memantine Hcl Other (See Comments)     imbalance  imbalance    Penicillins Swelling         Current Outpatient Medications:     amLODIPine (NORVASC) 5 MG tablet, TAKE ONE TABLET BY MOUTH DAILY, Disp: 90 tablet, Rfl: 3    atenolol (TENORMIN) 25 MG tablet, Take 1 tablet by mouth Daily., Disp: 90 tablet, Rfl:  2    Cholecalciferol (VITAMIN D3 PO), Take  by mouth., Disp: , Rfl:     donepezil (ARICEPT) 10 MG tablet, TAKE ONE TABLET BY MOUTH DAILY, Disp: 90 tablet, Rfl: 3    furosemide (LASIX) 10 MG/ML ORAL solution, Take 2mL daily; can take an extra 2mL daily up to twice per week for swelling, Disp: 200 mL, Rfl: 1    furosemide (LASIX) 40 MG tablet, Take 1 tablet by mouth 2 (Two) Times a Day. TAKE 1/4 OF A TABLET., Disp: , Rfl:     glucose blood test strip, Use daily for type 2 DM., Disp: 100 each, Rfl: 3    Jardiance 10 MG tablet tablet, TAKE ONE TABLET BY MOUTH DAILY, Disp: 90 tablet, Rfl: 7    Melatonin 10 MG capsule, Take 5 mg by mouth Every Night., Disp: , Rfl:     metFORMIN (GLUCOPHAGE) 500 MG tablet, TAKE 1/2 TABLET BY MOUTH TWICE A DAY WITH A MEAL (Patient taking differently: 1 tablet Every Night.), Disp: 90 tablet, Rfl: 3    Multiple Vitamin (MULTIVITAMINS PO), Take 1 tablet by mouth daily., Disp: , Rfl:     promethazine-dextromethorphan (PROMETHAZINE-DM) 6.25-15 MG/5ML syrup, Take 5 mL by mouth every night at bedtime., Disp: 118 mL, Rfl: 0    rivaroxaban (Xarelto) 20 MG tablet, Take 1 tablet by mouth Daily With Dinner., Disp: 90 tablet, Rfl: 2    rosuvastatin (CRESTOR) 20 MG tablet, TAKE ONE TABLET BY MOUTH EVERY NIGHT AT BEDTIME (Patient taking differently: 3 X PER WEEK), Disp: 90 tablet, Rfl: 0    sertraline (ZOLOFT) 100 MG tablet, TAKE TWO TABLETS BY MOUTH DAILY, Disp: 180 tablet, Rfl: 1    traZODone (DESYREL) 50 MG tablet, Take 0.5 tablets by mouth Daily. PRN, Disp: , Rfl:     vitamin B-12 (CYANOCOBALAMIN) 1000 MCG tablet, Take 1 tablet by mouth Daily., Disp: , Rfl:     vitamin C (ASCORBIC ACID) 250 MG tablet, Take 1 tablet by mouth Daily., Disp: , Rfl:     albuterol sulfate  (90 Base) MCG/ACT inhaler, Inhale 2 puffs Every 4 (Four) Hours As Needed for Wheezing. (Patient not taking: Reported on 11/22/2023), Disp: 1 inhaler, Rfl: 0     Objective:     Vitals:    11/22/23 1109   BP: 156/68   BP Location:  "Left arm   Pulse: 53   Weight: 69 kg (152 lb 3.2 oz)   Height: 157.5 cm (62.01\")     Body mass index is 27.83 kg/m².    Physical Exam  Vitals reviewed.   HENT:      Head: Normocephalic.      Nose: Nose normal.      Mouth/Throat:      Pharynx: Oropharynx is clear.      Comments: Poor dentition  Eyes:      Conjunctiva/sclera: Conjunctivae normal.   Cardiovascular:      Rate and Rhythm: Regular rhythm. Bradycardia present.      Pulses: Normal pulses.      Heart sounds: Normal heart sounds.   Pulmonary:      Effort: Pulmonary effort is normal.      Breath sounds: Normal breath sounds.   Abdominal:      Palpations: Abdomen is soft.      Tenderness: There is no abdominal tenderness.   Musculoskeletal:         General: No swelling. Normal range of motion.      Cervical back: Normal range of motion.   Skin:     General: Skin is warm and dry.   Neurological:      Mental Status: She is alert. She is disoriented.   Psychiatric:         Mood and Affect: Mood normal.         Cognition and Memory: Memory is impaired.           ECG 12 Lead    Date/Time: 11/22/2023 1:14 PM  Performed by: Mode Gongora MD    Authorized by: Mode Gongora MD  Comparison: compared with previous ECG   Similar to previous ECG  Rhythm: sinus bradycardia  Conduction: conduction normal  ST Segments: ST segments normal  T Waves: T waves normal  QRS axis: normal  Other: no other findings    Clinical impression: normal ECG            Assessment:       Diagnosis Plan   1. Bradycardia, sinus  [facility]      2. Primary hypertension        3. Paroxysmal atrial fibrillation  [facility]      4. Chronic diastolic congestive heart failure        5. Dementia without behavioral disturbance             Plan:       1.  She has a long history of mild sinus bradycardia.  It has become a little bit worse.  She surely has some degree of age-related sick sinus syndrome.  I did explain that, in general, I do not see sinus " bradycardia as a problem unless we really start to get low, heart rates below 45 bpm, or unless the patient is having lightheadedness or syncope.  I do not feel that the heart rate is making her have increased daytime sleepiness, and I think this is a factor of age and worsening dementia.  I do think it is reasonable to decrease her atenolol dose to 12.5 mg daily, and then consider stopping it completely if needed.  We did go ahead and put a heart monitor on her today so I can see if she is having any PAF.  If she is, that will make coming off the beta-blocker more difficult.     Donepezil can contribute to sinus bradycardia, as well.  I do not think her heart rate is enough of a problem to talk about stopping this medication just yet.  Also, I will inquire as to whether or not she can have a TSH checked next month during her physical.    2.  Her blood pressure was a bit elevated today but she is anxious about being here.  Her family watches it very closely at home and they will let us know if it is running too high.  In general, her systolic is in the 140s, which is reasonable for her.  If it starts to creep up on the lower dose of atenolol, we might have to consider something else.  She developed angioedema on ACE inhibitors, increased pedal edema on higher doses of amlodipine, and has reportedly had poor reactions to hydralazine in the past.  I would recommend using clonidine given her baseline low heart rate, either.    3.  Atrial Fibrillation and Atrial Flutter  Assessment   The patient has paroxysmal atrial fibrillation   This is non-valvular in etiology   The patient's CHADS2-VASc score is 7   A [id number] score of 2 or more is considered a high risk for a thromboembolic event   Rivaroxaban prescribed    Plan   Attempt to maintain sinus rhythm   Add rivaroxaban for antithrombotic therapy   Continue beta blocker for rhythm control    Her dementia is progressing. She has some balance issues but has only  fallen twice. Things appears stable but we may reach a point soon where we have to stop AC.     4.  Her volume status is stable on low-dose furosemide.    Sincerely,       Mode Gongora MD

## 2023-11-29 ENCOUNTER — OFFICE VISIT (OUTPATIENT)
Dept: CARDIOLOGY | Facility: CLINIC | Age: 78
End: 2023-11-29
Payer: MEDICARE

## 2023-11-29 VITALS
HEART RATE: 60 BPM | WEIGHT: 153.6 LBS | DIASTOLIC BLOOD PRESSURE: 60 MMHG | HEIGHT: 62 IN | SYSTOLIC BLOOD PRESSURE: 162 MMHG | BODY MASS INDEX: 28.26 KG/M2

## 2023-11-29 DIAGNOSIS — I48.0 PAROXYSMAL ATRIAL FIBRILLATION: Primary | ICD-10-CM

## 2023-11-29 DIAGNOSIS — R00.1 BRADYCARDIA, SINUS: ICD-10-CM

## 2023-11-29 DIAGNOSIS — I50.32 CHRONIC DIASTOLIC CONGESTIVE HEART FAILURE: ICD-10-CM

## 2023-11-29 DIAGNOSIS — I10 PRIMARY HYPERTENSION: ICD-10-CM

## 2023-11-29 NOTE — PROGRESS NOTES
Date of Office Visit: 2023  Encounter Provider: Mode Gongora MD  Place of Service: [facility] CARDIOLOGY  Patient Name: Kassi Mathis  :1945    Chief Complaint   Patient presents with    Follow-up    Med Management   :     HPI: Kassi Mathis is a 78 y.o. female who presents today in follow up. I have reviewed prior notes and there are no changes except for any new updates described below. I have also reviewed any information entered into the medical record by the patient or by ancillary staff.     She has a long-standing history of high blood pressure. She has paroxysmal atrial fibrillation.  She has a history of mild sinus bradycardia as well. She has chronic diastolic CHF/grade 2 diastolic dysfunction.    She still lives at home with her , and her children are very involved in her care.  She has dementia which is progressive. I saw her last week and her children noted significantly worsening fatigue. Her heart rate was consistently in the 40s at home. I decreased her atenolol dose. We put a rhythm monitor on her but she was unable to tolerate it very well.     They noted improvement very quickly. Her pulse is in the mid 50s at home, and her BP is usually 140s/70s. She's not had any syncope or near syncope.     Past Medical History:   Diagnosis Date    Allergic rhinitis     Anemia     Cataract     Chronic venous insufficiency     Dementia     Depression     Diabetes mellitus     Headache     Sinus?    Hidradenitis suppurativa     Hyperlipidemia     Hypertension     Low back pain     Osteoarthritis     Paroxysmal atrial fibrillation     Peripheral neuropathy     Stroke     Vitamin B 12 deficiency     Boarderline only with normal MMA/Homocysteine/IF antibody       Past Surgical History:   Procedure Laterality Date    BACK SURGERY      COLONOSCOPY      HYSTERECTOMY      PARTIAL    JOINT REPLACEMENT      Knee replacement    KNEE SURGERY Left     SUBTOTAL HYSTERECTOMY       TONSILLECTOMY         Social History     Socioeconomic History    Marital status:    Tobacco Use    Smoking status: Former     Packs/day: 0.50     Years: 0.00     Additional pack years: 0.00     Total pack years: 0.00     Types: Cigarettes    Smokeless tobacco: Never   Vaping Use    Vaping Use: Never used   Substance and Sexual Activity    Alcohol use: Never     Comment: CAFFEINE USE/ SOFT DRINKS.     Drug use: Never    Sexual activity: Not Currently     Partners: Male       Family History   Problem Relation Age of Onset    Breast cancer Mother     Depression Mother     Stroke Father     Alcohol abuse Father     Stroke Maternal Grandmother     Heart attack Maternal Grandmother     Heart disease Maternal Grandmother     Heart attack Maternal Grandfather     Cancer Maternal Grandfather         Throat cancer    Hyperlipidemia Daughter     Liver disease Son      Review of Systems   Reason unable to perform ROS: telephone/dementia.   Constitutional: Positive for malaise/fatigue.   Cardiovascular:  Negative for chest pain, leg swelling and palpitations.   Neurological:  Positive for excessive daytime sleepiness.   Psychiatric/Behavioral:  Positive for memory loss.    All other systems reviewed and are negative.      Allergies   Allergen Reactions    Enalapril Swelling and Other (See Comments)     Other reaction(s): Other: See Comments  Aka vasotec tabs  Aka vasotec tabs  Aka vasotec tabs  Aka vasotec tabs    Enalapril Maleate Swelling    Memantine Hcl Other (See Comments)     imbalance  imbalance    Penicillins Swelling         Current Outpatient Medications:     albuterol sulfate  (90 Base) MCG/ACT inhaler, Inhale 2 puffs Every 4 (Four) Hours As Needed for Wheezing., Disp: 1 inhaler, Rfl: 0    amLODIPine (NORVASC) 5 MG tablet, TAKE ONE TABLET BY MOUTH DAILY, Disp: 90 tablet, Rfl: 3    atenolol (TENORMIN) 25 MG tablet, Take 1 tablet by mouth Daily. (Patient taking differently: Take 0.5 tablets by mouth  "Daily.), Disp: 90 tablet, Rfl: 2    Cholecalciferol (VITAMIN D3 PO), Take  by mouth., Disp: , Rfl:     donepezil (ARICEPT) 10 MG tablet, TAKE ONE TABLET BY MOUTH DAILY, Disp: 90 tablet, Rfl: 3    furosemide (LASIX) 10 MG/ML ORAL solution, Take 2mL daily; can take an extra 2mL daily up to twice per week for swelling, Disp: 200 mL, Rfl: 1    furosemide (LASIX) 40 MG tablet, Take 1 tablet by mouth 2 (Two) Times a Day. TAKE 1/4 OF A TABLET., Disp: , Rfl:     glucose blood test strip, Use daily for type 2 DM., Disp: 100 each, Rfl: 3    Jardiance 10 MG tablet tablet, TAKE ONE TABLET BY MOUTH DAILY, Disp: 90 tablet, Rfl: 7    Melatonin 10 MG capsule, Take 5 mg by mouth Every Night., Disp: , Rfl:     metFORMIN (GLUCOPHAGE) 500 MG tablet, TAKE 1/2 TABLET BY MOUTH TWICE A DAY WITH A MEAL (Patient taking differently: 1 tablet Every Night.), Disp: 90 tablet, Rfl: 3    Multiple Vitamin (MULTIVITAMINS PO), Take 1 tablet by mouth daily., Disp: , Rfl:     promethazine-dextromethorphan (PROMETHAZINE-DM) 6.25-15 MG/5ML syrup, Take 5 mL by mouth every night at bedtime., Disp: 118 mL, Rfl: 0    rivaroxaban (Xarelto) 20 MG tablet, Take 1 tablet by mouth Daily With Dinner., Disp: 90 tablet, Rfl: 2    rosuvastatin (CRESTOR) 20 MG tablet, TAKE ONE TABLET BY MOUTH EVERY NIGHT AT BEDTIME (Patient taking differently: 3 X PER WEEK), Disp: 90 tablet, Rfl: 0    sertraline (ZOLOFT) 100 MG tablet, TAKE TWO TABLETS BY MOUTH DAILY, Disp: 180 tablet, Rfl: 1    traZODone (DESYREL) 50 MG tablet, Take 0.5 tablets by mouth Daily. PRN, Disp: , Rfl:     vitamin B-12 (CYANOCOBALAMIN) 1000 MCG tablet, Take 1 tablet by mouth Daily., Disp: , Rfl:     vitamin C (ASCORBIC ACID) 250 MG tablet, Take 1 tablet by mouth Daily., Disp: , Rfl:      Objective:     Vitals:    11/29/23 1424   BP: 162/60   BP Location: Left arm   Pulse: 60   Weight: 69.7 kg (153 lb 9.6 oz)   Height: 157.5 cm (62.01\")       Body mass index is 28.08 kg/m².    Physical Exam  Vitals reviewed. "   HENT:      Head: Normocephalic.      Nose: Nose normal.      Mouth/Throat:      Pharynx: Oropharynx is clear.      Comments: Poor dentition  Eyes:      Conjunctiva/sclera: Conjunctivae normal.   Cardiovascular:      Rate and Rhythm: Normal rate and regular rhythm.      Pulses: Normal pulses.      Heart sounds: Normal heart sounds.   Pulmonary:      Effort: Pulmonary effort is normal.      Breath sounds: Normal breath sounds.   Abdominal:      Palpations: Abdomen is soft.      Tenderness: There is no abdominal tenderness.   Musculoskeletal:         General: No swelling. Normal range of motion.      Cervical back: Normal range of motion.   Skin:     General: Skin is warm and dry.   Neurological:      Mental Status: She is alert. She is disoriented.   Psychiatric:         Mood and Affect: Mood normal.         Cognition and Memory: Memory is impaired.         Procedures      Assessment:       Diagnosis Plan   1. Paroxysmal atrial fibrillation        2. Bradycardia, sinus        3. Primary hypertension        4. Chronic diastolic congestive heart failure               Plan:       She has a long history of mild sinus bradycardia, but it started to worsen.  Thankfully, we have improved with the decreased dose of atenolol.  I have a low threshold for stopping it completely if needed.  I did try to put a heart rhythm monitor on her, but she did remove it, due to her dementia.  Her daughter has sent it in and we will see if we obtained any good data from it.  She has not passed out.  Her blood pressure is within goal at home, in the 140s over 70s.  Her heart rate is in the mid 50s to low 60s.  She is euvolemic.    I will see her back in 6 months.     Atrial Fibrillation and Atrial Flutter  Assessment   The patient has paroxysmal atrial fibrillation   This is non-valvular in etiology   The patient's CHADS2-VASc score is 7   A [id number] score of 2 or more is considered a high risk for a thromboembolic event   Rivaroxaban  prescribed    Plan   Attempt to maintain sinus rhythm   Add rivaroxaban for antithrombotic therapy   Continue beta blocker for rhythm control    Her dementia is progressing. She has some balance issues but has only fallen twice. Things appears stable but we may reach a point soon where we have to stop AC.     Sincerely,       Mode Gongora MD

## 2023-12-13 DIAGNOSIS — E11.9 TYPE 2 DIABETES MELLITUS WITHOUT COMPLICATION, WITHOUT LONG-TERM CURRENT USE OF INSULIN: ICD-10-CM

## 2023-12-13 DIAGNOSIS — I10 PRIMARY HYPERTENSION: ICD-10-CM

## 2023-12-13 DIAGNOSIS — Z11.59 NEED FOR HEPATITIS C SCREENING TEST: Primary | ICD-10-CM

## 2023-12-13 DIAGNOSIS — E78.5 HYPERLIPIDEMIA, UNSPECIFIED HYPERLIPIDEMIA TYPE: ICD-10-CM

## 2023-12-14 LAB
ALBUMIN SERPL-MCNC: 4.2 G/DL (ref 3.5–5.2)
ALBUMIN/CREAT UR: 80 MG/G CREAT (ref 0–29)
ALBUMIN/GLOB SERPL: 1.9 G/DL
ALP SERPL-CCNC: 78 U/L (ref 39–117)
ALT SERPL-CCNC: 13 U/L (ref 1–33)
APPEARANCE UR: CLEAR
AST SERPL-CCNC: 18 U/L (ref 1–32)
BACTERIA #/AREA URNS HPF: ABNORMAL /HPF
BASOPHILS # BLD AUTO: 0.03 10*3/MM3 (ref 0–0.2)
BASOPHILS NFR BLD AUTO: 0.5 % (ref 0–1.5)
BILIRUB SERPL-MCNC: 0.5 MG/DL (ref 0–1.2)
BILIRUB UR QL STRIP: NEGATIVE
BUN SERPL-MCNC: 16 MG/DL (ref 8–23)
BUN/CREAT SERPL: 16.8 (ref 7–25)
CALCIUM SERPL-MCNC: 9.7 MG/DL (ref 8.6–10.5)
CASTS URNS MICRO: ABNORMAL
CHLORIDE SERPL-SCNC: 103 MMOL/L (ref 98–107)
CHOLEST SERPL-MCNC: 176 MG/DL (ref 0–200)
CO2 SERPL-SCNC: 29 MMOL/L (ref 22–29)
COLOR UR: YELLOW
CREAT SERPL-MCNC: 0.95 MG/DL (ref 0.57–1)
CREAT UR-MCNC: 79.1 MG/DL
EGFRCR SERPLBLD CKD-EPI 2021: 61.1 ML/MIN/1.73
EOSINOPHIL # BLD AUTO: 0.04 10*3/MM3 (ref 0–0.4)
EOSINOPHIL NFR BLD AUTO: 0.7 % (ref 0.3–6.2)
EPI CELLS #/AREA URNS HPF: ABNORMAL /HPF
ERYTHROCYTE [DISTWIDTH] IN BLOOD BY AUTOMATED COUNT: 13 % (ref 12.3–15.4)
GLOBULIN SER CALC-MCNC: 2.2 GM/DL
GLUCOSE SERPL-MCNC: 233 MG/DL (ref 65–99)
GLUCOSE UR QL STRIP: ABNORMAL
HBA1C MFR BLD: 7.8 % (ref 4.8–5.6)
HCT VFR BLD AUTO: 39.8 % (ref 34–46.6)
HCV IGG SERPL QL IA: NON REACTIVE
HDLC SERPL-MCNC: 71 MG/DL (ref 40–60)
HGB BLD-MCNC: 12.9 G/DL (ref 12–15.9)
HGB UR QL STRIP: NEGATIVE
IMM GRANULOCYTES # BLD AUTO: 0.02 10*3/MM3 (ref 0–0.05)
IMM GRANULOCYTES NFR BLD AUTO: 0.3 % (ref 0–0.5)
KETONES UR QL STRIP: NEGATIVE
LDLC SERPL CALC-MCNC: 87 MG/DL (ref 0–100)
LEUKOCYTE ESTERASE UR QL STRIP: NEGATIVE
LYMPHOCYTES # BLD AUTO: 1.76 10*3/MM3 (ref 0.7–3.1)
LYMPHOCYTES NFR BLD AUTO: 29.5 % (ref 19.6–45.3)
MCH RBC QN AUTO: 29.5 PG (ref 26.6–33)
MCHC RBC AUTO-ENTMCNC: 32.4 G/DL (ref 31.5–35.7)
MCV RBC AUTO: 90.9 FL (ref 79–97)
MICROALBUMIN UR-MCNC: 63.5 UG/ML
MONOCYTES # BLD AUTO: 0.32 10*3/MM3 (ref 0.1–0.9)
MONOCYTES NFR BLD AUTO: 5.4 % (ref 5–12)
NEUTROPHILS # BLD AUTO: 3.8 10*3/MM3 (ref 1.7–7)
NEUTROPHILS NFR BLD AUTO: 63.6 % (ref 42.7–76)
NITRITE UR QL STRIP: NEGATIVE
NRBC BLD AUTO-RTO: 0 /100 WBC (ref 0–0.2)
PH UR STRIP: 6 [PH] (ref 5–8)
PLATELET # BLD AUTO: 261 10*3/MM3 (ref 140–450)
POTASSIUM SERPL-SCNC: 3.5 MMOL/L (ref 3.5–5.2)
PROT SERPL-MCNC: 6.4 G/DL (ref 6–8.5)
PROT UR QL STRIP: ABNORMAL
RBC # BLD AUTO: 4.38 10*6/MM3 (ref 3.77–5.28)
RBC #/AREA URNS HPF: ABNORMAL /HPF
SODIUM SERPL-SCNC: 142 MMOL/L (ref 136–145)
SP GR UR STRIP: ABNORMAL (ref 1–1.03)
TRIGL SERPL-MCNC: 101 MG/DL (ref 0–150)
TSH SERPL DL<=0.005 MIU/L-ACNC: 1.23 UIU/ML (ref 0.27–4.2)
UROBILINOGEN UR STRIP-MCNC: ABNORMAL MG/DL
VLDLC SERPL CALC-MCNC: 18 MG/DL (ref 5–40)
WBC # BLD AUTO: 5.97 10*3/MM3 (ref 3.4–10.8)
WBC #/AREA URNS HPF: ABNORMAL /HPF
YEAST #/AREA URNS HPF: ABNORMAL /HPF

## 2023-12-20 ENCOUNTER — OFFICE VISIT (OUTPATIENT)
Dept: INTERNAL MEDICINE | Facility: CLINIC | Age: 78
End: 2023-12-20
Payer: MEDICARE

## 2023-12-20 VITALS
BODY MASS INDEX: 27.42 KG/M2 | HEART RATE: 58 BPM | SYSTOLIC BLOOD PRESSURE: 140 MMHG | OXYGEN SATURATION: 98 % | WEIGHT: 149 LBS | HEIGHT: 62 IN | DIASTOLIC BLOOD PRESSURE: 72 MMHG

## 2023-12-20 DIAGNOSIS — E78.5 HYPERLIPIDEMIA, UNSPECIFIED HYPERLIPIDEMIA TYPE: ICD-10-CM

## 2023-12-20 DIAGNOSIS — Z12.31 ENCOUNTER FOR SCREENING MAMMOGRAM FOR BREAST CANCER: ICD-10-CM

## 2023-12-20 DIAGNOSIS — I10 PRIMARY HYPERTENSION: ICD-10-CM

## 2023-12-20 DIAGNOSIS — Z78.0 MENOPAUSE: ICD-10-CM

## 2023-12-20 DIAGNOSIS — E11.9 TYPE 2 DIABETES MELLITUS WITHOUT COMPLICATION, WITHOUT LONG-TERM CURRENT USE OF INSULIN: ICD-10-CM

## 2023-12-20 DIAGNOSIS — Z00.00 MEDICARE ANNUAL WELLNESS VISIT, SUBSEQUENT: Primary | ICD-10-CM

## 2023-12-20 NOTE — PROGRESS NOTES
The ABCs of the Annual Wellness Visit  Subsequent Medicare Wellness Visit    Subjective    Kassi Mathis is a 78 y.o. female who presents for a Subsequent Medicare Wellness Visit.    The following portions of the patient's history were reviewed and   updated as appropriate: allergies, current medications, past family history, past medical history, past social history, past surgical history, and problem list.    Compared to one year ago, the patient feels her physical   health is the same.    Compared to one year ago, the patient feels her mental   health is the same.    Recent Hospitalizations:  She was not admitted to the hospital during the last year.       Current Medical Providers:  Patient Care Team:  Parul Jaime MD as PCP - General (Internal Medicine)    Outpatient Medications Prior to Visit   Medication Sig Dispense Refill    albuterol sulfate  (90 Base) MCG/ACT inhaler Inhale 2 puffs Every 4 (Four) Hours As Needed for Wheezing. 1 inhaler 0    amLODIPine (NORVASC) 5 MG tablet TAKE ONE TABLET BY MOUTH DAILY 90 tablet 3    atenolol (TENORMIN) 25 MG tablet Take 1 tablet by mouth Daily. (Patient taking differently: Take 0.5 tablets by mouth Daily.) 90 tablet 2    Cholecalciferol (VITAMIN D3 PO) Take  by mouth.      donepezil (ARICEPT) 10 MG tablet TAKE ONE TABLET BY MOUTH DAILY 90 tablet 3    furosemide (LASIX) 10 MG/ML ORAL solution Take 2mL daily; can take an extra 2mL daily up to twice per week for swelling 200 mL 1    furosemide (LASIX) 40 MG tablet Take 1 tablet by mouth 2 (Two) Times a Day. TAKE 1/4 OF A TABLET.      glucose blood test strip Use daily for type 2 DM. 100 each 3    Jardiance 10 MG tablet tablet TAKE ONE TABLET BY MOUTH DAILY 90 tablet 7    Melatonin 10 MG capsule Take 5 mg by mouth Every Night.      metFORMIN (GLUCOPHAGE) 500 MG tablet TAKE 1/2 TABLET BY MOUTH TWICE A DAY WITH A MEAL (Patient taking differently: 1 tablet Every Night.) 90 tablet 3    Multiple Vitamin  "(MULTIVITAMINS PO) Take 1 tablet by mouth daily.      promethazine-dextromethorphan (PROMETHAZINE-DM) 6.25-15 MG/5ML syrup Take 5 mL by mouth every night at bedtime. 118 mL 0    rivaroxaban (Xarelto) 20 MG tablet Take 1 tablet by mouth Daily With Dinner. 90 tablet 2    rosuvastatin (CRESTOR) 20 MG tablet TAKE ONE TABLET BY MOUTH EVERY NIGHT AT BEDTIME (Patient taking differently: 3 X PER WEEK) 90 tablet 0    sertraline (ZOLOFT) 100 MG tablet TAKE TWO TABLETS BY MOUTH DAILY 180 tablet 1    vitamin B-12 (CYANOCOBALAMIN) 1000 MCG tablet Take 1 tablet by mouth Daily.      vitamin C (ASCORBIC ACID) 250 MG tablet Take 1 tablet by mouth Daily.       No facility-administered medications prior to visit.       No opioid medication identified on active medication list. I have reviewed chart for other potential  high risk medication/s and harmful drug interactions in the elderly.        Aspirin is not on active medication list.  Aspirin use is not indicated based on review of current medical condition/s. Risk of harm outweighs potential benefits.  .    Patient Active Problem List   Diagnosis    Depression    Allergic rhinitis    Hypertension    Dementia without behavioral disturbance    Paroxysmal atrial fibrillation    Cobalamin deficiency    Type 2 diabetes mellitus    Hyperlipidemia    Hammer toes of both feet    Cervical radiculopathy    Osteoarthritis of right knee    At high risk for falls    Chronic diastolic congestive heart failure    Bradycardia, sinus          Objective    Vitals:    12/20/23 1331   BP: 140/72   Pulse: 58   SpO2: 98%   Weight: 67.6 kg (149 lb)   Height: 157.5 cm (62.01\")   PainSc: 0-No pain     Estimated body mass index is 27.25 kg/m² as calculated from the following:    Height as of this encounter: 157.5 cm (62.01\").    Weight as of this encounter: 67.6 kg (149 lb).           Does the patient have evidence of cognitive impairment? Yes    Lab Results   Component Value Date    CHLPL 176 12/13/2023    " TRIG 101 2023    HDL 71 (H) 2023    LDL 87 2023    VLDL 18 2023    HGBA1C 7.80 (H) 2023        HEALTH RISK ASSESSMENT    Smoking Status:  Social History     Tobacco Use   Smoking Status Former    Packs/day: 0.50    Years: 0.00    Additional pack years: 0.00    Total pack years: 0.00    Types: Cigarettes   Smokeless Tobacco Never     Alcohol Consumption:  Social History     Substance and Sexual Activity   Alcohol Use Never    Comment: CAFFEINE USE/ SOFT DRINKS.      Fall Risk Screen:    STEADI Fall Risk Assessment was completed, and patient is at LOW risk for falls.Assessment completed on:2023    Depression Screenin/20/2023     1:32 PM   PHQ-2/PHQ-9 Depression Screening   Little Interest or Pleasure in Doing Things 0-->not at all   Feeling Down, Depressed or Hopeless 0-->not at all   PHQ-9: Brief Depression Severity Measure Score 0       Health Habits and Functional and Cognitive Screenin/20/2023     1:32 PM   Functional & Cognitive Status   Do you have difficulty preparing food and eating? Yes   Do you have difficulty bathing yourself, getting dressed or grooming yourself? No   Do you have difficulty using the toilet? No   Do you have difficulty moving around from place to place? Yes   Do you have trouble with steps or getting out of a bed or a chair? Yes   Current Diet Well Balanced Diet   Dental Exam Up to date   Eye Exam Up to date   Exercise (times per week) 0 times per week   Current Exercises Include No Regular Exercise   Do you need help using the phone?  Yes   Are you deaf or do you have serious difficulty hearing?  No   Do you need help to go to places out of walking distance? Yes   Do you need help shopping? Yes   Do you need help preparing meals?  Yes   Do you need help with housework?  Yes   Do you need help with laundry? Yes   Do you need help taking your medications? Yes   Do you need help managing money? Yes   Do you ever drive or ride in a car  without wearing a seat belt? Yes   Have you felt unusual stress, anger or loneliness in the last month? No   Who do you live with? Child   If you need help, do you have trouble finding someone available to you? No   Have you been bothered in the last four weeks by sexual problems? No   Do you have difficulty concentrating, remembering or making decisions? No       Age-appropriate Screening Schedule:  Refer to the list below for future screening recommendations based on patient's age, sex and/or medical conditions. Orders for these recommended tests are listed in the plan section. The patient has been provided with a written plan.    Health Maintenance   Topic Date Due    TDAP/TD VACCINES (1 - Tdap) Never done    ZOSTER VACCINE (2 of 3) 09/08/2015    DXA SCAN  09/16/2021    ANNUAL WELLNESS VISIT  12/05/2023    HEMOGLOBIN A1C  06/13/2024    DIABETIC EYE EXAM  06/14/2024    LIPID PANEL  12/13/2024    URINE MICROALBUMIN  12/13/2024    BMI FOLLOWUP  12/20/2024    COLORECTAL CANCER SCREENING  11/11/2025    HEPATITIS C SCREENING  Completed    COVID-19 Vaccine  Completed    INFLUENZA VACCINE  Completed    Pneumococcal Vaccine 65+  Completed                  CMS Preventative Services Quick Reference  Risk Factors Identified During Encounter  Immunizations Discussed/Encouraged: Tdap  The above risks/problems have been discussed with the patient.  Pertinent information has been shared with the patient in the After Visit Summary.  An After Visit Summary and PPPS were made available to the patient.    Follow Up:   Next Medicare Wellness visit to be scheduled in 1 year.       Additional E&M Note during same encounter follows:  Patient has multiple medical problems which are significant and separately identifiable that require additional work above and beyond the Medicare Wellness Visit.      Chief Complaint  Medicare Wellness-subsequent    Subjective        HPI  Kassibradford Mathis is also being seen today for     Dm-2.  She is under  "fair control.   HTN.  Fair control of blood pressure.    HLD.  Control is good.      Review of Systems   Respiratory: Negative.     Cardiovascular: Negative.        Objective   Vital Signs:  /72   Pulse 58   Ht 157.5 cm (62.01\")   Wt 67.6 kg (149 lb)   SpO2 98%   BMI 27.25 kg/m²     Physical Exam  Constitutional:       Appearance: She is well-developed.   HENT:      Head: Normocephalic and atraumatic.      Right Ear: Hearing and tympanic membrane normal.      Left Ear: Hearing and tympanic membrane normal.      Mouth/Throat:      Pharynx: No oropharyngeal exudate or posterior oropharyngeal erythema.   Cardiovascular:      Rate and Rhythm: Normal rate and regular rhythm.      Heart sounds: Normal heart sounds.   Pulmonary:      Effort: Pulmonary effort is normal.      Breath sounds: Normal breath sounds.   Musculoskeletal:      Right foot: Prominent metatarsal heads present.      Left foot: Prominent metatarsal heads present.   Feet:      Right foot:      Protective Sensation: 6 sites tested.  0 sites sensed.      Left foot:      Protective Sensation: 6 sites tested.  0 sites sensed.   Skin:     General: Skin is warm and dry.   Neurological:      Mental Status: She is alert and oriented to person, place, and time.   Psychiatric:         Behavior: Behavior normal.          The following data was reviewed by: Parul Jaime MD on 12/20/2023:  Common labs          6/2/2023    11:08 12/13/2023    11:06   Common Labs   Glucose 136  233    BUN 24  16    Creatinine 0.91  0.95    Sodium 146  142    Potassium 4.2  3.5    Chloride 106  103    Calcium 10.1  9.7    Total Protein 6.3  6.4    Albumin 3.8  4.2    Total Bilirubin 0.8  0.5    Alkaline Phosphatase 58  78    AST (SGOT) 23  18    ALT (SGPT) 25  13    WBC 9.12  5.97    Hemoglobin 13.6  12.9    Hematocrit 41.0  39.8    Platelets 266  261    Total Cholesterol 196  176    Triglycerides 91  101    HDL Cholesterol 75  71    LDL Cholesterol  105  87    Hemoglobin " A1C 8.30  7.80    Microalbumin, Urine  63.5                 Assessment and Plan   Diagnoses and all orders for this visit:    1. Medicare annual wellness visit, subsequent (Primary)    2. Type 2 diabetes mellitus without complication, without long-term current use of insulin    3. Primary hypertension    4. Hyperlipidemia, unspecified hyperlipidemia type    5. Menopause  -     DEXA Bone Density Axial; Future    6. Encounter for screening mammogram for breast cancer  -     Mammo Screening Digital Tomosynthesis Bilateral With CAD; Future    Other orders  -     Fluzone High-Dose 65+yrs ([id number])  -     COVID-19 F23 (Pfizer) 12yrs+ (COMIRNATY)      Discussed importance of preventative care including vaccinations, age appropriate cancer screening, routine lab work, healthy diet, and active lifestyle.  Dm-2.  Control is fair.  The patient is advised to continue current dosage of metformin and Jardiance.  Patient needs special shoes because she is a diabetic and hammertoes.   HTN.  Control is fair.  The patient is advised to continue current dosage of atenolol and amlodipine.    HLD.  Control is good.  The patient is advised to continue current dosage of Crestor.               Follow Up   Return in about 6 months (around 6/20/2024) for Recheck.  Patient was given instructions and counseling regarding her condition or for health maintenance advice. Please see specific information pulled into the AVS if appropriate.

## 2023-12-20 NOTE — PATIENT INSTRUCTIONS
Medicare Wellness  Personal Prevention Plan of Service     Date of Office Visit:    Encounter Provider:  Parul Jaime MD  Place of Service:  [facility] PRIMARY CARE  Patient Name: Kassi Mathis  :  1945    As part of the Medicare Wellness portion of your visit today, we are providing you with this personalized preventive plan of services (PPPS). This plan is based upon recommendations of the [place] Preventive Services Task Force (USPSTF) and the Advisory Committee on Immunization Practices (ACIP).    This lists the preventive care services that should be considered, and provides dates of when you are due. Items listed as completed are up-to-date and do not require any further intervention.    Health Maintenance   Topic Date Due    TDAP/TD VACCINES (1 - Tdap) Never done    ZOSTER VACCINE (2 of 3) 2015    DXA SCAN  2021    ANNUAL WELLNESS VISIT  2023    HEMOGLOBIN A1C  2024    DIABETIC EYE EXAM  2024    LIPID PANEL  2024    URINE MICROALBUMIN  2024    BMI FOLLOWUP  2024    COLORECTAL CANCER SCREENING  2025    HEPATITIS C SCREENING  Completed    COVID-19 Vaccine  Completed    INFLUENZA VACCINE  Completed    Pneumococcal Vaccine 65+  Completed       Orders Placed This Encounter   Procedures    DEXA Bone Density Axial     Standing Status:   Future     Order Specific Question:   Is patient taking or have taken long term Glucocorticoid (steroids)?     Answer:   No     Order Specific Question:   Does the patient have rheumatoid arthritis?     Answer:   No     Order Specific Question:   Reason for Exam:     Answer:   screen     Order Specific Question:   Release to patient     Answer:   Routine Release [[id number]]    Mammo Screening Digital Tomosynthesis Bilateral With CAD     Standing Status:   Future     Standing Expiration Date:   2024     Order Specific Question:   Reason for Exam:     Answer:   screen     Order Specific  Question:   Release to patient     Answer:   Routine Release [[id number]]    Fluzone High-Dose 65+yrs ([id number])    COVID-19 F23 (Pfizer) 12yrs+ (COMIRNATY)       No follow-ups on file.

## 2024-02-13 RX ORDER — ATENOLOL 25 MG/1
12.5 TABLET ORAL DAILY
Qty: 45 TABLET | Refills: 1 | Status: SHIPPED | OUTPATIENT
Start: 2024-02-13

## 2024-03-07 RX ORDER — RIVAROXABAN 20 MG/1
20 TABLET, FILM COATED ORAL
Qty: 90 TABLET | Refills: 2 | Status: SHIPPED | OUTPATIENT
Start: 2024-03-07

## 2024-03-29 ENCOUNTER — OFFICE VISIT (OUTPATIENT)
Dept: INTERNAL MEDICINE | Facility: CLINIC | Age: 79
End: 2024-03-29
Payer: MEDICARE

## 2024-03-29 VITALS
SYSTOLIC BLOOD PRESSURE: 130 MMHG | DIASTOLIC BLOOD PRESSURE: 66 MMHG | BODY MASS INDEX: 27.6 KG/M2 | HEIGHT: 62 IN | WEIGHT: 150 LBS | HEART RATE: 55 BPM | OXYGEN SATURATION: 99 %
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
## 2024-03-29 RX ORDER — CLINDAMYCIN PALMITATE HYDROCHLORIDE 75 MG/5ML
300 SOLUTION ORAL EVERY 8 HOURS
COMMUNITY
Start: 2024-03-24 | End: 2024-03-31

## 2024-03-29 RX ORDER — SULFAMETHOXAZOLE AND TRIMETHOPRIM 200; 40 MG/5ML; MG/5ML
20 SUSPENSION ORAL EVERY 12 HOURS
COMMUNITY
Start: 2024-03-24 | End: 2024-03-31

## 2024-03-30 PROBLEM — M20.42 ACQUIRED BILATERAL HAMMER TOES: Status: ACTIVE | Noted: 2024-01-30

## 2024-03-30 PROBLEM — E11.42 DIABETIC POLYNEUROPATHY ASSOCIATED WITH TYPE 2 DIABETES MELLITUS: Chronic | Status: ACTIVE | Noted: 2024-01-30

## 2024-03-30 PROBLEM — M20.41 ACQUIRED BILATERAL HAMMER TOES: Status: ACTIVE | Noted: 2024-01-30

## 2024-03-30 NOTE — PROGRESS NOTES
Subjective     Kassi Mathis is a 79 y.o. female who presents with   Chief Complaint   Patient presents with    Animal Bite     Hospital follow up       Animal Bite          Patient presents in f/u of a dog bite to the right hand.  She was seen and released from the ER on 3/24.  The dog was up to date on shots.  She was updated on tetanus and given Augmentin.      Review of Systems   Respiratory: Negative.     Cardiovascular: Negative.        The following portions of the patient's history were reviewed and updated as appropriate: allergies, current medications and problem list.    Patient Active Problem List    Diagnosis Date Noted    Type 2 diabetes mellitus 01/17/2016     Priority: High    Acquired bilateral hammer toes 01/30/2024    Diabetic polyneuropathy associated with type 2 diabetes mellitus 01/30/2024    Bradycardia, sinus 09/08/2022    Chronic diastolic congestive heart failure 08/09/2022    At high risk for falls 09/29/2021    Osteoarthritis of right knee 09/08/2020    Cervical radiculopathy 10/08/2018    Hammer toes of both feet 10/17/2017    Depression 01/17/2016    Allergic rhinitis 01/17/2016    Hypertension 01/17/2016    Dementia without behavioral disturbance 01/17/2016     Note Last Updated: 7/20/2016     By neuropysch evaluation 7/20/2016      Paroxysmal atrial fibrillation 01/17/2016    Cobalamin deficiency 01/17/2016     Note Last Updated: 6/8/2016     Boarderline only with normal MMA/Homocysteine/IF antibody.  Encouraged MVI daily.        Hyperlipidemia 01/17/2016       Current Outpatient Medications on File Prior to Visit   Medication Sig Dispense Refill    albuterol sulfate  (90 Base) MCG/ACT inhaler Inhale 2 puffs Every 4 (Four) Hours As Needed for Wheezing. 1 inhaler 0    amLODIPine (NORVASC) 5 MG tablet TAKE ONE TABLET BY MOUTH DAILY 90 tablet 3    atenolol (TENORMIN) 25 MG tablet Take 0.5 tablets by mouth Daily. 45 tablet 1    Cholecalciferol (VITAMIN D3 PO) Take  by mouth.       "clindamycin (CLEOCIN) 75 MG/5ML solution Take 20 mL by mouth Every 8 (Eight) Hours.      donepezil (ARICEPT) 10 MG tablet TAKE ONE TABLET BY MOUTH DAILY 90 tablet 3    furosemide (LASIX) 10 MG/ML ORAL solution Take 2mL daily; can take an extra 2mL daily up to twice per week for swelling 200 mL 1    furosemide (LASIX) 40 MG tablet Take 1 tablet by mouth 2 (Two) Times a Day. TAKE 1/4 OF A TABLET.      glucose blood test strip Use daily for type 2 DM. 100 each 3    Jardiance 10 MG tablet tablet TAKE ONE TABLET BY MOUTH DAILY 90 tablet 7    Melatonin 10 MG capsule Take 5 mg by mouth Every Night.      metFORMIN (GLUCOPHAGE) 500 MG tablet TAKE 1/2 TABLET BY MOUTH TWICE A DAY WITH A MEAL (Patient taking differently: 1 tablet Every Night.) 90 tablet 3    Multiple Vitamin (MULTIVITAMINS PO) Take 1 tablet by mouth daily.      promethazine-dextromethorphan (PROMETHAZINE-DM) 6.25-15 MG/5ML syrup Take 5 mL by mouth every night at bedtime. 118 mL 0    rosuvastatin (CRESTOR) 20 MG tablet TAKE ONE TABLET BY MOUTH EVERY NIGHT AT BEDTIME (Patient taking differently: 3 X PER WEEK) 90 tablet 0    sertraline (ZOLOFT) 100 MG tablet TAKE TWO TABLETS BY MOUTH DAILY 180 tablet 1    sulfamethoxazole-trimethoprim (BACTRIM,SEPTRA) 200-40 MG/5ML suspension Take 20 mL by mouth Every 12 (Twelve) Hours.      vitamin B-12 (CYANOCOBALAMIN) 1000 MCG tablet Take 1 tablet by mouth Daily.      vitamin C (ASCORBIC ACID) 250 MG tablet Take 1 tablet by mouth Daily.      Xarelto 20 MG tablet TAKE 1 TABLET BY MOUTH DAILY WITH DINNER 90 tablet 2     No current facility-administered medications on file prior to visit.       Objective     /66   Pulse 55   Ht 157.5 cm (62.01\")   Wt 68 kg (150 lb)   LMP  (LMP Unknown)   SpO2 99%   BMI 27.43 kg/m²     Physical Exam  Constitutional:       Appearance: She is well-developed.   HENT:      Head: Normocephalic and atraumatic.   Pulmonary:      Effort: Pulmonary effort is normal.   Skin:     Comments: 2cm " healing laceration right palm without redness or drainage.     Neurological:      Mental Status: She is alert and oriented to person, place, and time.   Psychiatric:         Behavior: Behavior normal.         Assessment & Plan   Diagnoses and all orders for this visit:    1. Dog bite, initial encounter (Primary)        Discussion    Patient presents in f/u of dog bite to the right hand.   It is healing.  Steristrips placed.  Patient has dementia and tends to pick at wounds on skin.   Finish Augmentin.  The patient is instructed to let our office know if not continuing to improve over the next week or if there is any change in symptoms.           Future Appointments   Date Time Provider Department Center   5/30/2024  1:45 PM Mode Gongora MD [facility]   6/4/2024 11:20 AM [facility]   6/11/2024  1:00 PM Parul Jaime MD [facility]

## 2024-04-01 ENCOUNTER — TELEPHONE (OUTPATIENT)
Dept: CARDIOLOGY | Facility: CLINIC | Age: 79
End: 2024-04-01
Payer: MEDICARE

## 2024-04-01 NOTE — TELEPHONE ENCOUNTER
I spoke with Flaca and gave them message from the provider.  They verbalized understanding & have no further questions at this time.    Thank you,    Shivani MAYES RN  [place] [facility] [place]  04/01/24 12:43 EDT

## 2024-04-01 NOTE — TELEPHONE ENCOUNTER
Let's try holding it. If BP is consistently > 160 mmHg, please call the office.     Thanks!  JENNIFER Mayer

## 2024-04-01 NOTE — TELEPHONE ENCOUNTER
Pt's daughter, Flaca, called the office today per request of Dr. Gongora to notify the office if pt's HR drops below 50.  Last week pt had HR fall this low, and again today.    149/75, HR 50  155/78, HR 50  126/75, HR 49    Pt's only symptom is she's been sleeping and tired.  Pt has dementia.  She's taking 12.5 mg atenolol QD.    Do you have any recommendations for this patient?    Thank you,    Shivani MAYES RN  [facility] [place]  04/01/24  12:30 EDT

## 2024-04-08 NOTE — TELEPHONE ENCOUNTER
Flaca called back. She said that 2 days ago she had to give the patient atenolol and then she had to give her a dose last night because her BP was high and her HR was elevated. However, now this AM she is having low HR again and elevated BP readings. Below are the readings she got this mornin/80 HR 53  178/86 HR 51  212/89 HR 52  187/92 HR 51    Patient has not taken any of her AM meds. She should be taking amlodipine 5mg, Lasix 10mg, and Jardiance 10mg. I instructed the daughter to give these meds and then in 2 hours check her BP and HR again and call us back. Patient's only symptom is she is just tired.     Beverley Paulson RN  Triage [facility]

## 2024-04-15 RX ORDER — EMPAGLIFLOZIN 10 MG/1
TABLET, FILM COATED ORAL
Qty: 90 TABLET | Refills: 7 | Status: SHIPPED | OUTPATIENT
Start: 2024-04-15

## 2024-04-25 RX ORDER — ROSUVASTATIN CALCIUM 20 MG/1
TABLET, COATED ORAL
Qty: 90 TABLET | Refills: 0 | Status: SHIPPED | OUTPATIENT
Start: 2024-04-25

## 2024-05-13 RX ORDER — SERTRALINE HYDROCHLORIDE 100 MG/1
TABLET, FILM COATED ORAL
Qty: 180 TABLET | Refills: 1 | Status: SHIPPED | OUTPATIENT
Start: 2024-05-13

## 2024-05-24 DIAGNOSIS — I10 ESSENTIAL HYPERTENSION: ICD-10-CM

## 2024-05-24 RX ORDER — AMLODIPINE BESYLATE 5 MG/1
5 TABLET ORAL DAILY
Qty: 90 TABLET | Refills: 3 | Status: SHIPPED | OUTPATIENT
Start: 2024-05-24

## 2024-05-24 NOTE — PROGRESS NOTES
RM:________     PCP: Parul Jaime MD    : 1945  AGE: 79 y.o.  EST PATIENT     REASON FOR VISIT/  CC:        BP Readings from Last 3 Encounters:   24 130/66   23 140/72   23 162/60      Wt Readings from Last 3 Encounters:   24 68 kg (150 lb)   23 67.6 kg (149 lb)   23 69.7 kg (153 lb 9.6 oz)        WT: ____________ BP: __________L __________R HR______    CHEST PAIN: _____________    SOA: _____________PALPS: _______________     LIGHTHEADED: ___________FATIGUE: ________________ EDEMA __________    ALLERGIES:Enalapril, Enalapril maleate, Memantine hcl, and Penicillins SMOKING HISTORY:  Social History     Tobacco Use    Smoking status: Former     Current packs/day: 0.50     Types: Cigarettes    Smokeless tobacco: Never   Vaping Use    Vaping status: Never Used   Substance Use Topics    Alcohol use: Never     Comment: CAFFEINE USE/ SOFT DRINKS.     Drug use: Never     CAFFEINE USE_________________  ALCOHOL ______________________

## 2024-05-30 ENCOUNTER — OFFICE VISIT (OUTPATIENT)
Dept: CARDIOLOGY | Facility: CLINIC | Age: 79
End: 2024-05-30
Payer: MEDICARE

## 2024-05-30 VITALS
HEIGHT: 62 IN | HEART RATE: 51 BPM | SYSTOLIC BLOOD PRESSURE: 134 MMHG | BODY MASS INDEX: 27.86 KG/M2 | DIASTOLIC BLOOD PRESSURE: 60 MMHG | WEIGHT: 151.4 LBS

## 2024-05-30 DIAGNOSIS — F03.90 DEMENTIA WITHOUT BEHAVIORAL DISTURBANCE: ICD-10-CM

## 2024-05-30 DIAGNOSIS — I10 PRIMARY HYPERTENSION: Primary | ICD-10-CM

## 2024-05-30 DIAGNOSIS — I10 PRIMARY HYPERTENSION: ICD-10-CM

## 2024-05-30 DIAGNOSIS — E78.5 HYPERLIPIDEMIA, UNSPECIFIED HYPERLIPIDEMIA TYPE: ICD-10-CM

## 2024-05-30 DIAGNOSIS — I50.32 CHRONIC DIASTOLIC CONGESTIVE HEART FAILURE: ICD-10-CM

## 2024-05-30 DIAGNOSIS — I48.0 PAROXYSMAL ATRIAL FIBRILLATION: Primary | ICD-10-CM

## 2024-05-30 DIAGNOSIS — Z91.81 AT HIGH RISK FOR FALLS: ICD-10-CM

## 2024-05-30 DIAGNOSIS — E11.9 TYPE 2 DIABETES MELLITUS WITHOUT COMPLICATION, WITHOUT LONG-TERM CURRENT USE OF INSULIN: ICD-10-CM

## 2024-05-30 DIAGNOSIS — R00.1 BRADYCARDIA, SINUS: ICD-10-CM

## 2024-05-30 DIAGNOSIS — I49.1 APC (ATRIAL PREMATURE CONTRACTIONS): ICD-10-CM

## 2024-05-30 RX ORDER — ATENOLOL 25 MG/1
12.5 TABLET ORAL DAILY
COMMUNITY
Start: 2024-05-11

## 2024-05-30 NOTE — PROGRESS NOTES
Date of Office Visit: 2024  Encounter Provider: Mode Gongora MD  Place of Service: [facility] CARDIOLOGY  Patient Name: Kassi Mathis  :1945    Chief Complaint   Patient presents with    Atrial Fibrillation   :     HPI: Kassi Mathis is a 79 y.o. female who presents today in follow up. I have reviewed prior notes and there are no changes except for any new updates described below. I have also reviewed any information entered into the medical record by the patient or by ancillary staff.     She has a long-standing history of HTN. She has paroxysmal atrial fibrillation.  She has a history of mild sinus bradycardia as well. She has chronic diastolic CHF/grade 2 diastolic dysfunction.    She still lives at home with her , and her children are very involved in her care.  She has dementia which is progressive. I saw her in 2023; her children noted significantly worsening fatigue. Her heart rate was consistently in the 40s at home. I decreased her atenolol dose. We put a rhythm monitor on her but she was unable to tolerate it very well due to her dementia. However, she symptomatically improved.  The data we did receive from the monitor showed a 12% burden of PACs.    For the most part, her CV status is stable.  Her daughter regularly checks her heart rate at home and sometimes will hold atenolol if her heart rate is in the 30s, but otherwise her heart rate is generally in the 50s. She has not had overt falls although she remains unsteady and does use a cane.      Past Medical History:   Diagnosis Date    Allergic rhinitis     Anemia     Cataract     Chronic venous insufficiency     Dementia     Depression     Diabetes mellitus     Headache     Sinus?    Hidradenitis suppurativa     Hyperlipidemia     Hypertension     Low back pain     Osteoarthritis     Paroxysmal atrial fibrillation     Peripheral neuropathy     Stroke     Vitamin B 12 deficiency     Boarderline  only with normal MMA/Homocysteine/IF antibody       Past Surgical History:   Procedure Laterality Date    BACK SURGERY      COLONOSCOPY      HYSTERECTOMY      PARTIAL    JOINT REPLACEMENT      Knee replacement    KNEE SURGERY Left     SUBTOTAL HYSTERECTOMY      TONSILLECTOMY         Social History     Socioeconomic History    Marital status:    Tobacco Use    Smoking status: Former     Current packs/day: 0.50     Types: Cigarettes    Smokeless tobacco: Never   Vaping Use    Vaping status: Never Used   Substance and Sexual Activity    Alcohol use: Never     Comment: CAFFEINE USE/ SOFT DRINKS.     Drug use: Never    Sexual activity: Not Currently     Partners: Male       Family History   Problem Relation Age of Onset    Breast cancer Mother     Depression Mother     Stroke Father     Alcohol abuse Father     Stroke Maternal Grandmother     Heart attack Maternal Grandmother     Heart disease Maternal Grandmother     Heart attack Maternal Grandfather     Cancer Maternal Grandfather         Throat cancer    Hyperlipidemia Daughter     Liver disease Son      Review of Systems   Reason unable to perform ROS: telephone/dementia.   Constitutional: Positive for malaise/fatigue.   Cardiovascular:  Negative for chest pain, leg swelling and palpitations.   Neurological:  Positive for excessive daytime sleepiness.   Psychiatric/Behavioral:  Positive for memory loss.    All other systems reviewed and are negative.      Allergies   Allergen Reactions    Enalapril Swelling and Other (See Comments)     Other reaction(s): Other: See Comments  Aka vasotec tabs  Aka vasotec tabs  Aka vasotec tabs  Aka vasotec tabs    Enalapril Maleate Swelling    Memantine Hcl Other (See Comments)     imbalance  imbalance    Penicillins Swelling         Current Outpatient Medications:     albuterol sulfate  (90 Base) MCG/ACT inhaler, Inhale 2 puffs Every 4 (Four) Hours As Needed for Wheezing., Disp: 1 inhaler, Rfl: 0    amLODIPine  "(NORVASC) 5 MG tablet, TAKE 1 TABLET BY MOUTH DAILY, Disp: 90 tablet, Rfl: 3    atenolol (TENORMIN) 25 MG tablet, 0.5 tablets Daily., Disp: , Rfl:     Cholecalciferol (VITAMIN D3 PO), Take  by mouth., Disp: , Rfl:     donepezil (ARICEPT) 10 MG tablet, TAKE ONE TABLET BY MOUTH DAILY, Disp: 90 tablet, Rfl: 3    furosemide (LASIX) 10 MG/ML ORAL solution, Take 2mL daily; can take an extra 2mL daily up to twice per week for swelling, Disp: 200 mL, Rfl: 1    furosemide (LASIX) 40 MG tablet, Take 1 tablet by mouth 2 (Two) Times a Day. TAKE 1/4 OF A TABLET., Disp: , Rfl:     glucose blood test strip, Use daily for type 2 DM., Disp: 100 each, Rfl: 3    Jardiance 10 MG tablet tablet, TAKE ONE TABLET BY MOUTH DAILY, Disp: 90 tablet, Rfl: 7    Melatonin 10 MG capsule, Take 5 mg by mouth Every Night., Disp: , Rfl:     metFORMIN (GLUCOPHAGE) 500 MG tablet, TAKE 1/2 TABLET BY MOUTH TWICE A DAY WITH A MEAL (Patient taking differently: 1 tablet Every Night.), Disp: 90 tablet, Rfl: 3    Multiple Vitamin (MULTIVITAMINS PO), Take 1 tablet by mouth daily., Disp: , Rfl:     promethazine-dextromethorphan (PROMETHAZINE-DM) 6.25-15 MG/5ML syrup, Take 5 mL by mouth every night at bedtime., Disp: 118 mL, Rfl: 0    rosuvastatin (CRESTOR) 20 MG tablet, TAKE ONE TABLET BY MOUTH EVERY NIGHT AT BEDTIME (Patient taking differently: 3 (Three) Times a Week.), Disp: 90 tablet, Rfl: 0    sertraline (ZOLOFT) 100 MG tablet, TAKE TWO TABLETS BY MOUTH DAILY, Disp: 180 tablet, Rfl: 1    vitamin B-12 (CYANOCOBALAMIN) 1000 MCG tablet, Take 1 tablet by mouth Daily., Disp: , Rfl:     vitamin C (ASCORBIC ACID) 250 MG tablet, Take 1 tablet by mouth Daily., Disp: , Rfl:     Xarelto 20 MG tablet, TAKE 1 TABLET BY MOUTH DAILY WITH DINNER, Disp: 90 tablet, Rfl: 2     Objective:     Vitals:    05/30/24 1323   BP: 134/60   BP Location: Right arm   Pulse: 51   Weight: 68.7 kg (151 lb 6.4 oz)   Height: 157.5 cm (62.01\")         Body mass index is 27.68 kg/m².    Physical " Exam  Vitals reviewed.   HENT:      Head: Normocephalic.      Nose: Nose normal.      Mouth/Throat:      Pharynx: Oropharynx is clear.      Comments: Poor dentition  Eyes:      Conjunctiva/sclera: Conjunctivae normal.   Cardiovascular:      Rate and Rhythm: Normal rate and regular rhythm.      Pulses: Normal pulses.      Heart sounds: Normal heart sounds.   Pulmonary:      Effort: Pulmonary effort is normal.      Breath sounds: Normal breath sounds.   Abdominal:      Palpations: Abdomen is soft.      Tenderness: There is no abdominal tenderness.   Musculoskeletal:         General: No swelling. Normal range of motion.      Cervical back: Normal range of motion.   Skin:     General: Skin is warm and dry.   Neurological:      Mental Status: She is alert. She is disoriented.   Psychiatric:         Mood and Affect: Mood normal.         Cognition and Memory: Memory is impaired.         ECG 12 Lead    Date/Time: 5/30/2024 1:38 PM  Performed by: Mode Gongora MD    Authorized by: Mode Gongora MD  Comparison: compared with previous ECG   Similar to previous ECG  Rhythm: sinus bradycardia  Conduction: conduction normal  ST Flattening: V6, V4, V3, V5, aVF and III  T Waves: T waves normal  QRS axis: left    Clinical impression: non-specific ECG          Assessment:       Diagnosis Plan   1. Paroxysmal atrial fibrillation        2. APC (atrial premature contractions)        3. Bradycardia, sinus        4. Primary hypertension        5. Chronic diastolic congestive heart failure        6. Dementia without behavioral disturbance        7. At high risk for falls           Plan:       She has a long history of mild sinus bradycardia, but it has worsened a bit over time. She has PAF, and a monitor in November 2023 showed a 12% burden of PACs. She has fatigue and increased daytime sleepiness, although I'm not sure that this can be blamed entirely on her vital signs. I think some of this is due to age and dementia.  For the most  part, her heart rate is relatively stable on her current dose of atenolol but sometimes the dose has to be held.  I have a low threshold for stopping it, but given the frequency of her atrial ectopy, I would like to continue it as long as we are safely able to do so. She has not passed out.  Her blood pressure is within goal for her age/female sex. She is euvolemic.     Atrial Fibrillation and Atrial Flutter  Assessment   The patient has paroxysmal atrial fibrillation   This is non-valvular in etiology   The patient's CHADS2-VASc score is 8   A [id number] score of 2 or more is considered a high risk for a thromboembolic event   Rivaroxaban prescribed    Plan   Attempt to maintain sinus rhythm   Add rivaroxaban for antithrombotic therapy    Her dementia is progressing. She has some balance issues but has only fallen twice. Things appears stable but we may reach a point soon where we have to stop AC.  Risks/benefits were again discussed with her daughter today.    Sincerely,       Mode Gongora MD

## 2024-05-31 LAB
ALBUMIN SERPL-MCNC: 3.7 G/DL (ref 3.5–5.2)
ALBUMIN/GLOB SERPL: 1.8 G/DL
ALP SERPL-CCNC: 68 U/L (ref 39–117)
ALT SERPL-CCNC: 24 U/L (ref 1–33)
AST SERPL-CCNC: 19 U/L (ref 1–32)
BASOPHILS # BLD AUTO: 0.03 10*3/MM3 (ref 0–0.2)
BASOPHILS NFR BLD AUTO: 0.3 % (ref 0–1.5)
BILIRUB SERPL-MCNC: 0.6 MG/DL (ref 0–1.2)
BUN SERPL-MCNC: 23 MG/DL (ref 8–23)
BUN/CREAT SERPL: 26.1 (ref 7–25)
CALCIUM SERPL-MCNC: 9.1 MG/DL (ref 8.6–10.5)
CHLORIDE SERPL-SCNC: 106 MMOL/L (ref 98–107)
CHOLEST SERPL-MCNC: 180 MG/DL (ref 0–200)
CO2 SERPL-SCNC: 27.2 MMOL/L (ref 22–29)
CREAT SERPL-MCNC: 0.88 MG/DL (ref 0.57–1)
EGFRCR SERPLBLD CKD-EPI 2021: 66.5 ML/MIN/1.73
EOSINOPHIL # BLD AUTO: 0.04 10*3/MM3 (ref 0–0.4)
EOSINOPHIL NFR BLD AUTO: 0.5 % (ref 0.3–6.2)
ERYTHROCYTE [DISTWIDTH] IN BLOOD BY AUTOMATED COUNT: 13.8 % (ref 12.3–15.4)
GLOBULIN SER CALC-MCNC: 2.1 GM/DL
GLUCOSE SERPL-MCNC: 173 MG/DL (ref 65–99)
HBA1C MFR BLD: 8 % (ref 4.8–5.6)
HCT VFR BLD AUTO: 39.8 % (ref 34–46.6)
HDLC SERPL-MCNC: 71 MG/DL (ref 40–60)
HGB BLD-MCNC: 12.8 G/DL (ref 12–15.9)
IMM GRANULOCYTES # BLD AUTO: 0.04 10*3/MM3 (ref 0–0.05)
IMM GRANULOCYTES NFR BLD AUTO: 0.5 % (ref 0–0.5)
LDLC SERPL CALC-MCNC: 93 MG/DL (ref 0–100)
LYMPHOCYTES # BLD AUTO: 2.17 10*3/MM3 (ref 0.7–3.1)
LYMPHOCYTES NFR BLD AUTO: 25.3 % (ref 19.6–45.3)
MCH RBC QN AUTO: 29.6 PG (ref 26.6–33)
MCHC RBC AUTO-ENTMCNC: 32.2 G/DL (ref 31.5–35.7)
MCV RBC AUTO: 92.1 FL (ref 79–97)
MONOCYTES # BLD AUTO: 0.66 10*3/MM3 (ref 0.1–0.9)
MONOCYTES NFR BLD AUTO: 7.7 % (ref 5–12)
NEUTROPHILS # BLD AUTO: 5.64 10*3/MM3 (ref 1.7–7)
NEUTROPHILS NFR BLD AUTO: 65.7 % (ref 42.7–76)
NRBC BLD AUTO-RTO: 0 /100 WBC (ref 0–0.2)
PLATELET # BLD AUTO: 239 10*3/MM3 (ref 140–450)
POTASSIUM SERPL-SCNC: 3.7 MMOL/L (ref 3.5–5.2)
PROT SERPL-MCNC: 5.8 G/DL (ref 6–8.5)
RBC # BLD AUTO: 4.32 10*6/MM3 (ref 3.77–5.28)
SODIUM SERPL-SCNC: 145 MMOL/L (ref 136–145)
TRIGL SERPL-MCNC: 91 MG/DL (ref 0–150)
VLDLC SERPL CALC-MCNC: 16 MG/DL (ref 5–40)
WBC # BLD AUTO: 8.58 10*3/MM3 (ref 3.4–10.8)

## 2024-06-06 ENCOUNTER — TELEPHONE (OUTPATIENT)
Dept: INTERNAL MEDICINE | Facility: CLINIC | Age: 79
End: 2024-06-06

## 2024-06-06 NOTE — TELEPHONE ENCOUNTER
Caller: Kassi Mathis    Relationship: Self    Best call back number: [phone]     What is the best time to reach you: ANY    Who are you requesting to speak with (clinical staff, provider,  specific staff member): PIOTR      What was the call regarding: NEEDED TO CANCEL FOR TOMORROW 6-7-24 WITH .  PATIENT WILL BE SEEING DR. HILLS 6-7-24 AT 2:15 PM.

## 2024-06-07 ENCOUNTER — OFFICE VISIT (OUTPATIENT)
Dept: SURGERY | Facility: CLINIC | Age: 79
End: 2024-06-07
Payer: MEDICARE

## 2024-06-07 VITALS
BODY MASS INDEX: 27.97 KG/M2 | SYSTOLIC BLOOD PRESSURE: 150 MMHG | WEIGHT: 152 LBS | DIASTOLIC BLOOD PRESSURE: 80 MMHG | HEIGHT: 62 IN

## 2024-06-07 DIAGNOSIS — L72.3 SEBACEOUS CYST: Primary | ICD-10-CM

## 2024-06-07 PROCEDURE — 88304 TISSUE EXAM BY PATHOLOGIST: CPT | Performed by: SURGERY

## 2024-06-08 NOTE — PROGRESS NOTES
Office procedure    Preoperative diagnosis: Tender enlarging cyst posterior neck    Postoperative diagnosis: Same    Procedure: 1.5 x 4.5 cm elliptical excision of tender enlarging sebaceous cyst posterior neck    Surgeon: Sveta    Anesthesia: 1% lidocaine with epinephrine    Specimen: Cyst    Blood loss: Minimal    Description: In seated position, prepped and draped usual sterile manner.   1% lidocaine with epinephrine infiltrated locally.  1.5 x 4.5 cm incision made and cyst excised completely.  Skin closed with running 3-0 nylon suture.  Good hemostasis noted.  Sterile dressing applied.  Wound care instructions given.  Follow-up in 2 weeks for suture removal.

## 2024-06-11 ENCOUNTER — OFFICE VISIT (OUTPATIENT)
Dept: INTERNAL MEDICINE | Facility: CLINIC | Age: 79
End: 2024-06-11
Payer: MEDICARE

## 2024-06-11 VITALS
DIASTOLIC BLOOD PRESSURE: 80 MMHG | OXYGEN SATURATION: 98 % | BODY MASS INDEX: 27.42 KG/M2 | WEIGHT: 149 LBS | SYSTOLIC BLOOD PRESSURE: 142 MMHG | HEART RATE: 57 BPM | HEIGHT: 62 IN

## 2024-06-11 DIAGNOSIS — E78.5 HYPERLIPIDEMIA, UNSPECIFIED HYPERLIPIDEMIA TYPE: ICD-10-CM

## 2024-06-11 DIAGNOSIS — I10 PRIMARY HYPERTENSION: ICD-10-CM

## 2024-06-11 DIAGNOSIS — F03.90 DEMENTIA WITHOUT BEHAVIORAL DISTURBANCE: ICD-10-CM

## 2024-06-11 DIAGNOSIS — E11.9 TYPE 2 DIABETES MELLITUS WITHOUT COMPLICATION, WITHOUT LONG-TERM CURRENT USE OF INSULIN: Primary | ICD-10-CM

## 2024-06-11 PROCEDURE — 1126F AMNT PAIN NOTED NONE PRSNT: CPT | Performed by: INTERNAL MEDICINE

## 2024-06-11 PROCEDURE — 3079F DIAST BP 80-89 MM HG: CPT | Performed by: INTERNAL MEDICINE

## 2024-06-11 PROCEDURE — G2211 COMPLEX E/M VISIT ADD ON: HCPCS | Performed by: INTERNAL MEDICINE

## 2024-06-11 PROCEDURE — 99214 OFFICE O/P EST MOD 30 MIN: CPT | Performed by: INTERNAL MEDICINE

## 2024-06-11 PROCEDURE — 1160F RVW MEDS BY RX/DR IN RCRD: CPT | Performed by: INTERNAL MEDICINE

## 2024-06-11 PROCEDURE — 1159F MED LIST DOCD IN RCRD: CPT | Performed by: INTERNAL MEDICINE

## 2024-06-11 PROCEDURE — 3077F SYST BP >= 140 MM HG: CPT | Performed by: INTERNAL MEDICINE

## 2024-06-11 NOTE — PROGRESS NOTES
Subjective     Kassi Mathis is a 79 y.o. female who presents with   Chief Complaint   Patient presents with    Hypertension    Hyperlipidemia    Diabetes       Hypertension  Associated symptoms include headaches.   Hyperlipidemia    Diabetes  Hypoglycemia symptoms include headaches.        The following data was reviewed by: Parul Jaime MD on 06/11/2024:  Common labs          12/13/2023    11:06 5/30/2024    12:00   Common Labs   Glucose 233  173    BUN 16  23    Creatinine 0.95  0.88    Sodium 142  145    Potassium 3.5  3.7    Chloride 103  106    Calcium 9.7  9.1    Total Protein 6.4  5.8    Albumin 4.2  3.7    Total Bilirubin 0.5  0.6    Alkaline Phosphatase 78  68    AST (SGOT) 18  19    ALT (SGPT) 13  24    WBC 5.97  8.58    Hemoglobin 12.9  12.8    Hematocrit 39.8  39.8    Platelets 261  239    Total Cholesterol 176  180    Triglycerides 101  91    HDL Cholesterol 71  71    LDL Cholesterol  87  93    Hemoglobin A1C 7.80  8.00    Microalbumin, Urine 63.5       DM-2.  Fair control at 80. HTN.  Blood pressure is under good control.  HLD.  LDL cholesterol is under good control.        Review of Systems   Respiratory: Negative.     Cardiovascular: Negative.    Neurological:  Positive for headaches.       The following portions of the patient's history were reviewed and updated as appropriate: allergies, current medications and problem list.    Patient Active Problem List    Diagnosis Date Noted    Type 2 diabetes mellitus 01/17/2016     Priority: High    Acquired bilateral hammer toes 01/30/2024    Diabetic polyneuropathy associated with type 2 diabetes mellitus 01/30/2024    Bradycardia, sinus 09/08/2022    Chronic diastolic congestive heart failure 08/09/2022    At high risk for falls 09/29/2021    Osteoarthritis of right knee 09/08/2020    Cervical radiculopathy 10/08/2018    Hammer toes of both feet 10/17/2017    Depression 01/17/2016    Allergic rhinitis 01/17/2016    Hypertension 01/17/2016    Dementia  without behavioral disturbance 01/17/2016     Note Last Updated: 7/20/2016     By neuropysch evaluation 7/20/2016      Paroxysmal atrial fibrillation 01/17/2016    B12 deficiency 01/17/2016     Note Last Updated: 6/8/2016     Boarderline only with normal MMA/Homocysteine/IF antibody.  Encouraged MVI daily.        Hyperlipidemia 01/17/2016       Current Outpatient Medications on File Prior to Visit   Medication Sig Dispense Refill    amLODIPine (NORVASC) 5 MG tablet TAKE 1 TABLET BY MOUTH DAILY 90 tablet 3    atenolol (TENORMIN) 25 MG tablet 0.5 tablets Daily.      donepezil (ARICEPT) 10 MG tablet TAKE ONE TABLET BY MOUTH DAILY 90 tablet 3    furosemide (LASIX) 10 MG/ML ORAL solution Take 2mL daily; can take an extra 2mL daily up to twice per week for swelling 200 mL 1    furosemide (LASIX) 40 MG tablet Take 1 tablet by mouth 2 (Two) Times a Day. TAKE 1/4 OF A TABLET.      glucose blood test strip Use daily for type 2 DM. 100 each 3    Jardiance 10 MG tablet tablet TAKE ONE TABLET BY MOUTH DAILY 90 tablet 7    Melatonin 10 MG capsule Take 5 mg by mouth Every Night.      metFORMIN (GLUCOPHAGE) 500 MG tablet TAKE 1/2 TABLET BY MOUTH TWICE A DAY WITH A MEAL (Patient taking differently: 1 tablet Every Night.) 90 tablet 3    Multiple Vitamin (MULTIVITAMINS PO) Take 1 tablet by mouth daily.      rosuvastatin (CRESTOR) 20 MG tablet TAKE ONE TABLET BY MOUTH EVERY NIGHT AT BEDTIME (Patient taking differently: 3 (Three) Times a Week.) 90 tablet 0    sertraline (ZOLOFT) 100 MG tablet TAKE TWO TABLETS BY MOUTH DAILY 180 tablet 1    vitamin B-12 (CYANOCOBALAMIN) 1000 MCG tablet Take 1 tablet by mouth Daily.      vitamin C (ASCORBIC ACID) 250 MG tablet Take 1 tablet by mouth Daily.      Xarelto 20 MG tablet TAKE 1 TABLET BY MOUTH DAILY WITH DINNER 90 tablet 2    [DISCONTINUED] promethazine-dextromethorphan (PROMETHAZINE-DM) 6.25-15 MG/5ML syrup Take 5 mL by mouth every night at bedtime. 118 mL 0     No current  "facility-administered medications on file prior to visit.       Objective     /80   Pulse 57   Ht 157.5 cm (62.01\")   Wt 67.6 kg (149 lb)   LMP  (LMP Unknown)   SpO2 98%   BMI 27.25 kg/m²     Physical Exam  Constitutional:       Appearance: She is well-developed.   HENT:      Head: Normocephalic and atraumatic.   Pulmonary:      Effort: Pulmonary effort is normal.   Neurological:      Mental Status: She is alert and oriented to person, place, and time.   Psychiatric:         Behavior: Behavior normal.         Assessment & Plan   Diagnoses and all orders for this visit:    1. Type 2 diabetes mellitus without complication, without long-term current use of insulin (Primary)    2. Primary hypertension    3. Hyperlipidemia, unspecified hyperlipidemia type    4. Dementia without behavioral disturbance        Discussion    Dm-2.  Control is fair.  The patient is advised to continue current dosage of metformin.    HTN. Control is fair.  The patient is advised to continue current dosage of atenolol and amlodipine.    HLD. Control is good.  The patient is advised to continue current dosage of Crestor.  Dementia without behavioral disturbance.           Future Appointments   Date Time Provider Department Center   6/24/2024  1:30 PM Mauro Oreilly PA-C [facility]   2/21/2025 11:30 AM [facility]   2/28/2025  1:30 PM Parul Jaime MD [facility]   3/3/2025  1:30 PM Mode Gongora MD [facility]         "

## 2024-06-12 LAB
LAB AP CASE REPORT: NORMAL
PATH REPORT.FINAL DX SPEC: NORMAL
PATH REPORT.GROSS SPEC: NORMAL

## 2024-07-10 DIAGNOSIS — F03.90 DEMENTIA WITHOUT BEHAVIORAL DISTURBANCE: Primary | ICD-10-CM

## 2024-07-15 ENCOUNTER — OFFICE VISIT (OUTPATIENT)
Dept: INTERNAL MEDICINE | Facility: CLINIC | Age: 79
End: 2024-07-15
Payer: MEDICARE

## 2024-07-15 VITALS
DIASTOLIC BLOOD PRESSURE: 70 MMHG | WEIGHT: 150.3 LBS | SYSTOLIC BLOOD PRESSURE: 160 MMHG | HEIGHT: 63 IN | RESPIRATION RATE: 12 BRPM | BODY MASS INDEX: 26.63 KG/M2 | HEART RATE: 60 BPM | TEMPERATURE: 99 F | OXYGEN SATURATION: 99 %

## 2024-07-15 DIAGNOSIS — J20.8 ACUTE BRONCHITIS DUE TO OTHER SPECIFIED ORGANISMS: Primary | ICD-10-CM

## 2024-07-15 DIAGNOSIS — E04.1 THYROID NODULE: ICD-10-CM

## 2024-07-15 LAB
EXPIRATION DATE: NORMAL
INTERNAL CONTROL: NORMAL
Lab: NORMAL
SARS-COV-2 AG UPPER RESP QL IA.RAPID: NOT DETECTED

## 2024-07-15 PROCEDURE — 1159F MED LIST DOCD IN RCRD: CPT | Performed by: INTERNAL MEDICINE

## 2024-07-15 PROCEDURE — 3077F SYST BP >= 140 MM HG: CPT | Performed by: INTERNAL MEDICINE

## 2024-07-15 PROCEDURE — 3078F DIAST BP <80 MM HG: CPT | Performed by: INTERNAL MEDICINE

## 2024-07-15 PROCEDURE — 87426 SARSCOV CORONAVIRUS AG IA: CPT | Performed by: INTERNAL MEDICINE

## 2024-07-15 PROCEDURE — 1126F AMNT PAIN NOTED NONE PRSNT: CPT | Performed by: INTERNAL MEDICINE

## 2024-07-15 PROCEDURE — 99213 OFFICE O/P EST LOW 20 MIN: CPT | Performed by: INTERNAL MEDICINE

## 2024-07-15 PROCEDURE — 1160F RVW MEDS BY RX/DR IN RCRD: CPT | Performed by: INTERNAL MEDICINE

## 2024-07-15 RX ORDER — DOXYCYCLINE HYCLATE 100 MG/1
100 CAPSULE ORAL 2 TIMES DAILY
Qty: 14 CAPSULE | Refills: 0 | Status: SHIPPED | OUTPATIENT
Start: 2024-07-15

## 2024-07-15 NOTE — PROGRESS NOTES
Subjective     Kassi Mathis is a 79 y.o. female who presents with   Chief Complaint   Patient presents with    Cough     Pt, C/o Cough started Friday 7/12/24 deep cough possible bronchitis. ER follow up.       History of Present Illness     C/o URI for four days.  Cough and chest tightness.  Low grade fever and chills.  No SOA.      Review of Systems   Constitutional:  Positive for chills and fever.   HENT:  Positive for congestion.    Respiratory:  Positive for cough and chest tightness. Negative for shortness of breath.        The following portions of the patient's history were reviewed and updated as appropriate: allergies, current medications and problem list.    Patient Active Problem List    Diagnosis Date Noted    Type 2 diabetes mellitus 01/17/2016     Priority: High    Acquired bilateral hammer toes 01/30/2024    Diabetic polyneuropathy associated with type 2 diabetes mellitus 01/30/2024    Bradycardia, sinus 09/08/2022    Chronic diastolic congestive heart failure 08/09/2022    At high risk for falls 09/29/2021    Osteoarthritis of right knee 09/08/2020    Cervical radiculopathy 10/08/2018    Hammer toes of both feet 10/17/2017    Depression 01/17/2016    Allergic rhinitis 01/17/2016    Hypertension 01/17/2016    Dementia without behavioral disturbance 01/17/2016     Note Last Updated: 7/20/2016     By neuropysch evaluation 7/20/2016      Paroxysmal atrial fibrillation 01/17/2016    B12 deficiency 01/17/2016     Note Last Updated: 6/8/2016     Boarderline only with normal MMA/Homocysteine/IF antibody.  Encouraged MVI daily.        Hyperlipidemia 01/17/2016       Current Outpatient Medications on File Prior to Visit   Medication Sig Dispense Refill    amLODIPine (NORVASC) 5 MG tablet TAKE 1 TABLET BY MOUTH DAILY 90 tablet 3    atenolol (TENORMIN) 25 MG tablet 0.5 tablets Daily.      donepezil (ARICEPT) 10 MG tablet TAKE ONE TABLET BY MOUTH DAILY 90 tablet 3    furosemide (LASIX) 10 MG/ML ORAL solution  "Take 2mL daily; can take an extra 2mL daily up to twice per week for swelling 200 mL 1    furosemide (LASIX) 40 MG tablet Take 1 tablet by mouth 2 (Two) Times a Day. TAKE 1/4 OF A TABLET.      glucose blood test strip Use daily for type 2 DM. 100 each 3    Jardiance 10 MG tablet tablet TAKE ONE TABLET BY MOUTH DAILY 90 tablet 7    Melatonin 10 MG capsule Take 5 mg by mouth Every Night.      metFORMIN (GLUCOPHAGE) 500 MG tablet TAKE 1/2 TABLET BY MOUTH TWICE A DAY WITH A MEAL (Patient taking differently: 1 tablet Every Night.) 90 tablet 3    Multiple Vitamin (MULTIVITAMINS PO) Take 1 tablet by mouth daily.      rosuvastatin (CRESTOR) 20 MG tablet TAKE ONE TABLET BY MOUTH EVERY NIGHT AT BEDTIME (Patient taking differently: 3 (Three) Times a Week.) 90 tablet 0    sertraline (ZOLOFT) 100 MG tablet TAKE TWO TABLETS BY MOUTH DAILY 180 tablet 1    vitamin B-12 (CYANOCOBALAMIN) 1000 MCG tablet Take 1 tablet by mouth Daily.      vitamin C (ASCORBIC ACID) 250 MG tablet Take 1 tablet by mouth Daily.      Xarelto 20 MG tablet TAKE 1 TABLET BY MOUTH DAILY WITH DINNER 90 tablet 2     No current facility-administered medications on file prior to visit.       Objective     /70 (BP Location: Left arm, Patient Position: Sitting, Cuff Size: Adult)   Pulse 60   Temp 99 °F (37.2 °C) (Oral)   Resp 12   Ht 160 cm (63\")   Wt 68.2 kg (150 lb 4.8 oz)   LMP  (LMP Unknown)   SpO2 99%   BMI 26.62 kg/m²     Physical Exam  Constitutional:       Appearance: She is well-developed.   HENT:      Head: Normocephalic and atraumatic.      Right Ear: Hearing and tympanic membrane normal.      Left Ear: Hearing and tympanic membrane normal.      Mouth/Throat:      Pharynx: No oropharyngeal exudate or posterior oropharyngeal erythema.   Cardiovascular:      Rate and Rhythm: Normal rate and regular rhythm.      Heart sounds: Normal heart sounds.   Pulmonary:      Effort: Pulmonary effort is normal.      Breath sounds: Normal breath sounds. "   Skin:     General: Skin is warm and dry.   Neurological:      Mental Status: She is alert and oriented to person, place, and time.   Psychiatric:         Behavior: Behavior normal.         Assessment & Plan   Diagnoses and all orders for this visit:    1. Acute bronchitis due to other specified organisms (Primary)    2. Thyroid nodule  -     US Thyroid; Future    Other orders  -     doxycycline (VIBRAMYCIN) 100 MG capsule; Take 1 capsule by mouth 2 (Two) Times a Day.  Dispense: 14 capsule; Refill: 0        Discussion    Patient presents with episode of acute bronchitis.  A prescription for antibiotics is provided today.  The patient is instructed to take along with Mucinex DM.  Let me know they are not feeling better over the next 3 days or if there is any change in symptoms.    Thyroid nodule.  Incidental.  Check thyroid US.         Future Appointments   Date Time Provider Department Center   2/21/2025 11:30 AM [facility]   2/28/2025  1:30 PM Parul Jaime MD [facility]   3/3/2025  1:30 PM Mode Gongora MD [facility]

## 2024-08-12 RX ORDER — ATENOLOL 25 MG/1
12.5 TABLET ORAL DAILY
Qty: 45 TABLET | Refills: 1 | Status: SHIPPED | OUTPATIENT
Start: 2024-08-12

## 2024-08-29 RX ORDER — FUROSEMIDE 20 MG
20 TABLET ORAL DAILY
Qty: 90 TABLET | Refills: 3 | Status: SHIPPED | OUTPATIENT
Start: 2024-08-29

## 2024-08-29 NOTE — TELEPHONE ENCOUNTER
Pt's daughter Roseanna called in to triage.  She states that she was returning Jessi's call.  She states that pt takes furosemide 20mg daily (currently that is 1/2 of a 40mg tablet), but if possible, she would prefer to switch to 20mg tablet if possible.  I have pended this refill request for your review and signature.  She states that pt is completely out of furosemide at this point.    Thanks so much,  Veronica Holman, RN  Triage RN  08/29/24 12:00 EDT

## 2024-10-28 ENCOUNTER — OFFICE VISIT (OUTPATIENT)
Dept: NEUROLOGY | Facility: CLINIC | Age: 79
End: 2024-10-28
Payer: MEDICARE

## 2024-10-28 VITALS
HEART RATE: 49 BPM | OXYGEN SATURATION: 98 % | HEIGHT: 63 IN | SYSTOLIC BLOOD PRESSURE: 124 MMHG | WEIGHT: 149 LBS | BODY MASS INDEX: 26.4 KG/M2 | DIASTOLIC BLOOD PRESSURE: 70 MMHG

## 2024-10-28 DIAGNOSIS — F03.90 DEMENTIA WITHOUT BEHAVIORAL DISTURBANCE: Primary | ICD-10-CM

## 2024-10-28 RX ORDER — METOPROLOL TARTRATE 50 MG
50 TABLET ORAL 2 TIMES DAILY
COMMUNITY

## 2024-10-28 RX ORDER — DABIGATRAN ETEXILATE 150 MG/1
150 CAPSULE ORAL 2 TIMES DAILY
COMMUNITY

## 2024-10-28 RX ORDER — HYDRALAZINE HYDROCHLORIDE 50 MG/1
100 TABLET, FILM COATED ORAL 2 TIMES DAILY
COMMUNITY

## 2024-10-28 RX ORDER — FLUTICASONE PROPIONATE 50 MCG
2 SPRAY, SUSPENSION (ML) NASAL DAILY
COMMUNITY

## 2024-10-28 RX ORDER — TRAZODONE HYDROCHLORIDE 50 MG/1
25 TABLET, FILM COATED ORAL
COMMUNITY
Start: 2024-06-18

## 2024-10-28 NOTE — PROGRESS NOTES
"Chief Complaint   Patient presents with    Dementia       Patient ID: Kassi Mathis is a 79 y.o. female.    HPI: I had the pleasure of seeing your patient today.  As you may know she is an 79-year-old female here for the evaluation of memory change.  The patient states that she is not sure why she is here.  Her daughter who accompanies her gives all of the history essentially.  She does mention that her mom started having trouble back in 2016 or so.  She was experiencing issues with paying monthly bills.  There were other safety concerns including personal hygiene.  She was having trouble remembering appointments and dates and times of other events.  She began having more issues with conversations.  She would not be able to complete herself when talking.  She was having lots of word finding issues.  She was having trouble managing medications as well.  Eventually she had neuropsych testing which did show evidence for \"dementia\".  She was started on donepezil.  They did try memantine however she was unable to tolerate it.  MRI imaging was performed which we did review.  This showed some microvascular ischemic changes however no significant issues.    The following portions of the patient's history were reviewed and updated as appropriate: allergies, current medications, past family history, past medical history, past social history, past surgical history and problem list.    Review of Systems   Constitutional:  Positive for fatigue.   Musculoskeletal:  Positive for gait problem.   Neurological:  Positive for tremors (rest & fine motor skills), speech difficulty, numbness (feet) and headaches. Negative for dizziness, seizures, syncope, facial asymmetry, weakness and light-headedness.   Psychiatric/Behavioral:  Positive for agitation, confusion, decreased concentration, hallucinations (visual & auditory) and sleep disturbance. Negative for behavioral problems, dysphoric mood, self-injury and suicidal ideas. The patient " is nervous/anxious and is hyperactive.       I have reviewed the review of systems above performed by my medical assistant.      Vitals:    10/28/24 1321   BP: 124/70   Pulse: (!) 49   SpO2: 98%       Neurological Exam  Mental Status  Awake and alert. Oriented only to person. Recalls 0 of 3 objects immediately. Speech is normal. Expressive aphasia present. Unable to perform serial calculations. Difficulty spelling words backwards. Fund of knowledge is abnormal.  Not able to cooperate for Mini-Mental status.  She was able to tell me her name however..    Cranial Nerves  CN I: Sense of smell is normal.  CN II: Visual acuity is normal.  CN III, IV, VI: Extraocular movements intact bilaterally. Pupils equal round and reactive to light bilaterally.  CN V: Facial sensation is normal.  CN VII: Full and symmetric facial movement.  CN XI: Shoulder shrug strength is normal.  CN XII: Tongue midline without atrophy or fasciculations.    Motor  Normal muscle bulk throughout. No fasciculations present. Normal muscle tone. No abnormal involuntary movements. No pronator drift.                                             Right                     Left  Rhomboids                            5                          5  Infraspinatus                          5                          5  Supraspinatus                       5                          5  Deltoid                                   5                          5   Biceps                                   5                          5  Brachioradialis                      5                          5   Triceps                                  5                          5   Pronator                                5                          5   Supinator                              5                           5   Wrist flexor                            5                          5   Wrist extensor                       5                          5   Finger flexor                           5                          5   Finger extensor                     5                          5   Interossei                              5                          5   Abductor pollicis brevis         5                          5   Flexor pollicis brevis             5                          5   Opponens pollicis                 5                          5  Extensor digitorum               5                          5  Abductor digiti minimi           5                          5   Abdominal                            5                          5  Glutei                                    5                          5  Hip abductor                         5                          5  Hip adductor                         5                          5   Iliopsoas                               5                          5   Quadriceps                           5                          5   Hamstring                             5                          5   Gastrocnemius                     5                           5   Anterior tibialis                      5                          5   Posterior tibialis                    5                          5   Peroneal                               5                          5  Ankle dorsiflexor                   5                          5  Ankle plantar flexor              5                           5  Extensor hallucis longus      5                           5    Sensory  Sensation is intact to light touch, pinprick, vibration and proprioception in all four extremities.    Reflexes  Deep tendon reflexes are 2+ and symmetric in all four extremities.    Right pathological reflexes: Mica's absent.  Left pathological reflexes: Mica's absent.    Coordination    Finger-to-nose, rapid alternating movements and heel-to-shin normal bilaterally without dysmetria.    Gait  Normal casual, toe, heel and tandem gait.       Physical Exam  Vitals reviewed.    Constitutional:       General: She is awake. She is not in acute distress.     Appearance: She is well-developed.   HENT:      Head: Normocephalic and atraumatic.   Eyes:      Extraocular Movements: Extraocular movements intact.      Pupils: Pupils are equal, round, and reactive to light.   Cardiovascular:      Rate and Rhythm: Normal rate and regular rhythm.      Heart sounds: Normal heart sounds.   Pulmonary:      Effort: Pulmonary effort is normal. No respiratory distress.      Breath sounds: Normal breath sounds.   Abdominal:      General: Bowel sounds are normal. There is no distension.      Palpations: Abdomen is soft.      Tenderness: There is no abdominal tenderness.   Musculoskeletal:         General: No deformity.      Cervical back: Normal range of motion.   Skin:     General: Skin is warm.      Findings: No rash.   Neurological:      Mental Status: She is alert.      Coordination: Coordination is intact.      Deep Tendon Reflexes: Reflexes are normal and symmetric.   Psychiatric:         Speech: Speech normal.         Judgment: Judgment normal.         Procedures    Assessment/Plan: She appears to have pretty advanced disease.  Therefore I do not feel that adding memantine titration would be of any help.  We will see her in 4 to 6 months or sooner if needed.  A total of 60 minutes was spent face-to-face with the patient today.  Of that greater than 50% of this time was spent discussing signs and symptoms of dementia, patient education, plan of care and prognosis.         Diagnoses and all orders for this visit:    1. Dementia without behavioral disturbance (Primary)           Randolph Ley II, MD

## 2024-10-30 ENCOUNTER — PATIENT ROUNDING (BHMG ONLY) (OUTPATIENT)
Dept: NEUROLOGY | Facility: CLINIC | Age: 79
End: 2024-10-30
Payer: MEDICARE

## 2024-11-07 ENCOUNTER — PATIENT ROUNDING (BHMG ONLY) (OUTPATIENT)
Dept: URGENT CARE | Facility: CLINIC | Age: 79
End: 2024-11-07
Payer: MEDICARE

## 2024-11-07 NOTE — ED NOTES
Thank you for letting us care for you during your recent visit at [facility]. We would love to hear about your experience with us.         We’re always looking for ways to make our patients’ experiences even better. Do you have any recommendations on ways we may improve?         I appreciate you taking the time to respond. Please be on the lookout for a survey about your recent visit from [facility] via text or email. We would greatly appreciate if you could fill that out and turn it back in. We want your voice to be heard and we value your feedback.         Thank you for choosing [facility] for your healthcare needs.

## 2024-11-11 RX ORDER — SERTRALINE HYDROCHLORIDE 100 MG/1
200 TABLET, FILM COATED ORAL DAILY
Qty: 180 TABLET | Refills: 1 | Status: SHIPPED | OUTPATIENT
Start: 2024-11-11

## 2024-11-26 ENCOUNTER — TELEPHONE (OUTPATIENT)
Dept: CARDIOLOGY | Facility: CLINIC | Age: 79
End: 2024-11-26
Payer: MEDICARE

## 2024-11-26 RX ORDER — RIVAROXABAN 20 MG/1
20 TABLET, FILM COATED ORAL
Qty: 90 TABLET | Refills: 2 | Status: SHIPPED | OUTPATIENT
Start: 2024-11-26

## 2024-11-26 NOTE — TELEPHONE ENCOUNTER
I have never seen patient.  She is an established patient of Dr. Gongora and she is out of the office this week.  Patient has a history of paroxysmal atrial fibrillation.  She was in normal rhythm during her last visit in May 2024.    She may hold her rivaroxaban for a few days because of the bleeding of the knee.  But when holding the rivaroxaban, she is at potential, nonmodifiable risk of stroke.  If she were to have any strokelike symptoms, she needs to present to the ED immediately.  Please make sure patient and daughter verbalized understanding.  Restart rivaroxaban as soon as possible per surgeon's instructions.  Thank you

## 2024-11-26 NOTE — TELEPHONE ENCOUNTER
"Pt's daughter, Flaca (okay per BELLE) called today.  Pt very recently had a fall and is \"bleeding into the knee\".  Surgeon says she's not a surgical candidate but they would like for her to hold OAC for a couple of days if possible.    Do you have any recommendations for this patient?    Thank you,    Shivani MAYES RN  [facility] Triage  11/26/24  15:48 EST    "

## 2024-11-27 NOTE — TELEPHONE ENCOUNTER
I spoke with Flaca and gave them message from the provider.  She verbalized understanding & has no further questions at this time.  She is not with the pt at this time.    Thank you,    Shivani MAYES , RN  Triage [facility]  11/27/24 09:39 EST

## 2024-11-30 ENCOUNTER — APPOINTMENT (OUTPATIENT)
Dept: GENERAL RADIOLOGY | Facility: HOSPITAL | Age: 79
End: 2024-11-30
Payer: MEDICARE

## 2024-11-30 ENCOUNTER — HOSPITAL ENCOUNTER (EMERGENCY)
Facility: HOSPITAL | Age: 79
Discharge: HOME | End: 2024-11-30
Attending: EMERGENCY MEDICINE
Payer: MEDICARE

## 2024-11-30 VITALS
RESPIRATION RATE: 18 BRPM | OXYGEN SATURATION: 98 % | WEIGHT: 150 LBS | HEART RATE: 65 BPM | DIASTOLIC BLOOD PRESSURE: 87 MMHG | TEMPERATURE: 97.4 F | SYSTOLIC BLOOD PRESSURE: 186 MMHG | HEIGHT: 63 IN | BODY MASS INDEX: 26.58 KG/M2

## 2024-11-30 DIAGNOSIS — M25.461 EFFUSION OF RIGHT KNEE: Primary | ICD-10-CM

## 2024-11-30 DIAGNOSIS — F03.90 DEMENTIA WITHOUT BEHAVIORAL DISTURBANCE, PSYCHOTIC DISTURBANCE, MOOD DISTURBANCE, OR ANXIETY, UNSPECIFIED DEMENTIA SEVERITY, UNSPECIFIED DEMENTIA TYPE: ICD-10-CM

## 2024-11-30 LAB
ALBUMIN SERPL-MCNC: 3.8 G/DL (ref 3.5–5.2)
ALBUMIN/GLOB SERPL: 1.4 G/DL
ALP SERPL-CCNC: 96 U/L (ref 39–117)
ALT SERPL W P-5'-P-CCNC: 42 U/L (ref 1–33)
ANION GAP SERPL CALCULATED.3IONS-SCNC: 12.8 MMOL/L (ref 5–15)
AST SERPL-CCNC: 41 U/L (ref 1–32)
BACTERIA UR QL AUTO: NORMAL /HPF
BASOPHILS # BLD AUTO: 0.03 10*3/MM3 (ref 0–0.2)
BASOPHILS NFR BLD AUTO: 0.2 % (ref 0–1.5)
BILIRUB SERPL-MCNC: 0.6 MG/DL (ref 0–1.2)
BILIRUB UR QL STRIP: NEGATIVE
BUN SERPL-MCNC: 32 MG/DL (ref 8–23)
BUN/CREAT SERPL: 31.4 (ref 7–25)
CALCIUM SPEC-SCNC: 9.5 MG/DL (ref 8.6–10.5)
CHLORIDE SERPL-SCNC: 103 MMOL/L (ref 98–107)
CLARITY UR: CLEAR
CO2 SERPL-SCNC: 26.2 MMOL/L (ref 22–29)
COLOR UR: YELLOW
CREAT SERPL-MCNC: 1.02 MG/DL (ref 0.57–1)
CRP SERPL-MCNC: 1.07 MG/DL (ref 0–0.5)
DEPRECATED RDW RBC AUTO: 45.3 FL (ref 37–54)
EGFRCR SERPLBLD CKD-EPI 2021: 55.7 ML/MIN/1.73
EOSINOPHIL # BLD AUTO: 0 10*3/MM3 (ref 0–0.4)
EOSINOPHIL NFR BLD AUTO: 0 % (ref 0.3–6.2)
ERYTHROCYTE [DISTWIDTH] IN BLOOD BY AUTOMATED COUNT: 13.6 % (ref 12.3–15.4)
ERYTHROCYTE [SEDIMENTATION RATE] IN BLOOD: 10 MM/HR (ref 0–30)
GLOBULIN UR ELPH-MCNC: 2.8 GM/DL
GLUCOSE SERPL-MCNC: 276 MG/DL (ref 65–99)
GLUCOSE UR STRIP-MCNC: ABNORMAL MG/DL
HCT VFR BLD AUTO: 37.6 % (ref 34–46.6)
HGB BLD-MCNC: 12.1 G/DL (ref 12–15.9)
HGB UR QL STRIP.AUTO: ABNORMAL
HYALINE CASTS UR QL AUTO: NORMAL /LPF
IMM GRANULOCYTES # BLD AUTO: 0.08 10*3/MM3 (ref 0–0.05)
IMM GRANULOCYTES NFR BLD AUTO: 0.6 % (ref 0–0.5)
KETONES UR QL STRIP: NEGATIVE
LEUKOCYTE ESTERASE UR QL STRIP.AUTO: NEGATIVE
LYMPHOCYTES # BLD AUTO: 2.05 10*3/MM3 (ref 0.7–3.1)
LYMPHOCYTES NFR BLD AUTO: 14.6 % (ref 19.6–45.3)
MCH RBC QN AUTO: 29.4 PG (ref 26.6–33)
MCHC RBC AUTO-ENTMCNC: 32.2 G/DL (ref 31.5–35.7)
MCV RBC AUTO: 91.3 FL (ref 79–97)
MONOCYTES # BLD AUTO: 0.89 10*3/MM3 (ref 0.1–0.9)
MONOCYTES NFR BLD AUTO: 6.3 % (ref 5–12)
NEUTROPHILS NFR BLD AUTO: 11 10*3/MM3 (ref 1.7–7)
NEUTROPHILS NFR BLD AUTO: 78.3 % (ref 42.7–76)
NITRITE UR QL STRIP: NEGATIVE
NRBC BLD AUTO-RTO: 0 /100 WBC (ref 0–0.2)
PH UR STRIP.AUTO: 5.5 [PH] (ref 5–8)
PLATELET # BLD AUTO: 299 10*3/MM3 (ref 140–450)
PMV BLD AUTO: 10.1 FL (ref 6–12)
POTASSIUM SERPL-SCNC: 3.9 MMOL/L (ref 3.5–5.2)
PROT SERPL-MCNC: 6.6 G/DL (ref 6–8.5)
PROT UR QL STRIP: ABNORMAL
RBC # BLD AUTO: 4.12 10*6/MM3 (ref 3.77–5.28)
RBC # UR STRIP: NORMAL /HPF
REF LAB TEST METHOD: NORMAL
SODIUM SERPL-SCNC: 142 MMOL/L (ref 136–145)
SP GR UR STRIP: 1.02 (ref 1–1.03)
SQUAMOUS #/AREA URNS HPF: NORMAL /HPF
UROBILINOGEN UR QL STRIP: ABNORMAL
WBC # UR STRIP: NORMAL /HPF
WBC NRBC COR # BLD AUTO: 14.05 10*3/MM3 (ref 3.4–10.8)

## 2024-11-30 PROCEDURE — 81001 URINALYSIS AUTO W/SCOPE: CPT | Performed by: EMERGENCY MEDICINE

## 2024-11-30 PROCEDURE — 73560 X-RAY EXAM OF KNEE 1 OR 2: CPT

## 2024-11-30 PROCEDURE — 36415 COLL VENOUS BLD VENIPUNCTURE: CPT

## 2024-11-30 PROCEDURE — 96374 THER/PROPH/DIAG INJ IV PUSH: CPT

## 2024-11-30 PROCEDURE — 85025 COMPLETE CBC W/AUTO DIFF WBC: CPT | Performed by: EMERGENCY MEDICINE

## 2024-11-30 PROCEDURE — 99283 EMERGENCY DEPT VISIT LOW MDM: CPT

## 2024-11-30 PROCEDURE — 80053 COMPREHEN METABOLIC PANEL: CPT | Performed by: EMERGENCY MEDICINE

## 2024-11-30 PROCEDURE — 25010000002 ONDANSETRON PER 1 MG: Performed by: EMERGENCY MEDICINE

## 2024-11-30 PROCEDURE — 96375 TX/PRO/DX INJ NEW DRUG ADDON: CPT

## 2024-11-30 PROCEDURE — 86140 C-REACTIVE PROTEIN: CPT | Performed by: EMERGENCY MEDICINE

## 2024-11-30 PROCEDURE — 25010000002 MORPHINE PER 10 MG: Performed by: EMERGENCY MEDICINE

## 2024-11-30 PROCEDURE — 85652 RBC SED RATE AUTOMATED: CPT | Performed by: EMERGENCY MEDICINE

## 2024-11-30 RX ORDER — HYDROCODONE BITARTRATE AND ACETAMINOPHEN 5; 325 MG/1; MG/1
1 TABLET ORAL EVERY 4 HOURS PRN
Qty: 16 TABLET | Refills: 0 | Status: SHIPPED | OUTPATIENT
Start: 2024-11-30

## 2024-11-30 RX ORDER — ACETAMINOPHEN AND CODEINE PHOSPHATE 300; 30 MG/1; MG/1
1 TABLET ORAL ONCE
Status: DISCONTINUED | OUTPATIENT
Start: 2024-11-30 | End: 2024-11-30 | Stop reason: HOSPADM

## 2024-11-30 RX ORDER — ONDANSETRON 2 MG/ML
4 INJECTION INTRAMUSCULAR; INTRAVENOUS ONCE
Status: COMPLETED | OUTPATIENT
Start: 2024-11-30 | End: 2024-11-30

## 2024-11-30 RX ORDER — MORPHINE SULFATE 2 MG/ML
2 INJECTION, SOLUTION INTRAMUSCULAR; INTRAVENOUS ONCE
Status: COMPLETED | OUTPATIENT
Start: 2024-11-30 | End: 2024-11-30

## 2024-11-30 RX ORDER — SODIUM CHLORIDE 0.9 % (FLUSH) 0.9 %
10 SYRINGE (ML) INJECTION AS NEEDED
Status: DISCONTINUED | OUTPATIENT
Start: 2024-11-30 | End: 2024-11-30 | Stop reason: HOSPADM

## 2024-11-30 RX ADMIN — MORPHINE SULFATE 2 MG: 2 INJECTION, SOLUTION INTRAMUSCULAR; INTRAVENOUS at 07:19

## 2024-11-30 RX ADMIN — ONDANSETRON 4 MG: 2 INJECTION, SOLUTION INTRAMUSCULAR; INTRAVENOUS at 07:20

## 2024-11-30 NOTE — ED PROVIDER NOTES
EMERGENCY DEPARTMENT ENCOUNTER    Room Number:  10/10  PCP: Parul Jaime MD  Historian: Patient's family      HPI:  Chief Complaint: Knee swelling/difficulty walking  A complete HPI/ROS/PMH/PSH/SH/FH are unobtainable due to: Dementia  Context: Kassi Mathis is a 79 y.o. female who presents to the ED  today with significant swelling to her right knee as well as declining in her ambulation status that is now been present and ongoing for the past week.  She reports that she sustained a fall sometime ago with a large amount of swelling to the knee.  She was taken off of her anticoagulation for a day and had some fluid withdrawn off that knee.  Upon resuming her anticoagulation medication, the fluid has steadily returned and is now moderate to severe in intensity.  The patient's family reports that at this point she essentially cannot walk due to the pain in her right knee.  They deny fever/chills, chest pain, shortness of breath, or nausea/vomiting.          PAST MEDICAL HISTORY  Active Ambulatory Problems     Diagnosis Date Noted    Depression 01/17/2016    Allergic rhinitis 01/17/2016    Hypertension 01/17/2016    Dementia without behavioral disturbance 01/17/2016    Paroxysmal atrial fibrillation 01/17/2016    B12 deficiency 01/17/2016    Type 2 diabetes mellitus 01/17/2016    Hyperlipidemia 01/17/2016    Hammer toes of both feet 10/17/2017    Cervical radiculopathy 10/08/2018    Osteoarthritis of right knee 09/08/2020    At high risk for falls 09/29/2021    Chronic diastolic congestive heart failure 08/09/2022    Bradycardia, sinus 09/08/2022    Acquired bilateral hammer toes 01/30/2024    Diabetic polyneuropathy associated with type 2 diabetes mellitus 01/30/2024     Resolved Ambulatory Problems     Diagnosis Date Noted    Fatigue 01/17/2016    Edema 01/17/2016    Anemia 01/17/2016    Abdominal pain 01/17/2016    Hidradenitis suppurativa 01/17/2016    Diarrhea 01/17/2016    Imbalance 07/20/2016    Headache  10/07/2018    Acute neck pain 10/07/2018     Past Medical History:   Diagnosis Date    Abnormal ECG     Cataract     Chronic venous insufficiency     Coronary artery disease     Dementia     Diabetes mellitus     History of transfusion     Low back pain     Osteoarthritis     Peripheral neuropathy     Pulmonary arterial hypertension     Stroke     Vitamin B 12 deficiency          PAST SURGICAL HISTORY  Past Surgical History:   Procedure Laterality Date    BACK SURGERY  2007    COLONOSCOPY      HYSTERECTOMY      PARTIAL    JOINT REPLACEMENT      Knee replacement    KNEE SURGERY Left     SUBTOTAL HYSTERECTOMY      TONSILLECTOMY           FAMILY HISTORY  Family History   Problem Relation Age of Onset    Breast cancer Mother     Depression Mother     Cancer Mother         Breast Ca    Stroke Father     Alcohol abuse Father     Stroke Maternal Grandmother     Heart attack Maternal Grandmother     Heart disease Maternal Grandmother     Heart attack Maternal Grandfather     Cancer Maternal Grandfather         Throat cancer    Hyperlipidemia Daughter     Diabetes Daughter     Hypertension Daughter     Liver disease Son     Alcohol abuse Son              Early death Son     Hypertension Son          SOCIAL HISTORY  Social History     Socioeconomic History    Marital status:    Tobacco Use    Smoking status: Former     Current packs/day: 0.50     Types: Cigarettes     Passive exposure: Past    Smokeless tobacco: Never   Vaping Use    Vaping status: Never Used   Substance and Sexual Activity    Alcohol use: Never     Comment: CAFFEINE USE/ SOFT DRINKS.     Drug use: Never    Sexual activity: Not Currently     Partners: Male         ALLERGIES  Enalapril, Memantine hcl, Enalapril maleate, and Penicillins        REVIEW OF SYSTEMS  Review of Systems   Constitutional:  Negative for fever.   HENT:  Negative for sore throat.    Eyes: Negative.    Respiratory:  Negative for cough and shortness of breath.     Cardiovascular:  Negative for chest pain.   Gastrointestinal:  Negative for abdominal pain, diarrhea and vomiting.   Genitourinary:  Negative for dysuria.   Musculoskeletal:  Positive for joint swelling. Negative for neck pain.        Right knee pain/swelling   Skin:  Negative for rash.   Allergic/Immunologic: Negative.    Neurological:  Negative for weakness, numbness and headaches.   Hematological: Negative.    Psychiatric/Behavioral: Negative.     All other systems reviewed and are negative.         PHYSICAL EXAM  ED Triage Vitals   Temp Heart Rate Resp BP SpO2   11/30/24 0334 11/30/24 0331 11/30/24 0331 11/30/24 0331 11/30/24 0331   97.4 °F (36.3 °C) 92 18 178/88 98 %      Temp src Heart Rate Source Patient Position BP Location FiO2 (%)   11/30/24 0334 -- -- -- --   Tympanic           Physical Exam  Constitutional:       General: She is not in acute distress.     Appearance: Normal appearance. She is not ill-appearing or toxic-appearing.   HENT:      Head: Normocephalic and atraumatic.   Eyes:      Extraocular Movements: Extraocular movements intact.      Pupils: Pupils are equal, round, and reactive to light.   Cardiovascular:      Rate and Rhythm: Normal rate and regular rhythm.      Heart sounds: No murmur heard.     No friction rub. No gallop.   Pulmonary:      Effort: Pulmonary effort is normal.      Breath sounds: Normal breath sounds.   Abdominal:      General: Abdomen is flat. There is no distension.      Palpations: Abdomen is soft.      Tenderness: There is no abdominal tenderness.   Musculoskeletal:         General: Swelling and tenderness present. No deformity or signs of injury. Normal range of motion.      Cervical back: Normal range of motion and neck supple.      Comments: Significant swelling present to the right knee with warmth to the touch   Skin:     General: Skin is warm and dry.   Neurological:      General: No focal deficit present.      Mental Status: She is alert and oriented to  person, place, and time.      Sensory: No sensory deficit.      Motor: No weakness.   Psychiatric:         Mood and Affect: Mood normal.         Behavior: Behavior normal.         Vital signs and nursing notes reviewed.          LAB RESULTS  Recent Results (from the past 24 hours)   CBC Auto Differential    Collection Time: 11/30/24  6:05 AM    Specimen: Blood   Result Value Ref Range    WBC 14.05 (H) 3.40 - 10.80 10*3/mm3    RBC 4.12 3.77 - 5.28 10*6/mm3    Hemoglobin 12.1 12.0 - 15.9 g/dL    Hematocrit 37.6 34.0 - 46.6 %    MCV 91.3 79.0 - 97.0 fL    MCH 29.4 26.6 - 33.0 pg    MCHC 32.2 31.5 - 35.7 g/dL    RDW 13.6 12.3 - 15.4 %    RDW-SD 45.3 37.0 - 54.0 fl    MPV 10.1 6.0 - 12.0 fL    Platelets 299 140 - 450 10*3/mm3    Neutrophil % 78.3 (H) 42.7 - 76.0 %    Lymphocyte % 14.6 (L) 19.6 - 45.3 %    Monocyte % 6.3 5.0 - 12.0 %    Eosinophil % 0.0 (L) 0.3 - 6.2 %    Basophil % 0.2 0.0 - 1.5 %    Immature Grans % 0.6 (H) 0.0 - 0.5 %    Neutrophils, Absolute 11.00 (H) 1.70 - 7.00 10*3/mm3    Lymphocytes, Absolute 2.05 0.70 - 3.10 10*3/mm3    Monocytes, Absolute 0.89 0.10 - 0.90 10*3/mm3    Eosinophils, Absolute 0.00 0.00 - 0.40 10*3/mm3    Basophils, Absolute 0.03 0.00 - 0.20 10*3/mm3    Immature Grans, Absolute 0.08 (H) 0.00 - 0.05 10*3/mm3    nRBC 0.0 0.0 - 0.2 /100 WBC   Sedimentation Rate    Collection Time: 11/30/24  6:05 AM    Specimen: Blood   Result Value Ref Range    Sed Rate 10 0 - 30 mm/hr   Comprehensive Metabolic Panel    Collection Time: 11/30/24  7:11 AM    Specimen: Blood   Result Value Ref Range    Glucose 276 (H) 65 - 99 mg/dL    BUN 32 (H) 8 - 23 mg/dL    Creatinine 1.02 (H) 0.57 - 1.00 mg/dL    Sodium 142 136 - 145 mmol/L    Potassium 3.9 3.5 - 5.2 mmol/L    Chloride 103 98 - 107 mmol/L    CO2 26.2 22.0 - 29.0 mmol/L    Calcium 9.5 8.6 - 10.5 mg/dL    Total Protein 6.6 6.0 - 8.5 g/dL    Albumin 3.8 3.5 - 5.2 g/dL    ALT (SGPT) 42 (H) 1 - 33 U/L    AST (SGOT) 41 (H) 1 - 32 U/L    Alkaline  Phosphatase 96 39 - 117 U/L    Total Bilirubin 0.6 0.0 - 1.2 mg/dL    Globulin 2.8 gm/dL    A/G Ratio 1.4 g/dL    BUN/Creatinine Ratio 31.4 (H) 7.0 - 25.0    Anion Gap 12.8 5.0 - 15.0 mmol/L    eGFR 55.7 (L) >60.0 mL/min/1.73   C-reactive Protein    Collection Time: 11/30/24  7:11 AM    Specimen: Blood   Result Value Ref Range    C-Reactive Protein 1.07 (H) 0.00 - 0.50 mg/dL   Urinalysis With Microscopic If Indicated (No Culture) - Urine, Clean Catch    Collection Time: 11/30/24  7:47 AM    Specimen: Urine, Clean Catch   Result Value Ref Range    Color, UA Yellow Yellow, Straw    Appearance, UA Clear Clear    pH, UA 5.5 5.0 - 8.0    Specific Gravity, UA 1.025 1.005 - 1.030    Glucose, UA >=1000 mg/dL (3+) (A) Negative    Ketones, UA Negative Negative    Bilirubin, UA Negative Negative    Blood, UA Trace (A) Negative    Protein, UA Trace (A) Negative    Leuk Esterase, UA Negative Negative    Nitrite, UA Negative Negative    Urobilinogen, UA 1.0 E.U./dL 0.2 - 1.0 E.U./dL   Urinalysis, Microscopic Only - Urine, Clean Catch    Collection Time: 11/30/24  7:47 AM    Specimen: Urine, Clean Catch   Result Value Ref Range    RBC, UA 0-2 None Seen, 0-2 /HPF    WBC, UA 0-2 None Seen, 0-2 /HPF    Bacteria, UA None Seen None Seen /HPF    Squamous Epithelial Cells, UA 0-2 None Seen, 0-2 /HPF    Hyaline Casts, UA 0-2 None Seen /LPF    Methodology Automated Microscopy        Ordered the above labs and reviewed the results.        RADIOLOGY  XR Knee 1 or 2 View Right    Result Date: 11/30/2024  Patient: JUSTICE SHAIKH  Time Out: 04:26 Exam(s): XR RIGHT KNEE EXAM:   XR Right Knee, 3 Views CLINICAL HISTORY:    Reason for exam: pain, swelling. TECHNIQUE:   Three views of the right knee. COMPARISON:   No relevant prior studies available. FINDINGS:   Bones joints:  Large knee joint effusion.  Moderate tricompartmental degenerative arthrosis.  There are arterial calcifications.  The bones are demineralized.  No dislocation.  No acute  fracture.   Soft tissues:  Unremarkable. IMPRESSION:       No acute fracture.  Degenerative changes.  Large knee joint effusion.     Electronically signed by Stanford Curry MD on 11-30-24 at 0426     Ordered the above noted radiological studies. Reviewed by me in PACS.            PROCEDURES  Procedures          MEDICATIONS GIVEN IN ER  Medications   ondansetron (ZOFRAN) injection 4 mg (4 mg Intravenous Given 11/30/24 0720)   morphine injection 2 mg (2 mg Intravenous Given 11/30/24 0719)                   MEDICAL DECISION MAKING, PROGRESS, and CONSULTS    All labs have been independently reviewed by me.  All radiology studies have been reviewed by me and I have also reviewed the radiology report.   EKG's independently viewed and interpreted by me.  Discussion below represents my analysis of pertinent findings related to patient's condition, differential diagnosis, treatment plan and final disposition.      Additional sources:  - Discussed/ obtained information from independent historians: History obtained from the patient's family at bedside.    - External (non-ED) record review: Upon medical records review, the patient was last seen and evaluated in the outpatient office of neurology on 10/28/2024 in follow-up for her known dementia.    - Chronic or social conditions impacting care: Chronic dementia    - Shared decision making: Discharge decision based on shared conversations have between myself as well as the patient and patient's family at bedside.      Orders placed during this visit:  Orders Placed This Encounter   Procedures    XR Knee 1 or 2 View Right    Comprehensive Metabolic Panel    Urinalysis With Microscopic If Indicated (No Culture) - Urine, Clean Catch    CBC Auto Differential    Sedimentation Rate    C-reactive Protein    Urinalysis, Microscopic Only - Urine, Clean Catch    Apply ace wrap    CBC & Differential             Differential diagnosis includes but is not limited to:    Right knee effusion,  osteoarthritis, septic joint, bony fracture, DVT, SVT, or bacteremia      Independent interpretation of labs, radiology studies, and discussions with consultants:    X-ray of the right knee independently interpreted by myself with my interpretation showing soft tissue swelling with a large effusion present.  No bony fracture or bony abnormality seen.        ED Course as of 11/30/24 1458   Sat Nov 30, 2024   0537 First look: Patient presents with recurrent effusion to right knee symptoms present for several months following a fall.  It was most recently drained on Tuesday by orthopedist.  Patient is anticoagulated with Xarelto.  Fluid has reaccumulated.  Patient's family also states that think she has been more confused than previously. [TJ]   3515 On reevaluation, the patient is resting more comfortably and without any further complaints.  I did inform them that the laboratory results were fairly unremarkable however she does have a significant right knee effusion.  I offered admission for the patient however the patient's family declined and would like to take her home to follow-up with the orthopedist for potential recurrent arthrocentesis next week.  I strongly encouraged her to return immediately should she develop any fevers, worsening pain, or any other acute abnormalities.  They understand and all questions answered. [BM]      ED Course User Index  [BM] Jose E Boyer MD  [TJ] Alcon Gregg MD           DIAGNOSIS  Final diagnoses:   Effusion of right knee   Dementia without behavioral disturbance, psychotic disturbance, mood disturbance, or anxiety, unspecified dementia severity, unspecified dementia type         DISPOSITION  DISCHARGE    Patient discharged in stable condition.    Reviewed implications of results, diagnosis, meds, responsibility to follow up, warning signs and symptoms of possible worsening, potential complications and reasons to return to ER.    Patient/Family voiced  understanding of above instructions.    Discussed plan for discharge, as there is no emergent indication for admission. Patient referred to primary care provider for BP management due to today's BP. Pt/family is agreeable and understands need for follow up and repeat testing.  Pt is aware that discharge does not mean that nothing is wrong but it indicates no emergency is present that requires admission and they must continue care with follow-up as given below or physician of their choice.     FOLLOW-UP  Parul Jaime MD  [address] [id number] [phone]    Schedule an appointment as soon as possible for a visit            Medication List        New Prescriptions      HYDROcodone-acetaminophen 5-325 MG per tablet  Commonly known as: NORCO  Take 1 tablet by mouth Every 4 (Four) Hours As Needed for Moderate Pain.            Changed      rosuvastatin 20 MG tablet  Commonly known as: CRESTOR  TAKE ONE TABLET BY MOUTH EVERY NIGHT AT BEDTIME  What changed:   how much to take  how to take this  when to take this               Where to Get Your Medications        These medications were sent to [facility] [id number] - [place], [place] - [address] [place] [address] - [phone]  - [phone] [address] [id number]      Phone: [phone]   HYDROcodone-acetaminophen 5-325 MG per tablet                   Latest Documented Vital Signs:  As of 14:58 EST  BP- (!) 186/87 HR- 65 Temp- 97.4 °F (36.3 °C) (Tympanic) O2 sat- 98%              --    Please note that portions of this were completed with a voice recognition program.       Note Disclaimer: At [facility], we believe that sharing information builds trust and better relationships. You are receiving this note because you are receiving care at [facility] or recently visited. It is possible you will see health information before a provider has talked with you about it. This kind of information can  be easy to misunderstand. To help you fully understand what it means for your health, we urge you to discuss this note with your provider.             Jose E Boyer MD  11/30/24 [id number]

## 2024-12-03 RX ORDER — DONEPEZIL HYDROCHLORIDE 10 MG/1
10 TABLET, FILM COATED ORAL DAILY
Qty: 90 TABLET | Refills: 3 | Status: SHIPPED | OUTPATIENT
Start: 2024-12-03

## 2024-12-11 ENCOUNTER — APPOINTMENT (OUTPATIENT)
Dept: CT IMAGING | Facility: HOSPITAL | Age: 79
DRG: 101 | End: 2024-12-11
Payer: MEDICARE

## 2024-12-11 ENCOUNTER — HOSPITAL ENCOUNTER (INPATIENT)
Facility: HOSPITAL | Age: 79
LOS: 2 days | Discharge: HOME | DRG: 101 | End: 2024-12-13
Attending: EMERGENCY MEDICINE | Admitting: HOSPITALIST
Payer: MEDICARE

## 2024-12-11 ENCOUNTER — APPOINTMENT (OUTPATIENT)
Dept: GENERAL RADIOLOGY | Facility: HOSPITAL | Age: 79
DRG: 101 | End: 2024-12-11
Payer: MEDICARE

## 2024-12-11 DIAGNOSIS — R56.9 NEW ONSET SEIZURE: Primary | ICD-10-CM

## 2024-12-11 PROBLEM — M25.461 EFFUSION OF RIGHT KNEE: Status: ACTIVE | Noted: 2024-12-11

## 2024-12-11 LAB
ALBUMIN SERPL-MCNC: 3.4 G/DL (ref 3.5–5.2)
ALBUMIN/GLOB SERPL: 1.1 G/DL
ALP SERPL-CCNC: 74 U/L (ref 39–117)
ALT SERPL W P-5'-P-CCNC: 16 U/L (ref 1–33)
ANION GAP SERPL CALCULATED.3IONS-SCNC: 12 MMOL/L (ref 5–15)
AST SERPL-CCNC: 29 U/L (ref 1–32)
BASOPHILS # BLD AUTO: 0.02 10*3/MM3 (ref 0–0.2)
BASOPHILS NFR BLD AUTO: 0.2 % (ref 0–1.5)
BILIRUB SERPL-MCNC: 1.1 MG/DL (ref 0–1.2)
BILIRUB UR QL STRIP: NEGATIVE
BUN SERPL-MCNC: 12 MG/DL (ref 8–23)
BUN/CREAT SERPL: 16.2 (ref 7–25)
CALCIUM SPEC-SCNC: 9.1 MG/DL (ref 8.6–10.5)
CHLORIDE SERPL-SCNC: 103 MMOL/L (ref 98–107)
CLARITY UR: CLEAR
CO2 SERPL-SCNC: 26 MMOL/L (ref 22–29)
COLOR UR: YELLOW
CREAT SERPL-MCNC: 0.74 MG/DL (ref 0.57–1)
DEPRECATED RDW RBC AUTO: 44.9 FL (ref 37–54)
EGFRCR SERPLBLD CKD-EPI 2021: 81.9 ML/MIN/1.73
EOSINOPHIL # BLD AUTO: 0.02 10*3/MM3 (ref 0–0.4)
EOSINOPHIL NFR BLD AUTO: 0.2 % (ref 0.3–6.2)
ERYTHROCYTE [DISTWIDTH] IN BLOOD BY AUTOMATED COUNT: 13.9 % (ref 12.3–15.4)
GLOBULIN UR ELPH-MCNC: 3 GM/DL
GLUCOSE SERPL-MCNC: 217 MG/DL (ref 65–99)
GLUCOSE UR STRIP-MCNC: ABNORMAL MG/DL
HCT VFR BLD AUTO: 35.3 % (ref 34–46.6)
HGB BLD-MCNC: 11.6 G/DL (ref 12–15.9)
HGB UR QL STRIP.AUTO: NEGATIVE
IMM GRANULOCYTES # BLD AUTO: 0.05 10*3/MM3 (ref 0–0.05)
IMM GRANULOCYTES NFR BLD AUTO: 0.5 % (ref 0–0.5)
KETONES UR QL STRIP: ABNORMAL
LEUKOCYTE ESTERASE UR QL STRIP.AUTO: NEGATIVE
LYMPHOCYTES # BLD AUTO: 1.2 10*3/MM3 (ref 0.7–3.1)
LYMPHOCYTES NFR BLD AUTO: 11.7 % (ref 19.6–45.3)
MAGNESIUM SERPL-MCNC: 1.8 MG/DL (ref 1.6–2.4)
MCH RBC QN AUTO: 29.7 PG (ref 26.6–33)
MCHC RBC AUTO-ENTMCNC: 32.9 G/DL (ref 31.5–35.7)
MCV RBC AUTO: 90.5 FL (ref 79–97)
MONOCYTES # BLD AUTO: 0.51 10*3/MM3 (ref 0.1–0.9)
MONOCYTES NFR BLD AUTO: 5 % (ref 5–12)
NEUTROPHILS NFR BLD AUTO: 8.42 10*3/MM3 (ref 1.7–7)
NEUTROPHILS NFR BLD AUTO: 82.4 % (ref 42.7–76)
NITRITE UR QL STRIP: NEGATIVE
PH UR STRIP.AUTO: 6 [PH] (ref 5–8)
PHOSPHATE SERPL-MCNC: 2 MG/DL (ref 2.5–4.5)
PLAT MORPH BLD: NORMAL
PLATELET # BLD AUTO: 270 10*3/MM3 (ref 140–450)
PMV BLD AUTO: 10 FL (ref 6–12)
POTASSIUM SERPL-SCNC: 3.9 MMOL/L (ref 3.5–5.2)
PROT SERPL-MCNC: 6.4 G/DL (ref 6–8.5)
PROT UR QL STRIP: ABNORMAL
RBC # BLD AUTO: 3.9 10*6/MM3 (ref 3.77–5.28)
RBC MORPH BLD: NORMAL
SODIUM SERPL-SCNC: 141 MMOL/L (ref 136–145)
SP GR UR STRIP: >1.03 (ref 1–1.03)
UROBILINOGEN UR QL STRIP: ABNORMAL
WBC MORPH BLD: NORMAL
WBC NRBC COR # BLD AUTO: 10.22 10*3/MM3 (ref 3.4–10.8)

## 2024-12-11 PROCEDURE — 93010 ELECTROCARDIOGRAM REPORT: CPT | Performed by: INTERNAL MEDICINE

## 2024-12-11 PROCEDURE — 99291 CRITICAL CARE FIRST HOUR: CPT

## 2024-12-11 PROCEDURE — 93005 ELECTROCARDIOGRAM TRACING: CPT | Performed by: EMERGENCY MEDICINE

## 2024-12-11 PROCEDURE — 99222 1ST HOSP IP/OBS MODERATE 55: CPT | Performed by: STUDENT IN AN ORGANIZED HEALTH CARE EDUCATION/TRAINING PROGRAM

## 2024-12-11 PROCEDURE — 84100 ASSAY OF PHOSPHORUS: CPT

## 2024-12-11 PROCEDURE — 85025 COMPLETE CBC W/AUTO DIFF WBC: CPT | Performed by: EMERGENCY MEDICINE

## 2024-12-11 PROCEDURE — 70450 CT HEAD/BRAIN W/O DYE: CPT

## 2024-12-11 PROCEDURE — 80053 COMPREHEN METABOLIC PANEL: CPT | Performed by: EMERGENCY MEDICINE

## 2024-12-11 PROCEDURE — 83735 ASSAY OF MAGNESIUM: CPT

## 2024-12-11 PROCEDURE — 25010000002 LEVETRIRACETAM PER 10 MG: Performed by: EMERGENCY MEDICINE

## 2024-12-11 PROCEDURE — 25010000002 LORAZEPAM PER 2 MG: Performed by: EMERGENCY MEDICINE

## 2024-12-11 PROCEDURE — 81003 URINALYSIS AUTO W/O SCOPE: CPT | Performed by: EMERGENCY MEDICINE

## 2024-12-11 PROCEDURE — 36415 COLL VENOUS BLD VENIPUNCTURE: CPT

## 2024-12-11 PROCEDURE — 85007 BL SMEAR W/DIFF WBC COUNT: CPT | Performed by: EMERGENCY MEDICINE

## 2024-12-11 PROCEDURE — 71045 X-RAY EXAM CHEST 1 VIEW: CPT

## 2024-12-11 RX ORDER — LORAZEPAM 2 MG/ML
0.25 INJECTION INTRAMUSCULAR ONCE
Status: COMPLETED | OUTPATIENT
Start: 2024-12-11 | End: 2024-12-11

## 2024-12-11 RX ORDER — BISACODYL 5 MG/1
5 TABLET, DELAYED RELEASE ORAL DAILY PRN
Status: DISCONTINUED | OUTPATIENT
Start: 2024-12-11 | End: 2024-12-13 | Stop reason: HOSPADM

## 2024-12-11 RX ORDER — POLYETHYLENE GLYCOL 3350 17 G/17G
17 POWDER, FOR SOLUTION ORAL DAILY PRN
Status: DISCONTINUED | OUTPATIENT
Start: 2024-12-11 | End: 2024-12-13 | Stop reason: HOSPADM

## 2024-12-11 RX ORDER — SODIUM CHLORIDE 0.9 % (FLUSH) 0.9 %
10 SYRINGE (ML) INJECTION AS NEEDED
Status: DISCONTINUED | OUTPATIENT
Start: 2024-12-11 | End: 2024-12-13 | Stop reason: HOSPADM

## 2024-12-11 RX ORDER — SODIUM CHLORIDE 9 MG/ML
40 INJECTION, SOLUTION INTRAVENOUS AS NEEDED
Status: DISCONTINUED | OUTPATIENT
Start: 2024-12-11 | End: 2024-12-13 | Stop reason: HOSPADM

## 2024-12-11 RX ORDER — NICOTINE POLACRILEX 4 MG
15 LOZENGE BUCCAL
Status: DISCONTINUED | OUTPATIENT
Start: 2024-12-11 | End: 2024-12-13 | Stop reason: HOSPADM

## 2024-12-11 RX ORDER — INSULIN LISPRO 100 [IU]/ML
2-7 INJECTION, SOLUTION INTRAVENOUS; SUBCUTANEOUS
Status: DISCONTINUED | OUTPATIENT
Start: 2024-12-12 | End: 2024-12-13 | Stop reason: HOSPADM

## 2024-12-11 RX ORDER — DEXTROSE MONOHYDRATE 25 G/50ML
25 INJECTION, SOLUTION INTRAVENOUS
Status: DISCONTINUED | OUTPATIENT
Start: 2024-12-11 | End: 2024-12-13 | Stop reason: HOSPADM

## 2024-12-11 RX ORDER — AMOXICILLIN 250 MG
2 CAPSULE ORAL 2 TIMES DAILY PRN
Status: DISCONTINUED | OUTPATIENT
Start: 2024-12-11 | End: 2024-12-13 | Stop reason: HOSPADM

## 2024-12-11 RX ORDER — TRAZODONE HYDROCHLORIDE 50 MG/1
100 TABLET, FILM COATED ORAL ONCE
Status: COMPLETED | OUTPATIENT
Start: 2024-12-11 | End: 2024-12-11

## 2024-12-11 RX ORDER — IBUPROFEN 600 MG/1
1 TABLET ORAL
Status: DISCONTINUED | OUTPATIENT
Start: 2024-12-11 | End: 2024-12-13 | Stop reason: HOSPADM

## 2024-12-11 RX ORDER — OLANZAPINE 10 MG/2ML
2.5 INJECTION, POWDER, FOR SOLUTION INTRAMUSCULAR EVERY 8 HOURS PRN
Status: DISCONTINUED | OUTPATIENT
Start: 2024-12-11 | End: 2024-12-12

## 2024-12-11 RX ORDER — SODIUM CHLORIDE 0.9 % (FLUSH) 0.9 %
10 SYRINGE (ML) INJECTION EVERY 12 HOURS SCHEDULED
Status: DISCONTINUED | OUTPATIENT
Start: 2024-12-11 | End: 2024-12-13 | Stop reason: HOSPADM

## 2024-12-11 RX ORDER — LEVETIRACETAM 500 MG/5ML
1000 INJECTION, SOLUTION, CONCENTRATE INTRAVENOUS ONCE
Status: COMPLETED | OUTPATIENT
Start: 2024-12-11 | End: 2024-12-11

## 2024-12-11 RX ORDER — LORAZEPAM 2 MG/ML
0.5 INJECTION INTRAMUSCULAR ONCE
Status: COMPLETED | OUTPATIENT
Start: 2024-12-11 | End: 2024-12-11

## 2024-12-11 RX ORDER — BISACODYL 10 MG
10 SUPPOSITORY, RECTAL RECTAL DAILY PRN
Status: DISCONTINUED | OUTPATIENT
Start: 2024-12-11 | End: 2024-12-13 | Stop reason: HOSPADM

## 2024-12-11 RX ORDER — ONDANSETRON 2 MG/ML
4 INJECTION INTRAMUSCULAR; INTRAVENOUS EVERY 6 HOURS PRN
Status: DISCONTINUED | OUTPATIENT
Start: 2024-12-11 | End: 2024-12-13 | Stop reason: HOSPADM

## 2024-12-11 RX ORDER — LEVETIRACETAM 500 MG/1
500 TABLET ORAL 2 TIMES DAILY
Status: DISCONTINUED | OUTPATIENT
Start: 2024-12-12 | End: 2024-12-12

## 2024-12-11 RX ADMIN — LORAZEPAM 0.5 MG: 2 INJECTION INTRAMUSCULAR; INTRAVENOUS at 19:44

## 2024-12-11 RX ADMIN — TRAZODONE HYDROCHLORIDE 100 MG: 50 TABLET ORAL at 23:12

## 2024-12-11 RX ADMIN — LORAZEPAM 0.25 MG: 2 INJECTION INTRAMUSCULAR; INTRAVENOUS at 21:05

## 2024-12-11 RX ADMIN — LEVETIRACETAM 1000 MG: 100 INJECTION INTRAVENOUS at 19:46

## 2024-12-11 NOTE — ED NOTES
Pt to ed from home via EMS    Pt family reports pt had seizure. Family state it was full body convulsions and it lasted about 5 min. Pt was postictal when EMS arrived. Pt baseline mentation is confused and incomprehensible speech, and sometimes follows commands. No hx seizure

## 2024-12-12 ENCOUNTER — APPOINTMENT (OUTPATIENT)
Dept: MRI IMAGING | Facility: HOSPITAL | Age: 79
DRG: 101 | End: 2024-12-12
Payer: COMMERCIAL

## 2024-12-12 ENCOUNTER — APPOINTMENT (OUTPATIENT)
Dept: NEUROLOGY | Facility: HOSPITAL | Age: 79
DRG: 101 | End: 2024-12-12
Payer: COMMERCIAL

## 2024-12-12 PROBLEM — R13.11 ORAL PHASE DYSPHAGIA: Status: ACTIVE | Noted: 2024-12-12

## 2024-12-12 PROBLEM — R56.9 POST-ICTAL STATE: Status: ACTIVE | Noted: 2024-12-12

## 2024-12-12 LAB
ANION GAP SERPL CALCULATED.3IONS-SCNC: 9.9 MMOL/L (ref 5–15)
BUN SERPL-MCNC: 11 MG/DL (ref 8–23)
BUN/CREAT SERPL: 18.6 (ref 7–25)
CALCIUM SPEC-SCNC: 8.7 MG/DL (ref 8.6–10.5)
CHLORIDE SERPL-SCNC: 106 MMOL/L (ref 98–107)
CO2 SERPL-SCNC: 26.1 MMOL/L (ref 22–29)
CREAT SERPL-MCNC: 0.59 MG/DL (ref 0.57–1)
DEPRECATED RDW RBC AUTO: 44.9 FL (ref 37–54)
EGFRCR SERPLBLD CKD-EPI 2021: 91.2 ML/MIN/1.73
ERYTHROCYTE [DISTWIDTH] IN BLOOD BY AUTOMATED COUNT: 13.9 % (ref 12.3–15.4)
GLUCOSE BLDC GLUCOMTR-MCNC: 107 MG/DL (ref 70–130)
GLUCOSE BLDC GLUCOMTR-MCNC: 121 MG/DL (ref 70–130)
GLUCOSE BLDC GLUCOMTR-MCNC: 127 MG/DL (ref 70–130)
GLUCOSE BLDC GLUCOMTR-MCNC: 138 MG/DL (ref 70–130)
GLUCOSE BLDC GLUCOMTR-MCNC: 184 MG/DL (ref 70–130)
GLUCOSE SERPL-MCNC: 124 MG/DL (ref 65–99)
HBA1C MFR BLD: 8.1 % (ref 4.8–5.6)
HCT VFR BLD AUTO: 30.9 % (ref 34–46.6)
HGB BLD-MCNC: 10.1 G/DL (ref 12–15.9)
MAGNESIUM SERPL-MCNC: 1.8 MG/DL (ref 1.6–2.4)
MCH RBC QN AUTO: 29.3 PG (ref 26.6–33)
MCHC RBC AUTO-ENTMCNC: 32.7 G/DL (ref 31.5–35.7)
MCV RBC AUTO: 89.6 FL (ref 79–97)
PHOSPHATE SERPL-MCNC: 2.3 MG/DL (ref 2.5–4.5)
PLATELET # BLD AUTO: 257 10*3/MM3 (ref 140–450)
PMV BLD AUTO: 9.6 FL (ref 6–12)
POTASSIUM SERPL-SCNC: 3.1 MMOL/L (ref 3.5–5.2)
QT INTERVAL: 444 MS
QT INTERVAL: 565 MS
QTC INTERVAL: 486 MS
QTC INTERVAL: 541 MS
RBC # BLD AUTO: 3.45 10*6/MM3 (ref 3.77–5.28)
SODIUM SERPL-SCNC: 142 MMOL/L (ref 136–145)
WBC NRBC COR # BLD AUTO: 9.33 10*3/MM3 (ref 3.4–10.8)

## 2024-12-12 PROCEDURE — 95816 EEG AWAKE AND DROWSY: CPT

## 2024-12-12 PROCEDURE — 80048 BASIC METABOLIC PNL TOTAL CA: CPT | Performed by: HOSPITALIST

## 2024-12-12 PROCEDURE — 84100 ASSAY OF PHOSPHORUS: CPT | Performed by: HOSPITALIST

## 2024-12-12 PROCEDURE — 93005 ELECTROCARDIOGRAM TRACING: CPT | Performed by: HOSPITALIST

## 2024-12-12 PROCEDURE — 25010000002 MAGNESIUM SULFATE 2 GM/50ML SOLUTION: Performed by: INTERNAL MEDICINE

## 2024-12-12 PROCEDURE — 83036 HEMOGLOBIN GLYCOSYLATED A1C: CPT | Performed by: HOSPITALIST

## 2024-12-12 PROCEDURE — 93010 ELECTROCARDIOGRAM REPORT: CPT | Performed by: INTERNAL MEDICINE

## 2024-12-12 PROCEDURE — 82948 REAGENT STRIP/BLOOD GLUCOSE: CPT

## 2024-12-12 PROCEDURE — 97530 THERAPEUTIC ACTIVITIES: CPT

## 2024-12-12 PROCEDURE — 70551 MRI BRAIN STEM W/O DYE: CPT

## 2024-12-12 PROCEDURE — 25810000003 SODIUM CHLORIDE 0.9 % SOLUTION: Performed by: INTERNAL MEDICINE

## 2024-12-12 PROCEDURE — 36415 COLL VENOUS BLD VENIPUNCTURE: CPT | Performed by: HOSPITALIST

## 2024-12-12 PROCEDURE — 97166 OT EVAL MOD COMPLEX 45 MIN: CPT

## 2024-12-12 PROCEDURE — 83735 ASSAY OF MAGNESIUM: CPT | Performed by: HOSPITALIST

## 2024-12-12 PROCEDURE — 25010000002 LEVETRIRACETAM PER 10 MG: Performed by: INTERNAL MEDICINE

## 2024-12-12 PROCEDURE — 85027 COMPLETE CBC AUTOMATED: CPT | Performed by: HOSPITALIST

## 2024-12-12 PROCEDURE — 95816 EEG AWAKE AND DROWSY: CPT | Performed by: PSYCHIATRY & NEUROLOGY

## 2024-12-12 PROCEDURE — 92610 EVALUATE SWALLOWING FUNCTION: CPT

## 2024-12-12 PROCEDURE — 97162 PT EVAL MOD COMPLEX 30 MIN: CPT

## 2024-12-12 RX ORDER — DONEPEZIL HYDROCHLORIDE 10 MG/1
10 TABLET, FILM COATED ORAL DAILY
Status: DISCONTINUED | OUTPATIENT
Start: 2024-12-12 | End: 2024-12-13 | Stop reason: HOSPADM

## 2024-12-12 RX ORDER — TRAZODONE HYDROCHLORIDE 50 MG/1
25 TABLET, FILM COATED ORAL NIGHTLY PRN
Status: DISCONTINUED | OUTPATIENT
Start: 2024-12-12 | End: 2024-12-13 | Stop reason: HOSPADM

## 2024-12-12 RX ORDER — MAGNESIUM SULFATE HEPTAHYDRATE 40 MG/ML
2 INJECTION, SOLUTION INTRAVENOUS ONCE
Status: COMPLETED | OUTPATIENT
Start: 2024-12-12 | End: 2024-12-12

## 2024-12-12 RX ORDER — POTASSIUM CHLORIDE 750 MG/1
40 TABLET, EXTENDED RELEASE ORAL DAILY
Status: DISCONTINUED | OUTPATIENT
Start: 2024-12-12 | End: 2024-12-13 | Stop reason: HOSPADM

## 2024-12-12 RX ORDER — FUROSEMIDE 20 MG/1
10 TABLET ORAL DAILY
Status: DISCONTINUED | OUTPATIENT
Start: 2024-12-13 | End: 2024-12-13 | Stop reason: HOSPADM

## 2024-12-12 RX ORDER — ACETAMINOPHEN 325 MG/1
650 TABLET ORAL EVERY 4 HOURS PRN
Status: DISCONTINUED | OUTPATIENT
Start: 2024-12-12 | End: 2024-12-13 | Stop reason: HOSPADM

## 2024-12-12 RX ORDER — HYDROCODONE BITARTRATE AND ACETAMINOPHEN 5; 325 MG/1; MG/1
0.5 TABLET ORAL EVERY 4 HOURS PRN
Status: DISCONTINUED | OUTPATIENT
Start: 2024-12-12 | End: 2024-12-13 | Stop reason: HOSPADM

## 2024-12-12 RX ORDER — ASCORBIC ACID 500 MG
250 TABLET ORAL DAILY
Status: DISCONTINUED | OUTPATIENT
Start: 2024-12-12 | End: 2024-12-13 | Stop reason: HOSPADM

## 2024-12-12 RX ORDER — HYDROCODONE BITARTRATE AND ACETAMINOPHEN 5; 325 MG/1; MG/1
1 TABLET ORAL EVERY 4 HOURS PRN
Status: DISCONTINUED | OUTPATIENT
Start: 2024-12-12 | End: 2024-12-12

## 2024-12-12 RX ORDER — AMLODIPINE BESYLATE 5 MG/1
5 TABLET ORAL DAILY
Status: DISCONTINUED | OUTPATIENT
Start: 2024-12-13 | End: 2024-12-13 | Stop reason: HOSPADM

## 2024-12-12 RX ORDER — ROSUVASTATIN CALCIUM 20 MG/1
20 TABLET, COATED ORAL 3 TIMES WEEKLY
Status: DISCONTINUED | OUTPATIENT
Start: 2024-12-12 | End: 2024-12-13 | Stop reason: HOSPADM

## 2024-12-12 RX ORDER — UREA 10 %
1000 LOTION (ML) TOPICAL DAILY
Status: DISCONTINUED | OUTPATIENT
Start: 2024-12-12 | End: 2024-12-13 | Stop reason: HOSPADM

## 2024-12-12 RX ORDER — FUROSEMIDE 20 MG/1
20 TABLET ORAL DAILY
Status: DISCONTINUED | OUTPATIENT
Start: 2024-12-12 | End: 2024-12-12

## 2024-12-12 RX ORDER — POTASSIUM CHLORIDE 7.45 MG/ML
10 INJECTION INTRAVENOUS
Status: DISCONTINUED | OUTPATIENT
Start: 2024-12-12 | End: 2024-12-12

## 2024-12-12 RX ORDER — METOPROLOL TARTRATE 50 MG
50 TABLET ORAL 2 TIMES DAILY
Status: DISCONTINUED | OUTPATIENT
Start: 2024-12-12 | End: 2024-12-12

## 2024-12-12 RX ORDER — SERTRALINE HYDROCHLORIDE 100 MG/1
1 TABLET, FILM COATED ORAL DAILY
COMMUNITY

## 2024-12-12 RX ORDER — LEVETIRACETAM 500 MG/5ML
500 INJECTION, SOLUTION, CONCENTRATE INTRAVENOUS EVERY 12 HOURS SCHEDULED
Status: DISCONTINUED | OUTPATIENT
Start: 2024-12-12 | End: 2024-12-13

## 2024-12-12 RX ORDER — SODIUM CHLORIDE 9 MG/ML
75 INJECTION, SOLUTION INTRAVENOUS CONTINUOUS
Status: DISCONTINUED | OUTPATIENT
Start: 2024-12-12 | End: 2024-12-13

## 2024-12-12 RX ORDER — TRAZODONE HYDROCHLORIDE 50 MG/1
1 TABLET, FILM COATED ORAL NIGHTLY PRN
COMMUNITY

## 2024-12-12 RX ORDER — TRAZODONE HYDROCHLORIDE 50 MG/1
25 TABLET, FILM COATED ORAL NIGHTLY
Status: DISCONTINUED | OUTPATIENT
Start: 2024-12-12 | End: 2024-12-12

## 2024-12-12 RX ADMIN — LEVETIRACETAM 500 MG: 100 INJECTION INTRAVENOUS at 10:23

## 2024-12-12 RX ADMIN — Medication 10 ML: at 20:53

## 2024-12-12 RX ADMIN — HYDROCODONE BITARTRATE AND ACETAMINOPHEN 0.5 TABLET: 5; 325 TABLET ORAL at 17:56

## 2024-12-12 RX ADMIN — Medication 5 MG: at 20:28

## 2024-12-12 RX ADMIN — SODIUM CHLORIDE 75 ML/HR: 9 INJECTION, SOLUTION INTRAVENOUS at 18:57

## 2024-12-12 RX ADMIN — POTASSIUM CHLORIDE 40 MEQ: 750 TABLET, EXTENDED RELEASE ORAL at 17:47

## 2024-12-12 RX ADMIN — TRAZODONE HYDROCHLORIDE 25 MG: 50 TABLET ORAL at 20:27

## 2024-12-12 RX ADMIN — MAGNESIUM SULFATE HEPTAHYDRATE 2 G: 40 INJECTION, SOLUTION INTRAVENOUS at 19:00

## 2024-12-12 RX ADMIN — LEVETIRACETAM 500 MG: 100 INJECTION INTRAVENOUS at 20:27

## 2024-12-12 RX ADMIN — Medication 10 ML: at 02:09

## 2024-12-12 NOTE — THERAPY EVALUATION
Patient Name: Kassi Mathis  : 1945    MRN: [id number]                              Today's Date: 2024       Admit Date: 2024    Visit Dx:     ICD-10-CM ICD-9-CM   1. New onset seizure  R56.9 780.39     Patient Active Problem List   Diagnosis    Depression    Allergic rhinitis    Hypertension    Dementia without behavioral disturbance    Paroxysmal atrial fibrillation    B12 deficiency    Type 2 diabetes mellitus    Hyperlipidemia    Hammer toes of both feet    Cervical radiculopathy    Osteoarthritis of right knee    At high risk for falls    Chronic diastolic congestive heart failure    Bradycardia, sinus    Acquired bilateral hammer toes    Diabetic polyneuropathy associated with type 2 diabetes mellitus    New onset seizure    Effusion of right knee    Oral phase dysphagia    Post-ictal state     Past Medical History:   Diagnosis Date    Abnormal ECG     AFib    Allergic rhinitis     Anemia     Cataract     Chronic venous insufficiency     Coronary artery disease     Afib    Dementia     Depression     Diabetes mellitus     Headache     Sinus?    Hidradenitis suppurativa     History of transfusion     Hyperlipidemia     Hypertension     Low back pain     Osteoarthritis     Paroxysmal atrial fibrillation     Peripheral neuropathy     Pulmonary arterial hypertension     Stroke     Vitamin B 12 deficiency     Boarderline only with normal MMA/Homocysteine/IF antibody     Past Surgical History:   Procedure Laterality Date    BACK SURGERY      COLONOSCOPY      HYSTERECTOMY      PARTIAL    JOINT REPLACEMENT      Knee replacement    KNEE SURGERY Left     SUBTOTAL HYSTERECTOMY      TONSILLECTOMY        General Information       Row Name 24 1510          OT Time and Intention    Document Type evaluation  -MM     Mode of Treatment co-treatment;physical therapy;occupational therapy  -MM     Patient Effort adequate  -MM     Comment cognition limiting  -MM       Row Name 24 0545           General Information    Patient Profile Reviewed yes  -MM     Prior Level of Function mod assist:;max assist:;ADL's  pt requires assist with ADLs and functional mobility using rwx for short distances, her daughter reports able to ambulate to BR commode at baseline  -MM     Existing Precautions/Restrictions fall  -MM     Barriers to Rehab cognitive status;medically complex  -MM       Row Name 12/12/24 1510          Living Environment    People in Home child(tayler), adult  -MM       Row Name 12/12/24 1510          Cognition    Orientation Status (Cognition) disoriented to;person;place;situation;time;verbal cues/prompts needed for orientation  -MM       Row Name 12/12/24 1510          Safety Issues/Impairments Affecting Functional Mobility    Safety Issues Affecting Function (Mobility) ability to follow commands;problem-solving;insight into deficits/self-awareness;sequencing abilities;safety precautions follow-through/compliance  -MM     Impairments Affecting Function (Mobility) strength;balance;endurance/activity tolerance;cognition  -MM     Cognitive Impairments, Mobility Safety/Performance attention;insight into deficits/self-awareness;judgment;problem-solving/reasoning;sequencing abilities;safety precaution follow-through;safety precaution awareness  -MM     Comment, Safety Issues/Impairments (Mobility) Co-treatment medically necessary and appropriate d/t pt's acuity level, decreased endurance and activity tolerance, and safety of patient and staff. Treatment focused on progression of care and goals established in POC. Gait belt and non-skid socks worn.  -MM               User Key  (r) = Recorded By, (t) = Taken By, (c) = Cosigned By      Initials Name Provider Type    MM Morenita Charles OT Occupational Therapist                     Mobility/ADL's       Row Name 12/12/24 1511          Bed Mobility    Bed Mobility supine-sit;sit-supine  -MM     Supine-Sit [place] (Bed Mobility) minimum assist (75% patient effort);2  person assist;verbal cues  -MM     Sit-Supine [place] (Bed Mobility) maximum assist (25% patient effort);2 person assist;verbal cues  -MM     Comment, (Bed Mobility) max cues for sequencing 2/2 cognition  -MM       Row Name 12/12/24 1511          Transfers    Transfers sit-stand transfer  -MM       Row Name 12/12/24 1511          Sit-Stand Transfer    Sit-Stand [place] (Transfers) minimum assist (75% patient effort);verbal cues;2 person assist  -MM     Assistive Device (Sit-Stand Transfers) walker, front-wheeled  -MM       Row Name 12/12/24 1511          Functional Mobility    Functional Mobility- Ind. Level minimum assist (75% patient effort);2 person assist required  -MM     Functional Mobility- Device walker, front-wheeled  -MM     Functional Mobility- Comment poor safety and walker navigation, poor initiation  -MM       Row Name 12/12/24 1511          Activities of Daily Living    BADL Assessment/Intervention lower body dressing;toileting  -MM       Row Name 12/12/24 1511          Lower Body Dressing Assessment/Training    [place] Level (Lower Body Dressing) don;shoes/slippers;maximum assist (25% patient effort)  -MM       Row Name 12/12/24 1511          Toileting Assessment/Training    Comment, (Toileting) purewick, incontinent  -MM               User Key  (r) = Recorded By, (t) = Taken By, (c) = Cosigned By      Initials Name Provider Type    Morenita James OT Occupational Therapist                   Obj/Interventions       [facility] Name 12/12/24 1513          Sensory Assessment (Somatosensory)    Sensory Assessment (Somatosensory) unable/difficult to assess  -MM       Row Name 12/12/24 1513          Vision Assessment/Intervention    Visual Impairment/Limitations unable/difficult to assess  -MM       Row Name 12/12/24 1513          Range of Motion Comprehensive    General Range of Motion bilateral upper extremity ROM WNL  -MM       Row Name 12/12/24 1513          Strength Comprehensive (MMT)     General Manual Muscle Testing (MMT) Assessment upper extremity strength deficits identified  -MM     Comment, General Manual Muscle Testing (MMT) Assessment generalized weakness, BUE grossly 3+/5, poor command following, unable to assess MMT  -MM       Row Name 12/12/24 1513          Motor Skills    Motor Skills functional endurance  -MM     Functional Endurance poor +  -MM       Row Name 12/12/24 1513          Balance    Balance Assessment sitting static balance;sitting dynamic balance;sit to stand dynamic balance;standing static balance;standing dynamic balance  -MM     Static Sitting Balance contact guard  -MM     Dynamic Sitting Balance contact guard;minimal assist  -MM     Position, Sitting Balance sitting edge of bed  -MM     Sit to Stand Dynamic Balance minimal assist;2-person assist  -MM     Static Standing Balance contact guard;minimal assist;2-person assist;verbal cues  -MM     Dynamic Standing Balance minimal assist;2-person assist;verbal cues  -MM     Position/Device Used, Standing Balance supported;walker, front-wheeled  -MM     Balance Interventions sitting;sit to stand;standing;supported;static;dynamic;moderate challenge;weight shifting activity;occupation based/functional task  -MM     Comment, Balance unsteady however no overt LOB  -MM               User Key  (r) = Recorded By, (t) = Taken By, (c) = Cosigned By      Initials Name Provider Type    MM Morenita Charles OT Occupational Therapist                   Goals/Plan       Row Name 12/12/24 1517          Transfer Goal 1 (OT)    Activity/Assistive Device (Transfer Goal 1, OT) sit-to-stand/stand-to-sit;bed-to-chair/chair-to-bed;toilet  -MM     [place] Level/Cues Needed (Transfer Goal 1, OT) contact guard required  -MM     Time Frame (Transfer Goal 1, OT) short term goal (STG);2 weeks  -MM     Progress/Outcome (Transfer Goal 1, OT) goal ongoing  -MM       Row Name 12/12/24 1517          Bathing Goal 1 (OT)    Activity/Device (Bathing Goal 1, OT)  upper body bathing  -MM     [place] Level/Cues Needed (Bathing Goal 1, OT) minimum assist (75% or more patient effort)  -MM     Time Frame (Bathing Goal 1, OT) short term goal (STG);2 weeks  -MM     Progress/Outcomes (Bathing Goal 1, OT) goal ongoing  -MM       Row Name 12/12/24 1517          Toileting Goal 1 (OT)    Activity/Device (Toileting Goal 1, OT) toileting skills, all  -MM     [place] Level/Cues Needed (Toileting Goal 1, OT) standby assist;verbal cues required  -MM     Time Frame (Toileting Goal 1, OT) short term goal (STG);2 weeks  -MM     Progress/Outcome (Toileting Goal 1, OT) goal ongoing  -MM       Row Name 12/12/24 1517          Grooming Goal 1 (OT)    Activity/Device (Grooming Goal 1, OT) grooming skills, all  -MM     [place] (Grooming Goal 1, OT) standby assist;verbal cues required  -MM     Time Frame (Grooming Goal 1, OT) short term goal (STG);2 weeks  -MM     Progress/Outcome (Grooming Goal 1, OT) goal ongoing  -MM       Row Name 12/12/24 1517          Therapy Assessment/Plan (OT)    Planned Therapy Interventions (OT) activity tolerance training;BADL retraining;cognitive/visual perception retraining;neuromuscular control/coordination retraining;patient/caregiver education/training;transfer/mobility retraining;strengthening exercise;ROM/therapeutic exercise;occupation/activity based interventions;functional balance retraining  -MM               User Key  (r) = Recorded By, (t) = Taken By, (c) = Cosigned By      Initials Name Provider Type    Morenita James OT Occupational Therapist                   Clinical Impression       Row Name 12/12/24 1514          Pain Assessment    Pretreatment Pain Rating 0/10 - no pain  -MM     Posttreatment Pain Rating 0/10 - no pain  -MM       Row Name 12/12/24 1514          Plan of Care Review    Plan of Care Reviewed With patient  -MM     Progress no change  -MM     Outcome Evaluation Pt is an 78 yo female admitted following new onset seizure. She  has hx severe dementia, disx4 at baseline. She is seen this date for OT cam[place], her daughter is present to provide prior level, reports she has 24/7 assist and SPV from family, requires assist with ADLs, able to demo short household distances to BR commode with rwx support at baseline. She presents this date with decreased activity tolerance and minimal strength deficits. Overall, main limitation is cognition and safety. She requires min A x2 for STS and functional mobility, max A for donning shoes in prep for OOB. She requires consistent cueing for sequencing and safety throughout functional mobility, increased time due to poor carryover. Continue to follow during inpatient stay to maximize (I) prior to d/c, plans home with 24/7 assist from family.  -MM       Row Name 12/12/24 1514          Therapy Assessment/Plan (OT)    Rehab Potential (OT) limited  -MM     Criteria for Skilled Therapeutic Interventions Met (OT) yes  -MM     Therapy Frequency (OT) 2 times/wk  -MM       Row Name 12/12/24 1514          Therapy Plan Review/Discharge Plan (OT)    Anticipated Discharge Disposition (OT) home with 24/7 care;home with assist  -MM       Row Name 12/12/24 1514          Vital Signs    O2 Delivery Pre Treatment room air  -MM       Row Name 12/12/24 1514          Positioning and Restraints    Pre-Treatment Position in bed  -MM     Post Treatment Position bed  -MM     In Bed notified nsg;fowlers;call light within reach;encouraged to call for assist;exit alarm on;side rails up x3;with family/caregiver  -MM               User Key  (r) = Recorded By, (t) = Taken By, (c) = Cosigned By      Initials Name Provider Type    MM Morenita Charles OT Occupational Therapist                   Outcome Measures       Row Name 12/12/24 1513          How much help from another is currently needed...    Putting on and taking off regular lower body clothing? 2  -MM     Bathing (including washing, rinsing, and drying) 2  -MM     Toileting (which  includes using toilet bed pan or urinal) 2  -MM     Putting on and taking off regular upper body clothing 2  -MM     Taking care of personal grooming (such as brushing teeth) 2  -MM     Eating meals 2  -MM     AM-PAC 6 Clicks Score (OT) 12  -MM       Row Name 12/12/24 1503 12/12/24 0855       How much help from another person do you currently need...    Turning from your back to your side while in flat bed without using bedrails? 2  -SM 2  -LT    Moving from lying on back to sitting on the side of a flat bed without bedrails? 2  -SM 2  -LT    Moving to and from a bed to a chair (including a wheelchair)? 2  -SM 2  -LT    Standing up from a chair using your arms (e.g., wheelchair, bedside chair)? 2  -SM 2  -LT    Climbing 3-5 steps with a railing? 1  -SM 2  -LT    To walk in hospital room? 2  -SM 2  -LT    AM-PAC 6 Clicks Score (PT) 11  -SM 12  -LT    Highest Level of Mobility Goal 4 --> Transfer to chair/commode  -SM 4 --> Transfer to chair/commode  -LT      Row Name 12/12/24 1518          Modified Powers Scale    Modified Powers Scale 4 - Moderately severe disability.  Unable to walk without assistance, and unable to attend to own bodily needs without assistance.  -MM       Row Name 12/12/24 1518 12/12/24 1503       Functional Assessment    Outcome Measure Options AM-PAC 6 Clicks Daily Activity (OT);Modified Powers  -MM AM-PAC 6 Clicks Basic Mobility (PT)  -SM              User Key  (r) = Recorded By, (t) = Taken By, (c) = Cosigned By      Initials Name Provider Type    LT Alba Amaya RN Registered Nurse    Morenita James OT Occupational Therapist    SM Emily Conley PT Physical Therapist                    Occupational Therapy Education       Title: PT OT SLP Therapies (In Progress)       Topic: Occupational Therapy (In Progress)       Point: ADL training (In Progress)       Description:   Instruct learner(s) on proper safety adaptation and remediation techniques during self care or transfers.    Instruct in proper use of assistive devices.                  Learning Progress Summary            Patient Acceptance, E, NL by MM at 12/12/2024 1519    Comment: role of OT                      Point: Precautions (In Progress)       Description:   Instruct learner(s) on prescribed precautions during self-care and functional transfers.                  Learning Progress Summary            Patient Acceptance, E, NL by MM at 12/12/2024 1519    Comment: role of OT                      Point: Body mechanics (In Progress)       Description:   Instruct learner(s) on proper positioning and spine alignment during self-care, functional mobility activities and/or exercises.                  Learning Progress Summary            Patient Acceptance, E, NL by MM at 12/12/2024 1519    Comment: role of OT                                      User Key       Initials Effective Dates Name Provider Type Discipline     05/31/24 -  Morenita Charles OT Occupational Therapist OT                  OT Recommendation and Plan  Planned Therapy Interventions (OT): activity tolerance training, BADL retraining, cognitive/visual perception retraining, neuromuscular control/coordination retraining, patient/caregiver education/training, transfer/mobility retraining, strengthening exercise, ROM/therapeutic exercise, occupation/activity based interventions, functional balance retraining  Therapy Frequency (OT): 2 times/wk  Plan of Care Review  Plan of Care Reviewed With: patient  Progress: no change  Outcome Evaluation: Pt is an 78 yo female admitted following new onset seizure. She has hx severe dementia, disx4 at baseline. She is seen this date for OT eval, her daughter is present to provide prior level, reports she has 24/7 assist and SPV from family, requires assist with ADLs, [facility]. She presents this date with decreased activity tolerance and minimal strength deficits.  Overall, main limitation is cognition and safety. She requires min A x2 for STS and functional mobility, max A for donning shoes in prep for OOB. She requires consistent cueing for sequencing and safety throughout functional mobility, increased time due to poor carryover. Continue to follow during inpatient stay to maximize (I) prior to d/c, plans home with 24/7 assist from family.     Time Calculation:   Evaluation Complexity (OT)  Review Occupational Profile/Medical/Therapy History Complexity: expanded/moderate complexity  Assessment, Occupational Performance/Identification of Deficit Complexity: 3-5 performance deficits  Clinical Decision Making Complexity (OT): detailed assessment/moderate complexity  Overall Complexity of Evaluation (OT): moderate complexity     Time Calculation- OT       Row Name 12/12/24 1519             Time Calculation- OT    OT Start Time 1320  -MM      OT Stop Time 1342  -MM      OT Time Calculation (min) 22 min  -MM      Total Timed Code Minutes- OT 14 minute(s)  -MM      OT Received On 12/12/24  -MM      OT - Next Appointment 12/17/24  -MM      OT Goal Re-Cert Due Date 12/26/24  -MM         Timed Charges    99734 - OT Therapeutic Activity Minutes 14  -MM         Untimed Charges    OT Eval/Re-eval Minutes 8  -MM         Total Minutes    Timed Charges Total Minutes 14  -MM      Untimed Charges Total Minutes 8  -MM       Total Minutes 22  -MM                User Key  (r) = Recorded By, (t) = Taken By, (c) = Cosigned By      Initials Name Provider Type    Morenita James OT Occupational Therapist                  Therapy Charges for Today       Code Description Service Date Service Provider Modifiers Qty    [id number]  OT THERAPEUTIC ACT EA 15 MIN 12/12/2024 Morenita Charles OT GO 1    [id number] HC OT EVAL MOD COMPLEXITY 2 12/12/2024 Morenita Charles OT GO 1                 Morenita Charles OT  12/12/2024

## 2024-12-12 NOTE — ED PROVIDER NOTES
EMERGENCY DEPARTMENT ENCOUNTER    Room number:  39/39  Date Seen:  12/11/2024    PCP:  Parul Jaime MD    Laboratory Results:  Recent Results (from the past 24 hours)   Comprehensive Metabolic Panel    Collection Time: 12/11/24  7:06 PM    Specimen: Blood   Result Value Ref Range    Glucose 217 (H) 65 - 99 mg/dL    BUN 12 8 - 23 mg/dL    Creatinine 0.74 0.57 - 1.00 mg/dL    Sodium 141 136 - 145 mmol/L    Potassium 3.9 3.5 - 5.2 mmol/L    Chloride 103 98 - 107 mmol/L    CO2 26.0 22.0 - 29.0 mmol/L    Calcium 9.1 8.6 - 10.5 mg/dL    Total Protein 6.4 6.0 - 8.5 g/dL    Albumin 3.4 (L) 3.5 - 5.2 g/dL    ALT (SGPT) 16 1 - 33 U/L    AST (SGOT) 29 1 - 32 U/L    Alkaline Phosphatase 74 39 - 117 U/L    Total Bilirubin 1.1 0.0 - 1.2 mg/dL    Globulin 3.0 gm/dL    A/G Ratio 1.1 g/dL    BUN/Creatinine Ratio 16.2 7.0 - 25.0    Anion Gap 12.0 5.0 - 15.0 mmol/L    eGFR 81.9 >60.0 mL/min/1.73   CBC Auto Differential    Collection Time: 12/11/24  7:06 PM    Specimen: Blood   Result Value Ref Range    WBC 10.22 3.40 - 10.80 10*3/mm3    RBC 3.90 3.77 - 5.28 10*6/mm3    Hemoglobin 11.6 (L) 12.0 - 15.9 g/dL    Hematocrit 35.3 34.0 - 46.6 %    MCV 90.5 79.0 - 97.0 fL    MCH 29.7 26.6 - 33.0 pg    MCHC 32.9 31.5 - 35.7 g/dL    RDW 13.9 12.3 - 15.4 %    RDW-SD 44.9 37.0 - 54.0 fl    MPV 10.0 6.0 - 12.0 fL    Platelets 270 140 - 450 10*3/mm3    Neutrophil % 82.4 (H) 42.7 - 76.0 %    Lymphocyte % 11.7 (L) 19.6 - 45.3 %    Monocyte % 5.0 5.0 - 12.0 %    Eosinophil % 0.2 (L) 0.3 - 6.2 %    Basophil % 0.2 0.0 - 1.5 %    Immature Grans % 0.5 0.0 - 0.5 %    Neutrophils, Absolute 8.42 (H) 1.70 - 7.00 10*3/mm3    Lymphocytes, Absolute 1.20 0.70 - 3.10 10*3/mm3    Monocytes, Absolute 0.51 0.10 - 0.90 10*3/mm3    Eosinophils, Absolute 0.02 0.00 - 0.40 10*3/mm3    Basophils, Absolute 0.02 0.00 - 0.20 10*3/mm3    Immature Grans, Absolute 0.05 0.00 - 0.05 10*3/mm3   Scan Slide    Collection Time: 12/11/24  7:06 PM    Specimen: Blood   Result Value  Ref Range    RBC Morphology Normal Normal    WBC Morphology Normal Normal    Platelet Morphology Normal Normal   Magnesium    Collection Time: 12/11/24  7:06 PM    Specimen: Blood   Result Value Ref Range    Magnesium 1.8 1.6 - 2.4 mg/dL   Phosphorus    Collection Time: 12/11/24  7:06 PM    Specimen: Blood   Result Value Ref Range    Phosphorus 2.0 (L) 2.5 - 4.5 mg/dL   ECG 12 Lead Syncope    Collection Time: 12/11/24  8:38 PM   Result Value Ref Range    QT Interval 444 ms    QTC Interval 486 ms   Urinalysis With Microscopic If Indicated (No Culture) - Urine, Clean Catch    Collection Time: 12/11/24  8:48 PM    Specimen: Urine, Clean Catch   Result Value Ref Range    Color, UA Yellow Yellow, Straw    Appearance, UA Clear Clear    pH, UA 6.0 5.0 - 8.0    Specific Gravity, UA >1.030 (H) 1.005 - 1.030    Glucose, UA >=1000 mg/dL (3+) (A) Negative    Ketones, UA 40 mg/dL (2+) (A) Negative    Bilirubin, UA Negative Negative    Blood, UA Negative Negative    Protein, UA Trace (A) Negative    Leuk Esterase, UA Negative Negative    Nitrite, UA Negative Negative    Urobilinogen, UA 1.0 E.U./dL 0.2 - 1.0 E.U./dL     I reviewed the above results.    Radiology:  CT Head Without Contrast    Result Date: 12/11/2024  CT HEAD WO CONTRAST-  INDICATIONS: Seizure  TECHNIQUE: Radiation dose reduction techniques were utilized, including automated exposure control and exposure modulation based on body size. Noncontrast head CT  COMPARISON: 9/27/2021  FINDINGS:  The exam is significantly limited by extensive motion artifact, and follow-up evaluation is advised as indications persist.  Within the limitations of the exam, no definite acute intracranial hemorrhage, midline shift or mass effect. No acute territorial infarct is identified. Basal ganglia mineralization is present.  Mild to moderate periventricular hypodensities suggest chronic small vessel ischemic change in a patient this age.  Arterial calcifications are seen at the base of  the brain.  Thinning of the pituitary is apparent. Ventricles, cisterns, cerebral sulci are unremarkable for patient age, and do not appear significantly changed.  Small likely mucous retention cyst in the left sphenoid sinus. The visualized paranasal sinuses, orbits, mastoid air cells are otherwise unremarkable.        The exam is significantly limited by motion artifact. No definite intracranial hemorrhage. However, considering the significant limitations of the exam, follow-up evaluation is recommended as indications persist.  This report was finalized on 12/11/2024 8:37 PM by Dr. Randolph Abbasi M.D on Workstation: [id number]      XR Chest 1 View    Result Date: 12/11/2024  AP CHEST  HISTORY: New onset seizure  COMPARISON: 2/8/2022  FINDINGS: Elevated right hemidiaphragm with right basilar atelectasis. Left lung appears clear. Heart size stable. No appreciable pneumothorax.      Stable chest x-ray. No acute findings    This report was finalized on 12/11/2024 7:53 PM by Dr. Mauro Castano M.D on Workstation: [id number]     I reviewed the above results    Medications ordered in ED:  Medications   sodium chloride 0.9 % flush 10 mL (has no administration in time range)   levETIRAcetam (KEPPRA) tablet 500 mg (has no administration in time range)   levETIRAcetam (KEPPRA) injection 1,000 mg (1,000 mg Intravenous Given 12/11/24 1946)   LORazepam (ATIVAN) injection 0.5 mg (0.5 mg Intravenous Given 12/1944)   LORazepam (ATIVAN) injection 0.25 mg (0.25 mg Intravenous Given 12/11/24 2105)       Progress and Consult Notes:  ED Course as of 12/11/24 2109   Wed Dec 11, 2024   2027 CT head independently interpreted by myself.  I see no evidence of intracranial hemorrhage.  There is motion artifact. [TD]   2106 Glucose(!): 217 [KA]   2106 Creatinine: 0.74 [KA]   2106 Ketones, UA(!): 40 mg/dL (2+) [KA]   2106 Leukocytes, UA: Negative [KA]   2106 WBC: 10.22 [KA]   2106 Hemoglobin(!): 11.6 [KA]   2106 I discussed the patient  with Dr. Calle, hospitalist.  We discussed patient's history presentation and workup.  He agrees to admit for further evaluation treatment. [KA]      ED Course User Index  [KA] Flaca Santos PA-C  [TD] Alcon Wilkinson II, MD     I reassessed the patient, counseled family and patient on workup and they are agreeable with plan for admission.  She has been agitated since arrival, has had a couple small doses of Ativan without any improvement.  I discussed the patient with ava, clinical pharmacist.  Zyprexa, Haldol, Geodon and droperidol but will lower her seizure threshold.  Family states she has previously been on trazodone and tolerated this well, will give a dose of trazodone.       Diagnosis:  Final diagnoses:   New onset seizure       Provider attestation:  I personally reviewed the past medical history, past surgical history, social history, family history, current medications, and allergies as they appear in the chart.             Flaca Santos PA-C  12/12/24 [id number]

## 2024-12-12 NOTE — PLAN OF CARE
Goal Outcome Evaluation:  Plan of Care Reviewed With: patient           Outcome Evaluation: pt disoriented X4, RA, son and daughter at the bedside, meds whole in puree, reg soft to chew diet with chopped meat and thin. q2 hour turns will cont to monitor                             Problem: Adult Inpatient Plan of Care  Goal: Plan of Care Review  Outcome: Progressing  Flowsheets (Taken 12/12/2024 1440)  Outcome Evaluation: pt disoriented X4, RA, son and daughter at the bedside, meds whole in puree, reg soft to chew diet with chopped meat and thin. q2 hour turns will cont to monitor  Plan of Care Reviewed With: patient  Goal: Patient-Specific Goal (Individualized)  Outcome: Progressing  Goal: Absence of Hospital-Acquired Illness or Injury  Outcome: Progressing  Intervention: Identify and Manage Fall Risk  Recent Flowsheet Documentation  Taken 12/12/2024 1439 by Alba Amaya RN  Safety Promotion/Fall Prevention:   activity supervised   assistive device/personal items within reach   clutter free environment maintained   fall prevention program maintained   nonskid shoes/slippers when out of bed   room organization consistent   safety round/check completed  Taken 12/12/2024 1200 by Alba Amaya, RN  Safety Promotion/Fall Prevention:   activity supervised   assistive device/personal items within reach   clutter free environment maintained   fall prevention program maintained   nonskid shoes/slippers when out of bed   room organization consistent   safety round/check completed  Taken 12/12/2024 1013 by Alba Amaya, RN  Safety Promotion/Fall Prevention:   activity supervised   assistive device/personal items within reach   clutter free environment maintained   fall prevention program maintained   nonskid shoes/slippers when out of bed   room organization consistent   safety round/check completed  Taken 12/12/2024 0855 by Alba Amaya, RN  Safety Promotion/Fall Prevention:   activity supervised   assistive  device/personal items within reach   clutter free environment maintained   fall prevention program maintained   nonskid shoes/slippers when out of bed   safety round/check completed   room organization consistent  Intervention: Prevent and Manage VTE (Venous Thromboembolism) Risk  Recent Flowsheet Documentation  Taken 12/12/2024 0855 by Alba Amaya RN  VTE Prevention/Management:   bilateral   SCDs (sequential compression devices) on  Intervention: Prevent Infection  Recent Flowsheet Documentation  Taken 12/12/2024 1439 by Alab Amaya RN  Infection Prevention: single patient room provided  Taken 12/12/2024 1200 by Alba Amaya RN  Infection Prevention: single patient room provided  Taken 12/12/2024 1013 by Alba Amaya RN  Infection Prevention: single patient room provided  Taken 12/12/2024 0855 by Alba Amaya RN  Infection Prevention: single patient room provided  Goal: Optimal Comfort and Wellbeing  Outcome: Progressing  Intervention: Provide Person-Centered Care  Recent Flowsheet Documentation  Taken 12/12/2024 0855 by Alba Amaya RN  Trust Relationship/Rapport:   care explained   thoughts/feelings acknowledged  Goal: Readiness for Transition of Care  Outcome: Progressing

## 2024-12-12 NOTE — PLAN OF CARE
Problem: Adult Inpatient Plan of Care  Goal: Plan of Care Review  12/12/2024 0552 by Antonette Oseguera RN  Outcome: Progressing  12/12/2024 0550 by Antonette Oseguera RN  Outcome: Progressing     Problem: Adult Inpatient Plan of Care  Goal: Absence of Hospital-Acquired Illness or Injury  Intervention: Identify and Manage Fall Risk  Recent Flowsheet Documentation  Taken 12/12/2024 0400 by Antonette Oseguera RN  Safety Promotion/Fall Prevention:   assistive device/personal items within reach   clutter free environment maintained   room organization consistent   safety round/check completed  Taken 12/12/2024 0200 by Antonette Oseguera RN  Safety Promotion/Fall Prevention:   assistive device/personal items within reach   clutter free environment maintained   room organization consistent   safety round/check completed  Taken 12/12/2024 0022 by Antonette Oseguera RN  Safety Promotion/Fall Prevention:   assistive device/personal items within reach   clutter free environment maintained   activity supervised   room organization consistent   safety round/check completed     Problem: Adult Inpatient Plan of Care  Goal: Absence of Hospital-Acquired Illness or Injury  Intervention: Prevent Skin Injury  Recent Flowsheet Documentation  Taken 12/12/2024 0200 by Antonette Oseguera RN  Body Position: supine  Taken 12/12/2024 0022 by Antonette Oseguera RN  Body Position:   turned   supine   weight shifting     Problem: Adult Inpatient Plan of Care  Goal: Optimal Comfort and Wellbeing  12/12/2024 0552 by Antonette Oseguera RN  Outcome: Progressing  12/12/2024 0550 by Antonette Oseguera RN  Outcome: Progressing  Intervention: Provide Person-Centered Care  Recent Flowsheet Documentation  Taken 12/12/2024 0022 by Antonette Oseguera RN  Trust Relationship/Rapport:   care explained   reassurance provided   thoughts/feelings acknowledged   empathic listening provided     Problem: Adult Inpatient Plan of Care  Goal: Optimal Comfort and  Wellbeing  Intervention: Provide Person-Centered Care  Recent Flowsheet Documentation  Taken 12/12/2024 0022 by Antonette Oseguera RN  Trust Relationship/Rapport:   care explained   reassurance provided   thoughts/feelings acknowledged   empathic listening provided     Problem: Fall Injury Risk  Goal: Absence of Fall and Fall-Related Injury  12/12/2024 0552 by Antonette Oseguera RN  Outcome: Progressing  12/12/2024 0550 by Antonette Oseguera RN  Outcome: Progressing  Intervention: Identify and Manage Contributors  Recent Flowsheet Documentation  Taken 12/12/2024 0400 by Antonette Oseguera RN  Medication Review/Management: medications reviewed  Taken 12/12/2024 0200 by Antonette Oseguera RN  Medication Review/Management: medications reviewed  Taken 12/12/2024 0022 by Antonette Oseguera RN  Medication Review/Management: medications reviewed  Intervention: Promote Injury-Free Environment  Recent Flowsheet Documentation  Taken 12/12/2024 0400 by Antonette Oseguera RN  Safety Promotion/Fall Prevention:   assistive device/personal items within reach   clutter free environment maintained   room organization consistent   safety round/check completed  Taken 12/12/2024 0200 by Antonette Oseguera RN  Safety Promotion/Fall Prevention:   assistive device/personal items within reach   clutter free environment maintained   room organization consistent   safety round/check completed  Taken 12/12/2024 0022 by Antonette Oseguera RN  Safety Promotion/Fall Prevention:   assistive device/personal items within reach   clutter free environment maintained   activity supervised   room organization consistent   safety round/check completed     Problem: Fall Injury Risk  Goal: Absence of Fall and Fall-Related Injury  Intervention: Identify and Manage Contributors  Recent Flowsheet Documentation  Taken 12/12/2024 0400 by Antonette Oseguera RN  Medication Review/Management: medications reviewed  Taken 12/12/2024 0200 by Antonette Oseguera RN  Medication  Review/Management: medications reviewed  Taken 12/12/2024 0022 by Antonette Oseguera RN  Medication Review/Management: medications reviewed     Problem: Fall Injury Risk  Goal: Absence of Fall and Fall-Related Injury  Intervention: Promote Injury-Free Environment  Recent Flowsheet Documentation  Taken 12/12/2024 0400 by Antonette Oseguera RN  Safety Promotion/Fall Prevention:   assistive device/personal items within reach   clutter free environment maintained   room organization consistent   safety round/check completed  Taken 12/12/2024 0200 by Antonette Oseguera RN  Safety Promotion/Fall Prevention:   assistive device/personal items within reach   clutter free environment maintained   room organization consistent   safety round/check completed  Taken 12/12/2024 0022 by Antonette Oseguera RN  Safety Promotion/Fall Prevention:   assistive device/personal items within reach   clutter free environment maintained   activity supervised   room organization consistent   safety round/check completed     Problem: Dementia Signs/Symptoms  Goal: Improved Behavioral Control (Dementia Signs/Symptoms)  Outcome: Progressing     Problem: Dementia Signs/Symptoms  Goal: Improved Mood Symptoms (Dementia Signs/Symptoms)  Outcome: Progressing     Problem: Anxiety Signs/Symptoms  Goal: Improved Sleep (Anxiety Signs/Symptoms)  Outcome: Progressing     Problem: Diabetes  Goal: Optimal Coping  Outcome: Progressing  Intervention: Support Wellbeing and Self-Management Success  Recent Flowsheet Documentation  Taken 12/12/2024 0022 by Antonette Oseguera RN  Family/Support System Care:   caregiver stress acknowledged   support provided   presence promoted     Problem: Diabetes  Goal: Optimal Coping  Intervention: Support Wellbeing and Self-Management Success  Recent Flowsheet Documentation  Taken 12/12/2024 0022 by Antonette Oseguera RN  Family/Support System Care:   caregiver stress acknowledged   support provided   presence promoted     Problem:  Diabetes  Goal: Minimize Hypoglycemia Risk  Outcome: Progressing  Intervention: Minimize and Manage Hypoglycemia  Recent Flowsheet Documentation  Taken 12/12/2024 0022 by Antonette Oseguera RN  Hypoglycemia Management: blood glucose monitored     Problem: Diabetes  Goal: Blood Glucose Level Within Target Range  Outcome: Progressing   Goal Outcome Evaluation:

## 2024-12-12 NOTE — PROCEDURES
Tech contacted floor regarding stat EEG order, per RN the pts family does not want this test done at this time. Will attempt again in the morning.

## 2024-12-12 NOTE — CONSULTS
Neurology Consult Note    Consult Date: 12/11/2024    Referring MD: No ref. provider found    Reason for Consult I have been asked to see the patient in neurological consultation to render advice and opinion regarding seizure like spell.    Kassi Mathis is a 79 y.o. female with PMH of atrial fibrillation on xarelto, severe late onset AD, diabetes, HLD, HTN, PAH, stroke, vitamine b12 deficiency presents to the ED with acute seizure like spell. Patient apparently was at baseline then suddenly started convulsion of bilateral upper extremities for 3 minutes with eyes open and gaze deviation the left. She then went limp in both arms for about 4 minutes. She was more confused/slow to respond for the following 10-15 minutes. She did not bite tongue. Incontinent intermittently at baseline. She is back to baseline per family. No history of seizure. No history of head trauma but history of stroke. She had bronchitis a few weeks ago which has since resolved. No history of febrile seizure or CNS infections. Patient was given 0.5 mg ativan and 1 g Keppra on arrival to the ED.    At baseline, she is diagnosed with severe dementia and typically only oriented to self with expressive aphasia. She lives with her daughter who assists her with all ADL's. Patient can walk with a walker typically to and from the bathroom but a few days ago she began struggling more with this due to confusion on where to go so daughter has helped her more. She is on aricept 10 mg daily.  Tried Namenda but could not tolerate secondary to dizziness. Sees Dr. Randolph Ley outpatient for management.     Past Medical/Surgical Hx:  Past Medical History:   Diagnosis Date    Abnormal ECG     AFib    Allergic rhinitis     Anemia     Cataract     Chronic venous insufficiency     Coronary artery disease     Afib    Dementia     Depression     Diabetes mellitus     Headache     Sinus?    Hidradenitis suppurativa     History of transfusion     Hyperlipidemia      Hypertension     Low back pain     Osteoarthritis     Paroxysmal atrial fibrillation     Peripheral neuropathy     Pulmonary arterial hypertension     Stroke     Vitamin B 12 deficiency     Boarderline only with normal MMA/Homocysteine/IF antibody     Past Surgical History:   Procedure Laterality Date    BACK SURGERY  2007    COLONOSCOPY      HYSTERECTOMY      PARTIAL    JOINT REPLACEMENT      Knee replacement    KNEE SURGERY Left     SUBTOTAL HYSTERECTOMY      TONSILLECTOMY         Medications On Admission  (Not in a hospital admission)      Allergies:  Allergies   Allergen Reactions    Enalapril Swelling     vasotec tabs    Memantine Hcl Other (See Comments)     imbalance      Enalapril Maleate Swelling    Penicillins Swelling     Beta lactam allergy details  Antibiotic reaction: (!) shortness of breath, other (Swelling)  Age at reaction: adult  Dose to reaction time: unknown  Reason for antibiotic: unknown  Epinephrine required for reaction?: unknown  Tolerated antibiotics: unknown          Social Hx:  Social History     Socioeconomic History    Marital status:    Tobacco Use    Smoking status: Former     Current packs/day: 0.50     Types: Cigarettes     Passive exposure: Past    Smokeless tobacco: Never   Vaping Use    Vaping status: Never Used   Substance and Sexual Activity    Alcohol use: Never     Comment: CAFFEINE USE/ SOFT DRINKS.     Drug use: Never    Sexual activity: Not Currently     Partners: Male       Family Hx:  Family History   Problem Relation Age of Onset    Breast cancer Mother     Depression Mother     Cancer Mother         Breast Ca    Stroke Father     Alcohol abuse Father     Stroke Maternal Grandmother     Heart attack Maternal Grandmother     Heart disease Maternal Grandmother     Heart attack Maternal Grandfather     Cancer Maternal Grandfather         Throat cancer    Hyperlipidemia Daughter     Diabetes Daughter     Hypertension Daughter     Liver disease Son     Alcohol abuse  "Son          2007    Early death Son     Hypertension Son        Exam    /69   Pulse 61   Temp 97.3 °F (36.3 °C) (Oral)   Resp 18   Ht 160 cm (63\")   Wt 66.7 kg (147 lb)   LMP  (LMP Unknown)   SpO2 97%   BMI 26.04 kg/m²   gen: NAD, vitals reviewed  HEENT: no nuchal rigidity  MS: alert and oriented to self only, open eyes to voice, does not follow commands, expressive aphasia at baseline  CN: blinks to threat b/l, PERRL, Conjugate gaze, VOR intact, no nasolabial fold flattening   Motor: response to pain all 4 ext, normal tone throughout. spontaneous movements throughout  Sensory: response to pain throughout  Reflexes:  downgoing plantars     DATA:    Lab Results   Component Value Date    GLUCOSE 217 (H) 2024    CALCIUM 9.1 2024     2024    K 3.9 2024    CO2 26.0 2024     2024    BUN 12 2024    CREATININE 0.74 2024    EGFRIFAFRI 66 2022    EGFRIFNONA 57 (L) 2022    BCR 16.2 2024    ANIONGAP 12.0 2024     Lab Results   Component Value Date    WBC 10.22 2024    HGB 11.6 (L) 2024    HCT 35.3 2024    MCV 90.5 2024     2024     Lab Results   Component Value Date    LDL 93 2024    LDL 87 2023     (H) 2023     Lab Results   Component Value Date    HGBA1C 8.00 (H) 2024     Lab Results   Component Value Date    INR 1.4 2024    INR 1.3 (H) 2015    PROTIME 15.0 (H) 2024    PROTIME 14.8 (H) 2015       Lab review:   Glucose 217  Sodium 141  K 3.9  Calcium 9.1    WBC 10.22  HgB 11.6    Imaging review:   CTH without contrast  FINDINGS:  The exam is significantly limited by extensive motion artifact, and  follow-up evaluation is advised as indications persist.  Within the limitations of the exam, no definite acute intracranial  hemorrhage, midline shift or mass effect. No acute territorial infarct  is identified. Basal ganglia " mineralization is present.  Mild to moderate periventricular hypodensities suggest chronic small  vessel ischemic change in a patient this age.  Arterial calcifications are seen at the base of the brain.  Thinning of the pituitary is apparent. Ventricles, cisterns, cerebral  sulci are unremarkable for patient age, and do not appear significantly  changed.  Small likely mucous retention cyst in the left sphenoid sinus. The  visualized paranasal sinuses, orbits, mastoid air cells are otherwise  unremarkable.  IMPRESSION:  The exam is significantly limited by motion artifact. No definite  intracranial hemorrhage. However, considering the significant  limitations of the exam, follow-up evaluation is recommended as  indications persist.    Diagnoses:  New onset seizure like activity  Severe dementia without behavioral disturbance  Type 2 diabetes  HTN  PAF on xarelto  HLD  Vitamin B12 deficiency     Comment: Patient constellation of symptoms highly concerning for new onset seizure. Patient has risk factors including severe dementia, reported history of stroke, and recent illness. Patient should be placed on hospital seizure precautions. Will obtain brain MRI with and without contrast and EEG to further evaluate. Will also check magnesium and phosphorus levels. Patient given Ativan 0.5 mg and Keppra 1 gram on arrival. Will continue Keppra 500 mg BID.    PLAN:   MRI brain with and without contrast  Routine EEG  Ativan 0.5 mg and Keppra 1 gram given on arrival  Start Keppra 500 mg BID  Hospital seizure precautions  Magnesium and phosphorus   Continue monitoring of O2 saturation  Call RRT and neurologist on call for acute neurological change.   We will continue to follow.    Recommendations discussed with Dr. Monteiro who is in agreement with plan.

## 2024-12-12 NOTE — PLAN OF CARE
Goal Outcome Evaluation:              Outcome Evaluation: Clinical swallow evaluation completed. Son and daughter at bedside. Pt answers some questions with one-word responses. Nonmeaningful, slurred speech primarily; son and daughter report baseline. Difficulties following oral [facility] commands; limited dentition noted, however, adequate munching mastication demonstrated with solids. No overt s/s of aspiration with ice chip, thins via spoon/cup/straw, puree, soft/mixed solid, or regular solids. Daughter reports beginning to cut solids into bite-sized pieces to avoid choking/coughing. Recommend soft/chopped solid diet with thin liquids. Meds whole in puree. Only allow PO when awake and alert, supervision/assistance with meals, sitting upright, slow rate, small/single bites/sips.

## 2024-12-12 NOTE — CONSULTS
Orthopaedic Surgery  Consult Note  Constantin Wilkerson MD    [phone]    HPI:  Patient is a 79 y.o.female who presents with right knee pain and likely hemarthrosis stemming from a fall. Patient had knee aspirated in office at [facility] 11/26/24 with return of bloody aspirate. Admitted to hospital for seizure however continues to have swollen likely hemorrhagic effusion of the right knee. There is an effusion about the right knee. The knee is tender to palpation. The labs are reassuring and I think is of low likelihood this is infectious in nature. I discussed with her daughter today that another aspiration would be somewhat therapeutic however could be likely to return. Pain is improved with rest and immobilization. Pain worsened with movement and palpation about the knee.     MEDICAL HISTORY  Past Medical History:   Diagnosis Date    Abnormal ECG     AFib    Allergic rhinitis     Anemia     Cataract     Chronic venous insufficiency     Coronary artery disease     Afib    Dementia     Depression     Diabetes mellitus     Headache     Sinus?    Hidradenitis suppurativa     History of transfusion     Hyperlipidemia     Hypertension     Low back pain     Osteoarthritis     Paroxysmal atrial fibrillation     Peripheral neuropathy     Pulmonary arterial hypertension     Stroke     Vitamin B 12 deficiency     Boarderline only with normal MMA/Homocysteine/IF antibody     Past Surgical History:   Procedure Laterality Date    BACK SURGERY  2007    COLONOSCOPY      HYSTERECTOMY      PARTIAL    JOINT REPLACEMENT      Knee replacement    KNEE SURGERY Left     SUBTOTAL HYSTERECTOMY      TONSILLECTOMY       Prior to Admission medications    Medication Sig Start Date End Date Taking? Authorizing Provider   Alcohol Swabs (Pharmacist Choice Alcohol) pads 1 each 2 (Two) Times a Day. 5/4/22   Maria De Jesus Al MD   amLODIPine (NORVASC) 5 MG tablet Take 1 tablet by mouth Daily. 2/21/24   ProviderRe MD    atorvastatin (LIPITOR) 10 MG tablet TAKE 1 TABLET EVERY DAY 9/4/24   Maria De Jesus Al MD   Cholecalciferol (VITAMIN D3) 5000 UNITS tablet Take 1 tablet by mouth Daily.    Re Simmons MD   DULoxetine (CYMBALTA) 60 MG capsule TAKE 1 CAPSULE EVERY DAY 1/15/24   Maria De Jesus Al MD   EPINEPHrine (EPIPEN) 0.3 MG/0.3ML solution auto-injector injection 0.3 mL Daily As Needed. 2/12/16   Re Simmons MD   glucose blood (Pharmacist Choice Autocode) test strip 1 each by Other route 2 (Two) Times a Day. Use as instructed 12/6/23   Maria De Jesus Al MD   Immune Globulin, Human, 40 GM/400ML solution 40 g Every 30 (Thirty) Days. 9/3/15   Re Simmons MD   metoprolol succinate XL (TOPROL-XL) 25 MG 24 hr tablet TAKE 1 TABLET EVERY DAY 9/30/24   Maria De Jesus Al MD   olmesartan (BENICAR) 40 MG tablet Take 1 tablet by mouth Daily. 9/23/24   Maria De Jesus Al MD   omeprazole (priLOSEC) 40 MG capsule TAKE 1 CAPSULE EVERY DAY 9/16/24   Maria De Jesus Al MD   Pharmacist Choice Lancets misc Use 1 each 2 (Two) Times a Day. 12/6/23   Maria De Jesus Al MD   Tirzepatide (Mounjaro) 15 MG/0.5ML solution pen-injector pen Inject 0.5 mL under the skin into the appropriate area as directed 1 (One) Time Per Week. 8/29/24   Maria De Jesus Al MD   traMADol (ULTRAM) 50 MG tablet TAKE ONE TABLET BY MOUTH EVERY 8 HOURS AS NEEDED FOR MODERATE PAIN 11/21/23   Maria De Jesus Al MD   Xarelto 20 MG tablet Take 1 tablet by mouth Daily. 5/23/23   Derrick Pizarro MD     Allergies   Allergen Reactions    Enalapril Swelling     vasotec tabs    Memantine Hcl Other (See Comments)     imbalance      Enalapril Maleate Swelling    Penicillins Swelling     Beta lactam allergy details  Antibiotic reaction: (!) shortness of breath, other (Swelling)  Age at reaction: adult  Dose to reaction time: unknown  Reason for antibiotic: unknown  Epinephrine required for reaction?: unknown  Tolerated antibiotics: unknown        Most Recent Immunizations   Administered Date(s) Administered     "31-influenza Vac Quardvalent Preservativ 01/01/2021    ABRYSVO (RSV, 60+ or pregnant women 32-36 wks) 02/02/2024    COVID-19 (PFIZER) 12YRS+ (COMIRNATY) 02/02/2024    COVID-19 (PFIZER) BIVALENT 12+YRS 09/23/2022    COVID-19 (PFIZER) Purple Cap Monovalent 10/30/2021    Fluad Quad 65+ 10/07/2020    Fluzone  >6mos 08/24/2015    Fluzone High-Dose 65+YRS 09/16/2019    Fluzone High-Dose 65+yrs 02/02/2024    INFLUENZA NASAL UF 11/01/2023    Influenza TIV (IM) 08/31/2015    Influenza, Unspecified 10/02/2020    Pneumococcal Conjugate 13-Valent (PCV13) 08/24/2015    Pneumococcal Polysaccharide (PPSV23) 10/17/2017    Tdap 03/24/2024    Zostavax 07/14/2015     Social History     Tobacco Use    Smoking status: Former     Current packs/day: 0.50     Types: Cigarettes     Passive exposure: Past    Smokeless tobacco: Never   Substance Use Topics    Alcohol use: Never     Comment: CAFFEINE USE/ SOFT DRINKS.       Social History     Substance and Sexual Activity   Drug Use Never       VITALS: /59 (BP Location: Left arm, Patient Position: Lying)   Pulse 60   Temp 97.9 °F (36.6 °C) (Oral)   Resp 16   Ht 160 cm (63\")   Wt 78.2 kg (172 lb 6.4 oz)   LMP  (LMP Unknown)   SpO2 99%   BMI 30.54 kg/m²  Body mass index is 30.54 kg/m².    PHYSICAL EXAM:   CONSTITUTIONAL: No acute distress  LUNGS: Equal chest rise, no shortness of air  CARDIOVASCULAR: palpable peripheral pulses  SKIN: no skin lesions in the area examined  LYMPH: no lymphadenopathy in the area examined  Musculoskeletal:   Right knee   Skin intact  Moderate effusion   Leg resting in a position of comfort  Able to DF PF at the ankle   Sensation is grossly intact distally   Foot is wwp       RADIOLOGY REVIEW:   CT Head Without Contrast    Result Date: 12/11/2024   The exam is significantly limited by motion artifact. No definite intracranial hemorrhage. However, considering the significant limitations of the exam, follow-up evaluation is recommended as indications " persist.  This report was finalized on 12/11/2024 8:37 PM by Dr. Randolph Abbasi M.D on Workstation: [id number]      XR Chest 1 View    Result Date: 12/11/2024  Stable chest x-ray. No acute findings    This report was finalized on 12/11/2024 7:53 PM by Dr. Mauro Castano M.D on Workstation: [id number]       LABS:   Results for the past 24 hours:   Recent Results (from the past 24 hours)   Comprehensive Metabolic Panel    Collection Time: 12/11/24  7:06 PM    Specimen: Blood   Result Value Ref Range    Glucose 217 (H) 65 - 99 mg/dL    BUN 12 8 - 23 mg/dL    Creatinine 0.74 0.57 - 1.00 mg/dL    Sodium 141 136 - 145 mmol/L    Potassium 3.9 3.5 - 5.2 mmol/L    Chloride 103 98 - 107 mmol/L    CO2 26.0 22.0 - 29.0 mmol/L    Calcium 9.1 8.6 - 10.5 mg/dL    Total Protein 6.4 6.0 - 8.5 g/dL    Albumin 3.4 (L) 3.5 - 5.2 g/dL    ALT (SGPT) 16 1 - 33 U/L    AST (SGOT) 29 1 - 32 U/L    Alkaline Phosphatase 74 39 - 117 U/L    Total Bilirubin 1.1 0.0 - 1.2 mg/dL    Globulin 3.0 gm/dL    A/G Ratio 1.1 g/dL    BUN/Creatinine Ratio 16.2 7.0 - 25.0    Anion Gap 12.0 5.0 - 15.0 mmol/L    eGFR 81.9 >60.0 mL/min/1.73   CBC Auto Differential    Collection Time: 12/11/24  7:06 PM    Specimen: Blood   Result Value Ref Range    WBC 10.22 3.40 - 10.80 10*3/mm3    RBC 3.90 3.77 - 5.28 10*6/mm3    Hemoglobin 11.6 (L) 12.0 - 15.9 g/dL    Hematocrit 35.3 34.0 - 46.6 %    MCV 90.5 79.0 - 97.0 fL    MCH 29.7 26.6 - 33.0 pg    MCHC 32.9 31.5 - 35.7 g/dL    RDW 13.9 12.3 - 15.4 %    RDW-SD 44.9 37.0 - 54.0 fl    MPV 10.0 6.0 - 12.0 fL    Platelets 270 140 - 450 10*3/mm3    Neutrophil % 82.4 (H) 42.7 - 76.0 %    Lymphocyte % 11.7 (L) 19.6 - 45.3 %    Monocyte % 5.0 5.0 - 12.0 %    Eosinophil % 0.2 (L) 0.3 - 6.2 %    Basophil % 0.2 0.0 - 1.5 %    Immature Grans % 0.5 0.0 - 0.5 %    Neutrophils, Absolute 8.42 (H) 1.70 - 7.00 10*3/mm3    Lymphocytes, Absolute 1.20 0.70 - 3.10 10*3/mm3    Monocytes, Absolute 0.51 0.10 - 0.90 10*3/mm3    Eosinophils,  Absolute 0.02 0.00 - 0.40 10*3/mm3    Basophils, Absolute 0.02 0.00 - 0.20 10*3/mm3    Immature Grans, Absolute 0.05 0.00 - 0.05 10*3/mm3   Scan Slide    Collection Time: 12/11/24  7:06 PM    Specimen: Blood   Result Value Ref Range    RBC Morphology Normal Normal    WBC Morphology Normal Normal    Platelet Morphology Normal Normal   Magnesium    Collection Time: 12/11/24  7:06 PM    Specimen: Blood   Result Value Ref Range    Magnesium 1.8 1.6 - 2.4 mg/dL   Phosphorus    Collection Time: 12/11/24  7:06 PM    Specimen: Blood   Result Value Ref Range    Phosphorus 2.0 (L) 2.5 - 4.5 mg/dL   ECG 12 Lead Syncope    Collection Time: 12/11/24  8:38 PM   Result Value Ref Range    QT Interval 444 ms    QTC Interval 486 ms   Urinalysis With Microscopic If Indicated (No Culture) - Urine, Clean Catch    Collection Time: 12/11/24  8:48 PM    Specimen: Urine, Clean Catch   Result Value Ref Range    Color, UA Yellow Yellow, Straw    Appearance, UA Clear Clear    pH, UA 6.0 5.0 - 8.0    Specific Gravity, UA >1.030 (H) 1.005 - 1.030    Glucose, UA >=1000 mg/dL (3+) (A) Negative    Ketones, UA 40 mg/dL (2+) (A) Negative    Bilirubin, UA Negative Negative    Blood, UA Negative Negative    Protein, UA Trace (A) Negative    Leuk Esterase, UA Negative Negative    Nitrite, UA Negative Negative    Urobilinogen, UA 1.0 E.U./dL 0.2 - 1.0 E.U./dL   POC Glucose Once    Collection Time: 12/12/24 12:24 AM    Specimen: Blood   Result Value Ref Range    Glucose 138 (H) 70 - 130 mg/dL   Hemoglobin A1c    Collection Time: 12/12/24  4:48 AM    Specimen: Blood   Result Value Ref Range    Hemoglobin A1C 8.10 (H) 4.80 - 5.60 %   Basic Metabolic Panel    Collection Time: 12/12/24  4:48 AM    Specimen: Blood   Result Value Ref Range    Glucose 124 (H) 65 - 99 mg/dL    BUN 11 8 - 23 mg/dL    Creatinine 0.59 0.57 - 1.00 mg/dL    Sodium 142 136 - 145 mmol/L    Potassium 3.1 (L) 3.5 - 5.2 mmol/L    Chloride 106 98 - 107 mmol/L    CO2 26.1 22.0 - 29.0 mmol/L     Calcium 8.7 8.6 - 10.5 mg/dL    BUN/Creatinine Ratio 18.6 7.0 - 25.0    Anion Gap 9.9 5.0 - 15.0 mmol/L    eGFR 91.2 >60.0 mL/min/1.73   CBC (No Diff)    Collection Time: 12/12/24  4:48 AM    Specimen: Blood   Result Value Ref Range    WBC 9.33 3.40 - 10.80 10*3/mm3    RBC 3.45 (L) 3.77 - 5.28 10*6/mm3    Hemoglobin 10.1 (L) 12.0 - 15.9 g/dL    Hematocrit 30.9 (L) 34.0 - 46.6 %    MCV 89.6 79.0 - 97.0 fL    MCH 29.3 26.6 - 33.0 pg    MCHC 32.7 31.5 - 35.7 g/dL    RDW 13.9 12.3 - 15.4 %    RDW-SD 44.9 37.0 - 54.0 fl    MPV 9.6 6.0 - 12.0 fL    Platelets 257 140 - 450 10*3/mm3   Magnesium    Collection Time: 12/12/24  4:48 AM    Specimen: Blood   Result Value Ref Range    Magnesium 1.8 1.6 - 2.4 mg/dL   Phosphorus    Collection Time: 12/12/24  4:48 AM    Specimen: Blood   Result Value Ref Range    Phosphorus 2.3 (L) 2.5 - 4.5 mg/dL   POC Glucose Once    Collection Time: 12/12/24  6:44 AM    Specimen: Blood   Result Value Ref Range    Glucose 107 70 - 130 mg/dL   ECG 12 Lead QT Measurement    Collection Time: 12/12/24  7:32 AM   Result Value Ref Range    QT Interval 565 ms    QTC Interval 541 ms       IMPRESSION:  Patient is a 79 y.o. female with right knee hemarthrosis     PLAN:   Admited to: Jorge Luis Calle MD  [facility]   Okay for diet   Discussed with daughter that this is likely a hemarthrosis that can be treated conservatively with compressive wrap ice and elevation. Encouraged PT and mobilization as that may help with the hemarthrosis decreasing   Labs/vitals reassuring this is not an infectious process   please call/chat  with any questions or concerns.    Constantin Wilkerson MD   [facility]   [phone] - [place] [facility]  [phone] - [place] Office

## 2024-12-12 NOTE — THERAPY EVALUATION
Patient Name: Kassi Mathis  : 1945    MRN: [id number]                              Today's Date: 2024       Admit Date: 2024    Visit Dx:     ICD-10-CM ICD-9-CM   1. New onset seizure  R56.9 780.39     Patient Active Problem List   Diagnosis    Depression    Allergic rhinitis    Hypertension    Dementia without behavioral disturbance    Paroxysmal atrial fibrillation    B12 deficiency    Type 2 diabetes mellitus    Hyperlipidemia    Hammer toes of both feet    Cervical radiculopathy    Osteoarthritis of right knee    At high risk for falls    Chronic diastolic congestive heart failure    Bradycardia, sinus    Acquired bilateral hammer toes    Diabetic polyneuropathy associated with type 2 diabetes mellitus    New onset seizure    Effusion of right knee    Oral phase dysphagia    Post-ictal state     Past Medical History:   Diagnosis Date    Abnormal ECG     AFib    Allergic rhinitis     Anemia     Cataract     Chronic venous insufficiency     Coronary artery disease     Afib    Dementia     Depression     Diabetes mellitus     Headache     Sinus?    Hidradenitis suppurativa     History of transfusion     Hyperlipidemia     Hypertension     Low back pain     Osteoarthritis     Paroxysmal atrial fibrillation     Peripheral neuropathy     Pulmonary arterial hypertension     Stroke     Vitamin B 12 deficiency     Boarderline only with normal MMA/Homocysteine/IF antibody     Past Surgical History:   Procedure Laterality Date    BACK SURGERY      COLONOSCOPY      HYSTERECTOMY      PARTIAL    JOINT REPLACEMENT      Knee replacement    KNEE SURGERY Left     SUBTOTAL HYSTERECTOMY      TONSILLECTOMY        General Information       Row Name 24 1452          Physical Therapy Time and Intention    Document Type evaluation  -SM     Mode of Treatment co-treatment;physical therapy  -SM       Row Name 24 1452          General Information    Patient Profile Reviewed yes  -SM     Prior Level of  Function max assist:  daughter assists with ADLs. pt ambulatory with rwx  -     Existing Precautions/Restrictions fall  -     Barriers to Rehab medically complex  -       Row Name 12/12/24 1452          Living Environment    People in Home child(tayler), adult  -       Row Name 12/12/24 1452          Cognition    Orientation Status (Cognition) disoriented to;person;place;situation;time  -       Row Name 12/12/24 1452          Safety Issues/Impairments Affecting Functional Mobility    Safety Issues Affecting Function (Mobility) ability to follow commands  -     Comment, Safety Issues/Impairments (Mobility) Co treatment medically appropriate and necessary due to patient acuity level, activity tolerance and safety of patient and staff. Evaluation established to achieve all goals in POC.  -               User Key  (r) = Recorded By, (t) = Taken By, (c) = Cosigned By      Initials Name Provider Type     Emily Conley, PT Physical Therapist                   Mobility       Row Name 12/12/24 1453          Bed Mobility    Bed Mobility supine-sit;sit-supine  -     Supine-Sit [place] (Bed Mobility) minimum assist (75% patient effort);2 person assist;verbal cues  -     Sit-Supine [place] (Bed Mobility) maximum assist (25% patient effort);2 person assist;verbal cues  -       Row Name 12/12/24 1453          Sit-Stand Transfer    Sit-Stand [place] (Transfers) minimum assist (75% patient effort);verbal cues;2 person assist  -     Assistive Device (Sit-Stand Transfers) walker, front-wheeled  -       Row Name 12/12/24 1459          Gait/Stairs (Locomotion)    [place] Level (Gait) 2 person assist;minimum assist (75% patient effort);verbal cues  -     Distance in Feet (Gait) 15  -SM     Deviations/Abnormal Patterns (Gait) harper decreased;gait speed decreased  -     Bilateral Gait Deviations forward flexed posture  -     Comment, (Gait/Stairs) Gait slow with continuous cues needed  for sequencing and safety. Assist needed for rwx guidance.  -               User Key  (r) = Recorded By, (t) = Taken By, (c) = Cosigned By      Initials Name Provider Type    Emily Wang PT Physical Therapist                   Obj/Interventions       Row Name 12/12/24 1459          Range of Motion Comprehensive    General Range of Motion bilateral lower extremity ROM WFL  -[facility] Name 12/12/24 1455          Strength Comprehensive (MMT)    General Manual Muscle Testing (MMT) Assessment lower extremity strength deficits identified  -     Comment, General Manual Muscle Testing (MMT) Assessment Generalized LE weakness. Unable to formally assess due to difficulty following commands  -       Row Name 12/12/24 1454          Balance    Balance Assessment sitting static balance;sitting dynamic balance;standing static balance;standing dynamic balance  -     Static Sitting Balance contact guard  -     Dynamic Sitting Balance minimal assist;contact guard  -     Position, Sitting Balance sitting edge of bed  -     Static Standing Balance contact guard;minimal assist;2-person assist  -     Dynamic Standing Balance minimal assist;2-person assist;verbal cues;non-verbal cues (demo/gesture)  -     Position/Device Used, Standing Balance supported;walker, front-wheeled  -     Balance Interventions sitting;standing;sit to stand;supported;static;dynamic  -               User Key  (r) = Recorded By, (t) = Taken By, (c) = Cosigned By      Initials Name Provider Type     Emily Conley PT Physical Therapist                   Goals/Plan       [facility] Name 12/12/24 1502          Bed Mobility Goal 1 (PT)    Activity/Assistive Device (Bed Mobility Goal 1, PT) bed mobility activities, all  -     [place] Level/Cues Needed (Bed Mobility Goal 1, PT) isabel assist  -     Time Frame (Bed Mobility Goal 1, PT) 1 week  -[facility] Name 12/12/24 1502          Transfer Goal 1 (PT)    Activity/Assistive  Device (Transfer Goal 1, PT) sit-to-stand/stand-to-sit;bed-to-chair/chair-to-bed  -SM     [place] Level/Cues Needed (Transfer Goal 1, PT) contact guard required  -SM     Time Frame (Transfer Goal 1, PT) 1 week  -SM       Row Name 12/12/24 5322          Gait Training Goal 1 (PT)    Activity/Assistive Device (Gait Training Goal 1, PT) gait (walking locomotion);walker, rolling  -SM     [place] Level (Gait Training Goal 1, PT) standby assist  -SM     Distance (Gait Training Goal 1, PT) 30ft  -SM     Time Frame (Gait Training Goal 1, PT) 1 week  -SM               User Key  (r) = Recorded By, (t) = Taken By, (c) = Cosigned By      Initials Name Provider Type     Emily Conley, PT Physical Therapist                   Clinical Impression       Row Name 12/12/24 0155          Pain    Pretreatment Pain Rating 0/10 - no pain  -SM     Posttreatment Pain Rating 0/10 - no pain  -SM       Row Name 12/12/24 3729          Plan of Care Review    Plan of Care Reviewed With patient;child  -     Outcome Evaluation Patient is an 79 y.o female who presented to [facility] following a seizure. Patient disoriented x4 at baseline. Patient lives at home with her daughter, whom was at bedside. Patient ambulates around home with support of rwx. Patient required minAx2 to sit up to EOB this date. Patient stood from EOB with minAx2. Patient ambulated in room with rwx and minAx2. Gait slow with continuous cues needed for sequencing and safety. Assist needed for rwx guidance. Continuous redirection for attention to task needed. Patient may continue to benefit from skilled PT intervention to address deficits in functional mobilty. Anticipate home with 24/7 assist at d/c. PT will continue to monitor.  -SM       Row Name 12/12/24 4947          Therapy Assessment/Plan (PT)    Rehab Potential (PT) fair  -     Criteria for Skilled Interventions Met (PT) yes  -SM     Therapy Frequency (PT) 2 times/wk  -SM       Row Name 12/12/24 1367           Vital Signs    Pre Patient Position Supine  -SM     Intra Patient Position Standing  -SM     Post Patient Position Supine  -SM       Row Name 12/12/24 1455          Positioning and Restraints    Pre-Treatment Position in bed  -SM     Post Treatment Position bed  -SM     In Bed notified nsg;call light within reach;encouraged to call for assist;exit alarm on;fowlers;with family/caregiver  -               User Key  (r) = Recorded By, (t) = Taken By, (c) = Cosigned By      Initials Name Provider Type     Emily Conley PT Physical Therapist                   Outcome Measures       Row Name 12/12/24 1503 12/12/24 0855       How much help from another person do you currently need...    Turning from your back to your side while in flat bed without using bedrails? 2  -SM 2  -LT    Moving from lying on back to sitting on the side of a flat bed without bedrails? 2  -SM 2  -LT    Moving to and from a bed to a chair (including a wheelchair)? 2  -SM 2  -LT    Standing up from a chair using your arms (e.g., wheelchair, bedside chair)? 2  -SM 2  -LT    Climbing 3-5 steps with a railing? 1  -SM 2  -LT    To walk in hospital room? 2  -SM 2  -LT    AM-PAC 6 Clicks Score (PT) 11  - 12  -LT    Highest Level of Mobility Goal 4 --> Transfer to chair/commode  -SM 4 --> Transfer to chair/commode  -LT      Row Name 12/12/24 1503          Functional Assessment    Outcome Measure Options AM-PAC 6 Clicks Basic Mobility (PT)  -               User Key  (r) = Recorded By, (t) = Taken By, (c) = Cosigned By      Initials Name Provider Type    Alba Smith RN Registered Nurse     Emily Conley, JOSE Physical Therapist                                 Physical Therapy Education       Title: PT OT SLP Therapies (In Progress)       Topic: Physical Therapy (In Progress)       Point: Mobility training (In Progress)       Learning Progress Summary            Patient Acceptance, E, NR by  at 12/12/2024 1503                       Point: Home exercise program (In Progress)       Learning Progress Summary            Patient Acceptance, E, NR by  at 12/12/2024 1503                      Point: Body mechanics (In Progress)       Learning Progress Summary            Patient Acceptance, E, NR by  at 12/12/2024 1503                      Point: Precautions (In Progress)       Learning Progress Summary            Patient Acceptance, E, NR by  at 12/12/2024 1503                                      User Key       Initials Effective Dates Name Provider Type Discipline     05/02/22 -  Emily Conley, PT Physical Therapist PT                  PT Recommendation and Plan     Outcome Evaluation: Patient is an 79 y.o female who presented to [facility] following a seizure. Patient disoriented x4 at baseline. Patient lives at home with her daughter, whom was at bedside. Patient ambulates around home with support of rwx. Patient required minAx2 to sit up to EOB this date. Patient stood from EOB with minAx2. Patient ambulated in room with rwx and minAx2. Gait slow with continuous cues needed for sequencing and safety. Assist needed for rwx guidance. Continuous redirection for attention to task needed. Patient may continue to benefit from skilled PT intervention to address deficits in functional mobilty. Anticipate home with 24/7 assist at d/c. PT will continue to monitor.     Time Calculation:         PT Charges       Row Name 12/12/24 1504             Time Calculation    Start Time 1321  -      Stop Time 1341  -      Time Calculation (min) 20 min  -      PT Received On 12/12/24  -      PT - Next Appointment 12/17/24  -      PT Goal Re-Cert Due Date 12/19/24  -         Time Calculation- PT    Total Timed Code Minutes- PT 12 minute(s)  -         Timed Charges    39957 - PT Therapeutic Activity Minutes 12  -SM         Total Minutes    Timed Charges Total Minutes 12  -SM       Total Minutes 12  -SM                User Key  (r) = Recorded By, (t) =  Taken By, (c) = Cosigned By      Initials Name Provider Type     Emily Conley, JOSE Physical Therapist                  Therapy Charges for Today       Code Description Service Date Service Provider Modifiers Qty    [id number]  PT THERAPEUTIC ACT EA 15 MIN 12/12/2024 Emily Conley, PT GP 1    [id number] HC PT EVAL MOD COMPLEXITY 3 12/12/2024 Emily Conley, PT GP 1            PT G-Codes  Outcome Measure Options: AM-PAC 6 Clicks Basic Mobility (PT)  AM-PAC 6 Clicks Score (PT): 11  PT Discharge Summary  Anticipated Discharge Disposition (PT): home with 24/7 care    Emily Conley PT  12/12/2024

## 2024-12-12 NOTE — NURSING NOTE
The patients family yelled out in the hallway for assistance because she felt that the patient was unresponsive. The family member was verbally abusive toward staff because she felt that we were not responsive quickly enough. I went to the patients room and evaluated her right away. She was drowsy but arousable. Baseline disoriented x4. Patient opened her eyes and responded to voice and touch. VSS. . Will continue to monitor the patient the remainder of this shift.

## 2024-12-12 NOTE — ED PROVIDER NOTES
EMERGENCY DEPARTMENT ENCOUNTER    Room Number:  39/39  PCP: Parul Jaime MD    HPI:  Chief Complaint: Seizure  A complete HPI/ROS/PMH/PSH/SH/FH are unobtainable due to: None  Context: Kassi Mathis is a 79 y.o. female who presents to the ED c/o acute new onset seizure.  No history of similar.  She reportedly had shaking her bilateral extremities for about 3 minutes that was then followed by being limp for 3 to 4 minutes.  She then was confused and slow to wake up for about 10 to 15 minutes.  She normally incontinent to urine at baseline.  She is now back to her baseline self per family.  She has not complained of any headache, chest pain, shortness of breath, abdominal pain, back pain.  No fever or other infectious symptoms.        PAST MEDICAL HISTORY  Active Ambulatory Problems     Diagnosis Date Noted    Depression 01/17/2016    Allergic rhinitis 01/17/2016    Hypertension 01/17/2016    Dementia without behavioral disturbance 01/17/2016    Paroxysmal atrial fibrillation 01/17/2016    B12 deficiency 01/17/2016    Type 2 diabetes mellitus 01/17/2016    Hyperlipidemia 01/17/2016    Hammer toes of both feet 10/17/2017    Cervical radiculopathy 10/08/2018    Osteoarthritis of right knee 09/08/2020    At high risk for falls 09/29/2021    Chronic diastolic congestive heart failure 08/09/2022    Bradycardia, sinus 09/08/2022    Acquired bilateral hammer toes 01/30/2024    Diabetic polyneuropathy associated with type 2 diabetes mellitus 01/30/2024     Resolved Ambulatory Problems     Diagnosis Date Noted    Fatigue 01/17/2016    Edema 01/17/2016    Anemia 01/17/2016    Abdominal pain 01/17/2016    Hidradenitis suppurativa 01/17/2016    Diarrhea 01/17/2016    Imbalance 07/20/2016    Headache 10/07/2018    Acute neck pain 10/07/2018     Past Medical History:   Diagnosis Date    Abnormal ECG     Cataract     Chronic venous insufficiency     Coronary artery disease     Dementia     Diabetes mellitus     History of  transfusion     Low back pain     Osteoarthritis     Peripheral neuropathy     Pulmonary arterial hypertension     Stroke     Vitamin B 12 deficiency          PAST SURGICAL HISTORY  Past Surgical History:   Procedure Laterality Date    BACK SURGERY  2007    COLONOSCOPY      HYSTERECTOMY      PARTIAL    JOINT REPLACEMENT      Knee replacement    KNEE SURGERY Left     SUBTOTAL HYSTERECTOMY      TONSILLECTOMY           FAMILY HISTORY  Family History   Problem Relation Age of Onset    Breast cancer Mother     Depression Mother     Cancer Mother         Breast Ca    Stroke Father     Alcohol abuse Father     Stroke Maternal Grandmother     Heart attack Maternal Grandmother     Heart disease Maternal Grandmother     Heart attack Maternal Grandfather     Cancer Maternal Grandfather         Throat cancer    Hyperlipidemia Daughter     Diabetes Daughter     Hypertension Daughter     Liver disease Son     Alcohol abuse Son              Early death Son     Hypertension Son          SOCIAL HISTORY  Social History     Socioeconomic History    Marital status:    Tobacco Use    Smoking status: Former     Current packs/day: 0.50     Types: Cigarettes     Passive exposure: Past    Smokeless tobacco: Never   Vaping Use    Vaping status: Never Used   Substance and Sexual Activity    Alcohol use: Never     Comment: CAFFEINE USE/ SOFT DRINKS.     Drug use: Never    Sexual activity: Not Currently     Partners: Male         ALLERGIES  Enalapril, Memantine hcl, Enalapril maleate, and Penicillins        REVIEW OF SYSTEMS  Review of Systems     Included in HPI  All systems reviewed and negative except for those discussed in HPI.       PHYSICAL EXAM  ED Triage Vitals [24 1640]   Temp Heart Rate Resp BP SpO2   97.3 °F (36.3 °C) 64 18 115/67 99 %      Temp src Heart Rate Source Patient Position BP Location FiO2 (%)   Oral Monitor Sitting Right arm --       Physical Exam      GENERAL: no acute distress  HENT: nardonna  patent, overall poor dentition  EYES: no scleral icterus  CV: regular rhythm, normal rate  RESPIRATORY: normal effort, clear to auscultation bilaterally  ABDOMEN: soft, nontender  MUSCULOSKELETAL: no deformity  NEURO: alert, nonsensical speech, moves all extremities, follows simple commands intermittently  PSYCH:  calm, cooperative  SKIN: warm, dry    Vital signs and nursing notes reviewed.          LAB RESULTS  No results found for this or any previous visit (from the past 24 hours).    Ordered the above labs and reviewed the results.        RADIOLOGY  No Radiology Exams Resulted Within Past 24 Hours    Ordered the above noted radiological studies. Reviewed by me in PACS.        MEDICATIONS GIVEN IN ER  Medications   sodium chloride 0.9 % flush 10 mL (has no administration in time range)         ORDERS PLACED DURING THIS VISIT:  Orders Placed This Encounter   Procedures    CT Head Without Contrast    XR Chest 1 View    Comprehensive Metabolic Panel    CBC Auto Differential    Urinalysis With Microscopic If Indicated (No Culture) - Urine, Clean Catch    Inpatient Neurology Consult General    Insert Peripheral IV    CBC & Differential         OUTPATIENT MEDICATION MANAGEMENT:  Current Facility-Administered Medications Ordered in Epic   Medication Dose Route Frequency Provider Last Rate Last Admin    sodium chloride 0.9 % flush 10 mL  10 mL Intravenous PRN Alcon Wilkinson II, MD         Current Outpatient Medications Ordered in Epic   Medication Sig Dispense Refill    amLODIPine (NORVASC) 5 MG tablet TAKE 1 TABLET BY MOUTH DAILY 90 tablet 3    atenolol (TENORMIN) 25 MG tablet TAKE 1/2 TABLET BY MOUTH DAILY 45 tablet 1    azithromycin (ZITHROMAX) 200 MG/5ML suspension Give the patient 500 mg (12.5 ml) by mouth the first day then 250 mg (6.5 ml) by mouth daily for 4 days. 42 mL 0    donepezil (ARICEPT) 10 MG tablet Take 1 tablet by mouth Daily. 90 tablet 3    fluticasone (FLONASE) 50 MCG/ACT nasal spray Administer  2 sprays into the nostril(s) as directed by provider Daily.      furosemide (LASIX) 20 MG tablet Take 1 tablet by mouth Daily. 90 tablet 3    glucose blood test strip Use daily for type 2 DM. 100 each 3    HYDROcodone-acetaminophen (NORCO) 5-325 MG per tablet Take 1 tablet by mouth Every 4 (Four) Hours As Needed for Moderate Pain. 16 tablet 0    Jardiance 10 MG tablet tablet TAKE ONE TABLET BY MOUTH DAILY 90 tablet 7    Melatonin 10 MG capsule Take 5 mg by mouth Every Night.      metFORMIN (GLUCOPHAGE) 500 MG tablet TAKE 1/2 TABLET BY MOUTH TWICE A DAY WITH MEALS 90 tablet 3    metoprolol tartrate (LOPRESSOR) 50 MG tablet Take 1 tablet by mouth 2 (Two) Times a Day.      Multiple Vitamin (MULTIVITAMINS PO) Take 1 tablet by mouth daily.      Multiple Vitamins-Minerals (ZINC PO) Take  by mouth.      rosuvastatin (CRESTOR) 20 MG tablet TAKE ONE TABLET BY MOUTH EVERY NIGHT AT BEDTIME (Patient taking differently: 3 (Three) Times a Week.) 90 tablet 0    sertraline (ZOLOFT) 100 MG tablet TAKE 2 TABLETS BY MOUTH DAILY 180 tablet 1    traZODone (DESYREL) 25 MG half tablet       vitamin B-12 (CYANOCOBALAMIN) 1000 MCG tablet Take 1 tablet by mouth Daily.      vitamin C (ASCORBIC ACID) 250 MG tablet Take 1 tablet by mouth Daily.      Xarelto 20 MG tablet TAKE 1 TABLET BY MOUTH DAILY WITH DINNER 90 tablet 2       PROCEDURES  Critical Care    Performed by: Alcon Wilkinson II, MD  Authorized by: Alcon Wilkinson II, MD    Critical care provider statement:     Critical care time (minutes):  32    Critical care was necessary to treat or prevent imminent or life-threatening deterioration of the following conditions:  CNS failure or compromise    Critical care was time spent personally by me on the following activities:  Ordering and performing treatments and interventions, ordering and review of laboratory studies, ordering and review of radiographic studies, pulse oximetry, re-evaluation of patient's condition, discussions  with consultants, evaluation of patient's response to treatment, examination of patient and obtaining history from patient or surrogate            MEDICAL DECISION MAKING, PROGRESS, and CONSULTS    Discussion below represents my analysis of pertinent findings related to patient's condition, differential diagnosis, treatment plan and final disposition.      Additional sources:  - Discussed/obtained information from independent historians:  Family at bedside   Additional information was obtained to confirm the patient's history.            Differential diagnosis:    Intracranial mass, intracranial hemorrhage, electrolyte abnormality, infectious etiology causing seizure, medication side effect, epilepsy             Independent interpretation of labs, radiology studies, and discussions with consultants:  ED Course as of 12/12/24 1026   Wed Dec 11, 2024   2027 CT head independently interpreted by myself.  I see no evidence of intracranial hemorrhage.  There is motion artifact. [TD]   2106 Glucose(!): 217 [KA]   2106 Creatinine: 0.74 [KA]   2106 Ketones, UA(!): 40 mg/dL (2+) [KA]   2106 Leukocytes, UA: Negative [KA]   2106 WBC: 10.22 [KA]   2106 Hemoglobin(!): 11.6 [KA]   2106 I discussed the patient with Dr. Calle, hospitalist.  We discussed patient's history presentation and workup.  He agrees to admit for further evaluation treatment. [KA]      ED Course User Index  [KA] Flaca Santos PA-C  [TD] Alcon Wilkinson II, MD       I discussed the case with Dr. Monteiro, neuro. He recommends either admission for MRI or reasonable to allow for outpatient MRI. Family strongly prefers admission given difficulty with getting her back to hospital for MRI.    DIAGNOSIS  Final diagnoses:   New onset seizure         DISPOSITION  Admit      Latest Documented Vital Signs:  As of 19:00 EST  BP- 175/77 HR- 62 Temp- 97.3 °F (36.3 °C) (Oral) O2 sat- 97%      --    Please note that portions of this were completed with a voice  recognition program.       Note Disclaimer: At [facility], we believe that sharing information builds trust and better relationships. You are receiving this note because you are receiving care at [facility] or recently visited. It is possible you will see health information before a provider has talked with you about it. This kind of information can be easy to misunderstand. To help you fully understand what it means for your health, we urge you to discuss this note with your provider.         Alcon Wilkinson II, MD  12/12/24 9997

## 2024-12-12 NOTE — H&P
Patient Identification:  Name: Kassi Mathis  Age: 79 y.o.  Sex: female  :  1945  MRN: [id number]         Primary Care Physician: Parul Jaime MD    Chief Complaint   Patient presents with    Seizures     Subjective   Patient is a 79 y.o. female with a history of advanced dementia (baseline mentation is confused and incomprehensible speech and sometimes follows commands) brought in by family because of seizure. It was witnessed by family member not present in the room (3 other family members present). It was described as full body convulsions lasting about 5 minutes and she was postictal when EMS arrived. Patient has a history of atrial fibrillation on Xarelto, diabetes, hypertension, hyperlipidemia, previous stroke. She lives at home with family. In the ED she was given a dose of Keppra and 2 doses of Ativan.    Past Medical History:   Diagnosis Date    Abnormal ECG     AFib    Allergic rhinitis     Anemia     Cataract     Chronic venous insufficiency     Coronary artery disease     Afib    Dementia     Depression     Diabetes mellitus     Headache     Sinus?    Hidradenitis suppurativa     History of transfusion     Hyperlipidemia     Hypertension     Low back pain     Osteoarthritis     Paroxysmal atrial fibrillation     Peripheral neuropathy     Pulmonary arterial hypertension     Stroke     Vitamin B 12 deficiency     Boarderline only with normal MMA/Homocysteine/IF antibody     Past Surgical History:   Procedure Laterality Date    BACK SURGERY  2007    COLONOSCOPY      HYSTERECTOMY      PARTIAL    JOINT REPLACEMENT      Knee replacement    KNEE SURGERY Left     SUBTOTAL HYSTERECTOMY      TONSILLECTOMY       Family History   Problem Relation Age of Onset    Breast cancer Mother     Depression Mother     Cancer Mother         Breast Ca    Stroke Father     Alcohol abuse Father     Stroke Maternal Grandmother     Heart attack Maternal Grandmother     Heart disease Maternal Grandmother     Heart  attack Maternal Grandfather     Cancer Maternal Grandfather         Throat cancer    Hyperlipidemia Daughter     Diabetes Daughter     Hypertension Daughter     Liver disease Son     Alcohol abuse Son              Early death Son     Hypertension Son      Social History     Tobacco Use    Smoking status: Former     Current packs/day: 0.50     Types: Cigarettes     Passive exposure: Past    Smokeless tobacco: Never   Vaping Use    Vaping status: Never Used   Substance Use Topics    Alcohol use: Never     Comment: CAFFEINE USE/ SOFT DRINKS.     Drug use: Never     Objective      Vital Signs  Temp:  [97.3 °F (36.3 °C)] 97.3 °F (36.3 °C)  Heart Rate:  [61-78] 74  Resp:  [18] 18  BP: (115-183)/() 155/100  Body mass index is 26.04 kg/m².    Physical Exam  Constitutional:       General: She is not in acute distress.     Appearance: She is ill-appearing. She is not toxic-appearing.      Comments: Elderly. Agitated.   HENT:      Head: Normocephalic and atraumatic.   Cardiovascular:      Rate and Rhythm: Normal rate and regular rhythm.   Pulmonary:      Effort: Pulmonary effort is normal. No respiratory distress.      Breath sounds: Normal breath sounds. No wheezing or rhonchi.   Abdominal:      General: Bowel sounds are normal.      Palpations: Abdomen is soft.      Tenderness: There is no abdominal tenderness. There is no guarding or rebound.   Musculoskeletal:      Right knee: Swelling and effusion present.   Skin:     General: Skin is warm and dry.   Neurological:      Mental Status: She is disoriented.      Comments: Moving all extremities. Not following commands   Psychiatric:         Behavior: Behavior is agitated. Behavior is not aggressive.         Cognition and Memory: Cognition is not impaired. Memory is not impaired.     Results for orders placed during the hospital encounter of 22    Adult Transthoracic Echo Complete W/ Cont if Necessary Per Protocol    Interpretation Summary  · Calculated  left ventricular EF = 64.6% Calculated left ventricular 3D EF = 69% Estimated left ventricular EF was in agreement with the calculated left ventricular EF. Left ventricular systolic function is normal. Normal left ventricular cavity size and wall thickness noted. All left ventricular wall segments contract normally. Left ventricular diastolic function is consistent with (grade II w/high LAP) pseudonormalization.  · The left atrial cavity is moderately dilated.  · Estimated right ventricular systolic pressure from tricuspid regurgitation is normal (<35 mmHg).       Assessment & Plan   Active Hospital Problems    Diagnosis  POA    **New onset seizure [R56.9]  Yes    Effusion of right knee [M25.461]  Unknown    Chronic diastolic congestive heart failure [I50.32]  Yes    Hypertension [I10]  Yes    Hyperlipidemia [E78.5]  Yes    Type 2 diabetes mellitus [E11.9]  Yes    Paroxysmal atrial fibrillation [I48.0]  Yes    Dementia without behavioral disturbance [F03.90]  Yes      Resolved Hospital Problems   No resolved problems to display.     79 y.o. female    New onset seizure  CT limited by motion  Neurology has evaluated: MRI brain and EEG ordered  Seizure precautions    Right knee pain  Recent injury and sounds like has a hemarthrosis and was treated with injection  X-ray from 11/30/2024 with large effusion will ask her orthopedic surgeon to see    Severe dementia  Sounds very advanced. Family states she has had dementia for about 10 years  QTc 486 try a small dose of Zyprexa to help with agitation (and monitor QTc)  Family states she takes trazodone and melatonin for sleep also  Try delirium precautions   Need to determine goals of care    Chronic HFpEF  DM2 with hyperglycemia A1c was 8 in May 2024. Low-dose correctional while here hold metformin  paroxysmal atrial fibrillation on Xarelto  Hypertension   Hyperlipidemia     Reconcile medications when PTA med review complete    Discussed with patient and family and ED  provider Roseanna Santos.    Jorge Luis Calle MD  [facility]  12/11/24

## 2024-12-12 NOTE — PLAN OF CARE
Goal Outcome Evaluation:  Plan of Care Reviewed With: patient        Progress: no change  Outcome Evaluation: Pt is an 78 yo female admitted following new onset seizure. She has hx severe dementia, disx4 at baseline. She is seen this date for OT eval, her daughter is present to provide prior level, reports she has 24/7 assist and SPV from family, requires assist with ADLs, able to demo short household distances to  commode with rwx support at baseline. She presents this date with decreased activity tolerance and minimal strength deficits. Overall, main limitation is cognition and safety. She requires min A x2 for STS and functional mobility, max A for donning shoes in prep for OOB. She requires consistent cueing for sequencing and safety throughout functional mobility, increased time due to poor carryover. Continue to follow during inpatient stay to maximize (I) prior to d/c, plans home with 24/7 assist from family.    Anticipated Discharge Disposition (OT): home with 24/7 care, home with assist

## 2024-12-12 NOTE — CASE MANAGEMENT/SOCIAL WORK
Discharge Planning Assessment  [facility]     Patient Name: Kassi Mathis  MRN: [id number]  Today's Date: 12/12/2024    Admit Date: 12/11/2024    Plan: Plans discharge home with assist of family and [facility] (referral placed).   Discharge Needs Assessment       Row Name 12/12/24 165       Living Environment    People in Home child(tayler), adult;grandchild(tayler);spouse    Name(s) of People in Home Flaca Mathis/daughter [phone]; patient's spouse and granddaughter    Current Living Arrangements home    Potentially Unsafe Housing Conditions none    In the past 12 months has the electric, gas, oil, or water company threatened to shut off services in your home? No    Primary Care Provided by child(tayler)    Provides Primary Care For no one, unable/limited ability to care for self    Family Caregiver if Needed child(tayler), adult    Family Caregiver Names Flaca Mathis/daughter [phone] and Meg Mathis/son [phone]    Quality of Family Relationships helpful;involved;supportive    Able to Return to Prior Arrangements yes       Resource/Environmental Concerns    Resource/Environmental Concerns home accessibility    Home Accessibility Concerns stairs to enter home    Transportation Concerns none       Transportation Needs    In the past 12 months, has lack of transportation kept you from medical appointments or from getting medications? no    In the past 12 months, has lack of transportation kept you from meetings, work, or from getting things needed for daily living? No       Food Insecurity    Within the past 12 months, you worried that your food would run out before you got the money to buy more. Never true    Within the past 12 months, the food you bought just didn't last and you didn't have money to get more. Never true       Transition Planning    Patient/Family Anticipates Transition to home with family;home with help/services    Patient/Family Anticipated Services at Transition home health care     Transportation Anticipated family or friend will provide       Discharge Needs Assessment    Readmission Within the Last 30 Days no previous admission in last 30 days    Equipment Currently Used at Home cane, straight;rollator    Concerns to be Addressed basic needs;home safety;discharge planning    Do you want help finding or keeping work or a job? Patient unable to answer    Do you want help with school or training? For example, starting or completing job training or getting a high school diploma, GED or equivalent Patient unable to answer    Anticipated Changes Related to Illness none    Equipment Needed After Discharge none    Discharge Facility/Level of Care Needs home with home health    Provided Post Acute Provider List? Yes    Post Acute Provider List Home Health    Provided Post Acute Provider Quality & Resource List? Yes    Post Acute Provider Quality and Resource List Home Health    Delivered To Support Person    Support Person Flaca Mathis/daughter [phone]    Method of Delivery In person    Offered/Gave Vendor List yes    Patient's Choice of Community Agency(s) Agreeable with referral to [facility]    Current Discharge Risk dependent with mobility/activities of daily living;cognitively impaired                   Discharge Plan       Row Name 12/12/24 2094       Plan    Plan Plans discharge home with assist of family and [facility] (referral placed).    Plan Comments Spoke with patient's daughter Flaca at bedside to complete initial assessment. Confirmed information on facesheet. PCP: Maye Jaime MD and Pharmacy: [facility] Patient lives with her spouse, daughter Flaca and granddaughter in single level house with basement. Two steps to enter/handrail on left side. Patient requires assistance with ADLs and uses straight cane and rollator for mobility. Denies difficulty affording medications or transportation concerns. Patient has been to [facility] for rehab and denies using home  health in the past. Family states plan is home with HH. Provided patient choice list for  agencies. Agreeable with [facility]. Await call back if able to accept. Family to transport per private auto.....[facility]                  Continued Care and Services - Admitted Since 12/11/2024       Home Medical Care       Service Provider Request Status Services Address Phone Fax Patient Preferred    [facility] - Request Sent -- [facility] [address] [facility] [address] [phone] --                  Expected Discharge Date and Time       Expected Discharge Date Expected Discharge Time    Dec 16, 2024            Demographic Summary    No documentation.                  Functional Status    No documentation.                  Psychosocial    No documentation.                  Abuse/Neglect    No documentation.                  Legal    No documentation.                  Substance Abuse    No documentation.                  Patient Forms    No documentation.                     Loly Eid RN

## 2024-12-12 NOTE — PROCEDURES
Tech arrived bedside in ED to complete stat EEG, family stated pt is not going to tolerate the test at this time and is requesting restraints. RN is aware of the situation and tech will follow up at a later time.

## 2024-12-12 NOTE — PLAN OF CARE
Goal Outcome Evaluation:  Plan of Care Reviewed With: patient, child           Outcome Evaluation: Patient is an 79 y.o female who presented to [facility] following a seizure. Patient disoriented x4 at baseline. Patient lives at home with her daughter, whom was at bedside. Patient ambulates around home with support of rwx. Patient required minAx2 to sit up to EOB this date. Patient stood from EOB with minAx2. Patient ambulated in room with rwx and minAx2. Gait slow with continuous cues needed for sequencing and safety. Assist needed for rwx guidance. Continuous redirection for attention to task needed. Patient may continue to benefit from skilled PT intervention to address deficits in functional mobilty. Anticipate home with 24/7 assist at d/c. PT will continue to monitor.    Anticipated Discharge Disposition (PT): home with 24/7 care

## 2024-12-12 NOTE — PLAN OF CARE
Goal Outcome Evaluation:              Outcome Evaluation: New orders received. RN reports pt is not appropriate for PO trials at this time. Speech to follow as appropriate.

## 2024-12-12 NOTE — ED NOTES
Nursing report ED to floor  Kassi Mathis  79 y.o.  female    Kassi Mathis is a 79 y.o. female who presents to the ED c/o acute new onset seizure.  No history of similar.  She reportedly had shaking her bilateral extremities for about 3 minutes that was then followed by being limp for 3 to 4 minutes.  She then was confused and slow to wake up for about 10 to 15 minutes.  She normally incontinent to urine at baseline.  She is now back to her baseline self per family.  She has not complained of any headache, chest pain, shortness of breath, abdominal pain, back pain.  No fever or other infectious symptoms.     HPI :  HPI  Stated Reason for Visit: seizure  History Obtained From: EMS, family    Chief Complaint  Chief Complaint   Patient presents with    Seizures       Admitting doctor:   Jorge Luis Calle MD    Admitting diagnosis:   The encounter diagnosis was New onset seizure.    Code status:   Current Code Status       Date Active Code Status Order ID Comments User Context       Prior            Allergies:   Enalapril, Memantine hcl, Enalapril maleate, and Penicillins    Isolation:   No active isolations    Intake and Output  No intake or output data in the 24 hours ending 12/11/24 2123    Weight:       12/11/24  1701   Weight: 66.7 kg (147 lb)       Most recent vitals:   Vitals:    12/11/24 1938 12/11/24 2001 12/11/24 2002 12/11/24 2048   BP: (!) 183/76 166/69     BP Location:       Patient Position:       Pulse: 62  61 76   Resp: 18      Temp:       TempSrc:       SpO2:       Weight:       Height:           Active LDAs/IV Access:   Lines, Drains & Airways       Active LDAs       Name Placement date Placement time Site Days    Peripheral IV 12/11/24 1920 Right Antecubital 12/11/24 1920  Antecubital  less than 1    External Urinary Catheter 11/30/24  0559  --  11                    Labs (abnormal labs have a star):   Labs Reviewed   COMPREHENSIVE METABOLIC PANEL - Abnormal; Notable for the following components:        Result Value    Glucose 217 (*)     Albumin 3.4 (*)     All other components within normal limits    Narrative:     GFR Categories in Chronic Kidney Disease (CKD)      GFR Category          GFR (mL/min/1.73)    Interpretation  G1                     90 or greater         Normal or high (1)  G2                      60-89                Mild decrease (1)  G3a                   45-59                Mild to moderate decrease  G3b                   30-44                Moderate to severe decrease  G4                    15-29                Severe decrease  G5                    14 or less           Kidney failure          (1)In the absence of evidence of kidney disease, neither GFR category G1 or G2 fulfill the criteria for CKD.    eGFR calculation 2021 CKD-EPI creatinine equation, which does not include race as a factor   CBC WITH AUTO DIFFERENTIAL - Abnormal; Notable for the following components:    Hemoglobin 11.6 (*)     Neutrophil % 82.4 (*)     Lymphocyte % 11.7 (*)     Eosinophil % 0.2 (*)     Neutrophils, Absolute 8.42 (*)     All other components within normal limits   URINALYSIS W/ MICROSCOPIC IF INDICATED (NO CULTURE) - Abnormal; Notable for the following components:    Specific Gravity, UA >1.030 (*)     Glucose, UA >=1000 mg/dL (3+) (*)     Ketones, UA 40 mg/dL (2+) (*)     Protein, UA Trace (*)     All other components within normal limits    Narrative:     Urine microscopic not indicated.   PHOSPHORUS - Abnormal; Notable for the following components:    Phosphorus 2.0 (*)     All other components within normal limits   SCAN SLIDE - Normal   MAGNESIUM - Normal   CBC AND DIFFERENTIAL    Narrative:     The following orders were created for panel order CBC & Differential.  Procedure                               Abnormality         Status                     ---------                               -----------         ------                     CBC Auto Differential[[id number]]        Abnormal            Final  result               Scan Slide[[id number]]                   Normal              Final result                 Please view results for these tests on the individual orders.       EKG:   ECG 12 Lead Syncope   Preliminary Result   HEART RATE=72  bpm   RR Kvikiuhg=691  ms   VA Gpizvvib=517  ms   P Horizontal Axis=37  deg   P Front Axis=0  deg   QRSD Interval=99  ms   QT Fstnxdjt=866  ms   JHjX=540  ms   QRS Axis=-18  deg   T Wave Axis=50  deg   - ABNORMAL ECG -   Unknown rhythm, irregular rate   LVH with secondary repolarization abnormality   Borderline prolonged QT interval   Artifact in lead(s) I,II,aVR,aVL,aVF,V6   Date and Time of Study:2024-12-11 20:38:22          Meds given in ED:   Medications   sodium chloride 0.9 % flush 10 mL (has no administration in time range)   levETIRAcetam (KEPPRA) tablet 500 mg (has no administration in time range)   levETIRAcetam (KEPPRA) injection 1,000 mg (1,000 mg Intravenous Given 12/11/24 1946)   LORazepam (ATIVAN) injection 0.5 mg (0.5 mg Intravenous Given 12/1945)   LORazepam (ATIVAN) injection 0.25 mg (0.25 mg Intravenous Given 12/11/24 2105)       Imaging results:  CT Head Without Contrast    Result Date: 12/11/2024   The exam is significantly limited by motion artifact. No definite intracranial hemorrhage. However, considering the significant limitations of the exam, follow-up evaluation is recommended as indications persist.  This report was finalized on 12/11/2024 8:37 PM by Dr. Randolph Abbasi M.D on Workstation: [id number]      XR Chest 1 View    Result Date: 12/11/2024  Stable chest x-ray. No acute findings    This report was finalized on 12/11/2024 7:53 PM by Dr. Mauro Castano M.D on Workstation: [id number]       Ambulatory status:   - assistance x2    Social issues:   Social History     Socioeconomic History    Marital status:    Tobacco Use    Smoking status: Former     Current packs/day: 0.50     Types: Cigarettes     Passive exposure: Past     Smokeless tobacco: Never   Vaping Use    Vaping status: Never Used   Substance and Sexual Activity    Alcohol use: Never     Comment: CAFFEINE USE/ SOFT DRINKS.     Drug use: Never    Sexual activity: Not Currently     Partners: Male       Peripheral Neurovascular  Peripheral Neurovascular (Adult)  Peripheral Neurovascular WDL: WDL    Neuro Cognitive  Neuro Cognitive (Adult)  Cognitive/Neuro/Behavioral WDL: .WDL except, speech  Speech: garbled    Learning  Learning Assessment  Learning Readiness and Ability: cognitive limitation noted    Respiratory  Respiratory WDL  Respiratory WDL: WDL    Abdominal Pain       Pain Assessments  Pain (Adult)  (0-10) Pain Rating: Rest: 0  (0-10) Pain Rating: Activity: 0    NIH Stroke Scale       Angelina Jon RN  12/11/24 21:23 EST

## 2024-12-12 NOTE — THERAPY EVALUATION
Acute Care - Speech Language Pathology   Swallow Initial Evaluation [facility]     Patient Name: Kassi Mathis  : 1945  MRN: [id number]  Today's Date: 2024               Admit Date: 2024    Visit Dx:     ICD-10-CM ICD-9-CM   1. New onset seizure  R56.9 780.39     Patient Active Problem List   Diagnosis    Depression    Allergic rhinitis    Hypertension    Dementia without behavioral disturbance    Paroxysmal atrial fibrillation    B12 deficiency    Type 2 diabetes mellitus    Hyperlipidemia    Hammer toes of both feet    Cervical radiculopathy    Osteoarthritis of right knee    At high risk for falls    Chronic diastolic congestive heart failure    Bradycardia, sinus    Acquired bilateral hammer toes    Diabetic polyneuropathy associated with type 2 diabetes mellitus    New onset seizure    Effusion of right knee    Oral phase dysphagia    Post-ictal state     Past Medical History:   Diagnosis Date    Abnormal ECG     AFib    Allergic rhinitis     Anemia     Cataract     Chronic venous insufficiency     Coronary artery disease     Afib    Dementia     Depression     Diabetes mellitus     Headache     Sinus?    Hidradenitis suppurativa     History of transfusion     Hyperlipidemia     Hypertension     Low back pain     Osteoarthritis     Paroxysmal atrial fibrillation     Peripheral neuropathy     Pulmonary arterial hypertension     Stroke     Vitamin B 12 deficiency     Boarderline only with normal MMA/Homocysteine/IF antibody     Past Surgical History:   Procedure Laterality Date    BACK SURGERY      COLONOSCOPY      HYSTERECTOMY      PARTIAL    JOINT REPLACEMENT      Knee replacement    KNEE SURGERY Left     SUBTOTAL HYSTERECTOMY      TONSILLECTOMY         SLP Recommendation and Plan  SLP Swallowing Diagnosis: suspected pharyngeal dysphagia, oral dysphagia (24 1400)  SLP Diet Recommendation: soft to chew textures, chopped, thin liquids (24 1400)  Recommended Precautions and  Strategies: upright posture during/after eating, small bites of food and sips of liquid, 1:1 supervision, assist with feeding (PO only when alert) (12/12/24 1400)  SLP Rec. for Method of Medication Administration: meds whole, with puree, as tolerated (12/12/24 1400)     Monitor for Signs of Aspiration: yes, notify SLP if any concerns (12/12/24 1400)  Recommended Diagnostics: reassess via clinical swallow evaluation (12/12/24 1400)  Swallow Criteria for Skilled Therapeutic Interventions Met: demonstrates skilled criteria (12/12/24 1400)     Rehab Potential/Prognosis, Swallowing: good, to achieve stated therapy goals (12/12/24 1400)  Therapy Frequency (Swallow): PRN (12/12/24 1400)  Predicted Duration Therapy Intervention (Days): until discharge (12/12/24 1400)  Oral Care Recommendations: Oral Care BID/PRN (12/12/24 1400)                                        Outcome Evaluation: Clinical swallow evaluation completed. Son and daughter at bedside. Pt answers some questions with one-word responses. Nonmeaningful, slurred speech primarily; son and daughter report baseline. [facility]; limited dentition noted, however, adequate munching mastication demonstrated with solids. No overt s/s of aspiration with ice chip, thins via spoon/cup/straw, puree, soft/mixed solid, or regular solids. Daughter reports beginning to cut solids into bite-sized pieces to avoid choking/coughing. Recommend soft/chopped solid diet with thin liquids. Meds whole in puree. Supervision/assistance with meals, sitting upright, slow rate, small/single bites/sips.      SWALLOW EVALUATION (Last 72 Hours)       SLP Adult Swallow Evaluation       Row Name 12/12/24 1400                   Rehab Evaluation    Document Type evaluation  -CR        Subjective Information no complaints  -CR        Patient Observations cooperative;alert  -CR        Patient Effort good  -CR        Symptoms Noted During/After Treatment none  -CR            General Information    Patient Profile Reviewed yes  -CR        Pertinent History Of Current Problem 80 y/o female admitted with seizure activity. Failed RN dysphagia screen. Hx advanced dementia.  -CR        Current Method of Nutrition NPO  -CR        Precautions/Limitations, Vision WFL;for purposes of eval  -CR        Precautions/Limitations, Hearing WFL;for purposes of eval  -CR        Prior Level of Function-Communication --  advanced dementia  -CR        Prior Level of Function-Swallowing soft to chew;chopped;thin liquids  -CR        Plans/Goals Discussed with patient and family;agreed upon  -CR        Barriers to Rehab cognitive status  -CR           Pain    Pretreatment Pain Rating 0/10 - no pain  -CR        Posttreatment Pain Rating 0/10 - no pain  -CR           Oral Motor Structure and Function    Dentition Assessment missing teeth  -CR        Secretion Management WNL/WFL  -CR        Mucosal Quality moist, healthy  -CR           Oral Musculature and Cranial Nerve Assessment    Oral Motor General Assessment unable to assess  -CR           Clinical Swallow Eval    Clinical Swallow Evaluation Summary Clinical swallow evaluation completed. Son and daughter at bedside. Pt answers some questions with one-word responses. Nonmeaningful, slurred speech primarily; son and daughter report baseline. Difficulties following oral mech commands; limited dentition noted, however, adequate munching mastication demonstrated with solids. No overt s/s of aspiration with ice chip, thins via spoon/cup/straw, puree, soft/mixed solid, or regular solids. Daughter reports beginning to cut solids into bite-sized pieces to avoid choking/coughing. Recommend soft/chopped solid diet with thin liquids. Meds whole in puree. Only allow PO when awake and alert, supervision/assistance with meals, sitting upright, slow rate, small/single bites/sips.  -CR           SLP Evaluation Clinical Impression    SLP Swallowing Diagnosis suspected  pharyngeal dysphagia;oral dysphagia  -CR        Functional Impact risk of aspiration/pneumonia  -CR        Rehab Potential/Prognosis, Swallowing good, to achieve stated therapy goals  -CR        Swallow Criteria for Skilled Therapeutic Interventions Met demonstrates skilled criteria  -CR           Recommendations    Therapy Frequency (Swallow) PRN  -CR        Predicted Duration Therapy Intervention (Days) until discharge  -CR        SLP Diet Recommendation soft to chew textures;chopped;thin liquids  -CR        Recommended Diagnostics reassess via clinical swallow evaluation  -CR        Recommended Precautions and Strategies upright posture during/after eating;small bites of food and sips of liquid;1:1 supervision;assist with feeding  PO only when alert  -CR        Oral Care Recommendations Oral Care BID/PRN  -CR        SLP Rec. for Method of Medication Administration meds whole;with puree;as tolerated  -CR        Monitor for Signs of Aspiration yes;notify SLP if any concerns  -CR           Swallow Goals (SLP)    Swallow STGs diet tolerance goal selection (SLP)  -CR        Diet Tolerance Goal Selection (SLP) Patient will tolerate trials of  -CR           (STG) Patient will tolerate trials of    Consistencies Trialed (Tolerate trials) soft to chew (chopped) textures;thin liquids  -CR        Desired Outcome (Tolerate trials) without signs/symptoms of aspiration  -CR        [place] (Tolerate trials) with minimal cues (75-90% accuracy)  -CR        Time Frame (Tolerate trials) by discharge  -CR        Progress/Outcomes (Tolerate trials) new goal  -CR                  User Key  (r) = Recorded By, (t) = Taken By, (c) = Cosigned By      Initials Name Effective Dates    Mackenzie Mclaughlin SLP 12/03/24 -                     EDUCATION  The patient has been educated in the following areas:   Dysphagia (Swallowing Impairment).        SLP GOALS       Row Name 12/12/24 1400             (STG) Patient will tolerate trials of     Consistencies Trialed (Tolerate trials) soft to chew (chopped) textures;thin liquids  -CR      Desired Outcome (Tolerate trials) without signs/symptoms of aspiration  -CR      [place] (Tolerate trials) with minimal cues (75-90% accuracy)  -CR      Time Frame (Tolerate trials) by discharge  -CR      Progress/Outcomes (Tolerate trials) new goal  -CR                User Key  (r) = Recorded By, (t) = Taken By, (c) = Cosigned By      Initials Name Provider Type    Mackenzie Mclaughlin SLP Speech and Language Pathologist                         Time Calculation:    Time Calculation- SLP       Row Name 12/12/24 1523             Time Calculation- SLP    SLP Start Time 0735  -CR      SLP Received On 12/12/24  -CR         Untimed Charges    [id number] Eval Oral Pharyng Swallow Minutes 60  -CR         Total Minutes    Untimed Charges Total Minutes 60  -CR       Total Minutes 60  -CR                User Key  (r) = Recorded By, (t) = Taken By, (c) = Cosigned By      Initials Name Provider Type    Mackenzie Mclaughlin SLP Speech and Language Pathologist                    Therapy Charges for Today       Code Description Service Date Service Provider Modifiers Qty    [id number] HC ST EVAL ORAL PHARYNG SWALLOW 4 12/12/2024 Mackenzie Coats SLP GN 1                 RICHARD Katz  12/12/2024

## 2024-12-12 NOTE — PROGRESS NOTES
[facility] Medicine Services  PROGRESS NOTE    Patient Name: Kassi Mathis  : 1945  MRN: [id number]    Date of Admission: 2024  Primary Care Physician: Parul Jaime MD    Subjective   Subjective     CC:  Follow-up seizure    Subjective: Patient has advanced dementia.  Family is at the bedside.  We discussed treatment plan with Keppra recommended per neurology.  Family notes she has been lethargic since receiving the seizure medicines in the ER yesterday.  I explained to them that Ativan can take some time to wear off with elderly patients particularly with dementia.  Although this could also be postictal status.  No further seizure activity reported.           Objective   Objective     Vital Signs:   Temp:  [97.3 °F (36.3 °C)-97.9 °F (36.6 °C)] 97.5 °F (36.4 °C)  Heart Rate:  [55-81] 58  Resp:  [16-18] 16  BP: (115-183)/() 152/66        Physical Exam:  Constitutional: Elderly appearing  HENT: NCAT, mucous membranes moist, neck supple  Respiratory: No cough or wheezes, normal respirations, nonlabored breathing   Cardiovascular: Pulse rate is normal, palpable radial pulses  Gastrointestinal:  Soft, nontender, nondistended  Musculoskeletal: Frail and somewhat chronically debilitated appearance, obese BMI is 30.5  Psychiatric: Lethargic  Neurologic: Disoriented  Skin: No rashes or jaundice, warm      Results Reviewed:  Results from last 7 days   Lab Units 24  0448 24  1906   WBC 10*3/mm3 9.33 10.22   HEMOGLOBIN g/dL 10.1* 11.6*   HEMATOCRIT % 30.9* 35.3   PLATELETS 10*3/mm3 257 270     Results from last 7 days   Lab Units 24  0448 24  1906   SODIUM mmol/L 142 141   POTASSIUM mmol/L 3.1* 3.9   CHLORIDE mmol/L 106 103   CO2 mmol/L 26.1 26.0   BUN mg/dL 11 12   CREATININE mg/dL 0.59 0.74   GLUCOSE mg/dL 124* 217*   CALCIUM mg/dL 8.7 9.1   ALK PHOS U/L  --  74   ALT (SGPT) U/L  --  16   AST (SGOT) U/L  --  29     Estimated Creatinine Clearance: 75.3 mL/min (by C-G  formula based on SCr of 0.59 mg/dL).    Microbiology Results Abnormal       None            Imaging Results (Last 24 Hours)       Procedure Component Value Units Date/Time    MRI Brain Without Contrast [[id number]] Collected: 12/12/24 1211     Updated: 12/12/24 1211    Narrative:      MRI OF THE BRAIN WITHOUT CONTRAST ON 12/12/2024     CLINICAL HISTORY: New onset seizure.     TECHNIQUE: The patient was unable to follow commands and hold still and  moving patient protocol had to be performed consisting of fast axial  FLAIR, T1-T2 diffusion and gradient echo T2 and sagittal T1-weighted  images of the head.     There are no prior MRIs of the brain for comparison. This is correlated  to yesterday evening's head CT from [facility] on  12/11/2024.     FINDINGS: Unfortunately this exam is slightly limited due to the fact  that the patient was moving quite a bit and fast sequences had to be  performed which are decreased resolution. Furthermore the patient motion  during the exams causes some blurring of some of the images and slightly  limits evaluation. There are multiple patchy nodular foci of T2 high  signal extending from the periventricular into the subcortical white  matter of the cerebral hemispheres consistent with moderate small vessel  disease. There is a 7 x 4 mm old lacunar infarct in the superior right  putamen. The remainder of the brain parenchyma is normal in signal  intensity. Specifically, no diffusion weighted abnormality is seen with  no acute infarct identified. On the gradient echo T2 weighted images no  acute or old blood breakdown products are seen intracranially. There is  diffuse cerebral atrophy. The lateral and third ventricles are mildly  prominent in size which is most probably on the basis central volume  loss or atrophy. I see no mass effect and no midline shift and no  extra-axial fluid collections are identified. The calvarium and the  skull base demonstrate normal marrow  signal intensity. The paranasal  sinuses and the mastoid air cells and middle ear cavities are clear.  There are intraocular lens implants in the globes bilaterally from  previous bilateral cataract surgery otherwise orbits are unremarkable.       Impression:      1. Unfortunately, this exam is slightly limited due to the fact that the  patient was moving quite a bit and fast sequences had to be preformed  which are of decreased resolution. Furthermore the patient moved  repetitively during this exam causing motion artifact which causes  blurring of many of the images and slightly limits evaluation.     2. Overall, no acute intracranial abnormality is identified.     3. There is moderate small vessel disease in cerebral white matter and  there is a 7 x 4 mm old lacunar infarct in the superior right putamen.     4. There is diffuse cerebral atrophy and the lateral and third  ventricles are probably prominent in size felt to be on the basis of  central volume loss or atrophy. The remainder of the MRI of the brain is  normal with no acute infarct or intracranial hemorrhage identified. The  etiology of patient's seizure-like activity is not established on this  exam.       CT Head Without Contrast [[id number]] Collected: 12/11/24 2032     Updated: 12/11/24 2041    Narrative:      CT HEAD WO CONTRAST-     INDICATIONS: Seizure     TECHNIQUE: Radiation dose reduction techniques were utilized, including  automated exposure control and exposure modulation based on body size.  Noncontrast head CT     COMPARISON: 9/27/2021     FINDINGS:     The exam is significantly limited by extensive motion artifact, and  follow-up evaluation is advised as indications persist.     Within the limitations of the exam, no definite acute intracranial  hemorrhage, midline shift or mass effect. No acute territorial infarct  is identified. Basal ganglia mineralization is present.     Mild to moderate periventricular hypodensities suggest chronic  small  vessel ischemic change in a patient this age.     Arterial calcifications are seen at the base of the brain.     Thinning of the pituitary is apparent. Ventricles, cisterns, cerebral  sulci are unremarkable for patient age, and do not appear significantly  changed.     Small likely mucous retention cyst in the left sphenoid sinus. The  visualized paranasal sinuses, orbits, mastoid air cells are otherwise  unremarkable.          Impression:         The exam is significantly limited by motion artifact. No definite  intracranial hemorrhage. However, considering the significant  limitations of the exam, follow-up evaluation is recommended as  indications persist.     This report was finalized on 12/11/2024 8:37 PM by Dr. Randolph Abbasi M.D on Workstation: [id number]       XR Chest 1 View [[id number]] Collected: 12/11/24 1952     Updated: 12/11/24 1956    Narrative:      AP CHEST     HISTORY: New onset seizure     COMPARISON: 2/8/2022     FINDINGS: Elevated right hemidiaphragm with right basilar atelectasis.  Left lung appears clear. Heart size stable. No appreciable pneumothorax.       Impression:      Stable chest x-ray. No acute findings           This report was finalized on 12/11/2024 7:53 PM by Dr. Mauro Castano M.D on Workstation: [id number]               Results for orders placed during the hospital encounter of 07/05/22    Adult Transthoracic Echo Complete W/ Cont if Necessary Per Protocol    Interpretation Summary  · Calculated left ventricular EF = 64.6% Calculated left ventricular 3D EF = 69% Estimated left ventricular EF was in agreement with the calculated left ventricular EF. Left ventricular systolic function is normal. Normal left ventricular cavity size and wall thickness noted. All left ventricular wall segments contract normally. Left ventricular diastolic function is consistent with (grade II w/high LAP) pseudonormalization.  · The left atrial cavity is moderately dilated.  · Estimated  right ventricular systolic pressure from tricuspid regurgitation is normal (<35 mmHg).      I have reviewed the medications:  Scheduled Meds:[START ON 12/13/2024] amLODIPine, 5 mg, Oral, Daily  vitamin C, 250 mg, Oral, Daily  donepezil, 10 mg, Oral, Daily  [Held by provider] furosemide, 20 mg, Oral, Daily  insulin lispro, 2-7 Units, Subcutaneous, 4x Daily AC & at Bedtime  levETIRAcetam, 500 mg, Intravenous, Q12H  melatonin, 2.5 mg, Oral, Nightly  metoprolol tartrate, 50 mg, Oral, BID  rosuvastatin, 20 mg, Oral, Once per day on Monday Wednesday Friday  sertraline, 200 mg, Oral, Daily  sodium chloride, 10 mL, Intravenous, Q12H  traZODone, 25 mg, Oral, Nightly  vitamin B-12, 1,000 mcg, Oral, Daily      Continuous Infusions:   PRN Meds:.  acetaminophen    senna-docusate sodium **AND** polyethylene glycol **AND** bisacodyl **AND** bisacodyl    dextrose    dextrose    glucagon (human recombinant)    HYDROcodone-acetaminophen    melatonin    ondansetron    [COMPLETED] Insert Peripheral IV **AND** sodium chloride    sodium chloride    sodium chloride    Assessment & Plan   Assessment & Plan     Active Hospital Problems    Diagnosis  POA    **New onset seizure [R56.9]  Yes    Oral phase dysphagia [R13.11]  Yes    Post-ictal state [R56.9]  Yes    Effusion of right knee [M25.461]  Yes    Chronic diastolic congestive heart failure [I50.32]  Yes    Hypertension [I10]  Yes    Hyperlipidemia [E78.5]  Yes    Type 2 diabetes mellitus [E11.9]  Yes    Paroxysmal atrial fibrillation [I48.0]  Yes    Dementia without behavioral disturbance [F03.90]  Yes      Resolved Hospital Problems   No resolved problems to display.        Brief Hospital Course to date:  Kassi Mathis is a 79 y.o. female presents to the hospital with new onset seizure.    Discussion/plan for today:  MRI of the brain ordered per neurology recommendation.  Diffuse atrophy and old lacunar infarct in the right putamen noted.  Change keppra IV while NPO.  Speech therapy  ordered for dysphagia and lethargy.  It appears patient is in postictal state.  Start IV fluid.  A1c quite elevated at 8.1.  Monitor glucose and adjust insulin as needed.  Significant dementia noted.  Greatly complicates aspects of care.  Replete potassium for hypokalemia  Give magnesium IV.  Chest x-ray images reviewed and stable.  Monitor blood pressure and adjust medicines as needed.  Continue chronic medications for other chronic medical issues.  Treatment plan discussed with family who is in agreement.    DVT Prophylaxis: Mechanical      Disposition: Pending    CODE STATUS:   Code Status and Medical Interventions: No CPR (Do Not Attempt to Resuscitate); Limited Support; No intubation (DNI); Flaca   Ordered at: 12/12/24 0803     Medical Intervention Limits:    No intubation (DNI)     Level Of Support Discussed With:    Next of Kin (If No Surrogate)     Code Status (Patient has no pulse and is not breathing):    No CPR (Do Not Attempt to Resuscitate)     Medical Interventions (Patient has pulse or is breathing):    Limited Support     Comments:    Flaca Lu MD  12/12/24

## 2024-12-13 ENCOUNTER — HOME CARE VISIT (OUTPATIENT)
Dept: HOME HEALTH SERVICES | Facility: HOME HEALTHCARE | Age: 79
End: 2024-12-13

## 2024-12-13 ENCOUNTER — HOME HEALTH ADMISSION (OUTPATIENT)
Dept: HOME HEALTH SERVICES | Facility: HOME HEALTHCARE | Age: 79
End: 2024-12-13
Payer: COMMERCIAL

## 2024-12-13 ENCOUNTER — DOCUMENTATION (OUTPATIENT)
Dept: HOME HEALTH SERVICES | Facility: HOME HEALTHCARE | Age: 79
End: 2024-12-13
Payer: COMMERCIAL

## 2024-12-13 ENCOUNTER — READMISSION MANAGEMENT (OUTPATIENT)
Dept: CALL CENTER | Facility: HOSPITAL | Age: 79
End: 2024-12-13
Payer: MEDICARE

## 2024-12-13 ENCOUNTER — TRANSCRIBE ORDERS (OUTPATIENT)
Dept: HOME HEALTH SERVICES | Facility: HOME HEALTHCARE | Age: 79
End: 2024-12-13
Payer: COMMERCIAL

## 2024-12-13 VITALS
HEART RATE: 53 BPM | TEMPERATURE: 97.5 F | RESPIRATION RATE: 16 BRPM | DIASTOLIC BLOOD PRESSURE: 65 MMHG | OXYGEN SATURATION: 98 % | HEIGHT: 63 IN | BODY MASS INDEX: 30.55 KG/M2 | SYSTOLIC BLOOD PRESSURE: 177 MMHG | WEIGHT: 172.4 LBS

## 2024-12-13 DIAGNOSIS — R56.9 SEIZURES: Primary | ICD-10-CM

## 2024-12-13 DIAGNOSIS — I50.33 ACUTE ON CHRONIC DIASTOLIC CONGESTIVE HEART FAILURE: ICD-10-CM

## 2024-12-13 DIAGNOSIS — E11.42 TYPE 2 DIABETES MELLITUS WITH DIABETIC POLYNEUROPATHY, UNSPECIFIED WHETHER LONG TERM INSULIN USE: ICD-10-CM

## 2024-12-13 LAB
ANION GAP SERPL CALCULATED.3IONS-SCNC: 8 MMOL/L (ref 5–15)
BUN SERPL-MCNC: 10 MG/DL (ref 8–23)
BUN/CREAT SERPL: 14.7 (ref 7–25)
CALCIUM SPEC-SCNC: 8.4 MG/DL (ref 8.6–10.5)
CHLORIDE SERPL-SCNC: 108 MMOL/L (ref 98–107)
CO2 SERPL-SCNC: 27 MMOL/L (ref 22–29)
CREAT SERPL-MCNC: 0.68 MG/DL (ref 0.57–1)
DEPRECATED RDW RBC AUTO: 43.9 FL (ref 37–54)
EGFRCR SERPLBLD CKD-EPI 2021: 88.2 ML/MIN/1.73
ERYTHROCYTE [DISTWIDTH] IN BLOOD BY AUTOMATED COUNT: 13.7 % (ref 12.3–15.4)
GLUCOSE BLDC GLUCOMTR-MCNC: 104 MG/DL (ref 70–130)
GLUCOSE SERPL-MCNC: 127 MG/DL (ref 65–99)
HCT VFR BLD AUTO: 32.2 % (ref 34–46.6)
HGB BLD-MCNC: 10.6 G/DL (ref 12–15.9)
MAGNESIUM SERPL-MCNC: 2.5 MG/DL (ref 1.6–2.4)
MCH RBC QN AUTO: 29.4 PG (ref 26.6–33)
MCHC RBC AUTO-ENTMCNC: 32.9 G/DL (ref 31.5–35.7)
MCV RBC AUTO: 89.4 FL (ref 79–97)
PHOSPHATE SERPL-MCNC: 2.4 MG/DL (ref 2.5–4.5)
PLATELET # BLD AUTO: 250 10*3/MM3 (ref 140–450)
PMV BLD AUTO: 9.6 FL (ref 6–12)
POTASSIUM SERPL-SCNC: 3.6 MMOL/L (ref 3.5–5.2)
QT INTERVAL: 456 MS
QTC INTERVAL: 441 MS
RBC # BLD AUTO: 3.6 10*6/MM3 (ref 3.77–5.28)
SODIUM SERPL-SCNC: 143 MMOL/L (ref 136–145)
WBC NRBC COR # BLD AUTO: 7.95 10*3/MM3 (ref 3.4–10.8)

## 2024-12-13 PROCEDURE — 83735 ASSAY OF MAGNESIUM: CPT | Performed by: HOSPITALIST

## 2024-12-13 PROCEDURE — 82948 REAGENT STRIP/BLOOD GLUCOSE: CPT

## 2024-12-13 PROCEDURE — 84100 ASSAY OF PHOSPHORUS: CPT | Performed by: HOSPITALIST

## 2024-12-13 PROCEDURE — 93010 ELECTROCARDIOGRAM REPORT: CPT | Performed by: INTERNAL MEDICINE

## 2024-12-13 PROCEDURE — 93005 ELECTROCARDIOGRAM TRACING: CPT | Performed by: HOSPITALIST

## 2024-12-13 PROCEDURE — 85027 COMPLETE CBC AUTOMATED: CPT | Performed by: HOSPITALIST

## 2024-12-13 PROCEDURE — 80048 BASIC METABOLIC PNL TOTAL CA: CPT | Performed by: HOSPITALIST

## 2024-12-13 RX ORDER — POTASSIUM CHLORIDE 1500 MG/1
20 TABLET, EXTENDED RELEASE ORAL DAILY
Start: 2024-12-13 | End: 2024-12-20 | Stop reason: SDUPTHER

## 2024-12-13 RX ORDER — ATENOLOL 25 MG/1
12.5 TABLET ORAL
Status: DISCONTINUED | OUTPATIENT
Start: 2024-12-13 | End: 2024-12-13 | Stop reason: HOSPADM

## 2024-12-13 RX ORDER — LEVETIRACETAM 500 MG/1
500 TABLET ORAL 2 TIMES DAILY
Status: DISCONTINUED | OUTPATIENT
Start: 2024-12-13 | End: 2024-12-13

## 2024-12-13 RX ORDER — POTASSIUM CHLORIDE 1.5 G/1.58G
40 POWDER, FOR SOLUTION ORAL EVERY 4 HOURS
Status: DISCONTINUED | OUTPATIENT
Start: 2024-12-13 | End: 2024-12-13 | Stop reason: HOSPADM

## 2024-12-13 RX ORDER — LEVETIRACETAM 100 MG/ML
500 SOLUTION ORAL EVERY 12 HOURS SCHEDULED
Qty: 900 ML | Refills: 0 | Status: SHIPPED | OUTPATIENT
Start: 2024-12-13 | End: 2024-12-13

## 2024-12-13 RX ORDER — FUROSEMIDE 20 MG/1
20 TABLET ORAL DAILY
Start: 2024-12-13

## 2024-12-13 RX ORDER — LEVETIRACETAM 500 MG/1
500 TABLET ORAL 2 TIMES DAILY
Qty: 180 TABLET | Refills: 0 | Status: SHIPPED | OUTPATIENT
Start: 2024-12-13 | End: 2024-12-13 | Stop reason: HOSPADM

## 2024-12-13 RX ORDER — LEVETIRACETAM 100 MG/ML
500 SOLUTION ORAL 2 TIMES DAILY
Qty: 900 ML | Refills: 0 | Status: SHIPPED | OUTPATIENT
Start: 2024-12-13

## 2024-12-13 RX ORDER — LEVETIRACETAM 100 MG/ML
500 SOLUTION ORAL EVERY 12 HOURS SCHEDULED
Status: DISCONTINUED | OUTPATIENT
Start: 2024-12-13 | End: 2024-12-13

## 2024-12-13 RX ORDER — LEVETIRACETAM 100 MG/ML
500 SOLUTION ORAL EVERY 12 HOURS SCHEDULED
Status: DISCONTINUED | OUTPATIENT
Start: 2024-12-13 | End: 2024-12-13 | Stop reason: HOSPADM

## 2024-12-13 RX ORDER — ACETAMINOPHEN 325 MG/1
650 TABLET ORAL EVERY 6 HOURS PRN
Start: 2024-12-13

## 2024-12-13 RX ORDER — LABETALOL HYDROCHLORIDE 5 MG/ML
10 INJECTION, SOLUTION INTRAVENOUS
Status: DISCONTINUED | OUTPATIENT
Start: 2024-12-13 | End: 2024-12-13 | Stop reason: HOSPADM

## 2024-12-13 RX ADMIN — OXYCODONE HYDROCHLORIDE AND ACETAMINOPHEN 250 MG: 500 TABLET ORAL at 09:06

## 2024-12-13 RX ADMIN — ROSUVASTATIN 20 MG: 20 TABLET, FILM COATED ORAL at 09:06

## 2024-12-13 RX ADMIN — DONEPEZIL HYDROCHLORIDE 10 MG: 10 TABLET, FILM COATED ORAL at 09:06

## 2024-12-13 RX ADMIN — Medication 10 ML: at 09:06

## 2024-12-13 RX ADMIN — ATENOLOL 12.5 MG: 25 TABLET ORAL at 09:06

## 2024-12-13 RX ADMIN — EMPAGLIFLOZIN 10 MG: 10 TABLET, FILM COATED ORAL at 09:06

## 2024-12-13 RX ADMIN — CYANOCOBALAMIN TAB 500 MCG 1000 MCG: 500 TAB at 09:06

## 2024-12-13 RX ADMIN — SERTRALINE 100 MG: 50 TABLET, FILM COATED ORAL at 09:06

## 2024-12-13 RX ADMIN — AMLODIPINE BESYLATE 5 MG: 5 TABLET ORAL at 09:06

## 2024-12-13 RX ADMIN — LEVETIRACETAM 500 MG: 500 TABLET, FILM COATED ORAL at 09:06

## 2024-12-13 RX ADMIN — POTASSIUM CHLORIDE 40 MEQ: 1.5 FOR SOLUTION ORAL at 09:05

## 2024-12-13 NOTE — PLAN OF CARE
Goal Outcome Evaluation:  Plan of Care Reviewed With: patient           Outcome Evaluation: discharged home with family                             Problem: Adult Inpatient Plan of Care  Goal: Plan of Care Review  Outcome: Met  Flowsheets (Taken 12/13/2024 0936)  Outcome Evaluation: discharged home with family  Plan of Care Reviewed With: patient  Goal: Patient-Specific Goal (Individualized)  Outcome: Met  Goal: Absence of Hospital-Acquired Illness or Injury  Outcome: Met  Intervention: Identify and Manage Fall Risk  Recent Flowsheet Documentation  Taken 12/13/2024 0932 by Alba Amaya RN  Safety Promotion/Fall Prevention:   activity supervised   assistive device/personal items within reach   clutter free environment maintained   fall prevention program maintained   nonskid shoes/slippers when out of bed   room organization consistent   safety round/check completed  Intervention: Prevent and Manage VTE (Venous Thromboembolism) Risk  Recent Flowsheet Documentation  Taken 12/13/2024 0932 by Alba Amaya RN  VTE Prevention/Management:   bilateral   SCDs (sequential compression devices) on  Intervention: Prevent Infection  Recent Flowsheet Documentation  Taken 12/13/2024 0932 by Alba Amaya RN  Infection Prevention: single patient room provided  Goal: Optimal Comfort and Wellbeing  Outcome: Met  Intervention: Provide Person-Centered Care  Recent Flowsheet Documentation  Taken 12/13/2024 0932 by Alba Amaya RN  Trust Relationship/Rapport:   care explained   thoughts/feelings acknowledged  Goal: Readiness for Transition of Care  Outcome: Met

## 2024-12-13 NOTE — PLAN OF CARE
Problem: Adult Inpatient Plan of Care  Goal: Plan of Care Review  Outcome: Progressing  Goal: Patient-Specific Goal (Individualized)  Outcome: Progressing  Goal: Absence of Hospital-Acquired Illness or Injury  Outcome: Progressing  Intervention: Identify and Manage Fall Risk  Recent Flowsheet Documentation  Taken 12/13/2024 0211 by Flaca Hutchison RN  Safety Promotion/Fall Prevention:   assistive device/personal items within reach   clutter free environment maintained   fall prevention program maintained   nonskid shoes/slippers when out of bed   safety round/check completed  Taken 12/13/2024 0000 by Flaca Hutchison RN  Safety Promotion/Fall Prevention:   assistive device/personal items within reach   clutter free environment maintained   fall prevention program maintained   nonskid shoes/slippers when out of bed   safety round/check completed  Taken 12/12/2024 2202 by Flaca Hutchison RN  Safety Promotion/Fall Prevention:   assistive device/personal items within reach   clutter free environment maintained   fall prevention program maintained   nonskid shoes/slippers when out of bed   safety round/check completed  Taken 12/12/2024 1950 by Flaca Hutchison RN  Safety Promotion/Fall Prevention:   assistive device/personal items within reach   clutter free environment maintained   fall prevention program maintained   nonskid shoes/slippers when out of bed   safety round/check completed  Intervention: Prevent Skin Injury  Recent Flowsheet Documentation  Taken 12/12/2024 1950 by Flaca Hutchison RN  Body Position:   position changed independently   neutral body alignment  Goal: Optimal Comfort and Wellbeing  Outcome: Progressing  Goal: Readiness for Transition of Care  Outcome: Progressing     Problem: Fall Injury Risk  Goal: Absence of Fall and Fall-Related Injury  Outcome: Progressing  Intervention: Promote Injury-Free Environment  Recent Flowsheet Documentation  Taken 12/13/2024 0211 by Flaca Hutchison RN  Safety  Promotion/Fall Prevention:   assistive device/personal items within reach   clutter free environment maintained   fall prevention program maintained   nonskid shoes/slippers when out of bed   safety round/check completed  Taken 12/13/2024 0000 by Flaca Hutchison RN  Safety Promotion/Fall Prevention:   assistive device/personal items within reach   clutter free environment maintained   fall prevention program maintained   nonskid shoes/slippers when out of bed   safety round/check completed  Taken 12/12/2024 2202 by Flaca Hutchison RN  Safety Promotion/Fall Prevention:   assistive device/personal items within reach   clutter free environment maintained   fall prevention program maintained   nonskid shoes/slippers when out of bed   safety round/check completed  Taken 12/12/2024 1950 by Flaca Hutchison RN  Safety Promotion/Fall Prevention:   assistive device/personal items within reach   clutter free environment maintained   fall prevention program maintained   nonskid shoes/slippers when out of bed   safety round/check completed     Problem: Skin Injury Risk Increased  Goal: Skin Health and Integrity  Outcome: Progressing  Intervention: Optimize Skin Protection  Recent Flowsheet Documentation  Taken 12/12/2024 1950 by Flaca Hutchison RN  Activity Management: bedrest     Problem: Dementia Signs/Symptoms  Goal: Improved Behavioral Control (Dementia Signs/Symptoms)  Outcome: Progressing  Goal: Improved Mood Symptoms (Dementia Signs/Symptoms)  Outcome: Progressing  Intervention: Optimize Emotion and Mood  Recent Flowsheet Documentation  Taken 12/12/2024 1950 by Flaca Hutchison RN  Supportive Measures:   active listening utilized   decision-making supported  Goal: Optimized Cognitive Function (Dementia Signs/Symptoms)  Outcome: Progressing  Goal: Optimized Oral Intake (Dementia Signs/Symptoms)  Outcome: Progressing  Goal: Improved Sleep (Dementia Signs/Symptoms)  Outcome: Progressing  Goal: Enhanced Social or Functional  Skills and Ability (Dementia Signs/Symptoms)  Outcome: Progressing     Problem: Anxiety Signs/Symptoms  Goal: Optimized Energy Level (Anxiety Signs/Symptoms)  Outcome: Progressing  Goal: Improved Mood Symptoms (Anxiety Signs/Symptoms)  Outcome: Progressing  Intervention: Optimize Emotion and Mood  Recent Flowsheet Documentation  Taken 12/12/2024 1950 by Flaca Hutchison RN  Supportive Measures:   active listening utilized   decision-making supported  Goal: Improved Sleep (Anxiety Signs/Symptoms)  Outcome: Progressing  Goal: Enhanced Social, Occupational or Functional Skills (Anxiety Signs/Symptoms)  Outcome: Progressing  Goal: Improved Somatic Symptoms (Anxiety Signs/Symptoms)  Outcome: Progressing     Problem: Diabetes  Goal: Optimal Coping  Outcome: Progressing  Intervention: Support Wellbeing and Self-Management Success  Recent Flowsheet Documentation  Taken 12/12/2024 1950 by Flaca Hutchison RN  Supportive Measures:   active listening utilized   decision-making supported  Goal: Optimal Functional Ability  Outcome: Progressing  Intervention: Optimize Functional Ability  Recent Flowsheet Documentation  Taken 12/12/2024 1950 by Flaca Hutchison RN  Activity Management: bedrest  Goal: Blood Glucose Level Within Target Range  Outcome: Progressing  Goal: Minimize Hypoglycemia Risk  Outcome: Progressing     Problem: Dysrhythmia  Goal: Normalized Cardiac Rhythm  Outcome: Progressing   Goal Outcome Evaluation:

## 2024-12-13 NOTE — CASE MANAGEMENT/SOCIAL WORK
Case Management Discharge Note      Final Note: Home with family and [facility] per private auto    Provided Post Acute Provider List?: Yes  Post Acute Provider List: Home Health  Provided Post Acute Provider Quality & Resource List?: Yes  Post Acute Provider Quality and Resource List: Home Health  Delivered To: Support Person  Support Person: Flaca Mathis/daughter [phone]  Method of Delivery: In person    Selected Continued Care - Discharged on 12/13/2024 Admission date: 12/11/2024 - Discharge disposition: Home or Self Care      Destination    No services have been selected for the patient.                Durable Medical Equipment    No services have been selected for the patient.                Dialysis/Infusion    No services have been selected for the patient.                Home Medical Care       Service Provider Services Address Phone Fax Patient Preferred    [facility] [address] [place] [id number] [phone] --              Therapy    No services have been selected for the patient.                Community Resources    No services have been selected for the patient.                Community & DME    No services have been selected for the patient.                    Transportation Services  Private: Car    Final Discharge Disposition Code: 06 - home with home health care

## 2024-12-13 NOTE — DISCHARGE SUMMARY
[facility] Medicine Services  DISCHARGE SUMMARY    Patient Name: Kassi Mathis  : 1945  MRN: [id number]    Date of Admission: 2024  4:41 PM  Date of Discharge:    2024  Primary Care Physician: Parul Jaime MD    Consults       Date and Time Order Name Status Description    2024 11:05 PM Inpatient Orthopedic Surgery Consult Completed     2024  8:28 PM A (on-call MD unless specified) Details      2024  7:00 PM Inpatient Neurology Consult General Completed             Hospital Course       Active Hospital Problems    Diagnosis  POA    **New onset seizure [R56.9]  Yes    Oral phase dysphagia [R13.11]  Yes    Effusion of right knee [M25.461]  Yes    Diabetic polyneuropathy associated with type 2 diabetes mellitus [E11.42]  Yes    Chronic diastolic congestive heart failure [I50.32]  Yes    Hypertension [I10]  Yes    Hyperlipidemia [E78.5]  Yes    Type 2 diabetes mellitus [E11.9]  Yes    Paroxysmal atrial fibrillation [I48.0]  Yes    Dementia without behavioral disturbance [F03.90]  Yes      Resolved Hospital Problems    Diagnosis Date Resolved POA    Post-ictal state [R56.9] 2024 Yes          Hospital Course:  Kassi Mathis is a 79 y.o. female presented to the hospital with new onset seizure.  She received supportive care and symptom treatment.  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  Keppra initiated per neurology consult.  They are arranging for follow-up in their clinic.  Patient initially had postictal state and initially had oral dysphagia.  She was seen by speech therapy and eventually put back on an oral diet.  She received IV fluid resuscitation.  A1c elevated at 8.1.  Recommend close follow-up with primary care for careful titration of diabetic medications.  Significant dementia noted.  Family feels they can take care of her at home.  Hypokalemia repleted.  Case discussed with neurology consult team who  "cleared her to discharge today.  Patient drastically improved on 12/13 and family eager to take her home.  Family tell me patient has been off of her Xarelto while and plan to discuss with primary care provider whether they want to restart this medication.  I discussed the reason for Xarelto and that with atrial fibrillation she has high stroke risk.  The family with her dementia and recent medical issues say they are concerned about the risk versus benefits and plan to hold for now and discussed with primary care at follow-up.  They understand while off this medication she will be at increased risk of stroke but lower risk of bleeding.    At the time of discharge patient was told to take all medications as prescribed, keep all follow-up appointments, and call their doctor or return to the hospital with any worsening or concerning symptoms.    Please note that this note was made using Dragon voice recognition software      Orthopedic surgery saw her for her right knee pain and hears the recommendations  \"Advanced osteoarthritis of the right knee     PLAN:   Admited to: Jorge Luis Calle MD  Unfortunately, there is not much we can do for her while she is in the hospital.  She just had a cortisone injection a few weeks ago in the office and will not be a candidate for another injection for 2-1/2 more months.  Family was at bedside and we did discuss the possibility of hyaluronic acid injections.  That would be something that we would have to do in the office setting but does not work for all patients.  It would require insurance prior authorization.  If they wish to pursue gel shots, we would be happy to do so and he can call the office to obtain prior authorization as well as to set up an appointment for the procedure.  Otherwise while she is in the hospital I would recommend physical therapy and anti-inflammatory medication as deemed safe and appropriate by the admitting team.  No need for acute orthopedic " "intervention\"    Day of Discharge     Subjective: Patient son is at the bedside and states she is back to her baseline and they would like to take her home today.  They agree to follow-up with primary care provider for general hospital follow-up.  Patient is pleasantly confused but conversational    No current fevers or chills  No current shortness of breath or cough  No current nausea, vomiting, or diarrhea  No current chest pain or palpitations      Vital Signs:   Temp:  [97.3 °F (36.3 °C)-97.5 °F (36.4 °C)] 97.5 °F (36.4 °C)  Heart Rate:  [53-81] 53  Resp:  [16] 16  BP: (152-187)/(65-77) 177/65     Physical Exam:  Constitutional: Awake, much more alert today but poor attention elderly  HENT: NCAT, mucous membranes moist, neck supple  Respiratory: No cough or wheezes, normal respirations, nonlabored breathing   Cardiovascular: Pulse rate is borderline bradycardia in the 60s during my evaluation, palpable radial pulses  Gastrointestinal:  Soft, nontender, nondistended  Musculoskeletal: Debilitated in appearance and obese  Psychiatric: Demented affect, cooperative, conversational  Neurologic: Disoriented, poor short-term memory, no slurred speech or facial droop, follows commands  Skin: No rashes or jaundice, warm      Pertinent  and/or Most Recent Results     Results from last 7 days   Lab Units 12/13/24  0626 12/12/24  0448 12/11/24  1906   WBC 10*3/mm3 7.95 9.33 10.22   HEMOGLOBIN g/dL 10.6* 10.1* 11.6*   HEMATOCRIT % 32.2* 30.9* 35.3   PLATELETS 10*3/mm3 250 257 270   SODIUM mmol/L 143 142 141   POTASSIUM mmol/L 3.6 3.1* 3.9   CHLORIDE mmol/L 108* 106 103   CO2 mmol/L 27.0 26.1 26.0   BUN mg/dL 10 11 12   CREATININE mg/dL 0.68 0.59 0.74   GLUCOSE mg/dL 127* 124* 217*   CALCIUM mg/dL 8.4* 8.7 9.1     Results from last 7 days   Lab Units 12/11/24  1906   BILIRUBIN mg/dL 1.1   ALK PHOS U/L 74   ALT (SGPT) U/L 16   AST (SGOT) U/L 29           Invalid input(s): \"TG\", \"LDLCALC\", \"LDLREALC\"  Results from last 7 days "   Lab Units 12/12/24  0448   HEMOGLOBIN A1C % 8.10*       Brief Urine Lab Results  (Last result in the past 365 days)        Color   Clarity   Blood   Leuk Est   Nitrite   Protein   CREAT   Urine HCG        12/11/24 2048 Yellow   Clear   Negative   Negative   Negative   Trace                    Imaging Results (All)       Procedure Component Value Units Date/Time    MRI Brain Without Contrast [[id number]] Collected: 12/12/24 1211     Updated: 12/13/24 0630    Narrative:      MRI OF THE BRAIN WITHOUT CONTRAST ON 12/12/2024     CLINICAL HISTORY: New onset seizure.     TECHNIQUE: The patient was unable to follow commands and hold still and  moving patient protocol had to be performed consisting of fast axial  FLAIR, T1-T2 diffusion and gradient echo T2 and sagittal T1-weighted  images of the head.     There are no prior MRIs of the brain for comparison. This is correlated  to yesterday evening's head CT from [facility] on  12/11/2024.     FINDINGS: Unfortunately this exam is slightly limited due to the fact  that the patient was moving quite a bit and fast sequences had to be  performed which are decreased resolution. Furthermore the patient motion  during the exams causes some blurring of some of the images and slightly  limits evaluation. There are multiple patchy nodular foci of T2 high  signal extending from the periventricular into the subcortical white  matter of the cerebral hemispheres consistent with moderate small vessel  disease. There is a 7 x 4 mm old lacunar infarct in the superior right  putamen. The remainder of the brain parenchyma is normal in signal  intensity. Specifically, no diffusion weighted abnormality is seen with  no acute infarct identified. On the gradient echo T2 weighted images no  acute or old blood breakdown products are seen intracranially. There is  diffuse cerebral atrophy. The lateral and third ventricles are mildly  prominent in size which is most probably on the  basis central volume  loss or atrophy. I see no mass effect and no midline shift and no  extra-axial fluid collections are identified. The calvarium and the  skull base demonstrate normal marrow signal intensity. The paranasal  sinuses and the mastoid air cells and middle ear cavities are clear.  There are intraocular lens implants in the globes bilaterally from  previous bilateral cataract surgery otherwise orbits are unremarkable.       Impression:      1. Unfortunately, this exam is slightly limited due to the fact that the  patient was moving quite a bit and fast sequences had to be preformed  which are of decreased resolution. Furthermore, the patient moved  repetitively during this exam causing motion artifact which causes  blurring of many of the images and slightly limits evaluation.     2. Overall, no acute intracranial abnormality is identified.     3. There is moderate small vessel disease in cerebral white matter and  there is a 7 x 4 mm old lacunar infarct in the superior right putamen.     4. There is diffuse cerebral atrophy and the lateral and third  ventricles are probably prominent in size felt to be on the basis of  central volume loss or atrophy. The remainder of the MRI of the brain is  normal with no acute infarct or intracranial hemorrhage identified. The  etiology of patient's seizure-like activity is not established on this  exam.        This report was finalized on 12/13/2024 6:27 AM by Dr. Nelson Morales M.D  on Workstation: [id number]       CT Head Without Contrast [[id number]] Collected: 12/11/24 2032     Updated: 12/11/24 2041    Narrative:      CT HEAD WO CONTRAST-     INDICATIONS: Seizure     TECHNIQUE: Radiation dose reduction techniques were utilized, including  automated exposure control and exposure modulation based on body size.  Noncontrast head CT     COMPARISON: 9/27/2021     FINDINGS:     The exam is significantly limited by extensive motion artifact, and  follow-up  evaluation is advised as indications persist.     Within the limitations of the exam, no definite acute intracranial  hemorrhage, midline shift or mass effect. No acute territorial infarct  is identified. Basal ganglia mineralization is present.     Mild to moderate periventricular hypodensities suggest chronic small  vessel ischemic change in a patient this age.     Arterial calcifications are seen at the base of the brain.     Thinning of the pituitary is apparent. Ventricles, cisterns, cerebral  sulci are unremarkable for patient age, and do not appear significantly  changed.     Small likely mucous retention cyst in the left sphenoid sinus. The  visualized paranasal sinuses, orbits, mastoid air cells are otherwise  unremarkable.          Impression:         The exam is significantly limited by motion artifact. No definite  intracranial hemorrhage. However, considering the significant  limitations of the exam, follow-up evaluation is recommended as  indications persist.     This report was finalized on 12/11/2024 8:37 PM by Dr. Randolph Abbasi M.D on Workstation: [id number]       XR Chest 1 View [[id number]] Collected: 12/11/24 1952     Updated: 12/11/24 1956    Narrative:      AP CHEST     HISTORY: New onset seizure     COMPARISON: 2/8/2022     FINDINGS: Elevated right hemidiaphragm with right basilar atelectasis.  Left lung appears clear. Heart size stable. No appreciable pneumothorax.       Impression:      Stable chest x-ray. No acute findings           This report was finalized on 12/11/2024 7:53 PM by Dr. Mauro Castano M.D on Workstation: [id number]               Results for orders placed during the hospital encounter of 07/11/22    Duplex Venous Lower Extremity - Right CAR    Interpretation Summary  · Right popliteal fossa fluid collection.  · Normal right lower extremity venous duplex scan.      Results for orders placed during the hospital encounter of 07/11/22    Duplex Venous Lower Extremity - Right  CAR    Interpretation Summary  · Right popliteal fossa fluid collection.  · Normal right lower extremity venous duplex scan.      Results for orders placed during the hospital encounter of 07/05/22    Adult Transthoracic Echo Complete W/ Cont if Necessary Per Protocol    Interpretation Summary  · Calculated left ventricular EF = 64.6% Calculated left ventricular 3D EF = 69% Estimated left ventricular EF was in agreement with the calculated left ventricular EF. Left ventricular systolic function is normal. Normal left ventricular cavity size and wall thickness noted. All left ventricular wall segments contract normally. Left ventricular diastolic function is consistent with (grade II w/high LAP) pseudonormalization.  · The left atrial cavity is moderately dilated.  · Estimated right ventricular systolic pressure from tricuspid regurgitation is normal (<35 mmHg).      Discharge Details        Discharge Medications        New Medications        Instructions Start Date   acetaminophen 325 MG tablet  Commonly known as: TYLENOL   650 mg, Oral, Every 6 Hours PRN      levETIRAcetam 500 MG tablet  Commonly known as: KEPPRA   500 mg, Oral, 2 Times Daily      potassium chloride 20 MEQ CR tablet  Commonly known as: KLOR-CON M20   20 mEq, Oral, Daily             Changes to Medications        Instructions Start Date   rivaroxaban 20 MG tablet  Commonly known as: Xarelto  What changed:   how much to take  how to take this  when to take this  additional instructions   DISCUSS WITH YOUR DOCTOR WHO PRESCRIBES WHEN TO RESTART      rosuvastatin 20 MG tablet  Commonly known as: CRESTOR  What changed:   how much to take  how to take this  when to take this   TAKE ONE TABLET BY MOUTH EVERY NIGHT AT BEDTIME             Continue These Medications        Instructions Start Date   amLODIPine 5 MG tablet  Commonly known as: NORVASC   5 mg, Oral, Daily      atenolol 25 MG tablet  Commonly known as: TENORMIN   12.5 mg, Oral, Daily      donepezil  10 MG tablet  Commonly known as: ARICEPT   10 mg, Oral, Daily      furosemide 20 MG tablet  Commonly known as: LASIX   20 mg, Oral, Daily, 1/2 tab daily      glucose blood test strip   Use daily for type 2 DM.       Jardiance 10 MG tablet tablet  Generic drug: empagliflozin   TAKE ONE TABLET BY MOUTH DAILY      melatonin 5 MG tablet tablet   5 mg, Oral, Nightly      metFORMIN 500 MG tablet  Commonly known as: GLUCOPHAGE   TAKE 1/2 TABLET BY MOUTH TWICE A DAY WITH MEALS      MULTIVITAMINS PO   1 tablet, Daily      sertraline 100 MG tablet  Commonly known as: ZOLOFT   100 mg, Oral, Daily      traZODone 50 MG tablet  Commonly known as: DESYREL   50 mg, Oral, Nightly PRN, 1/2 tab so only 25mg      vitamin B-12 1000 MCG tablet  Commonly known as: CYANOCOBALAMIN   1,000 mcg, Daily      vitamin C 250 MG tablet  Commonly known as: ASCORBIC ACID   250 mg, Daily      ZINC PO   Take  by mouth.               Allergies   Allergen Reactions    Enalapril Swelling     vasotec tabs    Memantine Hcl Other (See Comments)     imbalance      Enalapril Maleate Swelling    Penicillins Swelling     Beta lactam allergy details  Antibiotic reaction: (!) shortness of breath, other (Swelling)  Age at reaction: adult  Dose to reaction time: unknown  Reason for antibiotic: unknown  Epinephrine required for reaction?: unknown  Tolerated antibiotics: unknown            Discharge Disposition:  Home or Self Care    Diet:  Hospital:  Diet Order   Procedures    Diet: Regular/House; Texture: Soft to Chew (NDD 3); Soft to Chew: Chopped Meat; Fluid Consistency: Thin (IDDSI 0)       Activity:  Activity Instructions       Activity as Tolerated                   CODE STATUS:    Code Status and Medical Interventions: No CPR (Do Not Attempt to Resuscitate); Limited Support; No intubation (DNI); Flaca   Ordered at: 12/12/24 0803     Medical Intervention Limits:    No intubation (DNI)     Level Of Support Discussed With:    Next of Kin (If No Surrogate)      Code Status (Patient has no pulse and is not breathing):    No CPR (Do Not Attempt to Resuscitate)     Medical Interventions (Patient has pulse or is breathing):    Limited Support     Comments:    Flaca       Future Appointments   Date Time Provider Department Center   2/21/2025 11:30 AM [facility]   2/28/2025  1:30 PM Parul Jaime MD [facility]   3/3/2025 11:30 AM Evangelina eBatty APRN [facility]   3/3/2025  1:30 PM Mode Gongora MD [facility]         Additional Instructions for the Follow-ups that You Need to Schedule       Discharge Follow-up with PCP   As directed       Currently Documented PCP:    Parul Jaime MD    PCP Phone Number:    [phone]     Follow Up Details: 1 WEEK, CALL TODAY TO SCHEDULE               Follow-up Information       Randolph Ley II, MD Follow up.    Specialty: Neurology  Why: Follow-up with general neurology as an outpatient in 2 months they will arrange, call to confirm or with questions.  Contact information:  [address] [facility] [address]  [place] [address]  [phone]               Parul Jaime MD .    Specialty: Internal Medicine  Why: 1 WEEK, CALL TODAY TO SCHEDULE  Contact information:  [address] [facility] [address]  [place] [address]  [phone]                                 Jb Lu MD  12/13/24      Time Spent on Discharge:  I spent greater than 30 minutes on this discharge activity which included: face-to-face encounter with the patient, reviewing the data in the system, coordination of the care with the nursing staff as well as consultants, documentation, and entering orders.

## 2024-12-13 NOTE — PROGRESS NOTES
[facility] to provide Home Care services. Patient and family agreeable. PCP and contact information confirmed. VO received from [place] for Dr Jaime to [facility] for HH. Please call home phone to schedule HH visits, [phone]. Daughter Flaca can be alternate contact, [phone].   Thank you!    ----- Message from Sherif Correa sent at 3/2/2023  2:49 PM CST -----  Regarding: RE: PA Needed for Lupron    Patient has free care. PA not required.    ----- Message -----  From: Perla Pulliam RN  Sent: 3/2/2023   2:22 PM CST  To: Sherif Correa  Subject: PA Needed for Lupron                             Pt needs PA for Lupron because his current PA  2023. Pt will be rescheduled for next week to give time to obtain PA. Thanks!

## 2024-12-13 NOTE — CONSULTS
Orthopaedic Surgery  Consult Note  Dr. LINDY Dobbins II  [phone]    HPI:  Patient is a 79 y.o. Not  or  female who presents with Complaints of chronic right knee pain.  This patient is very well-known to me as she comes to our office routinely for cortisone injections.  She just recently had an injection about 3 weeks ago.  She was admitted to the hospital and was complaining of severe right knee pain and we were consulted for management.  She complains of sharp pain in the right knee worse with any motion or activity.  She has end-stage disease but due to her dementia she is not felt to be a candidate for surgical intervention.    MEDICAL HISTORY  Past Medical History:   Diagnosis Date   • Abnormal ECG     AFib   • Allergic rhinitis    • Anemia    • Cataract    • Chronic venous insufficiency    • Coronary artery disease     Afib   • Dementia    • Depression    • Diabetes mellitus    • Headache     Sinus?   • Hidradenitis suppurativa    • History of transfusion    • Hyperlipidemia    • Hypertension    • Low back pain    • Osteoarthritis    • Paroxysmal atrial fibrillation    • Peripheral neuropathy    • Pulmonary arterial hypertension    • Stroke    • Vitamin B 12 deficiency     Boarderline only with normal MMA/Homocysteine/IF antibody     Past Surgical History:   Procedure Laterality Date   • BACK SURGERY  2007   • COLONOSCOPY     • HYSTERECTOMY      PARTIAL   • JOINT REPLACEMENT      Knee replacement   • KNEE SURGERY Left    • SUBTOTAL HYSTERECTOMY     • TONSILLECTOMY       Prior to Admission medications    Medication Sig Start Date End Date Taking? Authorizing Provider   amLODIPine (NORVASC) 5 MG tablet TAKE 1 TABLET BY MOUTH DAILY 5/24/24  Yes Parul Jaime MD   atenolol (TENORMIN) 25 MG tablet TAKE 1/2 TABLET BY MOUTH DAILY 8/12/24  Yes Mode Gongora MD   donepezil (ARICEPT) 10 MG tablet Take 1 tablet by mouth Daily. 12/3/24  Yes Parul Jaime MD   furosemide (LASIX) 20 MG tablet  Take 1 tablet by mouth Daily.  Patient taking differently: Take 1 tablet by mouth Daily. 1/2 tab daily 8/29/24  Yes Mode Gongora MD   Jardiance 10 MG tablet tablet TAKE ONE TABLET BY MOUTH DAILY 4/15/24  Yes Parul Jaime MD   metFORMIN (GLUCOPHAGE) 500 MG tablet TAKE 1/2 TABLET BY MOUTH TWICE A DAY WITH MEALS 11/11/24  Yes Parul Jaime MD   Multiple Vitamin (MULTIVITAMINS PO) Take 1 tablet by mouth daily. 9/24/12  Yes Re Simmons MD   Multiple Vitamins-Minerals (ZINC PO) Take  by mouth.   Yes Re Simmons MD   rosuvastatin (CRESTOR) 20 MG tablet TAKE ONE TABLET BY MOUTH EVERY NIGHT AT BEDTIME  Patient taking differently: 3 (Three) Times a Week. 4/25/24  Yes Parul Jaime MD   vitamin B-12 (CYANOCOBALAMIN) 1000 MCG tablet Take 1 tablet by mouth Daily.   Yes Emergency, Nurse Epic, RN   vitamin C (ASCORBIC ACID) 250 MG tablet Take 1 tablet by mouth Daily.   Yes Re Simmons MD   Xarelto 20 MG tablet TAKE 1 TABLET BY MOUTH DAILY WITH DINNER 11/26/24  Yes Kassandra Phillips APRN   glucose blood test strip Use daily for type 2 DM. 7/20/16   Parul Jaime MD   melatonin 5 MG tablet tablet Take 1 tablet by mouth Every Night.    Re Simmons MD   sertraline (ZOLOFT) 100 MG tablet Take 1 tablet by mouth Daily.    Re Simmons MD   traZODone (DESYREL) 50 MG tablet Take 1 tablet by mouth At Night As Needed for Sleep. 1/2 tab so only 25mg    Re Simmons MD     Allergies   Allergen Reactions   • Enalapril Swelling     vasotec tabs   • Memantine Hcl Other (See Comments)     imbalance     • Enalapril Maleate Swelling   • Penicillins Swelling     Beta lactam allergy details  Antibiotic reaction: (!) shortness of breath, other (Swelling)  Age at reaction: adult  Dose to reaction time: unknown  Reason for antibiotic: unknown  Epinephrine required for reaction?: unknown  Tolerated antibiotics: unknown        Most Recent Immunizations   Administered Date(s)  "Administered   • 31-influenza Vac Quardvalent Preservativ 01/01/2021   • ABRYSVO (RSV, 60+ or pregnant women 32-36 wks) 02/02/2024   • COVID-19 (PFIZER) 12YRS+ (COMIRNATY) 02/02/2024   • COVID-19 (PFIZER) BIVALENT 12+YRS 09/23/2022   • COVID-19 (PFIZER) Purple Cap Monovalent 10/30/2021   • Fluad Quad 65+ 10/07/2020   • Fluzone  >6mos 08/24/2015   • Fluzone High-Dose 65+YRS 09/16/2019   • Fluzone High-Dose 65+yrs 02/02/2024   • INFLUENZA NASAL UF 11/01/2023   • Influenza TIV (IM) 08/31/2015   • Influenza, Unspecified 10/02/2020   • Pneumococcal Conjugate 13-Valent (PCV13) 08/24/2015   • Pneumococcal Polysaccharide (PPSV23) 10/17/2017   • Tdap 03/24/2024   • Zostavax 07/14/2015     Social History   History  Tobacco Use  •  Smoking status:  Former      Current packs/day:  0.50      Types:  Cigarettes      Passive exposure:  Past  •  Smokeless tobacco:  Never  Substance Use Topics  •  Alcohol use:  Never      Comment: CAFFEINE USE/ SOFT DRINKS.        Social History   History  Substance and Sexual Activity  Drug Use  Never        VITALS: BP (!) 187/73 (BP Location: Left arm, Patient Position: Lying)   Pulse 56   Temp 97.5 °F (36.4 °C) (Axillary)   Resp 16   Ht 160 cm (63\")   Wt 78.2 kg (172 lb 6.4 oz)   LMP  (LMP Unknown)   SpO2 95%   BMI 30.54 kg/m²  Body mass index is 30.54 kg/m².    PHYSICAL EXAM:   CONSTITUTIONAL: No acute distress  LUNGS: Equal chest rise, no shortness of air  CARDIOVASCULAR: palpable peripheral pulses  SKIN: no skin lesions in the area examined  LYMPH: no lymphadenopathy in the area examined  Musculoskeletal: Right knee effusion present.  Limited range of motion due to pain.  Patient is able to move her foot and toes but detailed neurovascular exam unable to be performed due to her dementia.    RADIOLOGY REVIEW:   MRI Brain Without Contrast    Result Date: 12/13/2024  1. Unfortunately, this exam is slightly limited due to the fact that the patient was moving quite a bit and fast sequences " had to be preformed which are of decreased resolution. Furthermore, the patient moved repetitively during this exam causing motion artifact which causes blurring of many of the images and slightly limits evaluation.  2. Overall, no acute intracranial abnormality is identified.  3. There is moderate small vessel disease in cerebral white matter and there is a 7 x 4 mm old lacunar infarct in the superior right putamen.  4. There is diffuse cerebral atrophy and the lateral and third ventricles are probably prominent in size felt to be on the basis of central volume loss or atrophy. The remainder of the MRI of the brain is normal with no acute infarct or intracranial hemorrhage identified. The etiology of patient's seizure-like activity is not established on this exam.   This report was finalized on 12/13/2024 6:27 AM by Dr. Nelson Morales M.D on Workstation: [id number]      CT Head Without Contrast    Result Date: 12/11/2024   The exam is significantly limited by motion artifact. No definite intracranial hemorrhage. However, considering the significant limitations of the exam, follow-up evaluation is recommended as indications persist.  This report was finalized on 12/11/2024 8:37 PM by Dr. [place] Abbasi M.D on Workstation: [id number]      XR Chest 1 View    Result Date: 12/11/2024  Stable chest x-ray. No acute findings    This report was finalized on 12/11/2024 7:53 PM by Dr. Mauro Castano M.D on Workstation: [id number]        LABS:   Results for the past 24 hours:   Recent Results (from the past 24 hours)   ECG 12 Lead QT Measurement    Collection Time: 12/12/24  7:32 AM   Result Value Ref Range    QT Interval 565 ms    QTC Interval 541 ms   POC Glucose Once    Collection Time: 12/12/24 12:09 PM    Specimen: Blood   Result Value Ref Range    Glucose 121 70 - 130 mg/dL   POC Glucose Once    Collection Time: 12/12/24  5:19 PM    Specimen: Blood   Result Value Ref Range    Glucose 127 70 - 130 mg/dL   POC Glucose  Once    Collection Time: 12/12/24  7:50 PM    Specimen: Blood   Result Value Ref Range    Glucose 184 (H) 70 - 130 mg/dL   Basic Metabolic Panel    Collection Time: 12/13/24  6:26 AM    Specimen: Blood   Result Value Ref Range    Glucose 127 (H) 65 - 99 mg/dL    BUN 10 8 - 23 mg/dL    Creatinine 0.68 0.57 - 1.00 mg/dL    Sodium 143 136 - 145 mmol/L    Potassium 3.6 3.5 - 5.2 mmol/L    Chloride 108 (H) 98 - 107 mmol/L    CO2 27.0 22.0 - 29.0 mmol/L    Calcium 8.4 (L) 8.6 - 10.5 mg/dL    BUN/Creatinine Ratio 14.7 7.0 - 25.0    Anion Gap 8.0 5.0 - 15.0 mmol/L    eGFR 88.2 >60.0 mL/min/1.73   CBC (No Diff)    Collection Time: 12/13/24  6:26 AM    Specimen: Blood   Result Value Ref Range    WBC 7.95 3.40 - 10.80 10*3/mm3    RBC 3.60 (L) 3.77 - 5.28 10*6/mm3    Hemoglobin 10.6 (L) 12.0 - 15.9 g/dL    Hematocrit 32.2 (L) 34.0 - 46.6 %    MCV 89.4 79.0 - 97.0 fL    MCH 29.4 26.6 - 33.0 pg    MCHC 32.9 31.5 - 35.7 g/dL    RDW 13.7 12.3 - 15.4 %    RDW-SD 43.9 37.0 - 54.0 fl    MPV 9.6 6.0 - 12.0 fL    Platelets 250 140 - 450 10*3/mm3   Magnesium    Collection Time: 12/13/24  6:26 AM    Specimen: Blood   Result Value Ref Range    Magnesium 2.5 (H) 1.6 - 2.4 mg/dL   Phosphorus    Collection Time: 12/13/24  6:26 AM    Specimen: Blood   Result Value Ref Range    Phosphorus 2.4 (L) 2.5 - 4.5 mg/dL       IMPRESSION:  Patient is a 79 y.o. Not  or  female with Advanced osteoarthritis of the right knee    PLAN:   Admited to: Jorge Luis Calle MD  Unfortunately, there is not much we can do for her while she is in the hospital.  She just had a cortisone injection a few weeks ago in the office and will not be a candidate for another injection for 2-1/2 more months.  Family was at bedside and we did discuss the possibility of hyaluronic acid injections.  That would be something that we would have to do in the office setting but does not work for all patients.  It would require insurance prior authorization.  If they wish to  "pursue gel shots, we would be happy to do so and he can call the office to obtain prior authorization as well as to set up an appointment for the procedure.  Otherwise while she is in the hospital I would recommend physical therapy and anti-inflammatory medication as deemed safe and appropriate by the admitting team.  No need for acute orthopedic intervention    R \"Felix\" Shilpi DODGE MD  Orthopaedic Surgery  [facility]  [phone] - [place] Office  [phone] - [place] Office            "

## 2024-12-13 NOTE — OUTREACH NOTE
Prep Survey      Flowsheet Row Responses   [facility] patient discharged from? [place]   Is LACE score < 7 ? No   Eligibility [facility]   Date of Admission 12/11/24   Date of Discharge 12/13/24   Discharge Disposition Home-Health Care Sv   Discharge diagnosis New onset seizure   Does the patient have one of the following disease processes/diagnoses(primary or secondary)? Other   Does the patient have Home health ordered? Yes   What is the Home health agency?  [facility]   Prep survey completed? Yes            Yadi MOYA - Registered Nurse

## 2024-12-16 ENCOUNTER — TRANSITIONAL CARE MANAGEMENT TELEPHONE ENCOUNTER (OUTPATIENT)
Dept: CALL CENTER | Facility: HOSPITAL | Age: 79
End: 2024-12-16
Payer: MEDICARE

## 2024-12-16 ENCOUNTER — TELEPHONE (OUTPATIENT)
Dept: CARDIOLOGY | Facility: CLINIC | Age: 79
End: 2024-12-16
Payer: MEDICARE

## 2024-12-16 NOTE — TELEPHONE ENCOUNTER
Caller: Flaca Mathis     Best call back number: [phone]     What is your medical concern? PT DAUGHTER FLACA CALLING TO SPEAK WITH DR CARPENTER ABOUT PT AND BLOOD THINNERS - PT CURRENTLY HAS A RIGHT KNEE THAT IS BONE ON BONE SO SHE IS NOT ABLE TO HAVE ANY SURGERY - SHE NOTES THAT PT IS BLEEDING INTO HER KNEE DUE TO HER BLOOD THINNERS, WHILE IN THE HOSPITAL FOR THREE DAYS, BLOOD THINNERS WERE STOPPED AND THE HOSPITALIST DR NEWBERRY ADVISED PT DAUGHTER TO CALL DR CARPENTER TO DISCUSS WHAT SHOULD BE DONE  - FLACA WOULD LIKE TO SPEAK WITH DR CARPENTER SPECIFICALLY INSTEAD OF [place] - PLEASE CALL TO ADVISE

## 2024-12-16 NOTE — TELEPHONE ENCOUNTER
"  Dr. Gongora is out today.  I reviewed the discharge summary.  Dr. Lu actually recommended that she restart the rivaroxaban, but patient's family wanted to hold off.    \"Family tell me patient has been off of her Xarelto while and plan to discuss with primary care provider whether they want to restart this medication. I discussed the reason for Xarelto and that with atrial fibrillation she has high stroke risk. The family with her dementia and recent medical issues say they are concerned about the risk versus benefits and plan to hold for now and discussed with primary care at follow-up. They understand while off this medication she will be at increased risk of stroke but lower risk of bleeding. \" Dr. Lu    I will defer to Dr. Gongora upon her return to office.     "

## 2024-12-16 NOTE — OUTREACH NOTE
Call Center TCM Note      Flowsheet Row Responses   [facility] patient discharged from? [place]   Does the patient have one of the following disease processes/diagnoses(primary or secondary)? Other   TCM attempt successful? Yes   Call start time 0927   Call end time 0933   Discharge diagnosis New onset seizure   Person spoke with today (if not patient) and relationship santiago Thayer   Meds reviewed with patient/caregiver? Yes   Is the patient having any side effects they believe may be caused by any medication additions or changes? No   Does the patient have all medications ordered at discharge? Yes   Is the patient taking all medications as directed (includes completed medication regime)? Yes   Comments Awaiting Neurology office to call with appt   Does the patient have an appointment with their PCP within 7-14 days of discharge? Yes  [Video visit 12/20/2024 11:00 AM]   What is the Home health agency?  [facility]   Has home health visited the patient within 72 hours of discharge? Call prior to 72 hours   Psychosocial issues? No   Did the patient receive a copy of their discharge instructions? Yes   Nursing interventions Reviewed instructions with patient   What is the patient's perception of their health status since discharge? Improving   Is the patient/caregiver able to teach back signs and symptoms related to disease process for when to call PCP? Yes   Is the patient/caregiver able to teach back signs and symptoms related to disease process for when to call 911? Yes   Is the patient/caregiver able to teach back the hierarchy of who to call/visit for symptoms/problems? PCP, Specialist, Home health nurse, Urgent Care, ED, 911 Yes   If the patient is a current smoker, are they able to teach back resources for cessation? Not a smoker   TCM call completed? Yes   Wrap up additional comments santiago Thayer states pt is doing better, and denies any seizure episodes. Pt is having right knee pain still.  Reviewed AVS/meds with dtr,  dtr shares pt did not get K+ and advised to call PCP office to ask for rx for K+ 20 meq,  message routed to PCP office about needed K+.   Call end time 0933   Would this patient benefit from a Referral to [facility] Social Work? No   Is the patient interested in additional calls from an ambulatory ? No            Tory Luu RN    12/16/2024, 09:35 EST

## 2024-12-17 ENCOUNTER — TELEPHONE (OUTPATIENT)
Dept: INTERNAL MEDICINE | Facility: CLINIC | Age: 79
End: 2024-12-17
Payer: MEDICARE

## 2024-12-17 NOTE — TELEPHONE ENCOUNTER
I called Roseanna. She just had a really bad fall and got a hemarthrosis in her knee. Her dementia is worsening. Between the falls, the blood in the joint (which is a huge risk for joint infection), and memory loss, it would seem that OAC is now more risky than beneficial. She's going to update us in a month.

## 2024-12-17 NOTE — TELEPHONE ENCOUNTER
Caller: [facility] [employer] [facility]    Relationship:     Best call back number: [phone]         Who are you requesting to speak with (clinical staff, provider,  specific staff member): PCP OR CLINICAL        What was the call regarding: NEEDS VERBAL ORDER FOR START OF CARE ON 12/18/24.

## 2024-12-18 ENCOUNTER — HOME CARE VISIT (OUTPATIENT)
Dept: HOME HEALTH SERVICES | Facility: HOME HEALTHCARE | Age: 79
End: 2024-12-18
Payer: COMMERCIAL

## 2024-12-18 PROCEDURE — G0299 HHS/HOSPICE OF RN EA 15 MIN: HCPCS

## 2024-12-19 ENCOUNTER — HOME CARE VISIT (OUTPATIENT)
Dept: HOME HEALTH SERVICES | Facility: HOME HEALTHCARE | Age: 79
End: 2024-12-19
Payer: COMMERCIAL

## 2024-12-19 NOTE — CASE COMMUNICATION
Patient missed a PT visit from [facility] on 12/19/2024    Reason: daughter called office to re-schedule for different day.       For your records only.   Per CMS Guidance, MD must be notified of missed/cancelled visits; therefore the prescribed frequency was not met.

## 2024-12-20 ENCOUNTER — TELEMEDICINE (OUTPATIENT)
Dept: INTERNAL MEDICINE | Facility: CLINIC | Age: 79
End: 2024-12-20
Payer: MEDICARE

## 2024-12-20 ENCOUNTER — TELEPHONE (OUTPATIENT)
Dept: INTERNAL MEDICINE | Facility: CLINIC | Age: 79
End: 2024-12-20

## 2024-12-20 VITALS — SYSTOLIC BLOOD PRESSURE: 130 MMHG | RESPIRATION RATE: 16 BRPM | HEART RATE: 88 BPM | DIASTOLIC BLOOD PRESSURE: 70 MMHG

## 2024-12-20 DIAGNOSIS — E87.6 HYPOKALEMIA: ICD-10-CM

## 2024-12-20 DIAGNOSIS — R56.9 NEW ONSET SEIZURE: Primary | ICD-10-CM

## 2024-12-20 DIAGNOSIS — F03.90 DEMENTIA WITHOUT BEHAVIORAL DISTURBANCE: ICD-10-CM

## 2024-12-20 PROBLEM — Z66 DNR (DO NOT RESUSCITATE): Status: ACTIVE | Noted: 2024-12-20

## 2024-12-20 RX ORDER — POTASSIUM CHLORIDE 750 MG/1
10 TABLET, EXTENDED RELEASE ORAL DAILY
Qty: 90 TABLET | Refills: 3 | Status: SHIPPED | OUTPATIENT
Start: 2024-12-20

## 2024-12-20 NOTE — PROGRESS NOTES
Transitional Care Follow Up Visit  Subjective     Kassi Mathis is a 79 y.o. female who presents for a transitional care management visit.    Within 48 business hours after discharge our office contacted her via telephone to coordinate her care and needs.      I reviewed and discussed the details of that call along with the discharge summary, hospital problems, inpatient lab results, inpatient diagnostic studies, and consultation reports with Kassi.     Current outpatient and discharge medications have been reconciled for the patient.  Reviewed by: Parul Jaime MD          12/13/2024    12:14 PM   Date of TCM Phone Call   [facility]   Date of Admission 12/11/2024   Date of Discharge 12/13/2024   [place] [facility] [place] for Readmission (LACE) Score: 12 (12/13/2024  6:00 AM)      Seizures        Course During Hospital Stay:      You have chosen to receive care through a telehealth visit.  Do you consent to use a video/audio connection for your medical care today? Yes  Provider is located in [place].  The patient is located in [place].        Seizures          The following data was reviewed by: Parul Jaime MD on 12/20/2024:  Common labs          12/11/2024    19:06 12/12/2024    04:48 12/13/2024    06:26   Common Labs   Glucose 217  124  127    BUN 12  11  10    Creatinine 0.74  0.59  0.68    Sodium 141  142  143    Potassium 3.9  3.1  3.6    Chloride 103  106  108    Calcium 9.1  8.7  8.4    Albumin 3.4      Total Bilirubin 1.1      Alkaline Phosphatase 74      AST (SGOT) 29      ALT (SGPT) 16      WBC 10.22  9.33  7.95    Hemoglobin 11.6  10.1  10.6    Hematocrit 35.3  30.9  32.2    Platelets 270  257  250    Hemoglobin A1C  8.10       Patient presents in hospital f/u.  She was admitted for seizure d/o.  I have reviewed discharge summary, labs, scans, cardiovascular studies and medication changes.      Reviewed hospitalization with the patient.      Kassi BELL  Del is a 79 y.o. female presented to the hospital with new onset seizure.  She received supportive care and symptom treatment.  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  Keppra initiated per neurology consult.  They are arranging for follow-up in their clinic.  Patient initially had postictal state and initially had oral dysphagia.  She was seen by speech therapy and eventually put back on an oral diet.  She received IV fluid resuscitation.  A1c elevated at 8.1.  Recommend close follow-up with primary care for careful titration of diabetic medications.  Significant dementia noted.  Family feels they can take care of her at home.  Hypokalemia repleted.  Case discussed with neurology consult team who cleared her to discharge today.  Patient drastically improved on 12/13 and family eager to take her home.  Family tell me patient has been off of her Xarelto while and plan to discuss with primary care provider whether they want to restart this medication.  I discussed the reason for Xarelto and that with atrial fibrillation she has high stroke risk.  The family with her dementia and recent medical issues say they are concerned about the risk versus benefits and plan to hold for now and discussed with primary care at follow-up.  They understand while off this medication she will be at increased risk of stroke but lower risk of bleeding.    D/w daughter.  Possible that new onset seizures caused by Tramadol which she has stopped.      Review of Systems   Neurological:  Positive for seizures.       The following portions of the patient's history were reviewed and updated as appropriate: allergies, current medications and problem list.    Patient Active Problem List    Diagnosis Date Noted    Type 2 diabetes mellitus 01/17/2016     Priority: High    DNR (do not resuscitate) 12/20/2024    Oral phase dysphagia 12/12/2024    New onset seizure 12/11/2024    Effusion of right  knee 12/11/2024    Acquired bilateral hammer toes 01/30/2024    Diabetic polyneuropathy associated with type 2 diabetes mellitus 01/30/2024    Bradycardia, sinus 09/08/2022    Chronic diastolic congestive heart failure 08/09/2022    At high risk for falls 09/29/2021    Osteoarthritis of right knee 09/08/2020    Cervical radiculopathy 10/08/2018    Hammer toes of both feet 10/17/2017    Depression 01/17/2016    Allergic rhinitis 01/17/2016    Hypertension 01/17/2016    Dementia without behavioral disturbance 01/17/2016     Note Last Updated: 7/20/2016     By neuropysch evaluation 7/20/2016      Paroxysmal atrial fibrillation 01/17/2016    B12 deficiency 01/17/2016     Note Last Updated: 6/8/2016     Boarderline only with normal MMA/Homocysteine/IF antibody.  Encouraged MVI daily.        Hyperlipidemia 01/17/2016       Current Outpatient Medications on File Prior to Visit   Medication Sig Dispense Refill    rivaroxaban (Xarelto) 20 MG tablet DISCUSS WITH YOUR DOCTOR WHO PRESCRIBES WHEN TO RESTART      acetaminophen (TYLENOL) 325 MG tablet Take 2 tablets by mouth Every 6 (Six) Hours As Needed for Mild Pain.      amLODIPine (NORVASC) 5 MG tablet TAKE 1 TABLET BY MOUTH DAILY 90 tablet 3    atenolol (TENORMIN) 25 MG tablet TAKE 1/2 TABLET BY MOUTH DAILY 45 tablet 1    donepezil (ARICEPT) 10 MG tablet Take 1 tablet by mouth Daily. 90 tablet 3    furosemide (LASIX) 20 MG tablet Take 1 tablet by mouth Daily. 1/2 tab daily      glucose blood test strip Use daily for type 2 DM. 100 each 3    Jardiance 10 MG tablet tablet TAKE ONE TABLET BY MOUTH DAILY 90 tablet 7    levETIRAcetam (Keppra) 100 MG/ML solution Take 5 mL by mouth 2 (Two) Times a Day. 900 mL 0    melatonin 5 MG tablet tablet Take 1 tablet by mouth Every Night. Indications: Trouble Sleeping      metFORMIN (GLUCOPHAGE) 500 MG tablet TAKE 1/2 TABLET BY MOUTH TWICE A DAY WITH MEALS 90 tablet 3    Multiple Vitamin (MULTIVITAMINS PO) Take 1 tablet by mouth daily.       Multiple Vitamins-Minerals (ZINC PO) Take  by mouth.      rosuvastatin (CRESTOR) 20 MG tablet TAKE ONE TABLET BY MOUTH EVERY NIGHT AT BEDTIME (Patient taking differently: 3 (Three) Times a Week.) 90 tablet 0    sertraline (ZOLOFT) 100 MG tablet Take 1 tablet by mouth Daily. Indications: Major Depressive Disorder      traZODone (DESYREL) 50 MG tablet Take 1 tablet by mouth At Night As Needed for Sleep. 1/2 tab so only 25mg  Indications: Major Depressive Disorder      vitamin B-12 (CYANOCOBALAMIN) 1000 MCG tablet Take 1 tablet by mouth Daily. Indications: Inadequate Vitamin B12      vitamin C (ASCORBIC ACID) 250 MG tablet Take 1 tablet by mouth Daily. Indications: Inadequate Vitamin C      [DISCONTINUED] potassium chloride (KLOR-CON M20) 20 MEQ CR tablet Take 1 tablet by mouth Daily.      [DISCONTINUED] rivaroxaban (Xarelto) 20 MG tablet DISCUSS WITH YOUR DOCTOR WHO PRESCRIBES WHEN TO RESTART 90 tablet 2     No current facility-administered medications on file prior to visit.       Objective     LMP  (LMP Unknown)       Review of Systems   Constitutional:  Positive for fatigue.   Respiratory: Negative.     Cardiovascular: Negative.    Neurological:  Positive for seizures.       Objective   There were no vitals taken for this visit.  Physical Exam  Constitutional:       Appearance: She is well-developed.   HENT:      Head: Normocephalic and atraumatic.   Pulmonary:      Effort: Pulmonary effort is normal.   Neurological:      Mental Status: She is alert and oriented to person, place, and time.   Psychiatric:         Behavior: Behavior normal.         Assessment & Plan   Diagnoses and all orders for this visit:    1. New onset seizure (Primary)    2. Dementia without behavioral disturbance    3. Hypokalemia    Other orders  -     potassium chloride (KLOR-CON M10) 10 MEQ CR tablet; Take 1 tablet by mouth Daily. Indications: Low Amount of Potassium in the Blood  Dispense: 90 tablet; Refill: 3          Discussion    F/u  new onset seizures.  Control is good.  The patient is advised to continue current dosage of Keppra.  F/u with neurology.  She will no longer take Tramadol.    Dementia without behavorial disturbance.  We discussed consideration of palliative care.    Hypokalemia.   Started in hospital.  She is advised to continue.  I sent in a refill.       The following portions of the patient's history were reviewed and updated as appropriate: allergies, current medications, past family history, past medical history, past social history, past surgical history, and problem list.

## 2024-12-20 NOTE — TELEPHONE ENCOUNTER
Caller: EFE ARROYO    Relationship: HOME HEALTH PROVIDER WITH [employer]    Best call back number:     [phone]       What orders are you requesting (i.e. lab or imaging):   PT EVALUATION    In what timeframe would the patient need to come in: THURSDAY OR FRIDAY 12.27-28    PLEASE CALL TO DISCUSS IF NEEDED.

## 2024-12-20 NOTE — HOME HEALTH
79F with a history of dementia, DM, Afib, HTN.  Recent hositalization due to new onset seizure. 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 Keppra initiated.  Patient initially had postictal state and initially had oral dysphagia. She was seen by speech therapy and eventually put back on an oral diet.  She is on a soft solid, thin liquid diet.  A1c elevated at 8.1. Significant dementia noted.  Patient has been off of her Xarelto while and plan to discuss with primary care provider whether they want to restart this medication. I discussed the reason for Xarelto and that with atrial fibrillation she has high stroke risk. The family with her dementia and recent medical issues say they are concerned about the risk versus benefits and plan to hold for now and discussed with primary care at follow-up. They understand while off this medication she will be at increased risk of stroke but lower risk of bleeding.  Daughter reports she has bleeding on her right knee.  Medications reconciled.  Uses walker to ambulate.  Lives at home with daughter who is PCG.  VSS.  SN FOC seizures.  SN to teach and instruct seizures and medication regime.

## 2024-12-23 RX ORDER — ROSUVASTATIN CALCIUM 20 MG/1
20 TABLET, COATED ORAL
Qty: 90 TABLET | Refills: 0 | Status: SHIPPED | OUTPATIENT
Start: 2024-12-23

## 2024-12-27 ENCOUNTER — HOME CARE VISIT (OUTPATIENT)
Dept: HOME HEALTH SERVICES | Facility: HOME HEALTHCARE | Age: 79
End: 2024-12-27
Payer: COMMERCIAL

## 2024-12-28 NOTE — HOME HEALTH
Patient missed a skilled nurse visit from [facility] on 12/27/24     Reason: Caregiver request.  SN was notified by PT.     For your records only.    As per home health protocol, MD must be notified of missed/cancelled visits; therefore the prescribed frequency was not met.

## 2024-12-30 NOTE — HOME HEALTH
Called for PT evaluation this date and her daughter wanted to wait on PT services at least to next week due to a death in the family.  She also wanted to cancel the skilled nursing visit today.

## 2024-12-31 ENCOUNTER — HOME CARE VISIT (OUTPATIENT)
Dept: HOME HEALTH SERVICES | Facility: HOME HEALTHCARE | Age: 79
End: 2024-12-31
Payer: COMMERCIAL

## 2024-12-31 PROCEDURE — G0493 RN CARE EA 15 MIN HH/HOSPICE: HCPCS

## 2024-12-31 PROCEDURE — G0155 HHCP-SVS OF CSW,EA 15 MIN: HCPCS

## 2025-01-01 NOTE — HOME HEALTH
HAILEY barnes on this date with daughter/caregiver Flaca. Patient has been living with daughter for a few years and daughter managing all care and needs. Some assistance from other family members. Flaca interested in additional care especially overnight to assist with rest. Patient has been involved with adult day care and loved it prior to hospitalization. Goal is for patient to regain that ability. New medication is making patient very tired but should improve over next couple weeks. Provided some options and [facility] Patient has all other needs met at this time. Patient sleeping during most of visit but able to provide resources and assess caregiver needs.

## 2025-01-02 ENCOUNTER — HOME CARE VISIT (OUTPATIENT)
Dept: HOME HEALTH SERVICES | Facility: HOME HEALTHCARE | Age: 80
End: 2025-01-02
Payer: COMMERCIAL

## 2025-01-02 VITALS
DIASTOLIC BLOOD PRESSURE: 68 MMHG | HEART RATE: 78 BPM | OXYGEN SATURATION: 96 % | SYSTOLIC BLOOD PRESSURE: 120 MMHG | RESPIRATION RATE: 18 BRPM

## 2025-01-02 NOTE — CASE COMMUNICATION
Home care manager (Anika) called pt's daughter on 12/31 to set up PT evaluation for 1/2 and pt's daughter agreed to 10:30AM.  Therapist called on 1/1/2025 to confirm and her daughter said she did not recall committing to that time.  Then she did agree to 10:30AM.  Pt's daughter called the office on 1/2/2025 declining the evaluation today.

## 2025-01-08 ENCOUNTER — HOME CARE VISIT (OUTPATIENT)
Dept: HOME HEALTH SERVICES | Facility: HOME HEALTHCARE | Age: 80
End: 2025-01-08
Payer: COMMERCIAL

## 2025-01-08 ENCOUNTER — TELEPHONE (OUTPATIENT)
Dept: INTERNAL MEDICINE | Facility: CLINIC | Age: 80
End: 2025-01-08
Payer: MEDICARE

## 2025-01-08 VITALS
TEMPERATURE: 98.3 F | OXYGEN SATURATION: 98 % | SYSTOLIC BLOOD PRESSURE: 130 MMHG | RESPIRATION RATE: 18 BRPM | HEART RATE: 78 BPM | DIASTOLIC BLOOD PRESSURE: 80 MMHG

## 2025-01-08 PROCEDURE — G0299 HHS/HOSPICE OF RN EA 15 MIN: HCPCS

## 2025-01-08 NOTE — TELEPHONE ENCOUNTER
Caller: EFE    Relationship: [facility]    Best call back number: [phone]    EFE CALLED WITH [facility] REQUESTING SPEECH AND PHYSICAL THERAPY ORDERS ON THIS PATIENT.    SHE IS HOPING TO GET THESE VERBAL ORDERS ASAP BECAUSE SHE WOULD LIKE TO VISIT WITH THE PATIENT 01/13/25

## 2025-01-10 ENCOUNTER — HOME CARE VISIT (OUTPATIENT)
Dept: HOME HEALTH SERVICES | Facility: HOME HEALTHCARE | Age: 80
End: 2025-01-10
Payer: MEDICARE

## 2025-01-10 PROCEDURE — G0153 HHCP-SVS OF S/L PATH,EA 15MN: HCPCS

## 2025-01-10 NOTE — Clinical Note
"Speech evaluation completed 1/10/25.     Summary: Pt presents with mild dysphagia suspected secondary to cognitive deficits. Pt daughter reports that patient has been doing better swallowing lately as she is providing her with a soft diet in bite sized pieces, chopped as needed with thin liquids. Denies any recent pneumonia. Pt has poor dentition so they avoid hard/crunchy foods, tough meats. She reports plans to puree pt's food as needed if observed increased difficulty with mastication and swallowing. SLP provided education regarding safe swallow compensatory strategies, s/s aspiration, and risks for pneumonia. Recommend provide meds whole or crushed in puree as needed. Pt reportedly has good to fair appetite and will occasionally supplement with protein shakes as needed. Pt presents with moderate to severe cognitive deficits. She is able to provide her name when prompted. Pt typically responds to questions with 1 word response, occasional use of phrases, however speech unitelligible at times. Pt inconsistent with following commands. Pt's caregiver is able to anticipate needs with patient communication via body language and use of gestures at times. SLP provided education regarding daily cognitive communication tasks and communication strategies. Provided online resource \"[facility]\" for dementia care tips. No further skilled ST indicated at this time. Pt daughter to contact pcp if patient has increased difficulty swallowing or s/s aspiration. "

## 2025-01-13 ENCOUNTER — HOME CARE VISIT (OUTPATIENT)
Dept: HOME HEALTH SERVICES | Facility: HOME HEALTHCARE | Age: 80
End: 2025-01-13
Payer: COMMERCIAL

## 2025-01-13 VITALS
DIASTOLIC BLOOD PRESSURE: 60 MMHG | SYSTOLIC BLOOD PRESSURE: 118 MMHG | OXYGEN SATURATION: 98 % | HEART RATE: 60 BPM | RESPIRATION RATE: 18 BRPM

## 2025-01-13 NOTE — HOME HEALTH
REASON FOR REFERRAL: Pt is a 79 year old female referred to home health services following recent hositalization due to new onset seizure. 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 Keppra initiated.  Patient initially had postictal state and initially had oral dysphagia. She was seen by speech therapy during hospitalization with recommendations for soft diet with thin liquids.       PRIMARY DIAGNOSIS: Alzheimer's disease with late onset     VFSS OR FEES: n/a    PERTINENT HISTORY: dementia, DM, Afib, HTN, right knee primary osteoarthritis    PRIOR LEVEL OF FUNCTION/LIVING SITUATION:  Pt lives with her spouse and her daughter who is her primary caregiver. She is dependent for management of medications, appointments, finances, meals, etc. Per daughter pt fell and hurt her knee several months ago. Prior to this fall she attended an adult day center where she plans to return in the near future.     MEDICAL NECESSITY: Dysphagia therapy and education for safe oral intake of least restrictive diet with reduced risk of aspiration and/or pneumonia.     EVALUATION SUMMARY/EDUCATION: Pt presents with mild dysphagia suspected secondary to cognitive deficits. Pt daughter reports that patient has been doing better swallowing lately as she is providing her with a soft diet in bite sized pieces, chopped as needed with thin liquids. Denies any recent pneumonia. Pt has poor dentition so they avoid hard/crunchy foods, tough meats. She reports plans to puree pt's food as needed if observed increased difficulty with mastication and swallowing. SLP provided education regarding safe swallow compensatory strategies, s/s aspiration, and risks for pneumonia. Recommend provide meds whole or crushed in puree as needed. Pt reportedly has good to fair appetite and will occasionally supplement with protein shakes as needed. Pt presents with moderate to severe cognitive deficits. She is able to  "provide her name when prompted. Pt typically responds to questions with 1 word response, occasional use of phrases, however speech unitelligible at times. Pt inconsistent with following commands. Pt's caregiver is able to anticipate needs with patient communication via body language and use of gestures at times. SLP provided education regarding daily cognitive communication tasks and communication strategies. Provided online resource \"[facility]\" for dementia care tips. No further skilled ST indicated at this time. Pt daughter to contact pcp if patient has increased difficulty swallowing or s/s aspiration.     ALL NEEDS MET THIS VISIT. EVALUATION ONLY."

## 2025-01-17 ENCOUNTER — HOME CARE VISIT (OUTPATIENT)
Dept: HOME HEALTH SERVICES | Facility: HOME HEALTHCARE | Age: 80
End: 2025-01-17
Payer: COMMERCIAL

## 2025-01-20 ENCOUNTER — HOME CARE VISIT (OUTPATIENT)
Dept: HOME HEALTH SERVICES | Facility: HOME HEALTHCARE | Age: 80
End: 2025-01-20
Payer: MEDICARE

## 2025-01-20 VITALS
HEART RATE: 53 BPM | RESPIRATION RATE: 18 BRPM | DIASTOLIC BLOOD PRESSURE: 58 MMHG | TEMPERATURE: 98.3 F | SYSTOLIC BLOOD PRESSURE: 104 MMHG | OXYGEN SATURATION: 99 %

## 2025-01-20 PROCEDURE — G0299 HHS/HOSPICE OF RN EA 15 MIN: HCPCS

## 2025-01-28 ENCOUNTER — TELEPHONE (OUTPATIENT)
Dept: NEUROLOGY | Facility: CLINIC | Age: 80
End: 2025-01-28
Payer: MEDICARE

## 2025-01-28 NOTE — TELEPHONE ENCOUNTER
Spoke to daughter of pt (on verbal) in regards to r/s appt due to provider being out of office. Needed one sooner with Dr. Ley if possible. Had put pt on cancellation list, need to know if this patient can still be seen by [facility] or to schedule with Dr. Ley when available.

## 2025-02-04 ENCOUNTER — OFFICE VISIT (OUTPATIENT)
Dept: NEUROLOGY | Facility: CLINIC | Age: 80
End: 2025-02-04
Payer: MEDICARE

## 2025-02-04 VITALS
DIASTOLIC BLOOD PRESSURE: 82 MMHG | HEART RATE: 61 BPM | HEIGHT: 63 IN | BODY MASS INDEX: 25.34 KG/M2 | SYSTOLIC BLOOD PRESSURE: 128 MMHG | OXYGEN SATURATION: 98 % | WEIGHT: 143 LBS

## 2025-02-04 DIAGNOSIS — R56.9 NEW ONSET SEIZURE: Primary | ICD-10-CM

## 2025-02-04 DIAGNOSIS — F03.90 DEMENTIA WITHOUT BEHAVIORAL DISTURBANCE: ICD-10-CM

## 2025-02-04 NOTE — PROGRESS NOTES
Chief Complaint   Patient presents with    Dementia without behavioral disturbance       Patient ID: Kassi Mathis is a 79 y.o. female.    HPI: I had the pleasure of seeing your patient again today.  As you may know she is an 79-year-old female here for the management of dementia.  She continues to have significant progression.  According to the patient's daughter who accompanies her here today she has had some issues with wandering at night.  Her primary care physician has started her on trazodone for sleep.  She does take some small naps during the day.  She denies any interval development of resting tremor.  No recent falls with head injury.  She was hospitalized not too long ago after experiencing a seizure.  Her daughter confirms that just prior to the seizure she was started on Ultram.  She does have a history of stroke she was started on Keppra 500 mg twice daily.  She has tolerated well.  No new symptoms from that perspective.  No new seizure-like activity.    The following portions of the patient's history were reviewed and updated as appropriate: allergies, current medications, past family history, past medical history, past social history, past surgical history and problem list.    Review of Systems   Constitutional:  Positive for fatigue.   Eyes:  Positive for visual disturbance.   Musculoskeletal:  Positive for gait problem (off balance).   Neurological:  Positive for speech difficulty and headaches. Negative for dizziness, tremors, seizures, syncope, facial asymmetry, weakness, light-headedness and numbness.   Psychiatric/Behavioral:  Positive for confusion, decreased concentration and sleep disturbance. Negative for agitation, behavioral problems, dysphoric mood, hallucinations, self-injury and suicidal ideas. The patient is not nervous/anxious and is not hyperactive.       I have reviewed the review of systems above performed by my medical assistant.      Vitals:    02/04/25 1052   BP: 128/82    Pulse: 61   SpO2: 98%       Neurological Exam  Mental Status  Awake and alert. Oriented only to person. Recalls 0 of 3 objects immediately. Speech is normal. Mixed aphasia present. Attention and concentration are normal. Fund of knowledge is abnormal.    Cranial Nerves  CN I: Sense of smell is normal.  CN II: Visual acuity is normal.  CN III, IV, VI: Extraocular movements intact bilaterally. Pupils equal round and reactive to light bilaterally.  CN V: Facial sensation is normal.  CN VII: Full and symmetric facial movement.  CN XI: Shoulder shrug strength is normal.  CN XII: Tongue midline without atrophy or fasciculations.    Motor  Normal muscle bulk throughout. No fasciculations present. Normal muscle tone. No abnormal involuntary movements. No pronator drift.                                             Right                     Left  Rhomboids                            5                          5  Infraspinatus                          5                          5  Supraspinatus                       5                          5  Deltoid                                   5                          5   Biceps                                   5                          5  Brachioradialis                      5                          5   Triceps                                  5                          5   Pronator                                5                          5   Supinator                              5                           5   Wrist flexor                            5                          5   Wrist extensor                       5                          5   Finger flexor                          5                          5   Finger extensor                     5                          5   Interossei                              5                          5   Abductor pollicis brevis         5                          5   Flexor pollicis brevis             5                           5   Opponens pollicis                 5                          5  Extensor digitorum               5                          5  Abductor digiti minimi           5                          5   Abdominal                            5                          5  Glutei                                    5                          5  Hip abductor                         5                          5  Hip adductor                         5                          5   Iliopsoas                               5                          5   Quadriceps                           5                          5   Hamstring                             5                          5   Gastrocnemius                     5                           5   Anterior tibialis                      5                          5   Posterior tibialis                    5                          5   Peroneal                               5                          5  Ankle dorsiflexor                   5                          5  Ankle plantar flexor              5                           5  Extensor hallucis longus      5                           5    Sensory  Sensation is intact to light touch, pinprick, vibration and proprioception in all four extremities.    Reflexes  Deep tendon reflexes are 2+ and symmetric in all four extremities.    Right pathological reflexes: Mica's absent.  Left pathological reflexes: Mica's absent.    Coordination    Finger-to-nose, rapid alternating movements and heel-to-shin normal bilaterally without dysmetria.    Gait  Normal casual, toe, heel and tandem gait.       Physical Exam  Vitals reviewed.   Constitutional:       General: She is awake.      Appearance: She is well-developed.   HENT:      Head: Normocephalic and atraumatic.   Eyes:      Extraocular Movements: Extraocular movements intact.      Pupils: Pupils are equal, round, and reactive to light.   Cardiovascular:      Rate and Rhythm:  Normal rate and regular rhythm.   Pulmonary:      Breath sounds: Normal breath sounds.   Musculoskeletal:         General: Normal range of motion.   Skin:     General: Skin is warm.   Neurological:      Mental Status: She is alert.      Coordination: Coordination is intact.      Deep Tendon Reflexes: Reflexes are normal and symmetric.   Psychiatric:         Speech: Speech normal.         Procedures    Assessment/Plan: We are going to continue her current medication regimen.  I am okay with the trazodone at night if it helps her sleep.  I did warn against frequent naps throughout the day or long-duration naps.  No need for follow-up studies at this time.  Continue Keppra as scheduled.  Continue both Aricept and Namenda as scheduled.  Will see her back in 6 months or sooner if needed.  A total of 45 minutes was spent face-to-face with the patient today.  Of that greater than 50% of this time was spent discussing signs and symptoms of dementia, seizures, patient education, plan of care and prognosis.         Diagnoses and all orders for this visit:    1. New onset seizure (Primary)    2. Dementia without behavioral disturbance           Randolph Ley II, MD

## 2025-02-06 RX ORDER — ATENOLOL 25 MG/1
12.5 TABLET ORAL DAILY
Qty: 45 TABLET | Refills: 1 | Status: SHIPPED | OUTPATIENT
Start: 2025-02-06

## 2025-02-18 DIAGNOSIS — E78.5 HYPERLIPIDEMIA, UNSPECIFIED HYPERLIPIDEMIA TYPE: ICD-10-CM

## 2025-02-18 DIAGNOSIS — E11.9 TYPE 2 DIABETES MELLITUS WITHOUT COMPLICATION, WITHOUT LONG-TERM CURRENT USE OF INSULIN: ICD-10-CM

## 2025-02-18 DIAGNOSIS — I10 PRIMARY HYPERTENSION: Primary | ICD-10-CM

## 2025-02-18 DIAGNOSIS — E53.8 B12 DEFICIENCY: ICD-10-CM

## 2025-02-22 LAB
ALBUMIN SERPL-MCNC: 3.6 G/DL (ref 3.5–5.2)
ALBUMIN/CREAT UR: 53 MG/G CREAT (ref 0–29)
ALBUMIN/GLOB SERPL: 1.5 G/DL
ALP SERPL-CCNC: 70 U/L (ref 39–117)
ALT SERPL-CCNC: 16 U/L (ref 1–33)
APPEARANCE UR: CLEAR
AST SERPL-CCNC: 15 U/L (ref 1–32)
BACTERIA #/AREA URNS HPF: NORMAL /HPF
BASOPHILS # BLD AUTO: 0.02 10*3/MM3 (ref 0–0.2)
BASOPHILS NFR BLD AUTO: 0.4 % (ref 0–1.5)
BILIRUB SERPL-MCNC: 0.4 MG/DL (ref 0–1.2)
BILIRUB UR QL STRIP: NEGATIVE
BUN SERPL-MCNC: 20 MG/DL (ref 8–23)
BUN/CREAT SERPL: 22 (ref 7–25)
CALCIUM SERPL-MCNC: 9.5 MG/DL (ref 8.6–10.5)
CASTS URNS MICRO: NORMAL
CHLORIDE SERPL-SCNC: 104 MMOL/L (ref 98–107)
CHOLEST SERPL-MCNC: 195 MG/DL (ref 0–200)
CO2 SERPL-SCNC: 27.4 MMOL/L (ref 22–29)
COLOR UR: YELLOW
CREAT SERPL-MCNC: 0.91 MG/DL (ref 0.57–1)
CREAT UR-MCNC: 64.5 MG/DL
EGFRCR SERPLBLD CKD-EPI 2021: 63.9 ML/MIN/1.73
EOSINOPHIL # BLD AUTO: 0.04 10*3/MM3 (ref 0–0.4)
EOSINOPHIL NFR BLD AUTO: 0.7 % (ref 0.3–6.2)
EPI CELLS #/AREA URNS HPF: NORMAL /HPF
ERYTHROCYTE [DISTWIDTH] IN BLOOD BY AUTOMATED COUNT: 14.1 % (ref 12.3–15.4)
GLOBULIN SER CALC-MCNC: 2.4 GM/DL
GLUCOSE SERPL-MCNC: 200 MG/DL (ref 65–99)
GLUCOSE UR QL STRIP: ABNORMAL
HBA1C MFR BLD: 8.1 % (ref 4.8–5.6)
HCT VFR BLD AUTO: 39.1 % (ref 34–46.6)
HDLC SERPL-MCNC: 61 MG/DL (ref 40–60)
HGB BLD-MCNC: 11.8 G/DL (ref 12–15.9)
HGB UR QL STRIP: NEGATIVE
IMM GRANULOCYTES # BLD AUTO: 0.02 10*3/MM3 (ref 0–0.05)
IMM GRANULOCYTES NFR BLD AUTO: 0.4 % (ref 0–0.5)
KETONES UR QL STRIP: NEGATIVE
LDLC SERPL CALC-MCNC: 117 MG/DL (ref 0–100)
LEUKOCYTE ESTERASE UR QL STRIP: NEGATIVE
LYMPHOCYTES # BLD AUTO: 1.77 10*3/MM3 (ref 0.7–3.1)
LYMPHOCYTES NFR BLD AUTO: 32.2 % (ref 19.6–45.3)
MCH RBC QN AUTO: 27.2 PG (ref 26.6–33)
MCHC RBC AUTO-ENTMCNC: 30.2 G/DL (ref 31.5–35.7)
MCV RBC AUTO: 90.1 FL (ref 79–97)
MICROALBUMIN UR-MCNC: 34 UG/ML
MONOCYTES # BLD AUTO: 0.34 10*3/MM3 (ref 0.1–0.9)
MONOCYTES NFR BLD AUTO: 6.2 % (ref 5–12)
NEUTROPHILS # BLD AUTO: 3.3 10*3/MM3 (ref 1.7–7)
NEUTROPHILS NFR BLD AUTO: 60.1 % (ref 42.7–76)
NITRITE UR QL STRIP: NEGATIVE
NRBC BLD AUTO-RTO: 0 /100 WBC (ref 0–0.2)
PH UR STRIP: 6 [PH] (ref 5–8)
PLATELET # BLD AUTO: 281 10*3/MM3 (ref 140–450)
POTASSIUM SERPL-SCNC: 4.2 MMOL/L (ref 3.5–5.2)
PROT SERPL-MCNC: 6 G/DL (ref 6–8.5)
PROT UR QL STRIP: NEGATIVE
RBC # BLD AUTO: 4.34 10*6/MM3 (ref 3.77–5.28)
RBC #/AREA URNS HPF: NORMAL /HPF
SODIUM SERPL-SCNC: 143 MMOL/L (ref 136–145)
SP GR UR STRIP: 1.03 (ref 1–1.03)
TRIGL SERPL-MCNC: 93 MG/DL (ref 0–150)
TSH SERPL DL<=0.005 MIU/L-ACNC: 1.31 UIU/ML (ref 0.27–4.2)
UROBILINOGEN UR STRIP-MCNC: ABNORMAL MG/DL
VIT B12 SERPL-MCNC: 1278 PG/ML (ref 211–946)
VLDLC SERPL CALC-MCNC: 17 MG/DL (ref 5–40)
WBC # BLD AUTO: 5.49 10*3/MM3 (ref 3.4–10.8)
WBC #/AREA URNS HPF: NORMAL /HPF

## 2025-02-28 ENCOUNTER — OFFICE VISIT (OUTPATIENT)
Dept: INTERNAL MEDICINE | Facility: CLINIC | Age: 80
End: 2025-02-28
Payer: MEDICARE

## 2025-02-28 VITALS
BODY MASS INDEX: 25.52 KG/M2 | DIASTOLIC BLOOD PRESSURE: 70 MMHG | OXYGEN SATURATION: 91 % | HEART RATE: 60 BPM | WEIGHT: 144 LBS | RESPIRATION RATE: 12 BRPM | SYSTOLIC BLOOD PRESSURE: 140 MMHG | HEIGHT: 63 IN

## 2025-02-28 DIAGNOSIS — F03.918 DEMENTIA WITH BEHAVIORAL DISTURBANCE: ICD-10-CM

## 2025-02-28 DIAGNOSIS — Z00.00 WELL ADULT EXAM: ICD-10-CM

## 2025-02-28 DIAGNOSIS — Z00.00 MEDICARE ANNUAL WELLNESS VISIT, SUBSEQUENT: Primary | ICD-10-CM

## 2025-02-28 DIAGNOSIS — E78.5 HYPERLIPIDEMIA, UNSPECIFIED HYPERLIPIDEMIA TYPE: ICD-10-CM

## 2025-02-28 DIAGNOSIS — I10 PRIMARY HYPERTENSION: ICD-10-CM

## 2025-02-28 DIAGNOSIS — E11.9 TYPE 2 DIABETES MELLITUS WITHOUT COMPLICATION, WITHOUT LONG-TERM CURRENT USE OF INSULIN: ICD-10-CM

## 2025-02-28 RX ORDER — FUROSEMIDE 20 MG/1
TABLET ORAL
COMMUNITY
Start: 2025-02-28

## 2025-02-28 NOTE — ASSESSMENT & PLAN NOTE
Discussed importance of preventative care including vaccinations, age appropriate cancer screening, routine lab work, healthy diet, and active lifestyle.  Dm-2.  Control is fair.  The patient is advised to continue current dosage of metformin.    HTN. Control is fair.  The patient is advised to continue current dosage of atenolol and amlodipine.    HLD. Control is good.  The patient is advised to continue current dosage of Crestor.  Dementia without behavioral disturbance.  Progressing.  Continue current dose of Aricept.

## 2025-02-28 NOTE — PATIENT INSTRUCTIONS
Medicare Wellness  Personal Prevention Plan of Service     Date of Office Visit:    Encounter Provider:  Parul Jaime MD  Place of Service:  [facility]  Patient Name: Kassi Mathis  :  1945    As part of the Medicare Wellness portion of your visit today, we are providing you with this personalized preventive plan of services (PPPS). This plan is based upon recommendations of the [place] Preventive Services Task Force (USPSTF) and the Advisory Committee on Immunization Practices (ACIP).    This lists the preventive care services that should be considered, and provides dates of when you are due. Items listed as completed are up-to-date and do not require any further intervention.    Health Maintenance   Topic Date Due    DXA SCAN  2021    DIABETIC EYE EXAM  2024    ANNUAL WELLNESS VISIT  2024    BMI FOLLOWUP  2024    ZOSTER VACCINE (3 of 3) 2025    HEMOGLOBIN A1C  2025    COLORECTAL CANCER SCREENING  2025    LIPID PANEL  2026    URINE MICROALBUMIN-CREATININE RATIO (uACR)  2026    TDAP/TD VACCINES (2 - Td or Tdap) 2034    COVID-19 Vaccine  Completed    RSV Vaccine - Adults  Completed    INFLUENZA VACCINE  Completed    Pneumococcal Vaccine 50+  Completed    MAMMOGRAM  Discontinued       No orders of the defined types were placed in this encounter.      No follow-ups on file.        Fall Prevention in the Home, Adult  Falls can cause injuries and affect people of all ages. There are many simple things that you can do to make your home safe and to help prevent falls.  If you need it, ask for help making these changes.  What actions can I take to prevent falls?  General information  Use good lighting in all rooms. Make sure to:  Replace any light bulbs that burn out.  Turn on lights if it is dark and use night-lights.  Keep items that you use often in easy-to-reach places. Lower the shelves around your home if  needed.  Move furniture so that there are clear paths around it.  Do not keep throw rugs or other things on the floor that can make you trip.  If any of your floors are uneven, fix them.  Add color or contrast paint or tape to clearly jorge and help you see:  Grab bars or handrails.  First and last steps of staircases.  Where the edge of each step is.  If you use a ladder or stepladder:  Make sure that it is fully opened. Do not climb a closed ladder.  Make sure the sides of the ladder are locked in place.  Have someone hold the ladder while you use it.  Know where your pets are as you move through your home.  What can I do in the bathroom?         Keep the floor dry. Clean up any water that is on the floor right away.  Remove soap buildup in the bathtub or shower. Buildup makes bathtubs and showers slippery.  Use non-skid mats or decals on the floor of the bathtub or shower.  Attach bath mats securely with double-sided, non-slip rug tape.  If you need to sit down while you are in the shower, use a non-slip stool.  Install grab bars by the toilet and in the bathtub and shower. Do not use towel bars as grab bars.  What can I do in the bedroom?  Make sure that you have a light by your bed that is easy to reach.  Do not use any sheets or blankets on your bed that hang to the floor.  Have a firm bench or chair with side arms that you can use for support when you get dressed.  What can I do in the kitchen?  Clean up any spills right away.  If you need to reach something above you, use a sturdy step stool that has a grab bar.  Keep electrical cables out of the way.  Do not use floor polish or wax that makes floors slippery.  What can I do with my stairs?  Do not leave anything on the stairs.  Make sure that you have a light switch at the top and the bottom of the stairs. Have them installed if you do not have them.  Make sure that there are handrails on both sides of the stairs. Fix handrails that are broken or loose. Make  sure that handrails are as long as the staircases.  Install non-slip stair treads on all stairs in your home if they do not have carpet.  Avoid having throw rugs at the top or bottom of stairs, or secure the rugs with carpet tape to prevent them from moving.  Choose a carpet design that does not hide the edge of steps on the stairs. Make sure that carpet is firmly attached to the stairs. Fix any carpet that is loose or worn.  What can I do on the outside of my home?  Use bright outdoor lighting.  Repair the edges of walkways and driveways and fix any cracks. Clear paths of anything that can make you trip, such as tools or rocks.  Add color or contrast paint or tape to clearly jorge and help you see high doorway thresholds.  Trim any bushes or trees on the main path into your home.  Check that handrails are securely fastened and in good repair. Both sides of all steps should have handrails.  Install guardrails along the edges of any raised decks or porches.  Have leaves, snow, and ice cleared regularly. Use sand, salt, or ice melt on walkways during winter months if you live where there is ice and snow.  In the garage, clean up any spills right away, including grease or oil spills.  What other actions can I take?  Review your medicines with your health care provider. Some medicines can make you confused or feel dizzy. This can increase your chance of falling.  Wear closed-toe shoes that fit well and support your feet. Wear shoes that have rubber soles and low heels.  Use a cane, walker, scooter, or crutches that help you move around if needed.  Talk with your provider about other ways that you can decrease your risk of falls. This may include seeing a physical therapist to learn to do exercises to improve movement and strength.  Where to find more information  Centers for Disease Control and Prevention, [place]: cdc.gov  [place] on Aging: tad.nih.gov  [place] on Aging: tad.nih.gov  Contact a  health care provider if:  You are afraid of falling at home.  You feel weak, drowsy, or dizzy at home.  You fall at home.  Get help right away if you:  Lose consciousness or have trouble moving after a fall.  Have a fall that causes a head injury.  These symptoms may be an emergency. Get help right away. Call 911.  Do not wait to see if the symptoms will go away.  Do not drive yourself to the hospital.  This information is not intended to replace advice given to you by your health care provider. Make sure you discuss any questions you have with your health care provider.  Document Revised: 08/21/2023 Document Reviewed: 08/21/2023  [employer] Patient Education © 2024 [employer]

## 2025-02-28 NOTE — PROGRESS NOTES
RM:________     PCP: Parul Jaime MD    : 1945  AGE: 79 y.o.  EST PATIENT     REASON FOR VISIT/  CC:        BP Readings from Last 3 Encounters:   25 140/70   25 128/82   25 104/58      Wt Readings from Last 3 Encounters:   25 65.3 kg (144 lb)   25 64.9 kg (143 lb)   24 78.2 kg (172 lb 6.4 oz)        WT: ____________ BP: __________L __________R HR______    CHEST PAIN: _____________    SOA: _____________PALPS: _______________     LIGHTHEADED: ___________FATIGUE: ________________ EDEMA __________    ALLERGIES:Enalapril, Memantine hcl, Enalapril maleate, Penicillins, and Tramadol SMOKING HISTORY:  Social History     Tobacco Use    Smoking status: Former     Current packs/day: 0.50     Types: Cigarettes     Passive exposure: Past    Smokeless tobacco: Never    Tobacco comments:     Quit over 30 yrs ago   Vaping Use    Vaping status: Never Used   Substance Use Topics    Alcohol use: Never     Comment: CAFFEINE USE/ SOFT DRINKS.     Drug use: Never     CAFFEINE USE_________________  ALCOHOL ______________________

## 2025-02-28 NOTE — PROGRESS NOTES
Subjective   The ABCs of the Annual Wellness Visit  Medicare Wellness Visit      Kassi Mathis is a 79 y.o. patient who presents for a Medicare Wellness Visit.    The following portions of the patient's history were reviewed and   updated as appropriate: allergies, current medications, past family history, past medical history, past social history, past surgical history, and problem list.    Compared to one year ago, the patient's physical   health is worse.  Compared to one year ago, the patient's mental   health is worse.    Recent Hospitalizations:  This patient has had a [facility] admission record on file within the last 365 days.  Current Medical Providers:  Patient Care Team:  Parul Jaime MD as PCP - General (Internal Medicine)  Mauro Edward OD (Optometry)  Mode Gongora MD as Consulting Physician (Cardiology)    Outpatient Medications Prior to Visit   Medication Sig Dispense Refill    acetaminophen (TYLENOL) 325 MG tablet Take 2 tablets by mouth Every 6 (Six) Hours As Needed for Mild Pain.      amLODIPine (NORVASC) 5 MG tablet TAKE 1 TABLET BY MOUTH DAILY 90 tablet 3    atenolol (TENORMIN) 25 MG tablet TAKE 1/2 TABLET BY MOUTH DAILY 45 tablet 1    donepezil (ARICEPT) 10 MG tablet Take 1 tablet by mouth Daily. 90 tablet 3    furosemide (LASIX) 20 MG tablet 1/2 tab daily  Indications: Edema      glucose blood test strip Use daily for type 2 DM. 100 each 3    Jardiance 10 MG tablet tablet TAKE ONE TABLET BY MOUTH DAILY 90 tablet 7    levETIRAcetam (Keppra) 100 MG/ML solution Take 5 mL by mouth 2 (Two) Times a Day. 900 mL 0    MAGIC MOUTHWASH W/NYSTATIN 1/1/1/1 (lidocaine - diphenhydrAMINE HCl - aluminum & magnesium hydroxide - nystatin) Swish and spit 2 mL 3 times a day. Indications: thrush      melatonin 5 MG tablet tablet Take 1 tablet by mouth Every Night. Indications: Trouble Sleeping      metFORMIN (GLUCOPHAGE) 500 MG tablet TAKE 1/2 TABLET BY MOUTH TWICE A DAY WITH MEALS 90 tablet 3     Multiple Vitamin (MULTIVITAMINS PO) Take 1 tablet by mouth Daily Indications: supplement       potassium chloride (KLOR-CON M10) 10 MEQ CR tablet Take 1 tablet by mouth Daily. Indications: Low Amount of Potassium in the Blood 90 tablet 3    rosuvastatin (CRESTOR) 20 MG tablet TAKE 1 TABLET BY MOUTH EVERY NIGHT AT BEDTIME 90 tablet 0    sertraline (ZOLOFT) 100 MG tablet Take 1 tablet by mouth Daily. Indications: Major Depressive Disorder      traZODone (DESYREL) 50 MG tablet Take 1 tablet by mouth At Night As Needed for Sleep. 1/2 tab so only 25mg  Indications: Major Depressive Disorder      vitamin B-12 (CYANOCOBALAMIN) 1000 MCG tablet Take 1 tablet by mouth Daily. Indications: Inadequate Vitamin B12      furosemide (LASIX) 20 MG tablet Take 1 tablet by mouth Daily. 1/2 tab daily      vitamin C (ASCORBIC ACID) 250 MG tablet Take 1 tablet by mouth Daily. Indications: Inadequate Vitamin C (Patient not taking: Reported on 2/4/2025)       No facility-administered medications prior to visit.     No opioid medication identified on active medication list. I have reviewed chart for other potential  high risk medication/s and harmful drug interactions in the elderly.      Aspirin is not on active medication list.  Aspirin use is not indicated based on review of current medical condition/s. Risk of harm outweighs potential benefits.  .    Patient Active Problem List   Diagnosis    Depression    Allergic rhinitis    Hypertension    Dementia with behavioral disturbance    Paroxysmal atrial fibrillation    B12 deficiency    Type 2 diabetes mellitus    Hyperlipidemia    Hammer toes of both feet    Cervical radiculopathy    Osteoarthritis of right knee    At high risk for falls    Chronic diastolic congestive heart failure    Bradycardia, sinus    Acquired bilateral hammer toes    Diabetic polyneuropathy associated with type 2 diabetes mellitus    New onset seizure    Effusion of right knee    Oral phase dysphagia    DNR (do not  "resuscitate)               Objective   Vitals:    25 1306   BP: 140/70   BP Location: Left arm   Patient Position: Sitting   Cuff Size: Adult   Pulse: 60   Resp: 12   SpO2: 91%   Weight: 65.3 kg (144 lb)   Height: 160 cm (63\")       Estimated body mass index is 25.51 kg/m² as calculated from the following:    Height as of this encounter: 160 cm (63\").    Weight as of this encounter: 65.3 kg (144 lb).    BMI is >= 25 and <30. (Overweight) The following options were offered after discussion;: weight loss educational material (shared in after visit summary)           Does the patient have evidence of cognitive impairment? No  Lab Results   Component Value Date    CHLPL 195 2025    TRIG 93 2025    HDL 61 (H) 2025     (H) 2025    VLDL 17 2025    HGBA1C 8.10 (H) 2025    HGBA1C 8.10 (H) 2024                                                                                                Health  Risk Assessment    Smoking Status:  Social History     Tobacco Use   Smoking Status Former    Current packs/day: 0.50    Types: Cigarettes    Passive exposure: Past   Smokeless Tobacco Never   Tobacco Comments    Quit over 30 yrs ago     Alcohol Consumption:  Social History     Substance and Sexual Activity   Alcohol Use Never    Comment: CAFFEINE USE/ SOFT DRINKS.        Fall Risk Screen  STEADI Fall Risk Assessment was completed, and patient is at MODERATE risk for falls. Assessment completed on:2025    Depression Screening   Little interest or pleasure in doing things? Not at all   Feeling down, depressed, or hopeless? Not at all   PHQ-2 Total Score 0      Health Habits and Functional and Cognitive Screenin/27/2025     7:23 PM   Functional & Cognitive Status   Do you have difficulty preparing food and eating? Yes    Do you have difficulty bathing yourself, getting dressed or grooming yourself? Yes    Do you have difficulty using the toilet? Yes    Do you have " difficulty moving around from place to place? Yes    Do you have trouble with steps or getting out of a bed or a chair? Yes    Current Diet Well Balanced Diet    Dental Exam Up to date    Eye Exam Up to date    Exercise (times per week) 0 times per week    Current Exercises Include No Regular Exercise    Do you need help using the phone?  Yes    Are you deaf or do you have serious difficulty hearing?  No    Do you need help to go to places out of walking distance? Yes    Do you need help shopping? Yes    Do you need help preparing meals?  Yes    Do you need help with housework?  Yes    Do you need help with laundry? Yes    Do you need help taking your medications? Yes    Do you need help managing money? Yes    Do you ever drive or ride in a car without wearing a seat belt? No    Have you felt unusual stress, anger or loneliness in the last month? No    Who do you live with? Child    If you need help, do you have trouble finding someone available to you? No    Have you been bothered in the last four weeks by sexual problems? No    Do you have difficulty concentrating, remembering or making decisions? Yes        Patient-reported           Age-appropriate Screening Schedule:  Refer to the list below for future screening recommendations based on patient's age, sex and/or medical conditions. Orders for these recommended tests are listed in the plan section. The patient has been provided with a written plan.    Health Maintenance List  Health Maintenance   Topic Date Due    DXA SCAN  09/16/2021    DIABETIC EYE EXAM  06/14/2024    ANNUAL WELLNESS VISIT  12/20/2024    BMI FOLLOWUP  12/20/2024    ZOSTER VACCINE (3 of 3) 04/06/2025    HEMOGLOBIN A1C  08/21/2025    COLORECTAL CANCER SCREENING  11/11/2025    LIPID PANEL  02/21/2026    URINE MICROALBUMIN-CREATININE RATIO (uACR)  02/21/2026    TDAP/TD VACCINES (2 - Td or Tdap) 03/24/2034    COVID-19 Vaccine  Completed    RSV Vaccine - Adults  Completed    INFLUENZA VACCINE   "Completed    Pneumococcal Vaccine 50+  Completed    MAMMOGRAM  Discontinued                                                                                                                                                CMS Preventative Services Quick Reference  Risk Factors Identified During Encounter  Fall Risk-High or Moderate: Discussed Fall Prevention in the home    The above risks/problems have been discussed with the patient.  Pertinent information has been shared with the patient in the After Visit Summary.  An After Visit Summary and PPPS were made available to the patient.    Follow Up:   Next Medicare Wellness visit to be scheduled in 1 year.         Additional E&M Note during same encounter follows:  Patient has additional, significant, and separately identifiable condition(s)/problem(s) that require work above and beyond the Medicare Wellness Visit     Chief Complaint  Medicare Wellness-subsequent    Subjective   HPI  Kassi is also being seen today for an annual adult preventative physical exam.     DM-2.  Fair control. HTN.  Fair control. HLD.  LDL cholesterol is under good control.    Dementia is progressing.     Review of Systems   Respiratory: Negative.     Cardiovascular: Negative.    Neurological:  Positive for confusion.   Psychiatric/Behavioral:  Positive for agitation and behavioral problems.               Objective   Vital Signs:  /70 (BP Location: Left arm, Patient Position: Sitting, Cuff Size: Adult)   Pulse 60   Resp 12   Ht 160 cm (63\")   Wt 65.3 kg (144 lb)   SpO2 91%   BMI 25.51 kg/m²   Physical Exam  Constitutional:       Appearance: She is well-developed.   HENT:      Head: Normocephalic and atraumatic.   Cardiovascular:      Rate and Rhythm: Normal rate and regular rhythm.      Heart sounds: Normal heart sounds.   Pulmonary:      Effort: Pulmonary effort is normal.      Breath sounds: Normal breath sounds.   Skin:     General: Skin is warm and dry.   Neurological:      " Mental Status: She is alert and oriented to person, place, and time.   Psychiatric:         Behavior: Behavior normal.         The following data was reviewed by: Parul Jaime MD on 02/28/2025:    Common labs          12/12/2024    04:48 12/13/2024    06:26 2/21/2025    11:14   Common Labs   Glucose 124  127  200    BUN 11  10  20    Creatinine 0.59  0.68  0.91    Sodium 142  143  143    Potassium 3.1  3.6  4.2    Chloride 106  108  104    Calcium 8.7  8.4  9.5    Albumin   3.6    Total Bilirubin   0.4    Alkaline Phosphatase   70    AST (SGOT)   15    ALT (SGPT)   16    WBC 9.33  7.95  5.49    Hemoglobin 10.1  10.6  11.8    Hematocrit 30.9  32.2  39.1    Platelets 257  250  281    Total Cholesterol   195    Triglycerides   93    HDL Cholesterol   61    LDL Cholesterol    117    Hemoglobin A1C 8.10   8.10    Microalbumin, Urine   34.0              Assessment and Plan Additional age appropriate preventative wellness advice topics were discussed during today's preventative wellness exam(some topics already addressed during AWV portion of the note above):   Fall prevention           Medicare annual wellness visit, subsequent         Well adult exam         Type 2 diabetes mellitus without complication, without long-term current use of insulin           Primary hypertension           Hyperlipidemia, unspecified hyperlipidemia type            Dementia with behavioral disturbance         Discussed importance of preventative care including vaccinations, age appropriate cancer screening, routine lab work, healthy diet, and active lifestyle.  Dm-2.  Control is fair.  The patient is advised to continue current dosage of metformin.    HTN. Control is fair.  The patient is advised to continue current dosage of atenolol and amlodipine.    HLD. Control is good.  The patient is advised to continue current dosage of Crestor.  Dementia without behavioral disturbance.  Progressing.  Continue current dose of Aricept.             Follow Up   No follow-ups on file.  Patient was given instructions and counseling regarding her condition or for health maintenance advice. Please see specific information pulled into the AVS if appropriate.

## 2025-03-03 ENCOUNTER — OFFICE VISIT (OUTPATIENT)
Dept: CARDIOLOGY | Facility: CLINIC | Age: 80
End: 2025-03-03
Payer: MEDICARE

## 2025-03-03 VITALS
SYSTOLIC BLOOD PRESSURE: 140 MMHG | DIASTOLIC BLOOD PRESSURE: 60 MMHG | HEIGHT: 63 IN | HEART RATE: 62 BPM | WEIGHT: 148 LBS | BODY MASS INDEX: 26.22 KG/M2

## 2025-03-03 DIAGNOSIS — I10 PRIMARY HYPERTENSION: ICD-10-CM

## 2025-03-03 DIAGNOSIS — R00.1 BRADYCARDIA, SINUS: ICD-10-CM

## 2025-03-03 DIAGNOSIS — I48.0 PAROXYSMAL ATRIAL FIBRILLATION: Primary | ICD-10-CM

## 2025-03-03 PROCEDURE — 99213 OFFICE O/P EST LOW 20 MIN: CPT | Performed by: INTERNAL MEDICINE

## 2025-03-03 PROCEDURE — 1160F RVW MEDS BY RX/DR IN RCRD: CPT | Performed by: INTERNAL MEDICINE

## 2025-03-03 PROCEDURE — 1159F MED LIST DOCD IN RCRD: CPT | Performed by: INTERNAL MEDICINE

## 2025-03-03 PROCEDURE — 3077F SYST BP >= 140 MM HG: CPT | Performed by: INTERNAL MEDICINE

## 2025-03-03 PROCEDURE — 3078F DIAST BP <80 MM HG: CPT | Performed by: INTERNAL MEDICINE

## 2025-03-03 NOTE — PROGRESS NOTES
Date of Office Visit: 2025  Encounter Provider: Mode Gongora MD  Place of Service: [facility] CARDIOLOGY  Patient Name: Kassi Mathis  :1945    Chief Complaint   Patient presents with    Atrial Fibrillation   :     HPI: Kassi Mahtis is a 79 y.o. female who presents today in follow up. I have reviewed prior notes and there are no changes except for any new updates described below. I have also reviewed any information entered into the medical record by the patient or by ancillary staff.     She has a long-standing history of HTN. She has paroxysmal atrial fibrillation.  She has a history of mild sinus bradycardia as well. She has chronic diastolic CHF/grade 2 diastolic dysfunction.    She still lives at home with her , and her children are very involved in her care.  She has dementia which is progressive. I saw her in 2023; her children noted significantly worsening fatigue. Her heart rate was consistently in the 40s at home. I decreased her atenolol dose. We put a rhythm monitor on her but she was unable to tolerate it very well due to her dementia. However, she symptomatically improved.  The data we did receive from the monitor showed a 12% burden of PACs.    For the most part, her CV status is stable.  Her daughter regularly checks her heart rate at home. She had several really bad falls over the last year, with several facial/head injuries. We had to stop apixaban.    Past Medical History:   Diagnosis Date    Allergic rhinitis     Anemia     Anxiety     Cataract     Chronic venous insufficiency     Dementia     Depression     Diabetes mellitus     Hidradenitis suppurativa     History of transfusion     Hyperlipidemia     Hypertension     Low back pain     Osteoarthritis     Paroxysmal atrial fibrillation     Peripheral neuropathy     Pulmonary arterial hypertension     Stroke     Visual impairment     Vitamin B 12 deficiency     Boarderline only with  normal MMA/Homocysteine/IF antibody       Past Surgical History:   Procedure Laterality Date    BACK SURGERY  2007    COLONOSCOPY      HYSTERECTOMY      PARTIAL    JOINT REPLACEMENT      Knee replacement    KNEE SURGERY Left     SPINE SURGERY  2010    SUBTOTAL HYSTERECTOMY      TONSILLECTOMY         Social History     Socioeconomic History    Marital status:    Tobacco Use    Smoking status: Former     Current packs/day: 0.50     Types: Cigarettes     Passive exposure: Past    Smokeless tobacco: Never    Tobacco comments:     Quit over 30 yrs ago   Vaping Use    Vaping status: Never Used   Substance and Sexual Activity    Alcohol use: Never     Comment: CAFFEINE USE/ SOFT DRINKS.     Drug use: Never    Sexual activity: Not Currently     Partners: Male       Family History   Problem Relation Age of Onset    Breast cancer Mother     Depression Mother     Cancer Mother         Breast Ca    Stroke Father     Alcohol abuse Father     Stroke Maternal Grandmother     Heart attack Maternal Grandmother     Heart disease Maternal Grandmother     Heart attack Maternal Grandfather     Cancer Maternal Grandfather         Throat cancer    Hyperlipidemia Daughter     Diabetes Daughter     Hypertension Daughter     Liver disease Son     Alcohol abuse Son              Early death Son     Hypertension Son      Review of Systems   Reason unable to perform ROS: telephone/dementia.   Constitutional: Positive for malaise/fatigue.   Cardiovascular:  Negative for chest pain, leg swelling and palpitations.   Neurological:  Positive for excessive daytime sleepiness.   Psychiatric/Behavioral:  Positive for memory loss.    All other systems reviewed and are negative.      Allergies   Allergen Reactions    Enalapril Swelling     vasotec tabs    Memantine Hcl Other (See Comments)     imbalance      Enalapril Maleate Swelling    Penicillins Swelling     Beta lactam allergy details  Antibiotic reaction: (!) shortness of breath,  other (Swelling)  Age at reaction: adult  Dose to reaction time: unknown  Reason for antibiotic: unknown  Epinephrine required for reaction?: unknown  Tolerated antibiotics: unknown       Tramadol Seizure         Current Outpatient Medications:     acetaminophen (TYLENOL) 325 MG tablet, Take 2 tablets by mouth Every 6 (Six) Hours As Needed for Mild Pain., Disp: , Rfl:     amLODIPine (NORVASC) 5 MG tablet, TAKE 1 TABLET BY MOUTH DAILY, Disp: 90 tablet, Rfl: 3    atenolol (TENORMIN) 25 MG tablet, TAKE 1/2 TABLET BY MOUTH DAILY, Disp: 45 tablet, Rfl: 1    donepezil (ARICEPT) 10 MG tablet, Take 1 tablet by mouth Daily. (Patient taking differently: Take 1 tablet by mouth Every Night. Indications: Dementia due to Vascular Disease), Disp: 90 tablet, Rfl: 3    furosemide (LASIX) 20 MG tablet, 1/2 tab daily  Indications: Edema, Disp: , Rfl:     glucose blood test strip, Use daily for type 2 DM., Disp: 100 each, Rfl: 3    Jardiance 10 MG tablet tablet, TAKE ONE TABLET BY MOUTH DAILY, Disp: 90 tablet, Rfl: 7    levETIRAcetam (Keppra) 100 MG/ML solution, Take 5 mL by mouth 2 (Two) Times a Day., Disp: 900 mL, Rfl: 0    MAGIC MOUTHWASH W/NYSTATIN 1/1/1/1 (lidocaine - diphenhydrAMINE HCl - aluminum & magnesium hydroxide - nystatin), Swish and spit 2 mL 3 times a day. Indications: thrush, Disp: , Rfl:     melatonin 5 MG tablet tablet, Take 1 tablet by mouth Every Night. Indications: Trouble Sleeping, Disp: , Rfl:     metFORMIN (GLUCOPHAGE) 500 MG tablet, TAKE 1/2 TABLET BY MOUTH TWICE A DAY WITH MEALS (Patient taking differently: 1 tablet Every Night. Indications: Type 2 Diabetes), Disp: 90 tablet, Rfl: 3    Multiple Vitamin (MULTIVITAMINS PO), Take 1 tablet by mouth Daily Indications: supplement , Disp: , Rfl:     potassium chloride (KLOR-CON M10) 10 MEQ CR tablet, Take 1 tablet by mouth Daily. Indications: Low Amount of Potassium in the Blood, Disp: 90 tablet, Rfl: 3    rosuvastatin (CRESTOR) 20 MG tablet, TAKE 1 TABLET BY MOUTH  "EVERY NIGHT AT BEDTIME, Disp: 90 tablet, Rfl: 0    sertraline (ZOLOFT) 100 MG tablet, Take 1 tablet by mouth Daily. Indications: Major Depressive Disorder, Disp: , Rfl:     traZODone (DESYREL) 50 MG tablet, Take 1 tablet by mouth At Night As Needed for Sleep. 1/2 tab so only 25mg  Indications: Major Depressive Disorder, Disp: , Rfl:     vitamin B-12 (CYANOCOBALAMIN) 1000 MCG tablet, Take 1 tablet by mouth Daily. Indications: Inadequate Vitamin B12, Disp: , Rfl:      Objective:     Vitals:    03/03/25 1347   BP: 140/60   BP Location: Right arm   Pulse: 62   Weight: 67.1 kg (148 lb)   Height: 160 cm (63\")           Body mass index is 26.22 kg/m².    Physical Exam  Vitals reviewed.   HENT:      Head: Normocephalic.      Nose: Nose normal.      Mouth/Throat:      Pharynx: Oropharynx is clear.      Comments: Poor dentition  Eyes:      Conjunctiva/sclera: Conjunctivae normal.   Cardiovascular:      Rate and Rhythm: Normal rate and regular rhythm.      Pulses: Normal pulses.      Heart sounds: Normal heart sounds.   Pulmonary:      Effort: Pulmonary effort is normal.      Breath sounds: Normal breath sounds.   Abdominal:      Palpations: Abdomen is soft.      Tenderness: There is no abdominal tenderness.   Musculoskeletal:         General: No swelling. Normal range of motion.      Cervical back: Normal range of motion.   Skin:     General: Skin is warm and dry.   Neurological:      Mental Status: She is alert. She is disoriented.   Psychiatric:         Mood and Affect: Mood normal.         Cognition and Memory: Memory is impaired.       Procedures  EKG --   I have personally reviewed EKG on 12/13/2025 and my interpretation of the tracing is as follows: SR, LVH, no change    Assessment:       Diagnosis Plan   1. Paroxysmal atrial fibrillation        2. Bradycardia, sinus        3. Primary hypertension             Plan:        Atrial Fibrillation and Atrial Flutter  Assessment   The patient has paroxysmal atrial " fibrillation   This is non-valvular in etiology   The patient's CHADS2-VASc score is 8   A [id number] score of 2 or more is considered a high risk for a thromboembolic event    Plan   Attempt to maintain sinus rhythm    Her dementia is progressing. She has had several bad falls. She is no longer anticoagulated.    Her sinus bradycardia appears to be stable. I do not see a need for a device at this time.    Her BP is within goal for age/dementia.    Sincerely,       Mode Gongora MD

## 2025-03-03 NOTE — LETTER
March 3, 2025     Parul Jaime MD  [address]  [place] [id number]    Patient: Kassi Mathis   YOB: 1945   Date of Visit: 3/3/2025       Dear Parul Jaime MD    Kassi Mathis was in my office today. Below is a copy of my note.    If you have questions, please do not hesitate to call me. I look forward to following Kassi along with you.         Sincerely,        Mode Gongora MD        CC: No Recipients    Date of Office Visit: 2025  Encounter Provider: Mode Gongora MD  Place of Service: [facility] CARDIOLOGY  Patient Name: Kassi Mathis  :1945    Chief Complaint   Patient presents with   • Atrial Fibrillation   :     HPI: Kassi Mathis is a 79 y.o. female who presents today in follow up. I have reviewed prior notes and there are no changes except for any new updates described below. I have also reviewed any information entered into the medical record by the patient or by ancillary staff.     She has a long-standing history of HTN. She has paroxysmal atrial fibrillation.  She has a history of mild sinus bradycardia as well. She has chronic diastolic CHF/grade 2 diastolic dysfunction.    She still lives at home with her , and her children are very involved in her care.  She has dementia which is progressive. I saw her in 2023; her children noted significantly worsening fatigue. Her heart rate was consistently in the 40s at home. I decreased her atenolol dose. We put a rhythm monitor on her but she was unable to tolerate it very well due to her dementia. However, she symptomatically improved.  The data we did receive from the monitor showed a 12% burden of PACs.    For the most part, her CV status is stable.  Her daughter regularly checks her heart rate at home. She had several really bad falls over the last year, with several facial/head injuries. We had to stop apixaban.    Past Medical History:   Diagnosis Date   •  Allergic rhinitis    • Anemia    • Anxiety    • Cataract    • Chronic venous insufficiency    • Dementia    • Depression    • Diabetes mellitus    • Hidradenitis suppurativa    • History of transfusion    • Hyperlipidemia    • Hypertension    • Low back pain    • Osteoarthritis    • Paroxysmal atrial fibrillation    • Peripheral neuropathy    • Pulmonary arterial hypertension    • Stroke    • Visual impairment    • Vitamin B 12 deficiency     Boarderline only with normal MMA/Homocysteine/IF antibody       Past Surgical History:   Procedure Laterality Date   • BACK SURGERY     • COLONOSCOPY     • HYSTERECTOMY      PARTIAL   • JOINT REPLACEMENT      Knee replacement   • KNEE SURGERY Left    • SPINE SURGERY     • SUBTOTAL HYSTERECTOMY     • TONSILLECTOMY         Social History     Socioeconomic History   • Marital status:    Tobacco Use   • Smoking status: Former     Current packs/day: 0.50     Types: Cigarettes     Passive exposure: Past   • Smokeless tobacco: Never   • Tobacco comments:     Quit over 30 yrs ago   Vaping Use   • Vaping status: Never Used   Substance and Sexual Activity   • Alcohol use: Never     Comment: CAFFEINE USE/ SOFT DRINKS.    • Drug use: Never   • Sexual activity: Not Currently     Partners: Male       Family History   Problem Relation Age of Onset   • Breast cancer Mother    • Depression Mother    • Cancer Mother         Breast Ca   • Stroke Father    • Alcohol abuse Father    • Stroke Maternal Grandmother    • Heart attack Maternal Grandmother    • Heart disease Maternal Grandmother    • Heart attack Maternal Grandfather    • Cancer Maternal Grandfather         Throat cancer   • Hyperlipidemia Daughter    • Diabetes Daughter    • Hypertension Daughter    • Liver disease Son    • Alcohol abuse Son             • Early death Son    • Hypertension Son      Review of Systems   Reason unable to perform ROS: telephone/dementia.   Constitutional: Positive for  malaise/fatigue.   Cardiovascular:  Negative for chest pain, leg swelling and palpitations.   Neurological:  Positive for excessive daytime sleepiness.   Psychiatric/Behavioral:  Positive for memory loss.    All other systems reviewed and are negative.      Allergies   Allergen Reactions   • Enalapril Swelling     vasotec tabs   • Memantine Hcl Other (See Comments)     imbalance     • Enalapril Maleate Swelling   • Penicillins Swelling     Beta lactam allergy details  Antibiotic reaction: (!) shortness of breath, other (Swelling)  Age at reaction: adult  Dose to reaction time: unknown  Reason for antibiotic: unknown  Epinephrine required for reaction?: unknown  Tolerated antibiotics: unknown      • Tramadol Seizure         Current Outpatient Medications:   •  acetaminophen (TYLENOL) 325 MG tablet, Take 2 tablets by mouth Every 6 (Six) Hours As Needed for Mild Pain., Disp: , Rfl:   •  amLODIPine (NORVASC) 5 MG tablet, TAKE 1 TABLET BY MOUTH DAILY, Disp: 90 tablet, Rfl: 3  •  atenolol (TENORMIN) 25 MG tablet, TAKE 1/2 TABLET BY MOUTH DAILY, Disp: 45 tablet, Rfl: 1  •  donepezil (ARICEPT) 10 MG tablet, Take 1 tablet by mouth Daily. (Patient taking differently: Take 1 tablet by mouth Every Night. Indications: Dementia due to Vascular Disease), Disp: 90 tablet, Rfl: 3  •  furosemide (LASIX) 20 MG tablet, 1/2 tab daily  Indications: Edema, Disp: , Rfl:   •  glucose blood test strip, Use daily for type 2 DM., Disp: 100 each, Rfl: 3  •  Jardiance 10 MG tablet tablet, TAKE ONE TABLET BY MOUTH DAILY, Disp: 90 tablet, Rfl: 7  •  levETIRAcetam (Keppra) 100 MG/ML solution, Take 5 mL by mouth 2 (Two) Times a Day., Disp: 900 mL, Rfl: 0  •  MAGIC MOUTHWASH W/NYSTATIN 1/1/1/1 (lidocaine - diphenhydrAMINE HCl - aluminum & magnesium hydroxide - nystatin), Swish and spit 2 mL 3 times a day. Indications: thrush, Disp: , Rfl:   •  melatonin 5 MG tablet tablet, Take 1 tablet by mouth Every Night. Indications: Trouble Sleeping, Disp: ,  "Rfl:   •  metFORMIN (GLUCOPHAGE) 500 MG tablet, TAKE 1/2 TABLET BY MOUTH TWICE A DAY WITH MEALS (Patient taking differently: 1 tablet Every Night. Indications: Type 2 Diabetes), Disp: 90 tablet, Rfl: 3  •  Multiple Vitamin (MULTIVITAMINS PO), Take 1 tablet by mouth Daily Indications: supplement , Disp: , Rfl:   •  potassium chloride (KLOR-CON M10) 10 MEQ CR tablet, Take 1 tablet by mouth Daily. Indications: Low Amount of Potassium in the Blood, Disp: 90 tablet, Rfl: 3  •  rosuvastatin (CRESTOR) 20 MG tablet, TAKE 1 TABLET BY MOUTH EVERY NIGHT AT BEDTIME, Disp: 90 tablet, Rfl: 0  •  sertraline (ZOLOFT) 100 MG tablet, Take 1 tablet by mouth Daily. Indications: Major Depressive Disorder, Disp: , Rfl:   •  traZODone (DESYREL) 50 MG tablet, Take 1 tablet by mouth At Night As Needed for Sleep. 1/2 tab so only 25mg  Indications: Major Depressive Disorder, Disp: , Rfl:   •  vitamin B-12 (CYANOCOBALAMIN) 1000 MCG tablet, Take 1 tablet by mouth Daily. Indications: Inadequate Vitamin B12, Disp: , Rfl:     Objective:     Vitals:    03/03/25 1347   BP: 140/60   BP Location: Right arm   Pulse: 62   Weight: 67.1 kg (148 lb)   Height: 160 cm (63\")           Body mass index is 26.22 kg/m².    Physical Exam  Vitals reviewed.   HENT:      Head: Normocephalic.      Nose: Nose normal.      Mouth/Throat:      Pharynx: Oropharynx is clear.      Comments: Poor dentition  Eyes:      Conjunctiva/sclera: Conjunctivae normal.   Cardiovascular:      Rate and Rhythm: Normal rate and regular rhythm.      Pulses: Normal pulses.      Heart sounds: Normal heart sounds.   Pulmonary:      Effort: Pulmonary effort is normal.      Breath sounds: Normal breath sounds.   Abdominal:      Palpations: Abdomen is soft.      Tenderness: There is no abdominal tenderness.   Musculoskeletal:         General: No swelling. Normal range of motion.      Cervical back: Normal range of motion.   Skin:     General: Skin is warm and dry.   Neurological:      Mental " Status: She is alert. She is disoriented.   Psychiatric:         Mood and Affect: Mood normal.         Cognition and Memory: Memory is impaired.       Procedures  EKG --   I have personally reviewed EKG on 12/13/2025 and my interpretation of the tracing is as follows: SR, LVH, no change    Assessment:       Diagnosis Plan   1. Paroxysmal atrial fibrillation        2. Bradycardia, sinus        3. Primary hypertension            Plan:       Atrial Fibrillation and Atrial Flutter  Assessment  • The patient has paroxysmal atrial fibrillation  • This is non-valvular in etiology  • The patient's CHADS2-VASc score is 8  • A [id number] score of 2 or more is considered a high risk for a thromboembolic event    Plan  • Attempt to maintain sinus rhythm    Her dementia is progressing. She has had several bad falls. She is no longer anticoagulated.    Her sinus bradycardia appears to be stable. I do not see a need for a device at this time.    Her BP is within goal for age/dementia.    Sincerely,       Mode Gongora MD

## 2025-03-25 ENCOUNTER — OFFICE VISIT (OUTPATIENT)
Dept: INTERNAL MEDICINE | Facility: CLINIC | Age: 80
End: 2025-03-25
Payer: MEDICARE

## 2025-03-25 VITALS
BODY MASS INDEX: 26.22 KG/M2 | HEART RATE: 79 BPM | SYSTOLIC BLOOD PRESSURE: 144 MMHG | DIASTOLIC BLOOD PRESSURE: 90 MMHG | WEIGHT: 148 LBS | HEIGHT: 63 IN | OXYGEN SATURATION: 98 %

## 2025-03-25 DIAGNOSIS — F03.918 DEMENTIA WITH BEHAVIORAL DISTURBANCE: ICD-10-CM

## 2025-03-25 DIAGNOSIS — J20.8 ACUTE BRONCHITIS DUE TO OTHER SPECIFIED ORGANISMS: Primary | ICD-10-CM

## 2025-03-25 RX ORDER — AZITHROMYCIN 200 MG/5ML
POWDER, FOR SUSPENSION ORAL
Qty: 60 ML | Refills: 0 | Status: SHIPPED | OUTPATIENT
Start: 2025-03-25 | End: 2025-03-25

## 2025-03-25 RX ORDER — ROSUVASTATIN CALCIUM 20 MG/1
20 TABLET, COATED ORAL
Qty: 90 TABLET | Refills: 0 | Status: SHIPPED | OUTPATIENT
Start: 2025-03-25

## 2025-03-25 RX ORDER — AZITHROMYCIN 100 MG/5ML
POWDER, FOR SUSPENSION ORAL
Qty: 60 ML | Refills: 0 | Status: SHIPPED | OUTPATIENT
Start: 2025-03-25

## 2025-03-25 RX ORDER — DEXTROMETHORPHAN HYDROBROMIDE AND PROMETHAZINE HYDROCHLORIDE 15; 6.25 MG/5ML; MG/5ML
5 SYRUP ORAL
Qty: 118 ML | Refills: 0 | Status: SHIPPED | OUTPATIENT
Start: 2025-03-25

## 2025-03-25 NOTE — PROGRESS NOTES
Subjective     Kassi Mathis is a 79 y.o. female who presents with   Chief Complaint   Patient presents with    Cough    Nasal Congestion    URI       History of Present Illness     Cough with congestion.  No SOA.  Low graded fever.  Using Dayquil.  Cough has persisted.      Review of Systems   HENT:  Positive for rhinorrhea.    Respiratory:  Positive for cough.    Cardiovascular:  Positive for chest pain.   Neurological:  Positive for headaches.       The following portions of the patient's history were reviewed and updated as appropriate: allergies, current medications and problem list.    Patient Active Problem List    Diagnosis Date Noted    Type 2 diabetes mellitus 01/17/2016     Priority: High    DNR (do not resuscitate) 12/20/2024    Oral phase dysphagia 12/12/2024    New onset seizure 12/11/2024    Effusion of right knee 12/11/2024    Acquired bilateral hammer toes 01/30/2024    Diabetic polyneuropathy associated with type 2 diabetes mellitus 01/30/2024    Bradycardia, sinus 09/08/2022    Chronic diastolic congestive heart failure 08/09/2022    At high risk for falls 09/29/2021    Osteoarthritis of right knee 09/08/2020    Cervical radiculopathy 10/08/2018    Hammer toes of both feet 10/17/2017    Depression 01/17/2016    Allergic rhinitis 01/17/2016    Hypertension 01/17/2016    Dementia with behavioral disturbance 01/17/2016     Note Last Updated: 7/20/2016     By neuropysch evaluation 7/20/2016      Paroxysmal atrial fibrillation 01/17/2016    B12 deficiency 01/17/2016     Note Last Updated: 6/8/2016     Boarderline only with normal MMA/Homocysteine/IF antibody.  Encouraged MVI daily.        Hyperlipidemia 01/17/2016       Current Outpatient Medications on File Prior to Visit   Medication Sig Dispense Refill    acetaminophen (TYLENOL) 325 MG tablet Take 2 tablets by mouth Every 6 (Six) Hours As Needed for Mild Pain.      amLODIPine (NORVASC) 5 MG tablet TAKE 1 TABLET BY MOUTH DAILY 90 tablet 3    atenolol  "(TENORMIN) 25 MG tablet TAKE 1/2 TABLET BY MOUTH DAILY 45 tablet 1    donepezil (ARICEPT) 10 MG tablet Take 1 tablet by mouth Daily. (Patient taking differently: Take 1 tablet by mouth Every Night. Indications: Dementia due to Vascular Disease) 90 tablet 3    furosemide (LASIX) 20 MG tablet 1/2 tab daily  Indications: Edema      glucose blood test strip Use daily for type 2 DM. 100 each 3    Jardiance 10 MG tablet tablet TAKE ONE TABLET BY MOUTH DAILY 90 tablet 7    levETIRAcetam (Keppra) 100 MG/ML solution Take 5 mL by mouth 2 (Two) Times a Day. 900 mL 0    MAGIC MOUTHWASH W/NYSTATIN 1/1/1/1 (lidocaine - diphenhydrAMINE HCl - aluminum & magnesium hydroxide - nystatin) Swish and spit 2 mL 3 times a day. Indications: thrush      melatonin 5 MG tablet tablet Take 1 tablet by mouth Every Night. Indications: Trouble Sleeping      metFORMIN (GLUCOPHAGE) 500 MG tablet TAKE 1/2 TABLET BY MOUTH TWICE A DAY WITH MEALS (Patient taking differently: 1 tablet Every Night. Indications: Type 2 Diabetes) 90 tablet 3    Multiple Vitamin (MULTIVITAMINS PO) Take 1 tablet by mouth Daily Indications: supplement       potassium chloride (KLOR-CON M10) 10 MEQ CR tablet Take 1 tablet by mouth Daily. Indications: Low Amount of Potassium in the Blood 90 tablet 3    sertraline (ZOLOFT) 100 MG tablet Take 1 tablet by mouth Daily. Indications: Major Depressive Disorder      traZODone (DESYREL) 50 MG tablet Take 1 tablet by mouth At Night As Needed for Sleep. 1/2 tab so only 25mg  Indications: Major Depressive Disorder      vitamin B-12 (CYANOCOBALAMIN) 1000 MCG tablet Take 1 tablet by mouth Daily. Indications: Inadequate Vitamin B12      [DISCONTINUED] rosuvastatin (CRESTOR) 20 MG tablet TAKE 1 TABLET BY MOUTH EVERY NIGHT AT BEDTIME 90 tablet 0     No current facility-administered medications on file prior to visit.       Objective     /90   Pulse 79   Ht 160 cm (62.99\")   Wt 67.1 kg (148 lb)   LMP  (LMP Unknown)   SpO2 98%   BMI " 26.22 kg/m²     Physical Exam  Constitutional:       Appearance: She is well-developed.   HENT:      Head: Normocephalic and atraumatic.      Right Ear: Hearing and tympanic membrane normal.      Left Ear: Hearing and tympanic membrane normal.      Mouth/Throat:      Pharynx: No oropharyngeal exudate or posterior oropharyngeal erythema.   Cardiovascular:      Rate and Rhythm: Normal rate and regular rhythm.      Heart sounds: Normal heart sounds.   Pulmonary:      Effort: Pulmonary effort is normal.      Breath sounds: Normal breath sounds.   Skin:     General: Skin is warm and dry.   Neurological:      Mental Status: She is alert and oriented to person, place, and time.   Psychiatric:         Behavior: Behavior normal.         Assessment & Plan   Diagnoses and all orders for this visit:    1. Acute bronchitis due to other specified organisms (Primary)    2. Dementia with behavioral disturbance  -     Ambulatory Referral to Social Care Services (Amb Case Mgmt)    Other orders  -     Discontinue: azithromycin (Zithromax) 200 MG/5ML suspension; Give the patient 672 mg (17 ml) by mouth the first day then 336 mg (8 ml) by mouth daily for 4 days.  Dispense: 60 mL; Refill: 0  -     promethazine-dextromethorphan (PROMETHAZINE-DM) 6.25-15 MG/5ML syrup; Take 5 mL by mouth every night at bedtime.  Dispense: 118 mL; Refill: 0  -     azithromycin (Zithromax) 100 MG/5ML suspension; Give the patient 500 mg  by mouth the first day then 250mg daily for 4 days.  Dispense: 60 mL; Refill: 0        Discussion    Patient presents with episode of acute bronchitis.  A prescription for antibiotics is provided today.  The patient is instructed to take along with Mucinex DM.  Let me know they are not feeling better over the next 3 days or if there is any change in symptoms.    Dementia with behavorial disturbance.  She is progressing.  She is needing help in the home.  Referral placed to  for advise for in home care.          Future Appointments   Date Time Provider Department Center   8/6/2025 12:20 PM Randolph Ley II, MD [facility]   9/8/2025 11:20 AM [facility]   9/15/2025  1:15 PM Parul Jaime MD [facility]   12/3/2025 11:00 AM Mode Gongora MD [id number]   2/24/2026 11:10 AM [facility]   3/3/2026  1:30 PM Parul Jaime MD [facility]

## 2025-03-26 ENCOUNTER — REFERRAL TRIAGE (OUTPATIENT)
Age: 80
End: 2025-03-26
Payer: MEDICARE

## 2025-03-28 ENCOUNTER — PATIENT OUTREACH (OUTPATIENT)
Age: 80
End: 2025-03-28
Payer: MEDICARE

## 2025-03-28 NOTE — OUTREACH NOTE
MSW received referral from primary care providers office for assistance with community resources. MSW outreach to patient's daughter by phone and left voicemail and call back number. MSW will continue to attempt outreach.    Sheron WAYNE -   Ambulatory Case Management    3/28/2025, 09:28 EDT

## 2025-03-31 DIAGNOSIS — F03.918 DEMENTIA WITH BEHAVIORAL DISTURBANCE: Primary | ICD-10-CM

## 2025-04-07 ENCOUNTER — PATIENT OUTREACH (OUTPATIENT)
Age: 80
End: 2025-04-07
Payer: MEDICARE

## 2025-04-07 NOTE — OUTREACH NOTE
Social Work Assessment  Questions/Answers      Flowsheet Row Most Recent Value   Referral Source physician, outpatient staff, outpatient clinic   Reason for Consult community resources, other (see comments), palliative care  [in home services]   Preferred Language English   Advance Care Planning Reviewed no concerns identified   People in Home child(tayler), adult, grandchild(tayler), spouse   Current Living Arrangements home   Potentially Unsafe Housing Conditions none   In the past 12 months has the electric, gas, oil, or water company threatened to shut off services in your home? No   Primary Care Provided by self, child(tayler)   Provides Primary Care For no one, unable/limited ability to care for self   Quality of Family Relationships helpful, involved, supportive   Employment Status retired   Source of Income unable to assess   Financial/Environmental Concerns none   Application for Public Assistance pending public assistance pending number   Spiritual, Cultural Beliefs, Ep[employer] Practices, Values that Affect Care no   Medications assistive person   Meal Preparation assistive person   Housekeeping assistive person   Laundry assistive person   Shopping assistive person   If for any reason you need help with day-to-day activities such as bathing, preparing meals, shopping, managing finances, etc., do you get the help you need? I could use a little more help   Feels Unsafe at Home or Work/School no   Feels Threatened by Someone no   Feels Unsafe at Home or Work/School no   Feels Threatened by Someone no   Major Change/Loss/Stressor medical condition/diagnosis   Sources of Support community support, adult child(tayler)   Do you want help finding or keeping work or a job? I do not need or want help   Do you want help with school or training? For example, starting or completing job training or getting a high school diploma, GED or equivalent No   Transportation Concerns none   Transportation Anticipated family or friend will  provide   Outpatient/Agency/Support Group Needs homecare agency          SDOH updated and reviewed with the patient during this program:  --     Disabilities: At Risk (4/7/2025)    Disabilities     Concentrating, Remembering, or Making Decisions Difficulty: yes     Doing Errands Independently Difficulty: yes      --     Employment: Not At Risk (4/7/2025)    Employment     Do you want help finding or keeping work or a job?: I do not need or want help      Financial Resource Strain: Low Risk  (4/7/2025)    Overall Financial Resource Strain (CARDIA)     Difficulty of Paying Living Expenses: Not very hard      --     Food Insecurity: No Food Insecurity (4/7/2025)    Hunger Vital Sign     Worried About Running Out of Food in the Last Year: Never true     Ran Out of Food in the Last Year: Never true      --     Health Literacy: Not At Risk (4/7/2025)    Education     Help with school or training?: No     Preferred Language: English   Recent Concern: Health Literacy - Inadequate Health Literacy (1/20/2025)    OASIS : Health Literacy     Frequency of needing help to read materials from doctor or pharmacy: Always      --     Housing Stability: Low Risk  (4/7/2025)    Housing Stability Vital Sign     Unable to Pay for Housing in the Last Year: No     Number of Times Moved in the Last Year: 0     Homeless in the Last Year: No      --     Interpersonal Safety: Not At Risk (4/7/2025)    Abuse Screen     Unsafe at Home or Work/School: no     Feels Threatened by Someone?: no     Does Anyone Keep You from Contacting Others or Doint Things Outside the Home?: no     Physical Sign of Abuse Present: no      --     Transportation Needs: No Transportation Needs (4/7/2025)    PRAPARE - Transportation     Lack of Transportation (Medical): No     Lack of Transportation (Non-Medical): No      --     Utilities: Not At Risk (4/7/2025)    [facility] Utilities     Threatened with loss of utilities: No      Continuing Care   [facility]    [address], [facility] [address]    Phone: [phone]    Request Status: Considering    Services: Transportation    Resource for: Transportation Needs   [facility] [place]    [address], [address], [facility] [address]    Phone: [phone]    Request Status: Considering    Services: Personal Care Services   [facility]    [address], [facility] [address]    Phone: [phone]    Request Status: Considering    Services: Palliative Care   [facility]    [address], [facility] [address]    Phone: [phone]    Request Status: Considering    Services: Personal Care Services     Patient Outreach    MSW outreach to patient as requested per primary care provider referral for assistance with community resource needs. Patient's daughter discussed the need for in home services for bathing and respite care services at home. Patient's daughter discussed patient is able to ambulate with assistance. Patient's daughter and MSW discussed options for in home care including private pay agencies, KIPDA services, and Medicaid waiver. MSW reviewed each programs availability, costs, and forms required. Patient's daughter discussed that due to wait lists for KIPDA and Medicaid waiver programs, she would prefer to proceed with private pay agencies. MSW has sent list of private care agencies to patient's daughter as requested via e-mail to [email or web address]. MSW has also provided information on [employer] for help line, educational materials, and support group for patient's daughter. Patient's daughter also discussed referral to [facility] and states she will return their call today. Patient's daughter thanked MSW for the assistance and stated there are no additional needs at this time.     Sheron WAYNE -   Ambulatory Case Management    4/7/2025, 10:30 EDT

## 2025-04-08 ENCOUNTER — TELEPHONE (OUTPATIENT)
Dept: INTERNAL MEDICINE | Facility: CLINIC | Age: 80
End: 2025-04-08
Payer: MEDICARE

## 2025-04-08 DIAGNOSIS — F03.918 DEMENTIA WITH BEHAVIORAL DISTURBANCE: Primary | ICD-10-CM

## 2025-04-08 DIAGNOSIS — E11.9 TYPE 2 DIABETES MELLITUS WITHOUT COMPLICATION, WITHOUT LONG-TERM CURRENT USE OF INSULIN: ICD-10-CM

## 2025-04-08 DIAGNOSIS — Z74.1 NEED FOR ASSISTANCE WITH PERSONAL CARE: ICD-10-CM

## 2025-04-08 DIAGNOSIS — Z74.2 NEED FOR HOME HEALTH CARE: ICD-10-CM

## 2025-04-08 DIAGNOSIS — F01.50 VASCULAR DEMENTIA, UNCOMPLICATED: ICD-10-CM

## 2025-04-08 DIAGNOSIS — I50.32 CHRONIC DIASTOLIC CONGESTIVE HEART FAILURE: ICD-10-CM

## 2025-04-08 DIAGNOSIS — F02.C11 SEVERE ALZHEIMER'S DEMENTIA WITH AGITATION, UNSPECIFIED TIMING OF DEMENTIA ONSET: ICD-10-CM

## 2025-04-08 DIAGNOSIS — G30.9 SEVERE ALZHEIMER'S DEMENTIA WITH AGITATION, UNSPECIFIED TIMING OF DEMENTIA ONSET: ICD-10-CM

## 2025-04-08 RX ORDER — SERTRALINE HYDROCHLORIDE 100 MG/1
150 TABLET, FILM COATED ORAL DAILY
Start: 2025-04-08

## 2025-05-05 RX ORDER — SERTRALINE HYDROCHLORIDE 100 MG/1
200 TABLET, FILM COATED ORAL DAILY
Qty: 180 TABLET | Refills: 1 | Status: SHIPPED | OUTPATIENT
Start: 2025-05-05

## 2025-05-27 RX ORDER — DEXTROMETHORPHAN HYDROBROMIDE AND PROMETHAZINE HYDROCHLORIDE 15; 6.25 MG/5ML; MG/5ML
SYRUP ORAL
Qty: 118 ML | Refills: 0 | Status: SHIPPED | OUTPATIENT
Start: 2025-05-27

## 2025-05-30 DIAGNOSIS — I10 ESSENTIAL HYPERTENSION: ICD-10-CM

## 2025-05-30 RX ORDER — AMLODIPINE BESYLATE 5 MG/1
5 TABLET ORAL DAILY
Qty: 90 TABLET | Refills: 3 | Status: SHIPPED | OUTPATIENT
Start: 2025-05-30

## 2025-06-23 RX ORDER — ROSUVASTATIN CALCIUM 20 MG/1
20 TABLET, COATED ORAL
Qty: 90 TABLET | Refills: 0 | Status: SHIPPED | OUTPATIENT
Start: 2025-06-23

## 2025-07-07 ENCOUNTER — OFFICE VISIT (OUTPATIENT)
Dept: INTERNAL MEDICINE | Facility: CLINIC | Age: 80
End: 2025-07-07
Payer: MEDICARE

## 2025-07-07 VITALS
WEIGHT: 148 LBS | SYSTOLIC BLOOD PRESSURE: 130 MMHG | BODY MASS INDEX: 26.22 KG/M2 | OXYGEN SATURATION: 98 % | HEART RATE: 54 BPM | DIASTOLIC BLOOD PRESSURE: 80 MMHG | HEIGHT: 63 IN

## 2025-07-07 DIAGNOSIS — F03.918 DEMENTIA WITH BEHAVIORAL DISTURBANCE: ICD-10-CM

## 2025-07-07 DIAGNOSIS — R10.2 SUPRAPUBIC PAIN: Primary | ICD-10-CM

## 2025-07-07 LAB
BILIRUB BLD-MCNC: NEGATIVE MG/DL
CLARITY, POC: CLEAR
COLOR UR: YELLOW
EXPIRATION DATE: ABNORMAL
GLUCOSE UR STRIP-MCNC: ABNORMAL MG/DL
KETONES UR QL: NEGATIVE
LEUKOCYTE EST, POC: NEGATIVE
Lab: ABNORMAL
NITRITE UR-MCNC: NEGATIVE MG/ML
PH UR: 5.5 [PH] (ref 5–8)
PROT UR STRIP-MCNC: NEGATIVE MG/DL
RBC # UR STRIP: NEGATIVE /UL
SP GR UR: 1.01
UROBILINOGEN UR QL: ABNORMAL

## 2025-07-07 PROCEDURE — 1126F AMNT PAIN NOTED NONE PRSNT: CPT | Performed by: INTERNAL MEDICINE

## 2025-07-07 PROCEDURE — 99213 OFFICE O/P EST LOW 20 MIN: CPT | Performed by: INTERNAL MEDICINE

## 2025-07-07 PROCEDURE — 1159F MED LIST DOCD IN RCRD: CPT | Performed by: INTERNAL MEDICINE

## 2025-07-07 PROCEDURE — 3079F DIAST BP 80-89 MM HG: CPT | Performed by: INTERNAL MEDICINE

## 2025-07-07 PROCEDURE — 1160F RVW MEDS BY RX/DR IN RCRD: CPT | Performed by: INTERNAL MEDICINE

## 2025-07-07 PROCEDURE — 3075F SYST BP GE 130 - 139MM HG: CPT | Performed by: INTERNAL MEDICINE

## 2025-07-07 PROCEDURE — 81003 URINALYSIS AUTO W/O SCOPE: CPT | Performed by: INTERNAL MEDICINE

## 2025-07-07 RX ORDER — SULFAMETHOXAZOLE AND TRIMETHOPRIM 200; 40 MG/5ML; MG/5ML
160 SUSPENSION ORAL 2 TIMES DAILY
Qty: 280 ML | Refills: 0 | Status: SHIPPED | OUTPATIENT
Start: 2025-07-07

## 2025-07-07 NOTE — PROGRESS NOTES
Subjective     Kassi Mathis is a 80 y.o. female who presents with   Chief Complaint   Patient presents with    Abdominal Pain       Abdominal Pain  Associated symptoms: no fever         C/o abdominal pain for one week.  Lower abdominal pain.  No fever.  No N/V.  No diarrhea.  No bleeding.  History obtained from her daughter.  Advanced dementia makes patient a poor historian.      Review of Systems   Constitutional:  Negative for fever.   Respiratory: Negative.     Cardiovascular: Negative.    Gastrointestinal:  Positive for abdominal pain.       The following portions of the patient's history were reviewed and updated as appropriate: allergies, current medications and problem list.    Patient Active Problem List    Diagnosis Date Noted    Type 2 diabetes mellitus 01/17/2016     Priority: High    DNR (do not resuscitate) 12/20/2024    Oral phase dysphagia 12/12/2024    New onset seizure 12/11/2024    Effusion of right knee 12/11/2024    Acquired bilateral hammer toes 01/30/2024    Diabetic polyneuropathy associated with type 2 diabetes mellitus 01/30/2024    Bradycardia, sinus 09/08/2022    Chronic diastolic congestive heart failure 08/09/2022    At high risk for falls 09/29/2021    Osteoarthritis of right knee 09/08/2020    Cervical radiculopathy 10/08/2018    Hammer toes of both feet 10/17/2017    Depression 01/17/2016    Allergic rhinitis 01/17/2016    Hypertension 01/17/2016    Dementia with behavioral disturbance 01/17/2016     Note Last Updated: 7/20/2016     By neuropysch evaluation 7/20/2016      Paroxysmal atrial fibrillation 01/17/2016    B12 deficiency 01/17/2016     Note Last Updated: 6/8/2016     Boarderline only with normal MMA/Homocysteine/IF antibody.  Encouraged MVI daily.        Hyperlipidemia 01/17/2016       Current Outpatient Medications on File Prior to Visit   Medication Sig Dispense Refill    acetaminophen (TYLENOL) 325 MG tablet Take 2 tablets by mouth Every 6 (Six) Hours As Needed for Mild  Pain.      amLODIPine (NORVASC) 5 MG tablet TAKE 1 TABLET BY MOUTH DAILY 90 tablet 3    atenolol (TENORMIN) 25 MG tablet TAKE 1/2 TABLET BY MOUTH DAILY 45 tablet 1    azithromycin (Zithromax) 100 MG/5ML suspension Give the patient 500 mg  by mouth the first day then 250mg daily for 4 days. 60 mL 0    donepezil (ARICEPT) 10 MG tablet Take 1 tablet by mouth Daily. (Patient taking differently: Take 1 tablet by mouth Every Night. Indications: Dementia due to Vascular Disease) 90 tablet 3    furosemide (LASIX) 20 MG tablet 1/2 tab daily  Indications: Edema      glucose blood test strip Use daily for type 2 DM. 100 each 3    Jardiance 10 MG tablet tablet TAKE ONE TABLET BY MOUTH DAILY 90 tablet 7    levETIRAcetam (Keppra) 100 MG/ML solution Take 5 mL by mouth 2 (Two) Times a Day. 900 mL 0    MAGIC MOUTHWASH W/NYSTATIN 1/1/1/1 (lidocaine - diphenhydrAMINE HCl - aluminum & magnesium hydroxide - nystatin) Swish and spit 2 mL 3 times a day. Indications: thrush      melatonin 5 MG tablet tablet Take 1 tablet by mouth Every Night. Indications: Trouble Sleeping      metFORMIN (GLUCOPHAGE) 500 MG tablet TAKE 1/2 TABLET BY MOUTH TWICE A DAY WITH MEALS (Patient taking differently: 1 tablet Every Night. Indications: Type 2 Diabetes) 90 tablet 3    Multiple Vitamin (MULTIVITAMINS PO) Take 1 tablet by mouth Daily Indications: supplement       potassium chloride (KLOR-CON M10) 10 MEQ CR tablet Take 1 tablet by mouth Daily. Indications: Low Amount of Potassium in the Blood 90 tablet 3    promethazine-dextromethorphan (PROMETHAZINE-DM) 6.25-15 MG/5ML syrup TAKE 5 MILLILITERS BY MOUTH EVERY NIGHT AT BEDTIME 118 mL 0    rosuvastatin (CRESTOR) 20 MG tablet TAKE 1 TABLET BY MOUTH EVERY NIGHT AT BEDTIME 90 tablet 0    sertraline (ZOLOFT) 100 MG tablet TAKE 2 TABLETS BY MOUTH DAILY 180 tablet 1    traZODone (DESYREL) 50 MG tablet Take 1 tablet by mouth At Night As Needed for Sleep. 1/2 tab so only 25mg  Indications: Major Depressive Disorder    "   vitamin B-12 (CYANOCOBALAMIN) 1000 MCG tablet Take 1 tablet by mouth Daily. Indications: Inadequate Vitamin B12       No current facility-administered medications on file prior to visit.       Objective     /80   Pulse 54   Ht 160 cm (62.99\")   Wt 67.1 kg (148 lb)   LMP  (LMP Unknown)   SpO2 98%   BMI 26.22 kg/m²     Physical Exam  Constitutional:       Appearance: She is well-developed.   HENT:      Head: Normocephalic and atraumatic.   Pulmonary:      Effort: Pulmonary effort is normal.   Abdominal:      General: There is no distension.      Palpations: Abdomen is soft. There is no mass.      Tenderness: There is no abdominal tenderness. There is no guarding or rebound.   Neurological:      Mental Status: She is alert and oriented to person, place, and time.   Psychiatric:         Behavior: Behavior normal.         Assessment & Plan   Diagnoses and all orders for this visit:    1. Suprapubic pain (Primary)  -     POC Urinalysis Dipstick, Automated  -     Urine Culture - Urine, Urine, Clean Catch    2. Dementia with behavioral disturbance    Other orders  -     sulfamethoxazole-trimethoprim (BACTRIM,SEPTRA) 200-40 MG/5ML suspension; Take 20 mL by mouth 2 (Two) Times a Day.  Dispense: 280 mL; Refill: 0  -     Famotidine 20mg/5mL suspension; Take 5 mL by mouth Daily.  Dispense: 450 mL; Refill: 3        Discussion    Patient presents with suprapubic pain and urinary frequency.  Urine dip is unremarkable.  Dementia makes history difficult.  Empirically treat for possibly UTI.  F/u on culture results.         Future Appointments   Date Time Provider Department Center   8/6/2025 12:20 PM Randolph Ley II, MD [facility]   9/8/2025 11:20 AM [facility]   9/15/2025  1:15 PM Parul Jaime MD [facility]   12/3/2025 11:00 AM Mode Gongora MD [id number]   2/24/2026 11:10 AM [facility]   3/3/2026  1:30 PM Parul Jaime MD [facility] " [facility]

## 2025-07-09 LAB
BACTERIA UR CULT: ABNORMAL
BACTERIA UR CULT: ABNORMAL

## 2025-07-14 RX ORDER — EMPAGLIFLOZIN 10 MG/1
10 TABLET, FILM COATED ORAL DAILY
Qty: 90 TABLET | Refills: 7 | Status: SHIPPED | OUTPATIENT
Start: 2025-07-14

## 2025-07-29 RX ORDER — DEXTROMETHORPHAN HYDROBROMIDE AND PROMETHAZINE HYDROCHLORIDE 15; 6.25 MG/5ML; MG/5ML
SYRUP ORAL
Qty: 118 ML | Refills: 0 | OUTPATIENT
Start: 2025-07-29

## 2025-08-04 RX ORDER — ATENOLOL 25 MG/1
TABLET ORAL
Qty: 45 TABLET | Refills: 2 | Status: SHIPPED | OUTPATIENT
Start: 2025-08-04

## 2025-08-04 RX ORDER — DEXTROMETHORPHAN HYDROBROMIDE AND PROMETHAZINE HYDROCHLORIDE 15; 6.25 MG/5ML; MG/5ML
SYRUP ORAL
Qty: 118 ML | Refills: 0 | Status: SHIPPED | OUTPATIENT
Start: 2025-08-04

## 2025-08-06 ENCOUNTER — TELEPHONE (OUTPATIENT)
Dept: NEUROLOGY | Facility: CLINIC | Age: 80
End: 2025-08-06

## 2025-08-06 ENCOUNTER — OFFICE VISIT (OUTPATIENT)
Dept: NEUROLOGY | Facility: CLINIC | Age: 80
End: 2025-08-06
Payer: MEDICARE

## 2025-08-06 VITALS
SYSTOLIC BLOOD PRESSURE: 154 MMHG | BODY MASS INDEX: 26.05 KG/M2 | HEART RATE: 56 BPM | DIASTOLIC BLOOD PRESSURE: 86 MMHG | WEIGHT: 147 LBS | OXYGEN SATURATION: 98 %

## 2025-08-06 DIAGNOSIS — Z87.898 HISTORY OF SEIZURE: ICD-10-CM

## 2025-08-06 DIAGNOSIS — F03.90 DEMENTIA WITHOUT BEHAVIORAL DISTURBANCE: Primary | ICD-10-CM

## 2025-08-08 ENCOUNTER — TELEPHONE (OUTPATIENT)
Dept: NEUROLOGY | Facility: CLINIC | Age: 80
End: 2025-08-08
Payer: MEDICARE

## 2025-08-12 DIAGNOSIS — F51.4 NIGHT TERRORS: Primary | ICD-10-CM

## 2025-08-12 DIAGNOSIS — M17.11 OSTEOARTHRITIS OF RIGHT KNEE, UNSPECIFIED OSTEOARTHRITIS TYPE: ICD-10-CM

## 2025-08-12 RX ORDER — CLONAZEPAM 0.5 MG/1
0.5 TABLET ORAL NIGHTLY
Qty: 30 TABLET | Refills: 0 | Status: SHIPPED | OUTPATIENT
Start: 2025-08-12 | End: 2025-09-11

## 2025-08-12 RX ORDER — LEVETIRACETAM 100 MG/ML
500 SOLUTION ORAL 2 TIMES DAILY
Qty: 900 ML | Refills: 0 | Status: SHIPPED | OUTPATIENT
Start: 2025-08-12

## 2025-08-19 ENCOUNTER — HOSPITAL ENCOUNTER (OUTPATIENT)
Dept: SPEECH THERAPY | Facility: HOSPITAL | Age: 80
Setting detail: THERAPIES SERIES
Discharge: HOME | End: 2025-08-19
Payer: MEDICARE

## 2025-08-19 DIAGNOSIS — R41.841 COGNITIVE COMMUNICATION DEFICIT: Primary | ICD-10-CM

## 2025-08-19 PROCEDURE — 92523 SPEECH SOUND LANG COMPREHEN: CPT | Performed by: SPEECH-LANGUAGE PATHOLOGIST
